# Patient Record
Sex: FEMALE | Race: WHITE | NOT HISPANIC OR LATINO
[De-identification: names, ages, dates, MRNs, and addresses within clinical notes are randomized per-mention and may not be internally consistent; named-entity substitution may affect disease eponyms.]

---

## 2017-01-12 ENCOUNTER — APPOINTMENT (OUTPATIENT)
Dept: UROGYNECOLOGY | Facility: CLINIC | Age: 82
End: 2017-01-12

## 2017-01-18 ENCOUNTER — APPOINTMENT (OUTPATIENT)
Dept: RHEUMATOLOGY | Facility: CLINIC | Age: 82
End: 2017-01-18

## 2017-01-18 ENCOUNTER — APPOINTMENT (OUTPATIENT)
Dept: RADIOLOGY | Facility: CLINIC | Age: 82
End: 2017-01-18

## 2017-01-18 ENCOUNTER — APPOINTMENT (OUTPATIENT)
Dept: INTERNAL MEDICINE | Facility: CLINIC | Age: 82
End: 2017-01-18

## 2017-01-18 ENCOUNTER — OUTPATIENT (OUTPATIENT)
Dept: OUTPATIENT SERVICES | Facility: HOSPITAL | Age: 82
LOS: 1 days | End: 2017-01-18
Payer: MEDICARE

## 2017-01-18 VITALS
DIASTOLIC BLOOD PRESSURE: 90 MMHG | HEIGHT: 62 IN | BODY MASS INDEX: 17.85 KG/M2 | WEIGHT: 97 LBS | SYSTOLIC BLOOD PRESSURE: 150 MMHG

## 2017-01-18 VITALS
BODY MASS INDEX: 17.48 KG/M2 | OXYGEN SATURATION: 98 % | HEIGHT: 62 IN | TEMPERATURE: 97.6 F | WEIGHT: 95 LBS | DIASTOLIC BLOOD PRESSURE: 100 MMHG | HEART RATE: 108 BPM | SYSTOLIC BLOOD PRESSURE: 150 MMHG

## 2017-01-18 DIAGNOSIS — Z00.00 ENCOUNTER FOR GENERAL ADULT MEDICAL EXAMINATION W/OUT ABNORMAL FINDINGS: ICD-10-CM

## 2017-01-18 DIAGNOSIS — Z00.8 ENCOUNTER FOR OTHER GENERAL EXAMINATION: ICD-10-CM

## 2017-01-18 PROCEDURE — 73130 X-RAY EXAM OF HAND: CPT

## 2017-01-18 PROCEDURE — 73110 X-RAY EXAM OF WRIST: CPT

## 2017-01-19 LAB
ALBUMIN SERPL ELPH-MCNC: 4 G/DL
ALP BLD-CCNC: 108 U/L
ALT SERPL-CCNC: 31 U/L
ANION GAP SERPL CALC-SCNC: 15 MMOL/L
AST SERPL-CCNC: 24 U/L
BASOPHILS # BLD AUTO: 0.02 K/UL
BASOPHILS NFR BLD AUTO: 0.3 %
BILIRUB SERPL-MCNC: 0.4 MG/DL
BUN SERPL-MCNC: 23 MG/DL
CALCIUM SERPL-MCNC: 10.5 MG/DL
CCP AB SER IA-ACNC: <8 UNITS
CHLORIDE SERPL-SCNC: 99 MMOL/L
CO2 SERPL-SCNC: 25 MMOL/L
CREAT SERPL-MCNC: 0.77 MG/DL
CRP SERPL-MCNC: 1.08 MG/DL
EOSINOPHIL # BLD AUTO: 0.21 K/UL
EOSINOPHIL NFR BLD AUTO: 2.7 %
ERYTHROCYTE [SEDIMENTATION RATE] IN BLOOD BY WESTERGREN METHOD: 39 MM/HR
GLUCOSE SERPL-MCNC: 203 MG/DL
HCT VFR BLD CALC: 43.9 %
HGB BLD-MCNC: 13.8 G/DL
IMM GRANULOCYTES NFR BLD AUTO: 0.4 %
LYMPHOCYTES # BLD AUTO: 1.83 K/UL
LYMPHOCYTES NFR BLD AUTO: 23.5 %
MAN DIFF?: NORMAL
MCHC RBC-ENTMCNC: 27.7 PG
MCHC RBC-ENTMCNC: 31.4 GM/DL
MCV RBC AUTO: 88.2 FL
MONOCYTES # BLD AUTO: 0.86 K/UL
MONOCYTES NFR BLD AUTO: 11.1 %
NEUTROPHILS # BLD AUTO: 4.83 K/UL
NEUTROPHILS NFR BLD AUTO: 62 %
PLATELET # BLD AUTO: 255 K/UL
POTASSIUM SERPL-SCNC: 4.3 MMOL/L
PROT SERPL-MCNC: 8.3 G/DL
RBC # BLD: 4.98 M/UL
RBC # FLD: 13.2 %
RF+CCP IGG SER-IMP: NEGATIVE
RHEUMATOID FACT SER QL: 13.4 IU/ML
SODIUM SERPL-SCNC: 139 MMOL/L
WBC # FLD AUTO: 7.78 K/UL

## 2017-01-21 LAB
HBA1C MFR BLD HPLC: 8.7 %
T4 SERPL-MCNC: 9.2 UG/DL
TSH SERPL-ACNC: 2.48 UIU/ML

## 2017-01-23 ENCOUNTER — CLINICAL ADVICE (OUTPATIENT)
Age: 82
End: 2017-01-23

## 2017-01-24 ENCOUNTER — APPOINTMENT (OUTPATIENT)
Dept: UROGYNECOLOGY | Facility: CLINIC | Age: 82
End: 2017-01-24

## 2017-02-02 ENCOUNTER — APPOINTMENT (OUTPATIENT)
Dept: INTERNAL MEDICINE | Facility: CLINIC | Age: 82
End: 2017-02-02

## 2017-02-13 ENCOUNTER — APPOINTMENT (OUTPATIENT)
Dept: ENDOCRINOLOGY | Facility: CLINIC | Age: 82
End: 2017-02-13

## 2017-02-13 VITALS
SYSTOLIC BLOOD PRESSURE: 150 MMHG | BODY MASS INDEX: 17.66 KG/M2 | WEIGHT: 96 LBS | OXYGEN SATURATION: 99 % | DIASTOLIC BLOOD PRESSURE: 90 MMHG | HEART RATE: 109 BPM | HEIGHT: 62 IN

## 2017-02-13 RX ORDER — DENOSUMAB 60 MG/ML
60 INJECTION SUBCUTANEOUS
Qty: 1 | Refills: 0 | Status: COMPLETED | OUTPATIENT
Start: 2017-02-13

## 2017-02-13 RX ADMIN — DENOSUMAB 0 MG/ML: 60 INJECTION SUBCUTANEOUS at 00:00

## 2017-03-07 ENCOUNTER — APPOINTMENT (OUTPATIENT)
Dept: UROGYNECOLOGY | Facility: CLINIC | Age: 82
End: 2017-03-07

## 2017-03-17 ENCOUNTER — MEDICATION RENEWAL (OUTPATIENT)
Age: 82
End: 2017-03-17

## 2017-03-30 ENCOUNTER — APPOINTMENT (OUTPATIENT)
Dept: RHEUMATOLOGY | Facility: CLINIC | Age: 82
End: 2017-03-30

## 2017-03-30 ENCOUNTER — APPOINTMENT (OUTPATIENT)
Dept: INTERNAL MEDICINE | Facility: CLINIC | Age: 82
End: 2017-03-30

## 2017-03-30 VITALS
OXYGEN SATURATION: 98 % | TEMPERATURE: 97.9 F | BODY MASS INDEX: 17.66 KG/M2 | WEIGHT: 96 LBS | HEIGHT: 62 IN | DIASTOLIC BLOOD PRESSURE: 90 MMHG | HEART RATE: 108 BPM | SYSTOLIC BLOOD PRESSURE: 160 MMHG

## 2017-04-20 ENCOUNTER — MESSAGE (OUTPATIENT)
Age: 82
End: 2017-04-20

## 2017-04-21 ENCOUNTER — APPOINTMENT (OUTPATIENT)
Dept: ENDOCRINOLOGY | Facility: CLINIC | Age: 82
End: 2017-04-21

## 2017-04-21 ENCOUNTER — EMERGENCY (EMERGENCY)
Facility: HOSPITAL | Age: 82
LOS: 1 days | Discharge: HOME | End: 2017-04-21
Attending: EMERGENCY MEDICINE | Admitting: EMERGENCY MEDICINE
Payer: MEDICARE

## 2017-04-21 VITALS
HEIGHT: 62 IN | OXYGEN SATURATION: 98 % | SYSTOLIC BLOOD PRESSURE: 210 MMHG | HEART RATE: 113 BPM | BODY MASS INDEX: 18.03 KG/M2 | WEIGHT: 98 LBS | DIASTOLIC BLOOD PRESSURE: 100 MMHG

## 2017-04-21 VITALS
RESPIRATION RATE: 18 BRPM | SYSTOLIC BLOOD PRESSURE: 185 MMHG | DIASTOLIC BLOOD PRESSURE: 91 MMHG | OXYGEN SATURATION: 98 % | HEART RATE: 98 BPM

## 2017-04-21 DIAGNOSIS — R29.810 FACIAL WEAKNESS: ICD-10-CM

## 2017-04-21 LAB
ALBUMIN SERPL ELPH-MCNC: 3.6 G/DL — SIGNIFICANT CHANGE UP (ref 3.3–5)
ALP SERPL-CCNC: 73 U/L — SIGNIFICANT CHANGE UP (ref 40–120)
ALT FLD-CCNC: 23 U/L RC — SIGNIFICANT CHANGE UP (ref 10–45)
ANION GAP SERPL CALC-SCNC: 13 MMOL/L — SIGNIFICANT CHANGE UP (ref 5–17)
APTT BLD: 28.4 SEC — SIGNIFICANT CHANGE UP (ref 27.5–37.4)
AST SERPL-CCNC: 19 U/L — SIGNIFICANT CHANGE UP (ref 10–40)
BASOPHILS # BLD AUTO: 0 K/UL — SIGNIFICANT CHANGE UP (ref 0–0.2)
BASOPHILS NFR BLD AUTO: 0.1 % — SIGNIFICANT CHANGE UP (ref 0–2)
BILIRUB SERPL-MCNC: 0.4 MG/DL — SIGNIFICANT CHANGE UP (ref 0.2–1.2)
BUN SERPL-MCNC: 19 MG/DL — SIGNIFICANT CHANGE UP (ref 7–23)
CALCIUM SERPL-MCNC: 9.7 MG/DL — SIGNIFICANT CHANGE UP (ref 8.4–10.5)
CHLORIDE SERPL-SCNC: 101 MMOL/L — SIGNIFICANT CHANGE UP (ref 96–108)
CO2 SERPL-SCNC: 24 MMOL/L — SIGNIFICANT CHANGE UP (ref 22–31)
CREAT SERPL-MCNC: 0.73 MG/DL — SIGNIFICANT CHANGE UP (ref 0.5–1.3)
EOSINOPHIL # BLD AUTO: 0.1 K/UL — SIGNIFICANT CHANGE UP (ref 0–0.5)
EOSINOPHIL NFR BLD AUTO: 1.7 % — SIGNIFICANT CHANGE UP (ref 0–6)
GAS PNL BLDV: SIGNIFICANT CHANGE UP
GLUCOSE BLDC GLUCOMTR-MCNC: 251
GLUCOSE SERPL-MCNC: 268 MG/DL — HIGH (ref 70–99)
HBA1C MFR BLD HPLC: 7.9
HCT VFR BLD CALC: 39.7 % — SIGNIFICANT CHANGE UP (ref 34.5–45)
HGB BLD-MCNC: 13 G/DL — SIGNIFICANT CHANGE UP (ref 11.5–15.5)
INR BLD: 1.16 RATIO — SIGNIFICANT CHANGE UP (ref 0.88–1.16)
LYMPHOCYTES # BLD AUTO: 1.6 K/UL — SIGNIFICANT CHANGE UP (ref 1–3.3)
LYMPHOCYTES # BLD AUTO: 24.6 % — SIGNIFICANT CHANGE UP (ref 13–44)
MCHC RBC-ENTMCNC: 28.2 PG — SIGNIFICANT CHANGE UP (ref 27–34)
MCHC RBC-ENTMCNC: 32.8 GM/DL — SIGNIFICANT CHANGE UP (ref 32–36)
MCV RBC AUTO: 85.9 FL — SIGNIFICANT CHANGE UP (ref 80–100)
MONOCYTES # BLD AUTO: 0.7 K/UL — SIGNIFICANT CHANGE UP (ref 0–0.9)
MONOCYTES NFR BLD AUTO: 9.8 % — SIGNIFICANT CHANGE UP (ref 2–14)
NEUTROPHILS # BLD AUTO: 4.3 K/UL — SIGNIFICANT CHANGE UP (ref 1.8–7.4)
NEUTROPHILS NFR BLD AUTO: 63.8 % — SIGNIFICANT CHANGE UP (ref 43–77)
PLATELET # BLD AUTO: 233 K/UL — SIGNIFICANT CHANGE UP (ref 150–400)
POTASSIUM SERPL-MCNC: 4.2 MMOL/L — SIGNIFICANT CHANGE UP (ref 3.5–5.3)
POTASSIUM SERPL-SCNC: 4.2 MMOL/L — SIGNIFICANT CHANGE UP (ref 3.5–5.3)
PROT SERPL-MCNC: 8.1 G/DL — SIGNIFICANT CHANGE UP (ref 6–8.3)
PROTHROM AB SERPL-ACNC: 12.6 SEC — SIGNIFICANT CHANGE UP (ref 9.8–12.7)
RBC # BLD: 4.63 M/UL — SIGNIFICANT CHANGE UP (ref 3.8–5.2)
RBC # FLD: 11.9 % — SIGNIFICANT CHANGE UP (ref 10.3–14.5)
SODIUM SERPL-SCNC: 138 MMOL/L — SIGNIFICANT CHANGE UP (ref 135–145)
URATE SERPL-MCNC: 4 MG/DL — SIGNIFICANT CHANGE UP (ref 2.5–7)
WBC # BLD: 6.7 K/UL — SIGNIFICANT CHANGE UP (ref 3.8–10.5)
WBC # FLD AUTO: 6.7 K/UL — SIGNIFICANT CHANGE UP (ref 3.8–10.5)

## 2017-04-21 PROCEDURE — 99284 EMERGENCY DEPT VISIT MOD MDM: CPT

## 2017-04-21 PROCEDURE — 99220: CPT

## 2017-04-21 PROCEDURE — 70450 CT HEAD/BRAIN W/O DYE: CPT | Mod: 26

## 2017-04-21 RX ORDER — LABETALOL HCL 100 MG
10 TABLET ORAL ONCE
Qty: 0 | Refills: 0 | Status: COMPLETED | OUTPATIENT
Start: 2017-04-21 | End: 2017-04-21

## 2017-04-21 RX ORDER — CEFAZOLIN SODIUM 1 G
1000 VIAL (EA) INJECTION ONCE
Qty: 0 | Refills: 0 | Status: COMPLETED | OUTPATIENT
Start: 2017-04-21 | End: 2017-04-21

## 2017-04-21 RX ORDER — ACETAMINOPHEN 500 MG
1000 TABLET ORAL ONCE
Qty: 0 | Refills: 0 | Status: DISCONTINUED | OUTPATIENT
Start: 2017-04-21 | End: 2017-04-25

## 2017-04-21 RX ORDER — SODIUM CHLORIDE 9 MG/ML
1000 INJECTION INTRAMUSCULAR; INTRAVENOUS; SUBCUTANEOUS ONCE
Qty: 0 | Refills: 0 | Status: COMPLETED | OUTPATIENT
Start: 2017-04-21 | End: 2017-04-21

## 2017-04-21 RX ORDER — KETOROLAC TROMETHAMINE 30 MG/ML
30 SYRINGE (ML) INJECTION ONCE
Qty: 0 | Refills: 0 | Status: DISCONTINUED | OUTPATIENT
Start: 2017-04-21 | End: 2017-04-21

## 2017-04-21 RX ORDER — INSULIN LISPRO 100/ML
10 VIAL (ML) SUBCUTANEOUS ONCE
Qty: 0 | Refills: 0 | Status: COMPLETED | OUTPATIENT
Start: 2017-04-21 | End: 2017-04-21

## 2017-04-21 RX ADMIN — SODIUM CHLORIDE 1000 MILLILITER(S): 9 INJECTION INTRAMUSCULAR; INTRAVENOUS; SUBCUTANEOUS at 22:40

## 2017-04-21 RX ADMIN — Medication 30 MILLIGRAM(S): at 22:52

## 2017-04-21 RX ADMIN — Medication 10 MILLIGRAM(S): at 19:12

## 2017-04-21 RX ADMIN — Medication 100 MILLIGRAM(S): at 22:40

## 2017-04-21 RX ADMIN — Medication 10 UNIT(S): at 23:37

## 2017-04-21 NOTE — ED CDU PROVIDER NOTE - PROGRESS NOTE DETAILS
Pt sleeping. NAD. No complaints. VSS. Continue to monitor. -SHWETA Barker Pt resting comfortably. NAD. No complaints. VSS. -PA Nieves Barker CDU NOTE SHWETA BRUNSON: Pt resting comfortably, feeling well without complaint. pt states hand swelling is improved, and warmth of hand resolved. NAD, VSS. R hand: + swelling, erythema of dorsal hand, no ttp, no warmth compared to L hand. pt to get MRI hand today. **CDU ATTENDING ADDENDUM/ATTESTATION (Dr. Don Matias): I have personally performed a face-to-face diagnostic evaluation on this patient. I attest that I have reviewed and formulated the diagnostic and therapeutic management plan in collaboration with the advanced practitioner (PA, NP). I agree with the history, physical exam, and plan of care as documented in my note and in that of the advanced practitioner (PA, NP), except where indicated. Of note, and in addition to the above, patient was evaluated by plastics/hand surgery attending Sintia earlier this AM prior to my arrival. Believes patient more likely has cellulitis, possibly other inflammatory condition (e.g. gout or osteoarthritis) and does not demonstrate evidence of acute osteomyelitis at this time. My examination reveals NO pain with range of motion of the joint, NO lymphangitic spread, and NO evidence of acute vascular, compartment, or other worrisome infectious etiology (e.g. necrotizing fasciitis). Considered MRSA as possible cause for cellulitis, but as patient's cellulitis appears to be improving. and as patient is scheduled to have close outpatient followup with Sintia, agree with continuation of previous antibiotics as prescribed at this time (cephazolin IV conversion to cephalexin PO). XR and other ED diagnostics reviewed at this time. XR without evidence of osteomyelitis. Based on the previous diagnostics, Dr. Patricio's recommendations (questioned need for advanced imaging), and my physical examination, agree with deferring MRI at this time for close outpatient followup. Anticipatory guidance provided. Patient appears STABLE for discharge at this time. CDU NOTE SHWETA BRUNSON: case and plan discussed with Dr. Matias, pt stable for d/c with antibiotics, outpt f/u with hand specialist and outpt MRI. **ATTENDING ADDENDUM (Dr. Don Matias): patient evaluated by neurology team (including attending Chano) at this time. NO adjuncts (medications) required at this time for Bell's palsy, given cellulitis. **CDU ATTENDING ADDENDUM/ATTESTATION (Dr. Don Matias): I have personally performed a face-to-face diagnostic evaluation on this patient. I attest that I have reviewed and formulated the diagnostic and therapeutic management plan in collaboration with the advanced practitioner (PA, NP). I agree with the history, physical exam, and plan of care as documented in my note and in that of the advanced practitioner (PA, NP), except where indicated. Of note, and in addition to the above, patient was evaluated by plastics/hand surgery attending Sintia earlier this AM prior to my arrival. Believes patient more likely has cellulitis, possibly other inflammatory condition (e.g. gout or osteoarthritis) and does not demonstrate evidence of acute osteomyelitis at this time. My examination reveals NO pain with range of motion of the joint, NO lymphangitic spread, and NO evidence of acute vascular, compartment, or other worrisome infectious etiology (e.g. necrotizing fasciitis). POSITIVE erythema of the dorsum of the hand. POSITIVE slight restriction of right wrist range of motion, but patient reports old history of prior wrist fracture that was treated with closed reduction and casting in the past, and current range of motion is at baseline with previous range of motion as noted. NO history of internal hardware. Considered MRSA as possible cause for cellulitis, but as patient's cellulitis appears to be improving. and as patient is scheduled to have close outpatient followup with Sintia, agree with continuation of previous antibiotics as prescribed at this time (cephazolin IV conversion to cephalexin PO). XR and other ED diagnostics reviewed at this time. XR without evidence of osteomyelitis. Based on the previous diagnostics, Dr. Patricio's recommendations (questioned need for advanced imaging), and my physical examination, agree with deferring MRI at this time for close outpatient followup. Anticipatory guidance provided. Patient appears STABLE for discharge at this time. CDU NOTE SHWETA BRUNSON: neuro saw pt re: bells palsy, nothing to do, ok to d/c, will f/u outpt. case and plan discussed with Dr. Matias; pt stable for d/c with antibiotics for cellulitis/UTI and outpt f/u.

## 2017-04-21 NOTE — ED CDU PROVIDER NOTE - OBJECTIVE STATEMENT
83yof pmhx of HTN HLD DM, currently in w/u for RA with Rheumatologist, sent from Endocrine office for further management of BP and R wrist redness and swelling. As per daughter, pt has had left lower facial droop for several days- possibly a week and noticed that bp has been uncontrolled. Pt denies any shea, visual changes, paresthesias or focal weakness, ab pain, n/v/d, cp,sob. As per daughter, pt is complaint with medications.no aggravating/ alleviating factors. No recent trauma/falls  .Rest of ros is otherwise neg. Recent change in insulin (increased humalog to 24units with breakfast and 12units with dinner). Pt seen and eval by neuro- dx with [place] palsy with outpt workup. In Cdu for pain control, abx and blood glucose monitoring.

## 2017-04-21 NOTE — ED ADULT NURSE REASSESSMENT NOTE - NS ED NURSE REASSESS COMMENT FT1
Received pt from REID Carpenter , received pt alert and responsive, oriented x4, denies any respiratory distress, SOB, or difficulty breathing. Pt transferred to CDU for observation for R hand redness/ swelling, bell palsy. Received pt with IV antibiotic and fluids infusing as ordered, pt tolerating well.  IV in place, patent and free of signs of infiltration, pt denies chest pain or palpitations, V/S stable, PA aware of BP. pt afebrile, pt denies pain at this time. Pt educated on unit and unit rules, instructed patient to notify RN of any needed assistance, Pt verbalizes understanding, Call bell placed within reach. Safety maintained. Will continue to monitor. Family member at bedside. Will assist with ambulation as needed.

## 2017-04-21 NOTE — ED ADULT NURSE NOTE - OBJECTIVE STATEMENT
Pt presents to the ER A+Ox3 complaining of left eye droop, right facial droop (chronic; of unknown time of onset), hyperglycemia, bilateral hand swelling and redness, hypertension and bilateral leg pain. As per family, within last few weeks patient has been complaining of severe bilateral leg pain making it difficult to ambulate. Was seen by Endocrine outpatient MD today; sent in for further evaluation. Pt denies headache, dizziness, blurred vision. Pt presents to the ER A+Ox3 complaining of left eye droop, right facial droop (chronic; of unknown time of onset), hyperglycemia, bilateral hand swelling and redness, hypertension and bilateral leg pain. As per family, within last few weeks patient has been complaining of severe bilateral leg pain making it difficult to ambulate. Was seen by Endocrine outpatient MD today; sent in for further evaluation. Pt denies headache, dizziness, blurred vision.  2000 pt pending ct scan results/vss elev bp noted carissa/resident/family demands insulin on the pt and food/informed needed ct results and final dispo fr attending and neuro/gcruz  2212 pt continues demands insulin for the pt and to take care of her elev bp/pt was given food by family able to tolerate at this time/pending final dispo/gcruz

## 2017-04-21 NOTE — ED ADULT NURSE NOTE - PMH
Bladder disorder    Bladder Prolapse, Acquired    CVA (cerebrovascular accident)  1/2013  Diabetes mellitus type 2 in nonobese    Dry eyes    Dyslipidemia    History of pancreatitis  ' 2008  HTN (hypertension)

## 2017-04-21 NOTE — ED CDU PROVIDER NOTE - PHYSICAL EXAMINATION
baseline imbalance with gait  nuero: + left lower facial droop; R wrist dorsum aspect with redness, swelling and warmth. limited ROM due to pain. 2+ pulses; less than 2 sec cap refill.

## 2017-04-21 NOTE — ED PROVIDER NOTE - CARE PLAN
Principal Discharge DX:	CVA (cerebral vascular accident) Principal Discharge DX:	Bell palsy Principal Discharge DX:	Bell palsy  Secondary Diagnosis:	Cellulitis of right upper extremity

## 2017-04-21 NOTE — ED PROVIDER NOTE - OBJECTIVE STATEMENT
82 y/o F hx of dm 2 , htn,  sent from pcp office for further management. As per daughter, pt has had left lower facial droop for several days  and noticed that bp has been uncontrolled. Pt denies any shea, visual changes, paresthesias or focal weakness, ab pain, n/v/d, cp,sob. As per daughter, pt is complaint with medications.no aggravating/ alleviating factors. No recent trauma/falls  .Rest of ros is otherwise neg.

## 2017-04-21 NOTE — ED CDU PROVIDER NOTE - PSH
Bladder Prolapse, Acquired  ' 2013;  Insertion Pessary  S/P laparotomy  initially to remove pancreatic mass but did not visualize mass and closed: ' 2008

## 2017-04-21 NOTE — ED PROVIDER NOTE - MEDICAL DECISION MAKING DETAILS
84 y/o F hx of dm 2 , htn, p/w  left lower facial droop for several days associated with persistnet high blood pressure and elevated sugars. PE + left lower facial droop, rest of neuro exam otherwise unremarkable. High suspicion for stroke will admit neuro. Plan to obtain labs, cth, ua and admit 82 y/o F hx of dm 2 , htn, p/w  left lower facial droop for several days associated with persistnet high blood pressure and elevated sugars. PE + left lower facial droop, rest of neuro exam otherwise unremarkable. High suspicion for stroke will call t neuro. Plan to obtain labs, cth, ua and admit ZR 84 y/o F hx of dm 2 , htn, p/w  left lower facial droop for several days associated with persistent high blood pressure and elevated sugars. she also co swelling and redness of her tr hand and wrist ,for couple of days  ,apparently has been seen by  rheumatologist dr Lawrence who is working her  up for RA and was told what she will need an MRI of her hand ,no fever ,no chills    PE + left lower facial droop, rest of neuro exam otherwise unremarkable. rt hand red swollen and hot to touch with  osteoarthritic changes  Will obtain blood work sed rate ,CRP xray of the hand   neuro cons to ro Lewis and reeval    ZR

## 2017-04-21 NOTE — ED CDU PROVIDER NOTE - PLAN OF CARE
1. Follow up with your PMD within 48-72hours.   2. Rest and elevate affected area. Take Motrin 600mg every 8 hours with food for pain. Complete the course of antibiotics. Follow up with your rheumatologist and endocrine doctors.   3. Any worsening redness, swelling, streaking (red lines), fever, chills retrun to ER 1. Follow up with your PMD and neurologist or our neurology clinic ([phone]) within 48-72hours. Follow up with Dr. Patricio, hand specialist, in   2. Rest and elevate affected area. Take Motrin 600mg every 8 hours with food as needed for pain. Take Keflex 500mg every 6 hours x 10 days, this will treat your skin infection and urine infection. Follow up with your rheumatologist, and endocrine doctors.   3. Any worsening redness, swelling, streaking (red lines), fever, chills return to ER 1. Follow up with your PMD and neurologist or our neurology clinic ([phone]) within 48-72hours. Follow up with Dr. Patricio, hand specialist, in 1 week, [phone].  2. Rest and elevate affected area. Take Motrin 600mg every 8 hours with food as needed for pain. Take Keflex 500mg every 6 hours x 10 days, this will treat your skin infection and urine infection. Follow up with your rheumatologist, and endocrine doctors.   3. Any worsening redness, swelling, streaking (red lines), fever, chills return to ER

## 2017-04-21 NOTE — ED PROVIDER NOTE - PROGRESS NOTE DETAILS
seen by neuro pt probably have [place] palsy ,follow up as out patient will call sima for hand dr terrazas will see in am in cdu dr Sasson called case discussed will see pt tomorrow in CDU

## 2017-04-21 NOTE — ED CDU PROVIDER NOTE - MEDICAL DECISION MAKING DETAILS
[place] palsy ,rt hand swelling ,redness and warmth ,? hx RA vs osteoarthritis ro gout will obtain sed rate ,crp MRI tomorrow am ,consultation tomorrow am with dr Patricio hand specialist ,antibiotics and reeval tomorrow ,Seen by neuro in ED for [place] palsy no treatment at present ZR

## 2017-04-22 VITALS
RESPIRATION RATE: 17 BRPM | OXYGEN SATURATION: 99 % | HEART RATE: 98 BPM | TEMPERATURE: 98 F | SYSTOLIC BLOOD PRESSURE: 152 MMHG | DIASTOLIC BLOOD PRESSURE: 66 MMHG

## 2017-04-22 LAB
APPEARANCE UR: CLEAR — SIGNIFICANT CHANGE UP
BILIRUB UR-MCNC: NEGATIVE — SIGNIFICANT CHANGE UP
COLOR SPEC: YELLOW — SIGNIFICANT CHANGE UP
CRP SERPL-MCNC: 1.5 MG/DL — HIGH (ref 0–0.4)
DIFF PNL FLD: ABNORMAL
ERYTHROCYTE [SEDIMENTATION RATE] IN BLOOD: 32 MM/HR — HIGH (ref 0–20)
GLUCOSE UR QL: >1000
KETONES UR-MCNC: NEGATIVE — SIGNIFICANT CHANGE UP
LEUKOCYTE ESTERASE UR-ACNC: ABNORMAL
NITRITE UR-MCNC: NEGATIVE — SIGNIFICANT CHANGE UP
PH UR: 6 — SIGNIFICANT CHANGE UP (ref 5–8)
PROT UR-MCNC: SIGNIFICANT CHANGE UP
SP GR SPEC: 1.03 — HIGH (ref 1.01–1.02)
UROBILINOGEN FLD QL: NEGATIVE — SIGNIFICANT CHANGE UP

## 2017-04-22 PROCEDURE — 84132 ASSAY OF SERUM POTASSIUM: CPT

## 2017-04-22 PROCEDURE — 85014 HEMATOCRIT: CPT

## 2017-04-22 PROCEDURE — 70450 CT HEAD/BRAIN W/O DYE: CPT

## 2017-04-22 PROCEDURE — 82803 BLOOD GASES ANY COMBINATION: CPT

## 2017-04-22 PROCEDURE — 85027 COMPLETE CBC AUTOMATED: CPT

## 2017-04-22 PROCEDURE — 83605 ASSAY OF LACTIC ACID: CPT

## 2017-04-22 PROCEDURE — 96374 THER/PROPH/DIAG INJ IV PUSH: CPT

## 2017-04-22 PROCEDURE — 86140 C-REACTIVE PROTEIN: CPT

## 2017-04-22 PROCEDURE — 80053 COMPREHEN METABOLIC PANEL: CPT

## 2017-04-22 PROCEDURE — 85610 PROTHROMBIN TIME: CPT

## 2017-04-22 PROCEDURE — 82435 ASSAY OF BLOOD CHLORIDE: CPT

## 2017-04-22 PROCEDURE — 84295 ASSAY OF SERUM SODIUM: CPT

## 2017-04-22 PROCEDURE — 99284 EMERGENCY DEPT VISIT MOD MDM: CPT | Mod: 25

## 2017-04-22 PROCEDURE — 96372 THER/PROPH/DIAG INJ SC/IM: CPT | Mod: XU

## 2017-04-22 PROCEDURE — 85730 THROMBOPLASTIN TIME PARTIAL: CPT

## 2017-04-22 PROCEDURE — 99217: CPT

## 2017-04-22 PROCEDURE — 81001 URINALYSIS AUTO W/SCOPE: CPT

## 2017-04-22 PROCEDURE — 84550 ASSAY OF BLOOD/URIC ACID: CPT

## 2017-04-22 PROCEDURE — 73110 X-RAY EXAM OF WRIST: CPT

## 2017-04-22 PROCEDURE — 82330 ASSAY OF CALCIUM: CPT

## 2017-04-22 PROCEDURE — 73110 X-RAY EXAM OF WRIST: CPT | Mod: 26,RT

## 2017-04-22 PROCEDURE — 96376 TX/PRO/DX INJ SAME DRUG ADON: CPT

## 2017-04-22 PROCEDURE — 96375 TX/PRO/DX INJ NEW DRUG ADDON: CPT

## 2017-04-22 PROCEDURE — 85652 RBC SED RATE AUTOMATED: CPT

## 2017-04-22 PROCEDURE — 82947 ASSAY GLUCOSE BLOOD QUANT: CPT

## 2017-04-22 PROCEDURE — G0378: CPT

## 2017-04-22 RX ORDER — CEFAZOLIN SODIUM 1 G
1000 VIAL (EA) INJECTION EVERY 8 HOURS
Qty: 0 | Refills: 0 | Status: DISCONTINUED | OUTPATIENT
Start: 2017-04-22 | End: 2017-04-25

## 2017-04-22 RX ORDER — AMLODIPINE BESYLATE 2.5 MG/1
10 TABLET ORAL DAILY
Qty: 0 | Refills: 0 | Status: DISCONTINUED | OUTPATIENT
Start: 2017-04-22 | End: 2017-04-25

## 2017-04-22 RX ORDER — INSULIN LISPRO 100/ML
22 VIAL (ML) SUBCUTANEOUS
Qty: 0 | Refills: 0 | Status: DISCONTINUED | OUTPATIENT
Start: 2017-04-22 | End: 2017-04-25

## 2017-04-22 RX ORDER — ATORVASTATIN CALCIUM 80 MG/1
20 TABLET, FILM COATED ORAL DAILY
Qty: 0 | Refills: 0 | Status: DISCONTINUED | OUTPATIENT
Start: 2017-04-22 | End: 2017-04-25

## 2017-04-22 RX ORDER — CEPHALEXIN 500 MG
1 CAPSULE ORAL
Qty: 40 | Refills: 0 | OUTPATIENT
Start: 2017-04-22 | End: 2017-05-02

## 2017-04-22 RX ORDER — ASPIRIN/CALCIUM CARB/MAGNESIUM 324 MG
81 TABLET ORAL DAILY
Qty: 0 | Refills: 0 | Status: DISCONTINUED | OUTPATIENT
Start: 2017-04-22 | End: 2017-04-25

## 2017-04-22 RX ADMIN — Medication 100 MILLIGRAM(S): at 05:58

## 2017-04-22 RX ADMIN — AMLODIPINE BESYLATE 10 MILLIGRAM(S): 2.5 TABLET ORAL at 05:58

## 2017-04-22 RX ADMIN — Medication 20 MILLIGRAM(S): at 05:58

## 2017-04-24 ENCOUNTER — APPOINTMENT (OUTPATIENT)
Dept: INTERNAL MEDICINE | Facility: CLINIC | Age: 82
End: 2017-04-24

## 2017-04-24 VITALS
SYSTOLIC BLOOD PRESSURE: 160 MMHG | DIASTOLIC BLOOD PRESSURE: 90 MMHG | HEIGHT: 62 IN | HEART RATE: 101 BPM | BODY MASS INDEX: 18.03 KG/M2 | WEIGHT: 98 LBS

## 2017-04-24 VITALS — SYSTOLIC BLOOD PRESSURE: 154 MMHG | DIASTOLIC BLOOD PRESSURE: 80 MMHG

## 2017-04-24 DIAGNOSIS — M25.531 PAIN IN RIGHT WRIST: ICD-10-CM

## 2017-04-24 DIAGNOSIS — L03.113 CELLULITIS OF RIGHT UPPER LIMB: ICD-10-CM

## 2017-04-24 DIAGNOSIS — G51.0 BELL'S PALSY: ICD-10-CM

## 2017-04-24 DIAGNOSIS — G89.29 PAIN IN RIGHT WRIST: ICD-10-CM

## 2017-05-15 ENCOUNTER — APPOINTMENT (OUTPATIENT)
Dept: ENDOCRINOLOGY | Facility: CLINIC | Age: 82
End: 2017-05-15

## 2017-05-15 VITALS — WEIGHT: 98.4 LBS | BODY MASS INDEX: 18 KG/M2

## 2017-05-24 ENCOUNTER — MEDICATION RENEWAL (OUTPATIENT)
Age: 82
End: 2017-05-24

## 2017-06-06 ENCOUNTER — RX RENEWAL (OUTPATIENT)
Age: 82
End: 2017-06-06

## 2017-06-06 ENCOUNTER — APPOINTMENT (OUTPATIENT)
Dept: UROGYNECOLOGY | Facility: CLINIC | Age: 82
End: 2017-06-06

## 2017-06-15 ENCOUNTER — APPOINTMENT (OUTPATIENT)
Dept: UROGYNECOLOGY | Facility: CLINIC | Age: 82
End: 2017-06-15

## 2017-07-10 ENCOUNTER — MEDICATION RENEWAL (OUTPATIENT)
Age: 82
End: 2017-07-10

## 2017-08-14 ENCOUNTER — APPOINTMENT (OUTPATIENT)
Dept: ENDOCRINOLOGY | Facility: CLINIC | Age: 82
End: 2017-08-14

## 2017-08-14 ENCOUNTER — APPOINTMENT (OUTPATIENT)
Dept: UROGYNECOLOGY | Facility: CLINIC | Age: 82
End: 2017-08-14

## 2017-08-18 ENCOUNTER — APPOINTMENT (OUTPATIENT)
Dept: UROGYNECOLOGY | Facility: CLINIC | Age: 82
End: 2017-08-18
Payer: MEDICARE

## 2017-08-18 PROCEDURE — 99213 OFFICE O/P EST LOW 20 MIN: CPT

## 2017-08-28 ENCOUNTER — APPOINTMENT (OUTPATIENT)
Dept: UROGYNECOLOGY | Facility: CLINIC | Age: 82
End: 2017-08-28
Payer: MEDICARE

## 2017-08-28 PROCEDURE — A4562: CPT

## 2017-08-28 PROCEDURE — 99214 OFFICE O/P EST MOD 30 MIN: CPT

## 2017-09-12 ENCOUNTER — APPOINTMENT (OUTPATIENT)
Dept: UROGYNECOLOGY | Facility: CLINIC | Age: 82
End: 2017-09-12
Payer: MEDICARE

## 2017-09-12 DIAGNOSIS — N86 EROSION AND ECTROPION OF CERVIX UTERI: ICD-10-CM

## 2017-09-12 DIAGNOSIS — N89.9 NONINFLAMMATORY DISORDER OF VAGINA, UNSPECIFIED: ICD-10-CM

## 2017-09-12 DIAGNOSIS — N89.8 OTHER SPECIFIED NONINFLAMMATORY DISORDERS OF VAGINA: ICD-10-CM

## 2017-09-12 PROCEDURE — 99213 OFFICE O/P EST LOW 20 MIN: CPT

## 2017-09-21 ENCOUNTER — APPOINTMENT (OUTPATIENT)
Dept: UROGYNECOLOGY | Facility: CLINIC | Age: 82
End: 2017-09-21
Payer: MEDICARE

## 2017-09-21 PROCEDURE — 99213 OFFICE O/P EST LOW 20 MIN: CPT

## 2017-10-23 ENCOUNTER — APPOINTMENT (OUTPATIENT)
Dept: UROGYNECOLOGY | Facility: CLINIC | Age: 82
End: 2017-10-23
Payer: MEDICARE

## 2017-10-23 PROCEDURE — 99213 OFFICE O/P EST LOW 20 MIN: CPT

## 2017-11-03 ENCOUNTER — APPOINTMENT (OUTPATIENT)
Dept: ENDOCRINOLOGY | Facility: CLINIC | Age: 82
End: 2017-11-03
Payer: MEDICARE

## 2017-11-03 VITALS
HEART RATE: 130 BPM | BODY MASS INDEX: 17.48 KG/M2 | HEIGHT: 62 IN | DIASTOLIC BLOOD PRESSURE: 90 MMHG | OXYGEN SATURATION: 98 % | SYSTOLIC BLOOD PRESSURE: 140 MMHG | WEIGHT: 95 LBS

## 2017-11-03 LAB
GLUCOSE BLDC GLUCOMTR-MCNC: 189
HBA1C MFR BLD HPLC: 7.2

## 2017-11-03 PROCEDURE — 99214 OFFICE O/P EST MOD 30 MIN: CPT | Mod: 25

## 2017-11-03 PROCEDURE — 96372 THER/PROPH/DIAG INJ SC/IM: CPT

## 2017-11-03 PROCEDURE — 82962 GLUCOSE BLOOD TEST: CPT

## 2017-11-03 PROCEDURE — 83036 HEMOGLOBIN GLYCOSYLATED A1C: CPT | Mod: QW

## 2017-11-03 RX ORDER — DENOSUMAB 60 MG/ML
60 INJECTION SUBCUTANEOUS
Qty: 0 | Refills: 0 | Status: COMPLETED | OUTPATIENT
Start: 2017-11-03

## 2017-11-03 RX ADMIN — DENOSUMAB 0 MG/ML: 60 INJECTION SUBCUTANEOUS at 00:00

## 2017-11-04 ENCOUNTER — MOBILE ON CALL (OUTPATIENT)
Age: 82
End: 2017-11-04

## 2017-11-06 ENCOUNTER — MOBILE ON CALL (OUTPATIENT)
Age: 82
End: 2017-11-06

## 2017-11-06 LAB
25(OH)D3 SERPL-MCNC: 64.9 NG/ML
ALBUMIN SERPL ELPH-MCNC: 3.3 G/DL
ALP BLD-CCNC: 81 U/L
ALT SERPL-CCNC: 17 U/L
ANION GAP SERPL CALC-SCNC: 14 MMOL/L
AST SERPL-CCNC: 18 U/L
BASOPHILS # BLD AUTO: 0.01 K/UL
BASOPHILS NFR BLD AUTO: 0.2 %
BILIRUB SERPL-MCNC: 0.3 MG/DL
BUN SERPL-MCNC: 32 MG/DL
CALCIUM SERPL-MCNC: 10.5 MG/DL
CALCIUM SERPL-MCNC: 10.5 MG/DL
CHLORIDE SERPL-SCNC: 103 MMOL/L
CHOLEST SERPL-MCNC: 99 MG/DL
CHOLEST/HDLC SERPL: 2.2 RATIO
CO2 SERPL-SCNC: 24 MMOL/L
CREAT SERPL-MCNC: 0.87 MG/DL
EOSINOPHIL # BLD AUTO: 0.14 K/UL
EOSINOPHIL NFR BLD AUTO: 2.5 %
GLUCOSE SERPL-MCNC: 124 MG/DL
HCT VFR BLD CALC: 33.9 %
HDLC SERPL-MCNC: 45 MG/DL
HGB BLD-MCNC: 10.5 G/DL
IMM GRANULOCYTES NFR BLD AUTO: 0.7 %
LDLC SERPL CALC-MCNC: 43 MG/DL
LYMPHOCYTES # BLD AUTO: 1.62 K/UL
LYMPHOCYTES NFR BLD AUTO: 29.1 %
MAN DIFF?: NORMAL
MCHC RBC-ENTMCNC: 25.5 PG
MCHC RBC-ENTMCNC: 31 GM/DL
MCV RBC AUTO: 82.3 FL
MONOCYTES # BLD AUTO: 0.42 K/UL
MONOCYTES NFR BLD AUTO: 7.5 %
NEUTROPHILS # BLD AUTO: 3.34 K/UL
NEUTROPHILS NFR BLD AUTO: 60 %
PARATHYROID HORMONE INTACT: 36 PG/ML
PHOSPHATE SERPL-MCNC: 2.9 MG/DL
PLATELET # BLD AUTO: 400 K/UL
POTASSIUM SERPL-SCNC: 4.5 MMOL/L
PROT SERPL-MCNC: 8 G/DL
RBC # BLD: 4.12 M/UL
RBC # FLD: 15.2 %
SODIUM SERPL-SCNC: 141 MMOL/L
T4 FREE SERPL-MCNC: 1.4 NG/DL
TRIGL SERPL-MCNC: 57 MG/DL
TSH SERPL-ACNC: 2.96 UIU/ML
WBC # FLD AUTO: 5.57 K/UL

## 2017-11-08 ENCOUNTER — FORM ENCOUNTER (OUTPATIENT)
Age: 82
End: 2017-11-08

## 2017-11-09 ENCOUNTER — INPATIENT (INPATIENT)
Facility: HOSPITAL | Age: 82
LOS: 2 days | Discharge: HOME | DRG: 303 | End: 2017-11-12
Attending: INTERNAL MEDICINE | Admitting: STUDENT IN AN ORGANIZED HEALTH CARE EDUCATION/TRAINING PROGRAM
Payer: MEDICARE

## 2017-11-09 ENCOUNTER — MOBILE ON CALL (OUTPATIENT)
Age: 82
End: 2017-11-09

## 2017-11-09 ENCOUNTER — OUTPATIENT (OUTPATIENT)
Dept: OUTPATIENT SERVICES | Facility: HOSPITAL | Age: 82
LOS: 1 days | End: 2017-11-09
Payer: MEDICARE

## 2017-11-09 ENCOUNTER — APPOINTMENT (OUTPATIENT)
Dept: INTERNAL MEDICINE | Facility: CLINIC | Age: 82
End: 2017-11-09

## 2017-11-09 ENCOUNTER — APPOINTMENT (OUTPATIENT)
Dept: ULTRASOUND IMAGING | Facility: HOSPITAL | Age: 82
End: 2017-11-09

## 2017-11-09 ENCOUNTER — APPOINTMENT (OUTPATIENT)
Dept: RHEUMATOLOGY | Facility: CLINIC | Age: 82
End: 2017-11-09
Payer: MEDICARE

## 2017-11-09 VITALS
SYSTOLIC BLOOD PRESSURE: 204 MMHG | OXYGEN SATURATION: 99 % | DIASTOLIC BLOOD PRESSURE: 99 MMHG | HEART RATE: 128 BPM | RESPIRATION RATE: 19 BRPM | TEMPERATURE: 99 F

## 2017-11-09 VITALS — DIASTOLIC BLOOD PRESSURE: 96 MMHG | SYSTOLIC BLOOD PRESSURE: 200 MMHG

## 2017-11-09 VITALS
HEIGHT: 62 IN | HEART RATE: 130 BPM | SYSTOLIC BLOOD PRESSURE: 190 MMHG | BODY MASS INDEX: 17.3 KG/M2 | TEMPERATURE: 97.9 F | OXYGEN SATURATION: 98 % | DIASTOLIC BLOOD PRESSURE: 95 MMHG | WEIGHT: 94 LBS | RESPIRATION RATE: 16 BRPM

## 2017-11-09 DIAGNOSIS — I10 ESSENTIAL (PRIMARY) HYPERTENSION: ICD-10-CM

## 2017-11-09 DIAGNOSIS — R60.9 EDEMA, UNSPECIFIED: ICD-10-CM

## 2017-11-09 DIAGNOSIS — N39.0 URINARY TRACT INFECTION, SITE NOT SPECIFIED: ICD-10-CM

## 2017-11-09 LAB
ALBUMIN SERPL ELPH-MCNC: 3.5 G/DL — SIGNIFICANT CHANGE UP (ref 3.3–5)
ALP SERPL-CCNC: 83 U/L — SIGNIFICANT CHANGE UP (ref 40–120)
ALT FLD-CCNC: 21 U/L RC — SIGNIFICANT CHANGE UP (ref 10–45)
ANION GAP SERPL CALC-SCNC: 12 MMOL/L — SIGNIFICANT CHANGE UP (ref 5–17)
APPEARANCE UR: CLEAR — SIGNIFICANT CHANGE UP
APTT BLD: 28.9 SEC — SIGNIFICANT CHANGE UP (ref 27.5–37.4)
AST SERPL-CCNC: 29 U/L — SIGNIFICANT CHANGE UP (ref 10–40)
BASOPHILS # BLD AUTO: 0 K/UL — SIGNIFICANT CHANGE UP (ref 0–0.2)
BASOPHILS NFR BLD AUTO: 0.1 % — SIGNIFICANT CHANGE UP (ref 0–2)
BILIRUB SERPL-MCNC: 0.3 MG/DL — SIGNIFICANT CHANGE UP (ref 0.2–1.2)
BILIRUB UR-MCNC: NEGATIVE — SIGNIFICANT CHANGE UP
BUN SERPL-MCNC: 22 MG/DL — SIGNIFICANT CHANGE UP (ref 7–23)
CALCIUM SERPL-MCNC: 9.5 MG/DL — SIGNIFICANT CHANGE UP (ref 8.4–10.5)
CHLORIDE SERPL-SCNC: 103 MMOL/L — SIGNIFICANT CHANGE UP (ref 96–108)
CO2 SERPL-SCNC: 23 MMOL/L — SIGNIFICANT CHANGE UP (ref 22–31)
COLOR SPEC: SIGNIFICANT CHANGE UP
CREAT SERPL-MCNC: 0.77 MG/DL — SIGNIFICANT CHANGE UP (ref 0.5–1.3)
DIFF PNL FLD: ABNORMAL
EOSINOPHIL # BLD AUTO: 0.1 K/UL — SIGNIFICANT CHANGE UP (ref 0–0.5)
EOSINOPHIL NFR BLD AUTO: 1 % — SIGNIFICANT CHANGE UP (ref 0–6)
GAS PNL BLDV: SIGNIFICANT CHANGE UP
GLUCOSE SERPL-MCNC: 183 MG/DL — HIGH (ref 70–99)
GLUCOSE UR QL: 500
HCT VFR BLD CALC: 34.8 % — SIGNIFICANT CHANGE UP (ref 34.5–45)
HGB BLD-MCNC: 11.3 G/DL — LOW (ref 11.5–15.5)
INR BLD: 1.15 RATIO — SIGNIFICANT CHANGE UP (ref 0.88–1.16)
KETONES UR-MCNC: NEGATIVE — SIGNIFICANT CHANGE UP
LEUKOCYTE ESTERASE UR-ACNC: ABNORMAL
LYMPHOCYTES # BLD AUTO: 1.7 K/UL — SIGNIFICANT CHANGE UP (ref 1–3.3)
LYMPHOCYTES # BLD AUTO: 26.5 % — SIGNIFICANT CHANGE UP (ref 13–44)
MCHC RBC-ENTMCNC: 27.2 PG — SIGNIFICANT CHANGE UP (ref 27–34)
MCHC RBC-ENTMCNC: 32.6 GM/DL — SIGNIFICANT CHANGE UP (ref 32–36)
MCV RBC AUTO: 83.5 FL — SIGNIFICANT CHANGE UP (ref 80–100)
MONOCYTES # BLD AUTO: 0.4 K/UL — SIGNIFICANT CHANGE UP (ref 0–0.9)
MONOCYTES NFR BLD AUTO: 5.4 % — SIGNIFICANT CHANGE UP (ref 2–14)
NEUTROPHILS # BLD AUTO: 4.4 K/UL — SIGNIFICANT CHANGE UP (ref 1.8–7.4)
NEUTROPHILS NFR BLD AUTO: 67 % — SIGNIFICANT CHANGE UP (ref 43–77)
NITRITE UR-MCNC: NEGATIVE — SIGNIFICANT CHANGE UP
NT-PROBNP SERPL-SCNC: 545 PG/ML — HIGH (ref 0–300)
PH UR: 7 — SIGNIFICANT CHANGE UP (ref 5–8)
PLATELET # BLD AUTO: 323 K/UL — SIGNIFICANT CHANGE UP (ref 150–400)
POTASSIUM SERPL-MCNC: 4.4 MMOL/L — SIGNIFICANT CHANGE UP (ref 3.5–5.3)
POTASSIUM SERPL-SCNC: 4.4 MMOL/L — SIGNIFICANT CHANGE UP (ref 3.5–5.3)
PROT SERPL-MCNC: 8.4 G/DL — HIGH (ref 6–8.3)
PROT UR-MCNC: SIGNIFICANT CHANGE UP
PROTHROM AB SERPL-ACNC: 12.5 SEC — SIGNIFICANT CHANGE UP (ref 9.8–12.7)
RBC # BLD: 4.16 M/UL — SIGNIFICANT CHANGE UP (ref 3.8–5.2)
RBC # FLD: 14.1 % — SIGNIFICANT CHANGE UP (ref 10.3–14.5)
SODIUM SERPL-SCNC: 138 MMOL/L — SIGNIFICANT CHANGE UP (ref 135–145)
SP GR SPEC: 1.01 — SIGNIFICANT CHANGE UP (ref 1.01–1.02)
UROBILINOGEN FLD QL: NEGATIVE — SIGNIFICANT CHANGE UP
WBC # BLD: 6.5 K/UL — SIGNIFICANT CHANGE UP (ref 3.8–10.5)
WBC # FLD AUTO: 6.5 K/UL — SIGNIFICANT CHANGE UP (ref 3.8–10.5)

## 2017-11-09 PROCEDURE — 71010: CPT | Mod: 26

## 2017-11-09 PROCEDURE — G0463: CPT

## 2017-11-09 PROCEDURE — 99214 OFFICE O/P EST MOD 30 MIN: CPT

## 2017-11-09 PROCEDURE — 93970 EXTREMITY STUDY: CPT

## 2017-11-09 PROCEDURE — 93970 EXTREMITY STUDY: CPT | Mod: 26

## 2017-11-09 PROCEDURE — 99285 EMERGENCY DEPT VISIT HI MDM: CPT

## 2017-11-09 RX ORDER — SODIUM CHLORIDE 9 MG/ML
500 INJECTION INTRAMUSCULAR; INTRAVENOUS; SUBCUTANEOUS ONCE
Qty: 0 | Refills: 0 | Status: COMPLETED | OUTPATIENT
Start: 2017-11-09 | End: 2017-11-09

## 2017-11-09 RX ORDER — INSULIN LISPRO 100/ML
10 VIAL (ML) SUBCUTANEOUS ONCE
Qty: 0 | Refills: 0 | Status: COMPLETED | OUTPATIENT
Start: 2017-11-09 | End: 2017-11-09

## 2017-11-09 RX ORDER — NAPROXEN 500 MG/1
500 TABLET, DELAYED RELEASE ORAL
Qty: 60 | Refills: 2 | Status: DISCONTINUED | COMMUNITY
Start: 2017-04-20 | End: 2017-11-09

## 2017-11-09 RX ORDER — CEFTRIAXONE 500 MG/1
1 INJECTION, POWDER, FOR SOLUTION INTRAMUSCULAR; INTRAVENOUS ONCE
Qty: 0 | Refills: 0 | Status: COMPLETED | OUTPATIENT
Start: 2017-11-09 | End: 2017-11-09

## 2017-11-09 RX ADMIN — SODIUM CHLORIDE 500 MILLILITER(S): 9 INJECTION INTRAMUSCULAR; INTRAVENOUS; SUBCUTANEOUS at 20:54

## 2017-11-09 RX ADMIN — CEFTRIAXONE 100 GRAM(S): 500 INJECTION, POWDER, FOR SOLUTION INTRAMUSCULAR; INTRAVENOUS at 22:20

## 2017-11-09 RX ADMIN — SODIUM CHLORIDE 500 MILLILITER(S): 9 INJECTION INTRAMUSCULAR; INTRAVENOUS; SUBCUTANEOUS at 20:09

## 2017-11-09 NOTE — ED PROVIDER NOTE - PROGRESS NOTE DETAILS
pt b/l pitting edema now red, hot to touch, and increased swelling and redness and warmth to R wrist. Hx arthritis followed by rheumatologist, ? infection vs arthritis. Given 1 g ceftriaxone

## 2017-11-09 NOTE — ED PROVIDER NOTE - OBJECTIVE STATEMENT
82 yo F hx HTN, CVA (2013), HLD, DM2 on insulin, Sent in by PMD for elevated BP, lower extremity edema. Pt had LE DVT study today which was negative. Denies CP, SOB, orthopnea, CANDELARIO, HA, visual changes, focal deficits. Pt's daughters explain that her PMD also concerned for anemia (Hbg 10 today vs normal 13 or 14).     Denies hematuria, dysuria, melena, hematochezia. 82 yo F hx HTN, CVA (2013), HLD, DM2 on insulin, Sent in by PMD for elevated BP, lower extremity edema. Pt had LE DVT study today which was negative. Denies hx MI/stents/CHF. Denies CP, SOB, orthopnea, CANDELARIO, HA, visual changes, focal deficits. Pt's daughters explain that her PMD also concerned for anemia (Hbg 10 today vs normal 13 or 14).     Denies hematuria, dysuria, melena, hematochezia.

## 2017-11-09 NOTE — ED CLERICAL - CLERICAL COMMENTS
IdaJoseph - [phone] / hypertensive urgency, bp - 200/90 , swelling dvt left leg, diabetic. pt has meds with her Deepika Banks - [phone] / hypertensive urgency, bp - 200/90 , swelling dvt left leg, diabetic. pt has meds with her

## 2017-11-09 NOTE — ED ADULT NURSE NOTE - PMH
Arthritis    Bladder disorder    Bladder Prolapse, Acquired    CVA (cerebrovascular accident)  1/2013  Diabetes mellitus type 2 in nonobese    Dry eyes    Dyslipidemia    History of pancreatitis  ' 2008  HTN (hypertension)

## 2017-11-09 NOTE — ED PROVIDER NOTE - PROGRESS NOTE
Problem: Pain  Goal: #Acceptable pain/comfort level is achieved/maintained at rest (based on self report using Numeric Rating Scales/Faces  Outcome: Outcome Met, Continue evaluating goal progress toward completion  Pain controlled by PRN pain medication       Stable.

## 2017-11-09 NOTE — ED ADULT NURSE NOTE - CHIEF COMPLAINT QUOTE
HTN while at PMD, leg swelling, no CP, SOB, diff breathing HTN while at PMD, leg swelling negative doppler PTA, no CP, SOB, diff breathing

## 2017-11-09 NOTE — ED PROVIDER NOTE - MEDICAL DECISION MAKING DETAILS
82 yo F hx HLD, HTN, DM2, hx CVA presenting with new onset LE edema with negative DVT study, tachycardia and elevated blood pressure. No HA, CP, visual changes or new focal neurological deficits. 82 yo F hx HLD, HTN, DM2, hx CVA presenting with new onset LE edema with negative DVT study, tachycardia and elevated blood pressure. No HA, CP, visual changes or new focal neurological deficits.    Attending MD Montano: 82 yo female with PMH for CVA, DM type 2, HLD, arthritis, HTN presents with daughters of evaluation of bilateral LE edema, and elevated BP.  Had negative outpatient LE duplex today.  On arrival in ED patient denies any complaints.  Patient denies chest pain, palpitations, SOB, or diaphoresis. Patient denies fever, chills, nausea, vomiting, or diarrhea.  Attending MD Montano: A & O x 3, NAD, HEENT WNL and no facial asymmetry; lungs CTAB, heart tachycardiar; abdomen soft NTND; extremities with bilaterally ankle edema; skin with no rashes, neuro exam non focal with no motor or sensory deficits. Patient with marked sinus tachycardia with lateral ST depression, guaiac negative on exam.  Will give fluid challenge, send labs and cardiac enzymes, chest xray, IVF and admission for further workup.

## 2017-11-09 NOTE — ED PROVIDER NOTE - LOCATION
pitting edema to knee. Area of swelling is not hot to touch or tender to touch. pitting edema to ankle

## 2017-11-09 NOTE — ED PROVIDER NOTE - MUSCULOSKELETAL NEGATIVE STATEMENT, MLM
no back pain, no gout, no musculoskeletal pain, no neck pain, and no weakness. + joint pain 2/2 osteoarthritis

## 2017-11-09 NOTE — ED PROVIDER NOTE - NEUROLOGICAL, MLM
Alert and oriented, no focal deficits, no motor or sensory deficits. slight facial dyssymmetry on R when pt smiles, but as per daughter this is not new (attributes it to her dentures)

## 2017-11-09 NOTE — ED ADULT NURSE NOTE - OBJECTIVE STATEMENT
83 year old female presents to ED sent by MD for htn whilst doing a doppler for r/o dvt in bilateral lower extremities. Patient denies sob, chest pain, dizziness. Lung sounds clear. Abd nondistended nontender. Skin warm dry intact. Family at bedside.

## 2017-11-09 NOTE — ED PROVIDER NOTE - ATTENDING CONTRIBUTION TO CARE
Attending MD Montano:  I personally have seen and examined this patient.  Resident note reviewed and agree on plan of care and except where noted.  See MDM for details.

## 2017-11-10 DIAGNOSIS — R82.90 UNSPECIFIED ABNORMAL FINDINGS IN URINE: ICD-10-CM

## 2017-11-10 DIAGNOSIS — R00.0 TACHYCARDIA, UNSPECIFIED: ICD-10-CM

## 2017-11-10 DIAGNOSIS — D64.9 ANEMIA, UNSPECIFIED: ICD-10-CM

## 2017-11-10 DIAGNOSIS — Z29.9 ENCOUNTER FOR PROPHYLACTIC MEASURES, UNSPECIFIED: ICD-10-CM

## 2017-11-10 DIAGNOSIS — E78.5 HYPERLIPIDEMIA, UNSPECIFIED: ICD-10-CM

## 2017-11-10 DIAGNOSIS — R60.0 LOCALIZED EDEMA: ICD-10-CM

## 2017-11-10 DIAGNOSIS — I63.9 CEREBRAL INFARCTION, UNSPECIFIED: ICD-10-CM

## 2017-11-10 DIAGNOSIS — E11.9 TYPE 2 DIABETES MELLITUS WITHOUT COMPLICATIONS: ICD-10-CM

## 2017-11-10 DIAGNOSIS — N39.0 URINARY TRACT INFECTION, SITE NOT SPECIFIED: ICD-10-CM

## 2017-11-10 DIAGNOSIS — I10 ESSENTIAL (PRIMARY) HYPERTENSION: ICD-10-CM

## 2017-11-10 LAB
ALBUMIN SERPL ELPH-MCNC: 3 G/DL — LOW (ref 3.3–5)
ALP SERPL-CCNC: 71 U/L — SIGNIFICANT CHANGE UP (ref 40–120)
ALT FLD-CCNC: 18 U/L RC — SIGNIFICANT CHANGE UP (ref 10–45)
ANION GAP SERPL CALC-SCNC: 11 MMOL/L — SIGNIFICANT CHANGE UP (ref 5–17)
AST SERPL-CCNC: 28 U/L — SIGNIFICANT CHANGE UP (ref 10–40)
BASOPHILS # BLD AUTO: 0.01 K/UL — SIGNIFICANT CHANGE UP (ref 0–0.2)
BASOPHILS NFR BLD AUTO: 0.2 % — SIGNIFICANT CHANGE UP (ref 0–2)
BILIRUB SERPL-MCNC: 0.3 MG/DL — SIGNIFICANT CHANGE UP (ref 0.2–1.2)
BUN SERPL-MCNC: 18 MG/DL — SIGNIFICANT CHANGE UP (ref 7–23)
CALCIUM SERPL-MCNC: 8.8 MG/DL — SIGNIFICANT CHANGE UP (ref 8.4–10.5)
CHLORIDE SERPL-SCNC: 105 MMOL/L — SIGNIFICANT CHANGE UP (ref 96–108)
CK MB BLD-MCNC: 3.9 % — HIGH (ref 0–3.5)
CK MB CFR SERPL CALC: 4.8 NG/ML — HIGH (ref 0–3.8)
CK SERPL-CCNC: 122 U/L — SIGNIFICANT CHANGE UP (ref 25–170)
CO2 SERPL-SCNC: 22 MMOL/L — SIGNIFICANT CHANGE UP (ref 22–31)
CREAT SERPL-MCNC: 0.76 MG/DL — SIGNIFICANT CHANGE UP (ref 0.5–1.3)
D DIMER BLD IA.RAPID-MCNC: 1059 NG/ML DDU — HIGH
EOSINOPHIL # BLD AUTO: 0.02 K/UL — SIGNIFICANT CHANGE UP (ref 0–0.5)
EOSINOPHIL NFR BLD AUTO: 0.3 % — SIGNIFICANT CHANGE UP (ref 0–6)
GLUCOSE BLDC GLUCOMTR-MCNC: 162 MG/DL — HIGH (ref 70–99)
GLUCOSE BLDC GLUCOMTR-MCNC: 196 MG/DL — HIGH (ref 70–99)
GLUCOSE BLDC GLUCOMTR-MCNC: 201 MG/DL — HIGH (ref 70–99)
GLUCOSE SERPL-MCNC: 82 MG/DL — SIGNIFICANT CHANGE UP (ref 70–99)
HCT VFR BLD CALC: 30.9 % — LOW (ref 34.5–45)
HGB BLD-MCNC: 9.8 G/DL — LOW (ref 11.5–15.5)
IMM GRANULOCYTES NFR BLD AUTO: 0.5 % — SIGNIFICANT CHANGE UP (ref 0–1.5)
LYMPHOCYTES # BLD AUTO: 0.85 K/UL — LOW (ref 1–3.3)
LYMPHOCYTES # BLD AUTO: 13.3 % — SIGNIFICANT CHANGE UP (ref 13–44)
MAGNESIUM SERPL-MCNC: 1.9 MG/DL — SIGNIFICANT CHANGE UP (ref 1.6–2.6)
MCHC RBC-ENTMCNC: 25.6 PG — LOW (ref 27–34)
MCHC RBC-ENTMCNC: 31.7 GM/DL — LOW (ref 32–36)
MCV RBC AUTO: 80.7 FL — SIGNIFICANT CHANGE UP (ref 80–100)
MONOCYTES # BLD AUTO: 0.44 K/UL — SIGNIFICANT CHANGE UP (ref 0–0.9)
MONOCYTES NFR BLD AUTO: 6.9 % — SIGNIFICANT CHANGE UP (ref 2–14)
NEUTROPHILS # BLD AUTO: 5.03 K/UL — SIGNIFICANT CHANGE UP (ref 1.8–7.4)
NEUTROPHILS NFR BLD AUTO: 78.8 % — HIGH (ref 43–77)
PHOSPHATE SERPL-MCNC: 1.7 MG/DL — LOW (ref 2.5–4.5)
PLATELET # BLD AUTO: 295 K/UL — SIGNIFICANT CHANGE UP (ref 150–400)
POTASSIUM SERPL-MCNC: 4 MMOL/L — SIGNIFICANT CHANGE UP (ref 3.5–5.3)
POTASSIUM SERPL-SCNC: 4 MMOL/L — SIGNIFICANT CHANGE UP (ref 3.5–5.3)
PROT SERPL-MCNC: 7.2 G/DL — SIGNIFICANT CHANGE UP (ref 6–8.3)
RBC # BLD: 3.83 M/UL — SIGNIFICANT CHANGE UP (ref 3.8–5.2)
RBC # FLD: 15.2 % — HIGH (ref 10.3–14.5)
SODIUM SERPL-SCNC: 138 MMOL/L — SIGNIFICANT CHANGE UP (ref 135–145)
TROPONIN T SERPL-MCNC: 0.02 NG/ML — SIGNIFICANT CHANGE UP (ref 0–0.06)
TSH SERPL-MCNC: 2.55 UIU/ML — SIGNIFICANT CHANGE UP (ref 0.27–4.2)
WBC # BLD: 6.38 K/UL — SIGNIFICANT CHANGE UP (ref 3.8–10.5)
WBC # FLD AUTO: 6.38 K/UL — SIGNIFICANT CHANGE UP (ref 3.8–10.5)

## 2017-11-10 PROCEDURE — 71275 CT ANGIOGRAPHY CHEST: CPT | Mod: 26

## 2017-11-10 PROCEDURE — 12345: CPT | Mod: NC

## 2017-11-10 PROCEDURE — 99223 1ST HOSP IP/OBS HIGH 75: CPT | Mod: GC

## 2017-11-10 PROCEDURE — 99223 1ST HOSP IP/OBS HIGH 75: CPT

## 2017-11-10 RX ORDER — DEXTROSE 50 % IN WATER 50 %
25 SYRINGE (ML) INTRAVENOUS ONCE
Qty: 0 | Refills: 0 | Status: DISCONTINUED | OUTPATIENT
Start: 2017-11-10 | End: 2017-11-12

## 2017-11-10 RX ORDER — SODIUM CHLORIDE 9 MG/ML
1000 INJECTION, SOLUTION INTRAVENOUS
Qty: 0 | Refills: 0 | Status: DISCONTINUED | OUTPATIENT
Start: 2017-11-10 | End: 2017-11-12

## 2017-11-10 RX ORDER — DEXTROSE 50 % IN WATER 50 %
1 SYRINGE (ML) INTRAVENOUS ONCE
Qty: 0 | Refills: 0 | Status: DISCONTINUED | OUTPATIENT
Start: 2017-11-10 | End: 2017-11-12

## 2017-11-10 RX ORDER — METOPROLOL TARTRATE 50 MG
25 TABLET ORAL DAILY
Qty: 0 | Refills: 0 | Status: DISCONTINUED | OUTPATIENT
Start: 2017-11-10 | End: 2017-11-10

## 2017-11-10 RX ORDER — ENOXAPARIN SODIUM 100 MG/ML
40 INJECTION SUBCUTANEOUS EVERY 24 HOURS
Qty: 0 | Refills: 0 | Status: DISCONTINUED | OUTPATIENT
Start: 2017-11-10 | End: 2017-11-12

## 2017-11-10 RX ORDER — DOCUSATE SODIUM 100 MG
100 CAPSULE ORAL
Qty: 0 | Refills: 0 | Status: DISCONTINUED | OUTPATIENT
Start: 2017-11-10 | End: 2017-11-12

## 2017-11-10 RX ORDER — AMLODIPINE BESYLATE 2.5 MG/1
10 TABLET ORAL DAILY
Qty: 0 | Refills: 0 | Status: DISCONTINUED | OUTPATIENT
Start: 2017-11-10 | End: 2017-11-10

## 2017-11-10 RX ORDER — DEXTROSE 50 % IN WATER 50 %
12.5 SYRINGE (ML) INTRAVENOUS ONCE
Qty: 0 | Refills: 0 | Status: DISCONTINUED | OUTPATIENT
Start: 2017-11-10 | End: 2017-11-12

## 2017-11-10 RX ORDER — INSULIN LISPRO 100/ML
VIAL (ML) SUBCUTANEOUS
Qty: 0 | Refills: 0 | Status: DISCONTINUED | OUTPATIENT
Start: 2017-11-10 | End: 2017-11-12

## 2017-11-10 RX ORDER — INSULIN LISPRO 100/ML
VIAL (ML) SUBCUTANEOUS AT BEDTIME
Qty: 0 | Refills: 0 | Status: DISCONTINUED | OUTPATIENT
Start: 2017-11-10 | End: 2017-11-12

## 2017-11-10 RX ORDER — ASPIRIN/CALCIUM CARB/MAGNESIUM 324 MG
1 TABLET ORAL
Qty: 0 | Refills: 0 | COMMUNITY

## 2017-11-10 RX ORDER — ATORVASTATIN CALCIUM 80 MG/1
20 TABLET, FILM COATED ORAL AT BEDTIME
Qty: 0 | Refills: 0 | Status: DISCONTINUED | OUTPATIENT
Start: 2017-11-10 | End: 2017-11-12

## 2017-11-10 RX ORDER — ASPIRIN/CALCIUM CARB/MAGNESIUM 324 MG
81 TABLET ORAL DAILY
Qty: 0 | Refills: 0 | Status: DISCONTINUED | OUTPATIENT
Start: 2017-11-10 | End: 2017-11-12

## 2017-11-10 RX ORDER — CEFTRIAXONE 500 MG/1
1 INJECTION, POWDER, FOR SOLUTION INTRAMUSCULAR; INTRAVENOUS EVERY 24 HOURS
Qty: 0 | Refills: 0 | Status: DISCONTINUED | OUTPATIENT
Start: 2017-11-10 | End: 2017-11-10

## 2017-11-10 RX ORDER — GLUCAGON INJECTION, SOLUTION 0.5 MG/.1ML
1 INJECTION, SOLUTION SUBCUTANEOUS ONCE
Qty: 0 | Refills: 0 | Status: DISCONTINUED | OUTPATIENT
Start: 2017-11-10 | End: 2017-11-12

## 2017-11-10 RX ORDER — AMLODIPINE BESYLATE 2.5 MG/1
1 TABLET ORAL
Qty: 0 | Refills: 0 | COMMUNITY

## 2017-11-10 RX ADMIN — Medication 81 MILLIGRAM(S): at 17:19

## 2017-11-10 RX ADMIN — Medication 100 MILLIGRAM(S): at 22:14

## 2017-11-10 RX ADMIN — ATORVASTATIN CALCIUM 20 MILLIGRAM(S): 80 TABLET, FILM COATED ORAL at 22:14

## 2017-11-10 RX ADMIN — Medication: at 13:00

## 2017-11-10 RX ADMIN — ENOXAPARIN SODIUM 40 MILLIGRAM(S): 100 INJECTION SUBCUTANEOUS at 05:17

## 2017-11-10 RX ADMIN — Medication: at 18:11

## 2017-11-10 RX ADMIN — Medication 20 MILLIGRAM(S): at 05:17

## 2017-11-10 RX ADMIN — Medication 10 UNIT(S): at 00:29

## 2017-11-10 NOTE — CHART NOTE - NSCHARTNOTEFT_GEN_A_CORE
Patient seen and examined in the emergency department. Sent to the ED with hypertension and lower extremity edema. Duplex was negative. TTE ordered. Underwent a CTA that was negative for PE (patient was tachycardic). Positive UA given CTX however has no urinary symptoms, awaiting urine culture. Seen by cardiology and an ischemic workup was recommended due to EKG changes- pharmacologic stress test pending. Type 2 DM - will change to consistent carbohydrate diet. PT eval ordered.

## 2017-11-10 NOTE — H&P ADULT - PROBLEM SELECTOR PLAN 6
- Diabetic diet.  - Lovenox for DVT ppx. - Check FSG, SSI ordered with meal. - Check FSG, SSI ordered with meal.  - confirm home insulin regimen in AM; missed last night's dose

## 2017-11-10 NOTE — H&P ADULT - NSHPPHYSICALEXAM_GEN_ALL_CORE
T(C): 37.1 (11-10-17 @ 00:33), Max: 37.1 (11-09-17 @ 18:36)  HR: 109 (11-10-17 @ 00:33) (109 - 128)  BP: 151/72 (11-10-17 @ 00:33) (151/72 - 204/99)  RR: 18 (11-10-17 @ 00:33) (16 - 19)  SpO2: 99% (11-10-17 @ 00:33) (99% - 100%)  Wt(kg): --  GENERAL: NAD, well-developed  HEAD:  Atraumatic, Normocephalic  EYES: EOMI, PERRLA, conjunctiva and sclera clear  NECK: Supple, No JVD  CHEST/LUNG: Clear to auscultation bilaterally; No wheeze  HEART: regular but tachycardic; No murmurs, rubs, or gallops  ABDOMEN: Soft, Nontender, Nondistended; Bowel sounds present  EXTREMITIES:  2+ Peripheral Pulses, 1+ LE swelling at ankle level bilaterally.  PSYCH: AAOx3  NEUROLOGY: non-focal  SKIN: No rashes or lesions

## 2017-11-10 NOTE — H&P ADULT - NSHPSOCIALHISTORY_GEN_ALL_CORE
No smoking, no drug use, no alcohol. No smoking, no drug use, no alcohol.  Lives with daughter and .

## 2017-11-10 NOTE — H&P ADULT - HISTORY OF PRESENT ILLNESS
84yo F w/ PMHx DM on insulin, HTN, CVA w/o residual deficit 2013, arthritis sent in from clinic today for elevated bp and HTN. Patient reported she was at baseline health status prior to today. She had office visit yesterday am and her bp in clinic was 200/96 (per chart review in allscript). Patient was also noticed to bilateral ankle swelling without clear etiology. Patient currently denied sob, chest pain, lightheadedness, palpitation, abd pain, n/v, diarrhea/constipation, fever, chills from home. Of note, patient denied any urinary sx at this time: no urgency, polyuria, or burning.     In the ER, patient was noted to be tachycardic up to 130s on admission. BP stable, afebrile and sating well on RA. Cardiology service was consulted. UA was found to be positive; so patient received 2L bolus and ceftriaxone 1gram after BCx, UCx sent. 82yo F w/ PMHx DM on insulin, HTN, CVA w/o residual deficit 2013, arthritis sent in from clinic today for elevated bp and HTN. Patient reported she was at baseline health status prior to today. She had office visit yesterday am and her bp in clinic was 200/96 (per chart review in allscript). Patient was also noticed to bilateral ankle swelling without clear etiology. Patient currently denied sob, chest pain, lightheadedness, palpitation, abd pain, n/v, diarrhea/constipation, fever, chills from home. Of note, patient denied any urinary sx at this time: no urgency, polyuria, or burning.     In the ER, patient was noted to be tachycardic up to 130s on admission. BP stable, afebrile and sating well on RA. Cardiology service was consulted. UA was found to be positive; so patient received 0.5L x 2 bolus and ceftriaxone 1gram after BCx, UCx sent. 84yo F w/ PMHx DM on insulin, HTN, CVA w/o residual deficit 2013, arthritis sent in from clinic today for elevated bp and HTN. Patient reported she was at baseline health status prior to today. She had office visit yesterday am and her bp in clinic was 200/96 (per chart review in allscript). Patient was also noticed to bilateral ankle swelling without clear etiology. Patient currently denied sob, chest pain, lightheadedness, palpitation, abd pain, n/v, diarrhea/constipation, fever, chills from home. She also denies CANDELARIO, orthopnea or PND.  Patient denied any urinary sx at this time: no urgency, polyuria, or burning.     In the ER, patient was noted to be tachycardic up to 130s on admission. BP stable, afebrile and sating well on RA. Cardiology service was consulted. UA was found to be positive; so patient received 0.5L x 2 bolus and ceftriaxone 1gram after BCx, UCx sent.

## 2017-11-10 NOTE — H&P ADULT - PROBLEM SELECTOR PLAN 3
- SBP normalized to 150s; will continue to monitor.  - Continue amlodipine for BP control. Lisinopril restarted as renal function at baseline and no hyperK. - SBP normalized to 150s; will continue to monitor.  - Continue amlodipine for BP control, holding parameters. - SBP normalized to 150s; will continue to monitor.  - Continue amlodipine for BP control, holding parameters.  - c/w enalapril - SBP normalized to 150s; will continue to monitor.  - c/w enalapril for BP control, holding parameters.

## 2017-11-10 NOTE — H&P ADULT - PROBLEM SELECTOR PLAN 1
- Patient was found to have positive UA in the ER; however, patient has no urinary sx reported. Remains afebrile without leukocytosis.  - Continue to monitor for fever, leukocytosis or sx development.  - C/w abx ceftriaxone 1gram daily. Finish 3 day course abx, downgrade as needed pending culture results. - Patient was found to have positive UA in the ER; however, patient has no urinary sx reported. Remains afebrile without leukocytosis.  - Continue to monitor for fever, leukocytosis or sx development.  - s/p 1gram ceftriaxone in the ER. Will hold off on further abx for now. pending culture results.

## 2017-11-10 NOTE — H&P ADULT - PROBLEM SELECTOR PLAN 2
- Patient HR 130s on admission. Cardiology consulted with notes indicating STD on inferolateral leads of admission EKG. EKG ST changes subsequently normalized after HR returns to 100s.  - Patient remains asymptomatic and CE x 1 negative. Will f/u enzymes at 8h interval.  - Will start toprol 25mg daily, lipitor and aspirin per cards recs.  - Check EKG and TTE in the am.  - Consider pharm stress test as per cardiology recs. - Patient HR 130s on admission. Cardiology consulted with notes indicating STD on inferolateral leads of admission EKG. EKG ST changes subsequently normalized after HR returns to 100s.  - Patient remains asymptomatic and CE x 1 negative. Will f/u enzymes at 8h interval.  - Will start lipitor and aspirin per cards recs. Hold off on toprol for now in case patient needs stress test in the am.  - Check EKG and TTE in the am.  - Consider pharm stress test as per cardiology recs. - Patient HR 130s on admission. Cardiology consulted with notes indicating STD on inferolateral leads of admission EKG. EKG ST changes subsequently normalized after HR returns to 100s.  no sign of infection and no pain that explains sinus tachycardia.  Wells criteria: 1.5; unlikely PE; will check d-dimer  - Patient remains asymptomatic and CE x 1 negative. Will f/u enzymes at 8h interval.  - Will start lipitor and aspirin per cards recs. Hold off on toprol for now in case patient needs stress test in the am.  - Check EKG and TTE in the am.  - Consider pharm stress test as per cardiology recs. - Patient HR 130s on admission. Cardiology consulted with notes indicating STD on inferolateral leads of admission EKG. EKG ST changes subsequently normalized after HR returns to 100s.  no sign of infection and no pain that explains sinus tachycardia.  Wells criteria: 1.5; unlikely PE; will check d-dimer  - Patient remains asymptomatic and CE x 1 negative. Will f/u enzymes at 8h interval.  - Will start lipitor and aspirin per cards recs. Hold off on toprol for now in case patient needs stress test in the am.  - Check EKG and TTE in the am.  - Consider pharm stress test as per cardiology recs.  monitor in telemetry  monitor electrolytes

## 2017-11-10 NOTE — H&P ADULT - NSHPREVIEWOFSYSTEMS_GEN_ALL_CORE
REVIEW OF SYSTEMS:    CONSTITUTIONAL: No weakness, fevers or chills  EYES/ENT: No visual changes;  No vertigo or throat pain   NECK: No pain or stiffness  RESPIRATORY: No cough, wheezing, hemoptysis; No shortness of breath  CARDIOVASCULAR: No chest pain or palpitations  GASTROINTESTINAL: No abdominal or epigastric pain. No nausea, vomiting, or hematemesis; No diarrhea or constipation. No melena or hematochezia.  GENITOURINARY: No dysuria, frequency or hematuria  NEUROLOGICAL: No numbness or weakness  SKIN: No itching, burning, rashes, or lesions   PSYCH: No mood change.  ENDO: No weight loss or gain. REVIEW OF SYSTEMS:    CONSTITUTIONAL: No weakness, fevers or chills  EYES/ENT: No visual changes;  No vertigo or throat pain   NECK: No pain or stiffness  RESPIRATORY: No cough, wheezing, hemoptysis; No shortness of breath  CARDIOVASCULAR: No chest pain or palpitations; no orthopnea or PND  GASTROINTESTINAL: No abdominal or epigastric pain. No nausea, vomiting, or hematemesis; No diarrhea or constipation. No melena or hematochezia.  GENITOURINARY: No dysuria, frequency or hematuria  NEUROLOGICAL: No numbness or weakness  SKIN: No itching, burning, rashes, or lesions   PSYCH: No mood change.  ENDO: No weight loss or gain.

## 2017-11-10 NOTE — H&P ADULT - PROBLEM SELECTOR PLAN 5
- Check FSG, SSI ordered with meal. - Unclear etiology. Likely dependent edema. Last echo 2012 with EF 70%.  - Repeat TTE in the am.   - Resp status at baseline and appears euvolemic on exam; no need for diuresis at this time. - Unclear etiology. Likely dependent edema and/or secondary to arthritis. Last echo 2012 with EF 70%.  - Repeat TTE in the am.   - Resp status at baseline and appears euvolemic on exam; no need for diuresis at this time.

## 2017-11-10 NOTE — H&P ADULT - ASSESSMENT
84yo F w/ PMHx DM on insulin, HTN, CVA w/o residual deficit 2013, arthritis sent in from clinic today for elevated bp and HTN, found to have UTI without sx in the ER complicated by tachycardia.

## 2017-11-10 NOTE — H&P ADULT - NSHPLABSRESULTS_GEN_ALL_CORE
Imaging, EKG, labs reviewed:  EKG with sinus tach (initially inferolateral leads ST depression per cardiology attending note, EKG missing currently)  CXR negative    ( @ 20:02)                      11.3  6.5 )-----------( 323                 34.8    Neutrophils = 4.4 (67.0%)  Lymphocytes = 1.7 (26.5%)  Eosinophils = 0.1 (1.0%)  Basophils = 0.0 (0.1%)  Monocytes = 0.4 (5.4%)  Bands = --%        138  |  103  |  22  ----------------------------<  183<H>  4.4   |  23  |  0.77    Ca    9.5      2017 20:02    TPro  8.4<H>  /  Alb  3.5  /  TBili  0.3  /  DBili  x   /  AST  29  /  ALT  21  /  AlkPhos  83      ( 2017 20:02 )   PT: 12.5 sec;   INR: 1.15 ratio;     PTT:28.9 sec  CARDIAC MARKERS ( 2017 20:02 )  Trop <0.01 ng/mL / CK 51 U/L / CKMB x         Venous Blood Gas:   @ 20:17  7.39/46/44/27/78  VBG Lactate: 1.8      Urinalysis Basic - ( 2017 20:56 )    Color: PL Yellow / Appearance: Clear / S.011 / pH: x  Gluc: x / Ketone: Negative  / Bili: Negative / Urobili: Negative   Blood: x / Protein: Trace / Nitrite: Negative   Leuk Esterase: Large / RBC: 5-10 /HPF / WBC >50 /HPF   Sq Epi: x / Non Sq Epi: OCC /HPF / Bacteria: Moderate /HPF Imaging, EKG, labs reviewed:  EKG with sinus tach (initially inferolateral leads ST depression per cardiology attending note, EKG missing currently)  CXR negative  Venous duplex reviewed: negative for DVT    ( @ 20:02)                      11.3  6.5 )-----------( 323                 34.8    Neutrophils = 4.4 (67.0%)  Lymphocytes = 1.7 (26.5%)  Eosinophils = 0.1 (1.0%)  Basophils = 0.0 (0.1%)  Monocytes = 0.4 (5.4%)  Bands = --%        138  |  103  |  22  ----------------------------<  183<H>  4.4   |  23  |  0.77    Ca    9.5      2017 20:02    TPro  8.4<H>  /  Alb  3.5  /  TBili  0.3  /  DBili  x   /  AST  29  /  ALT  21  /  AlkPhos  83      ( 2017 20:02 )   PT: 12.5 sec;   INR: 1.15 ratio;     PTT:28.9 sec  CARDIAC MARKERS ( 2017 20:02 )  Trop <0.01 ng/mL / CK 51 U/L / CKMB x         Venous Blood Gas:   @ 20:17  7.39/46/44/27/78  VBG Lactate: 1.8      Urinalysis Basic - ( 2017 20:56 )    Color: PL Yellow / Appearance: Clear / S.011 / pH: x  Gluc: x / Ketone: Negative  / Bili: Negative / Urobili: Negative   Blood: x / Protein: Trace / Nitrite: Negative   Leuk Esterase: Large / RBC: 5-10 /HPF / WBC >50 /HPF   Sq Epi: x / Non Sq Epi: OCC /HPF / Bacteria: Moderate /HPF

## 2017-11-10 NOTE — CONSULT NOTE ADULT - PROBLEM SELECTOR RECOMMENDATION 9
Ischemic changes appreciated on 12 lead EKG on during tachycardic episode in inferolateral leads.  Patient is asymptomatic, EKG changes resolved as tachycardia improved and heart rate came down to 100-105BPM.   Tachycardia maybe secondary to UTI, pain, flare of the arthritis producing demand ischemia. First set of cardiac enzyme negative, BNP>500.  Order 2nd, 3rd set Q8hrs.   TTE in AM for evaluation of LV function.  12 lead EKG in AM.  Continue telemetry monitoring.  Start ASA 81mg po daily, metoprolol xl 25mg po daily, continue lipitor 10mg po daily.   Ischemia workup indicated, pharmacological nuclear stress test to r/o CAD. Ischemic changes appreciated on 12 lead EKG during tachycardic episode in inferolateral leads.  Patient was asymptomatic throughout the episode, EKG changes resolved as tachycardia improved and heart rate came down to 100-105BPM.   Tachycardia maybe secondary to UTI, pain, flare of the arthritis causing ischemia. First set of cardiac enzyme negative, BNP>500.  Order 2nd, 3rd set Q8hrs.   TTE in AM for evaluation of LV function.  12 lead EKG in AM.  Continue telemetry monitoring.  Start ASA 81mg po daily, metoprolol xl 25mg po daily, continue lipitor 10mg po daily.   Ischemia workup indicated, pharmacological nuclear stress test to r/o CAD.

## 2017-11-10 NOTE — CONSULT NOTE ADULT - SUBJECTIVE AND OBJECTIVE BOX
CARDIOLOGY CONSULTATION NOTE                                                                                             Consult requested by: Emergency department    Reason for Consultation: tachycardia    History obtained by: Patient and daughters     obtained: No    Chief complaint:    Patient is a 83y old  Female who was sent from PMD office for lower extremity oedema.     HPI:  83yr old female pmhx DM, CVA  no neurological deficit, htn, arthritis sent by her PMD for lower extremity oedema. As per daughter, the lower extremity swelling started a few days ago, more prominent in left ankle. Lower extremity ultrasound done to r/o DVT, negative. Patient is also c/o right wrist swelling and pain. No c/o chest pain, chest tightness, dizziness, LOC, orthopnea, PND, nausea, vomiting, fever. EKG done in ER, sinus tachycardia 130BPM, st depression 2mm inferolateral leads. Repeat EKG sinus tachycardia 115 BPM, st depressions normalized. Patient is asymptomatic and resting comfortably in bed. Urine analysis done and found to have UTI.     REVIEW OF SYMPTOMS: Cardiovascular:  See HPI. No chest pain,  No dyspnea,  No syncope,  No palpitations, No dizziness, No Orthopnea, No Paroxsymal nocturnal dyspnea  Respiratory:  No Dyspnea, No cough,     Genitourinary:  No dysuria, no hematuria  Gastrointestinal:  No nausea, no vomiting. No diarrhea.  No abdominal pain. No dark color stool, no melena   Neurological: No headache, no dizziness, no slurred speech  Psychiatric: No agitation, no anxiety.    ALL OTHER REVIEW OF SYSTEMS ARE NEGATIVE.    ALLERGIES:   No Known Allergies    CURRENT MEDICATIONS:  ceftriaxone  normal saline    HOME MEDICATIONS:  Naproxen 500mg po bid  enalapril 20mg po daily  lipitor 10mg po daily  Humalog    PAST MEDICAL HISTORY  Arthritis  Bladder disorder  Bladder Prolapse, Acquired  CVA (cerebrovascular accident)  Transient ischemic attack (TIA)  Dyslipidemia  Diabetes mellitus type 2 in nonobese  HTN (hypertension)    PAST SURGICAL HISTORY  Bladder Prolapse, Acquired  S/P laparotomy    FAMILY HISTORY:  No pertinent family history in first degree relatives    SOCIAL HISTORY:  Denies smoking/alcohol/drugs    Vital Signs Last 24 Hrs  T(C): 37.1 (10 Nov 2017 00:33), Max: 37.1 (2017 18:36)  T(F): 98.7 (10 Nov 2017 00:33), Max: 98.7 (2017 18:36)  HR: 109 (10 Nov 2017 00:33) (109 - 128)  BP: 151/72 (10 Nov 2017 00:33) (151/72 - 204/99)  BP(mean): --  RR: 18 (10 Nov 2017 00:33) (16 - 19)  SpO2: 99% (10 Nov 2017 00:33) (99% - 100%)    PHYSICAL EXAM:  Constitutional: Comfortable . No acute distress.   HEENT: Atraumatic and normcephalic , neck is supple . no JVD. No carotid bruit. PEERL   CNS: A&Ox3. No focal deficits. EOMI.   Lymph Nodes: Cervical : Not palpable.  Respiratory: CTAB  Cardiovascular: S1S2 RRR. No murmur/rubs or gallop.  Gastrointestinal: Soft non-tender and non distended . +Bowel sounds.   Extremities: right wrist and left ankle oedema, erythema, tenderness, 2+pulses  Psychiatric: Calm . no agitation.  Skin: No skin rash/ulcers visualized to face, hands or feet.    Intake and output:     LABS:                        11.3   6.5   )-----------( 323      ( 2017 20:02 )             34.8         138  |  103  |  22  ----------------------------<  183<H>  4.4   |  23  |  0.77    Ca    9.5      2017 20:02    TPro  8.4<H>  /  Alb  3.5  /  TBili  0.3  /  DBili  x   /  AST  29  /  ALT  21  /  AlkPhos  83      CARDIAC MARKERS ( 2017 20:02 )  x     / <0.01 ng/mL / 51 U/L / x     / 3.2 ng/mL    ;p-BNP=Serum Pro-Brain Natriuretic Peptide: 545 pg/mL ( @ 20:02)    PT/INR - ( 2017 20:02 )   PT: 12.5 sec;   INR: 1.15 ratio         PTT - ( 2017 20:02 )  PTT:28.9 sec  Urinalysis Basic - ( 2017 20:56 )    Color: PL Yellow / Appearance: Clear / S.011 / pH: x  Gluc: x / Ketone: Negative  / Bili: Negative / Urobili: Negative   Blood: x / Protein: Trace / Nitrite: Negative   Leuk Esterase: Large / RBC: 5-10 /HPF / WBC >50 /HPF   Sq Epi: x / Non Sq Epi: OCC /HPF / Bacteria: Moderate /HPF        INTERPRETATION OF TELEMETRY: Reviewed by me.   ECG: Reviewed by me.   sinus tachycardia 130BPM, st depression 2mm inferolateral leads.   Repeat EKG sinus tachycardia 115 BPM, st depressions normalized.    RADIOLOGY & ADDITIONAL STUDIES:    X-ray:  reviewed by me. chronic interstitial changes, mild pulmonary vascular congestion, no infiltrate

## 2017-11-10 NOTE — H&P ADULT - PROBLEM SELECTOR PLAN 4
- Unclear etiology. Likely dependent edema. Last echo 2012 with EF 70%.  - Repeat TTE in the am.   - Resp status at baseline and appears euvolemic on exam; no need for diuresis at this time. - Per allscript clinic note, patient Hb 13.8 in Jan. 2017. Today 11.3 w/ MCV 83 on admission. No reported hx of bleeding and no recorded hx of C-scope.  - Will continue to trend CBC.  - Will send LDH, haptoglobin, VitB12, Folate, Iron studies for further w/u. - Per [facility] clinic note, patient Hb 13.8 in Jan. 2017. Today 11.3 w/ MCV 83 on admission. No reported hx of bleeding and no recorded hx of C-scope.  - Will continue to trend CBC.  - Will send LDH, haptoglobin, VitB12, Folate, Iron studies for further w/u.  - check stool occult

## 2017-11-10 NOTE — CONSULT NOTE ADULT - ATTENDING COMMENTS
Thank you for the consultation and allowing me to participate in the care of Ms. Lassiter. My team will follow.    Eric Garcia M.D, F.A.C.C  [facility]   [phone]

## 2017-11-10 NOTE — CONSULT NOTE ADULT - ASSESSMENT
83yr old female pmhx DM, CVA 2013 no neurological deficit, htn, arthritis sent by her PMD for lower extremity oedema, found to be tachycardic in ED with EKG changes suggestive of ischemia.

## 2017-11-11 DIAGNOSIS — M19.90 UNSPECIFIED OSTEOARTHRITIS, UNSPECIFIED SITE: ICD-10-CM

## 2017-11-11 DIAGNOSIS — E83.39 OTHER DISORDERS OF PHOSPHORUS METABOLISM: ICD-10-CM

## 2017-11-11 LAB
ANION GAP SERPL CALC-SCNC: 11 MMOL/L — SIGNIFICANT CHANGE UP (ref 5–17)
BUN SERPL-MCNC: 22 MG/DL — SIGNIFICANT CHANGE UP (ref 7–23)
CALCIUM SERPL-MCNC: 8.4 MG/DL — SIGNIFICANT CHANGE UP (ref 8.4–10.5)
CHLORIDE SERPL-SCNC: 102 MMOL/L — SIGNIFICANT CHANGE UP (ref 96–108)
CK SERPL-CCNC: 62 U/L — SIGNIFICANT CHANGE UP (ref 25–170)
CO2 SERPL-SCNC: 21 MMOL/L — LOW (ref 22–31)
CREAT SERPL-MCNC: 0.75 MG/DL — SIGNIFICANT CHANGE UP (ref 0.5–1.3)
FERRITIN SERPL-MCNC: 311 NG/ML — HIGH (ref 15–150)
FOLATE SERPL-MCNC: 7.5 NG/ML — SIGNIFICANT CHANGE UP (ref 4.8–24.2)
GLUCOSE BLDC GLUCOMTR-MCNC: 169 MG/DL — HIGH (ref 70–99)
GLUCOSE BLDC GLUCOMTR-MCNC: 199 MG/DL — HIGH (ref 70–99)
GLUCOSE BLDC GLUCOMTR-MCNC: 213 MG/DL — HIGH (ref 70–99)
GLUCOSE BLDC GLUCOMTR-MCNC: 284 MG/DL — HIGH (ref 70–99)
GLUCOSE SERPL-MCNC: 270 MG/DL — HIGH (ref 70–99)
HAPTOGLOB SERPL-MCNC: 213 MG/DL — HIGH (ref 34–200)
HBA1C BLD-MCNC: 7.2 % — HIGH (ref 4–5.6)
HCT VFR BLD CALC: 30.6 % — LOW (ref 34.5–45)
HGB BLD-MCNC: 9.7 G/DL — LOW (ref 11.5–15.5)
IRON SATN MFR SERPL: 16 UG/DL — LOW (ref 30–160)
IRON SATN MFR SERPL: 9 % — LOW (ref 14–50)
LDH SERPL L TO P-CCNC: 267 U/L — HIGH (ref 50–242)
MAGNESIUM SERPL-MCNC: 2 MG/DL — SIGNIFICANT CHANGE UP (ref 1.6–2.6)
MCHC RBC-ENTMCNC: 25.1 PG — LOW (ref 27–34)
MCHC RBC-ENTMCNC: 31.7 GM/DL — LOW (ref 32–36)
MCV RBC AUTO: 79.3 FL — LOW (ref 80–100)
OB PNL STL: NEGATIVE — SIGNIFICANT CHANGE UP
PHOSPHATE SERPL-MCNC: 1.6 MG/DL — LOW (ref 2.5–4.5)
PLATELET # BLD AUTO: 322 K/UL — SIGNIFICANT CHANGE UP (ref 150–400)
POTASSIUM SERPL-MCNC: 4.7 MMOL/L — SIGNIFICANT CHANGE UP (ref 3.5–5.3)
POTASSIUM SERPL-SCNC: 4.7 MMOL/L — SIGNIFICANT CHANGE UP (ref 3.5–5.3)
RBC # BLD: 3.86 M/UL — SIGNIFICANT CHANGE UP (ref 3.8–5.2)
RBC # BLD: 3.86 M/UL — SIGNIFICANT CHANGE UP (ref 3.8–5.2)
RBC # FLD: 15.7 % — HIGH (ref 10.3–14.5)
RETICS #: 38.2 K/UL — SIGNIFICANT CHANGE UP (ref 25–125)
RETICS/RBC NFR: 1 % — SIGNIFICANT CHANGE UP (ref 0.5–2.5)
SODIUM SERPL-SCNC: 134 MMOL/L — LOW (ref 135–145)
TIBC SERPL-MCNC: 173 UG/DL — LOW (ref 220–430)
UIBC SERPL-MCNC: 157 UG/DL — SIGNIFICANT CHANGE UP (ref 110–370)
VIT B12 SERPL-MCNC: 346 PG/ML — SIGNIFICANT CHANGE UP (ref 243–894)
WBC # BLD: 7.62 K/UL — SIGNIFICANT CHANGE UP (ref 3.8–10.5)
WBC # FLD AUTO: 7.62 K/UL — SIGNIFICANT CHANGE UP (ref 3.8–10.5)

## 2017-11-11 PROCEDURE — 99233 SBSQ HOSP IP/OBS HIGH 50: CPT | Mod: GC

## 2017-11-11 PROCEDURE — 99233 SBSQ HOSP IP/OBS HIGH 50: CPT

## 2017-11-11 RX ORDER — SODIUM,POTASSIUM PHOSPHATES 278-250MG
1 POWDER IN PACKET (EA) ORAL THREE TIMES A DAY
Qty: 0 | Refills: 0 | Status: DISCONTINUED | OUTPATIENT
Start: 2017-11-11 | End: 2017-11-11

## 2017-11-11 RX ORDER — METOPROLOL TARTRATE 50 MG
12.5 TABLET ORAL
Qty: 0 | Refills: 0 | Status: DISCONTINUED | OUTPATIENT
Start: 2017-11-11 | End: 2017-11-12

## 2017-11-11 RX ORDER — SODIUM,POTASSIUM PHOSPHATES 278-250MG
1 POWDER IN PACKET (EA) ORAL
Qty: 0 | Refills: 0 | Status: COMPLETED | OUTPATIENT
Start: 2017-11-11 | End: 2017-11-12

## 2017-11-11 RX ORDER — POLYETHYLENE GLYCOL 3350 17 G/17G
17 POWDER, FOR SOLUTION ORAL ONCE
Qty: 0 | Refills: 0 | Status: DISCONTINUED | OUTPATIENT
Start: 2017-11-11 | End: 2017-11-11

## 2017-11-11 RX ORDER — INSULIN GLARGINE 100 [IU]/ML
10 INJECTION, SOLUTION SUBCUTANEOUS AT BEDTIME
Qty: 0 | Refills: 0 | Status: DISCONTINUED | OUTPATIENT
Start: 2017-11-11 | End: 2017-11-12

## 2017-11-11 RX ADMIN — Medication 10 MILLIGRAM(S): at 12:33

## 2017-11-11 RX ADMIN — ENOXAPARIN SODIUM 40 MILLIGRAM(S): 100 INJECTION SUBCUTANEOUS at 06:25

## 2017-11-11 RX ADMIN — Medication 3: at 12:32

## 2017-11-11 RX ADMIN — ATORVASTATIN CALCIUM 20 MILLIGRAM(S): 80 TABLET, FILM COATED ORAL at 21:38

## 2017-11-11 RX ADMIN — Medication 100 MILLIGRAM(S): at 17:00

## 2017-11-11 RX ADMIN — Medication 2: at 08:27

## 2017-11-11 RX ADMIN — Medication 100 MILLIGRAM(S): at 07:25

## 2017-11-11 RX ADMIN — Medication 1 TABLET(S): at 21:38

## 2017-11-11 RX ADMIN — Medication 81 MILLIGRAM(S): at 12:33

## 2017-11-11 RX ADMIN — INSULIN GLARGINE 10 UNIT(S): 100 INJECTION, SOLUTION SUBCUTANEOUS at 21:40

## 2017-11-11 RX ADMIN — Medication 12.5 MILLIGRAM(S): at 17:00

## 2017-11-11 RX ADMIN — Medication 1: at 16:52

## 2017-11-11 RX ADMIN — Medication 1 TABLET(S): at 16:53

## 2017-11-11 NOTE — PROGRESS NOTE ADULT - PROBLEM SELECTOR PLAN 3
UA w/ WBCs and some bacteria, but patient is completely asymptomatic, with no dysuria/hematuria/polyuria and absence of fever and leukocytosis.  No clear evidence of UTI and patient is at her baseline, as per surrounding family.  - c/w holding ABx at this time, will monitor for change in clinical status

## 2017-11-11 NOTE — PROGRESS NOTE ADULT - PROBLEM SELECTOR PLAN 6
F/S elevated; standing basal insulin has been held since admission.  Home dose 75/25 32 units total daily.  - Start lantus 10 U qhs  - c/w F/S and ISSq

## 2017-11-11 NOTE — PROGRESS NOTE ADULT - PROBLEM SELECTOR PLAN 1
Cardiac enzymes negative and currently asymptomatic.  However, patient with ischemic changes on presenting EKG.  In the setting of comorbidities, there is some concern for underlying CAD.  Cardiology f/u appreciated.  - Pending TTE and nuclear stress test  - c/w telemetry monitoring  - Start metoprolol 12.5 mg BID as per cardiology  - c/w lipitor and ASA

## 2017-11-11 NOTE — PROGRESS NOTE ADULT - ASSESSMENT
82 y/o woman w/ PMHx DM2 on insulin, HTN, CVA w/o residual deficit 2013, and osteoarthritis sent in from clinic due to elevated BP and HTN in the setting of joint swelling, found to have dynamic EKG changes.  Now symptomatically improving.

## 2017-11-11 NOTE — PROGRESS NOTE ADULT - ASSESSMENT
IMPRESSION:     No pulmonary embolism.    Two 4 mm pulmonary nodules in the right lower lobe, stable in size since   2012.    IMPRESSION AND PLAN:  83yr old female pmhx DM, CVA 2013 no neurological deficit, htn, arthritis sent by her PMD for lower extremity edema, found to be tachycardic in ED with EKG changes suggestive of ischemia. h/o recurrent cellulitis and newly dx anemia w/ weight loss and constipation.    1) Abnormal ECG   Ischemic changes appreciated on 12 lead EKG during tachycardic hypertensive episode that improved with good control.   Tachycardia maybe secondary to UTI, pain, flare of the arthritis, ischemia secondary to severe hypertension (204/99).   Cardiac enzyme negative, BNP>500.    ·	TTE for evaluation of LV function.  ·	Start ASA 81mg po daily,   ·	Given ischemic ST abn at high HR, start and titrate BB  ·	Continue atorvastatin 10mg po daily.   ·	Pharmacological nuclear stress test  ·	    2) HTN  Poorly controlled heretofore    3)Murmur of mild AS    4) Anemia        Leland Cueva MD, FACC  Office: [phone]  Pager: [phone]

## 2017-11-11 NOTE — PROGRESS NOTE ADULT - PROBLEM SELECTOR PLAN 2
Still mild tachycardia, now improving since admission.  CTPA negative for PE, no evidence of underlying infection.  - c/w monitoring telemetry  - c/w monitoring electrolytes

## 2017-11-11 NOTE — PROGRESS NOTE ADULT - PROBLEM SELECTOR PLAN 7
Patient reported erythema, swelling, and pain of her b/l ankles and wrists two days ago, preceding this hospitalization.  All of these symptoms have resolved, unclear etiology.  She has no reported h/o inflammatory arthritis.  - Will c/w monitoring for now

## 2017-11-11 NOTE — PROGRESS NOTE ADULT - PROBLEM SELECTOR PLAN 5
Per [facility]script clinic note, patient Hb 13.8 in Jan. 2017. Hgb downtrended to 11 and then 9.8 since hospitalization, now stabilized over past two days.  Iron studies are c/w mixed picture, with Fe deficiency and some element of AOCD.  Borderline microcytosis.  No evidence of hemolysis.   - Consider starting FeSO4 daily supplementation  - c/w monitoring CBC for now   - Will continue to trend CBC.

## 2017-11-12 ENCOUNTER — TRANSCRIPTION ENCOUNTER (OUTPATIENT)
Age: 82
End: 2017-11-12

## 2017-11-12 VITALS — SYSTOLIC BLOOD PRESSURE: 147 MMHG | DIASTOLIC BLOOD PRESSURE: 78 MMHG | HEART RATE: 68 BPM

## 2017-11-12 LAB
ANION GAP SERPL CALC-SCNC: 11 MMOL/L — SIGNIFICANT CHANGE UP (ref 5–17)
ANION GAP SERPL CALC-SCNC: 12 MMOL/L — SIGNIFICANT CHANGE UP (ref 5–17)
BUN SERPL-MCNC: 21 MG/DL — SIGNIFICANT CHANGE UP (ref 7–23)
BUN SERPL-MCNC: 23 MG/DL — SIGNIFICANT CHANGE UP (ref 7–23)
CALCIUM SERPL-MCNC: 9.1 MG/DL — SIGNIFICANT CHANGE UP (ref 8.4–10.5)
CALCIUM SERPL-MCNC: 9.2 MG/DL — SIGNIFICANT CHANGE UP (ref 8.4–10.5)
CHLORIDE SERPL-SCNC: 102 MMOL/L — SIGNIFICANT CHANGE UP (ref 96–108)
CHLORIDE SERPL-SCNC: 105 MMOL/L — SIGNIFICANT CHANGE UP (ref 96–108)
CK MB BLD-MCNC: 2.1 % — SIGNIFICANT CHANGE UP (ref 0–3.5)
CK MB CFR SERPL CALC: 1.3 NG/ML — SIGNIFICANT CHANGE UP (ref 0–10)
CO2 SERPL-SCNC: 21 MMOL/L — LOW (ref 22–31)
CO2 SERPL-SCNC: 23 MMOL/L — SIGNIFICANT CHANGE UP (ref 22–31)
CREAT SERPL-MCNC: 0.78 MG/DL — SIGNIFICANT CHANGE UP (ref 0.5–1.3)
CREAT SERPL-MCNC: 0.8 MG/DL — SIGNIFICANT CHANGE UP (ref 0.5–1.3)
GLUCOSE BLDC GLUCOMTR-MCNC: 128 MG/DL — HIGH (ref 70–99)
GLUCOSE BLDC GLUCOMTR-MCNC: 205 MG/DL — HIGH (ref 70–99)
GLUCOSE BLDC GLUCOMTR-MCNC: 81 MG/DL — SIGNIFICANT CHANGE UP (ref 70–99)
GLUCOSE SERPL-MCNC: 138 MG/DL — HIGH (ref 70–99)
GLUCOSE SERPL-MCNC: 83 MG/DL — SIGNIFICANT CHANGE UP (ref 70–99)
HCT VFR BLD CALC: 35.3 % — SIGNIFICANT CHANGE UP (ref 34.5–45)
HGB BLD-MCNC: 11.6 G/DL — SIGNIFICANT CHANGE UP (ref 11.5–15.5)
MAGNESIUM SERPL-MCNC: 2.1 MG/DL — SIGNIFICANT CHANGE UP (ref 1.6–2.6)
MAGNESIUM SERPL-MCNC: 2.2 MG/DL — SIGNIFICANT CHANGE UP (ref 1.6–2.6)
MCHC RBC-ENTMCNC: 27.5 PG — SIGNIFICANT CHANGE UP (ref 27–34)
MCHC RBC-ENTMCNC: 32.7 GM/DL — SIGNIFICANT CHANGE UP (ref 32–36)
MCV RBC AUTO: 83.9 FL — SIGNIFICANT CHANGE UP (ref 80–100)
PHOSPHATE SERPL-MCNC: 2 MG/DL — LOW (ref 2.5–4.5)
PHOSPHATE SERPL-MCNC: 3.6 MG/DL — SIGNIFICANT CHANGE UP (ref 2.5–4.5)
PLATELET # BLD AUTO: 312 K/UL — SIGNIFICANT CHANGE UP (ref 150–400)
POTASSIUM SERPL-MCNC: 4.4 MMOL/L — SIGNIFICANT CHANGE UP (ref 3.5–5.3)
POTASSIUM SERPL-MCNC: 4.5 MMOL/L — SIGNIFICANT CHANGE UP (ref 3.5–5.3)
POTASSIUM SERPL-SCNC: 4.4 MMOL/L — SIGNIFICANT CHANGE UP (ref 3.5–5.3)
POTASSIUM SERPL-SCNC: 4.5 MMOL/L — SIGNIFICANT CHANGE UP (ref 3.5–5.3)
RBC # BLD: 4.21 M/UL — SIGNIFICANT CHANGE UP (ref 3.8–5.2)
RBC # FLD: 14.5 % — SIGNIFICANT CHANGE UP (ref 10.3–14.5)
SODIUM SERPL-SCNC: 136 MMOL/L — SIGNIFICANT CHANGE UP (ref 135–145)
SODIUM SERPL-SCNC: 138 MMOL/L — SIGNIFICANT CHANGE UP (ref 135–145)
TROPONIN T SERPL-MCNC: 0.01 NG/ML — SIGNIFICANT CHANGE UP
WBC # BLD: 7.7 K/UL — SIGNIFICANT CHANGE UP (ref 3.8–10.5)
WBC # FLD AUTO: 7.7 K/UL — SIGNIFICANT CHANGE UP (ref 3.8–10.5)

## 2017-11-12 PROCEDURE — 84443 ASSAY THYROID STIM HORMONE: CPT

## 2017-11-12 PROCEDURE — 83735 ASSAY OF MAGNESIUM: CPT

## 2017-11-12 PROCEDURE — 84295 ASSAY OF SERUM SODIUM: CPT

## 2017-11-12 PROCEDURE — 82607 VITAMIN B-12: CPT

## 2017-11-12 PROCEDURE — A9500: CPT

## 2017-11-12 PROCEDURE — 71045 X-RAY EXAM CHEST 1 VIEW: CPT

## 2017-11-12 PROCEDURE — 99239 HOSP IP/OBS DSCHRG MGMT >30: CPT

## 2017-11-12 PROCEDURE — 85027 COMPLETE CBC AUTOMATED: CPT

## 2017-11-12 PROCEDURE — 85610 PROTHROMBIN TIME: CPT

## 2017-11-12 PROCEDURE — 83010 ASSAY OF HAPTOGLOBIN QUANT: CPT

## 2017-11-12 PROCEDURE — 83880 ASSAY OF NATRIURETIC PEPTIDE: CPT

## 2017-11-12 PROCEDURE — 82272 OCCULT BLD FECES 1-3 TESTS: CPT

## 2017-11-12 PROCEDURE — 80048 BASIC METABOLIC PNL TOTAL CA: CPT

## 2017-11-12 PROCEDURE — 81001 URINALYSIS AUTO W/SCOPE: CPT

## 2017-11-12 PROCEDURE — 85014 HEMATOCRIT: CPT

## 2017-11-12 PROCEDURE — 83550 IRON BINDING TEST: CPT

## 2017-11-12 PROCEDURE — 82330 ASSAY OF CALCIUM: CPT

## 2017-11-12 PROCEDURE — 82962 GLUCOSE BLOOD TEST: CPT

## 2017-11-12 PROCEDURE — 93016 CV STRESS TEST SUPVJ ONLY: CPT

## 2017-11-12 PROCEDURE — 82947 ASSAY GLUCOSE BLOOD QUANT: CPT

## 2017-11-12 PROCEDURE — 80053 COMPREHEN METABOLIC PANEL: CPT

## 2017-11-12 PROCEDURE — 97161 PT EVAL LOW COMPLEX 20 MIN: CPT

## 2017-11-12 PROCEDURE — 83036 HEMOGLOBIN GLYCOSYLATED A1C: CPT

## 2017-11-12 PROCEDURE — 82550 ASSAY OF CK (CPK): CPT

## 2017-11-12 PROCEDURE — 82553 CREATINE MB FRACTION: CPT

## 2017-11-12 PROCEDURE — 84100 ASSAY OF PHOSPHORUS: CPT

## 2017-11-12 PROCEDURE — 96374 THER/PROPH/DIAG INJ IV PUSH: CPT

## 2017-11-12 PROCEDURE — 85379 FIBRIN DEGRADATION QUANT: CPT

## 2017-11-12 PROCEDURE — 84132 ASSAY OF SERUM POTASSIUM: CPT

## 2017-11-12 PROCEDURE — 83605 ASSAY OF LACTIC ACID: CPT

## 2017-11-12 PROCEDURE — 84484 ASSAY OF TROPONIN QUANT: CPT

## 2017-11-12 PROCEDURE — 93017 CV STRESS TEST TRACING ONLY: CPT

## 2017-11-12 PROCEDURE — 93018 CV STRESS TEST I&R ONLY: CPT

## 2017-11-12 PROCEDURE — 78452 HT MUSCLE IMAGE SPECT MULT: CPT

## 2017-11-12 PROCEDURE — 71275 CT ANGIOGRAPHY CHEST: CPT

## 2017-11-12 PROCEDURE — 83615 LACTATE (LD) (LDH) ENZYME: CPT

## 2017-11-12 PROCEDURE — 82746 ASSAY OF FOLIC ACID SERUM: CPT

## 2017-11-12 PROCEDURE — 78452 HT MUSCLE IMAGE SPECT MULT: CPT | Mod: 26

## 2017-11-12 PROCEDURE — A9505: CPT

## 2017-11-12 PROCEDURE — 82803 BLOOD GASES ANY COMBINATION: CPT

## 2017-11-12 PROCEDURE — 85730 THROMBOPLASTIN TIME PARTIAL: CPT

## 2017-11-12 PROCEDURE — 85045 AUTOMATED RETICULOCYTE COUNT: CPT

## 2017-11-12 PROCEDURE — 99285 EMERGENCY DEPT VISIT HI MDM: CPT | Mod: 25

## 2017-11-12 PROCEDURE — 82728 ASSAY OF FERRITIN: CPT

## 2017-11-12 PROCEDURE — 82435 ASSAY OF BLOOD CHLORIDE: CPT

## 2017-11-12 PROCEDURE — 93005 ELECTROCARDIOGRAM TRACING: CPT

## 2017-11-12 RX ORDER — SODIUM,POTASSIUM PHOSPHATES 278-250MG
1 POWDER IN PACKET (EA) ORAL
Qty: 0 | Refills: 0 | Status: DISCONTINUED | OUTPATIENT
Start: 2017-11-12 | End: 2017-11-12

## 2017-11-12 RX ORDER — METOPROLOL TARTRATE 50 MG
0.5 TABLET ORAL
Qty: 30 | Refills: 0 | OUTPATIENT
Start: 2017-11-12 | End: 2017-12-12

## 2017-11-12 RX ORDER — DOCUSATE SODIUM 100 MG
1 CAPSULE ORAL
Qty: 60 | Refills: 0 | OUTPATIENT
Start: 2017-11-12 | End: 2017-12-12

## 2017-11-12 RX ORDER — INSULIN LISPRO 100 [IU]/ML
22 INJECTION, SUSPENSION SUBCUTANEOUS
Qty: 0 | Refills: 0 | COMMUNITY

## 2017-11-12 RX ORDER — ENOXAPARIN SODIUM 100 MG/ML
30 INJECTION SUBCUTANEOUS DAILY
Qty: 0 | Refills: 0 | Status: DISCONTINUED | OUTPATIENT
Start: 2017-11-12 | End: 2017-11-12

## 2017-11-12 RX ORDER — ASPIRIN/CALCIUM CARB/MAGNESIUM 324 MG
1 TABLET ORAL
Qty: 30 | Refills: 0 | OUTPATIENT
Start: 2017-11-12 | End: 2017-12-12

## 2017-11-12 RX ADMIN — Medication 81 MILLIGRAM(S): at 12:18

## 2017-11-12 RX ADMIN — Medication 1 TABLET(S): at 09:49

## 2017-11-12 RX ADMIN — ENOXAPARIN SODIUM 30 MILLIGRAM(S): 100 INJECTION SUBCUTANEOUS at 12:20

## 2017-11-12 RX ADMIN — Medication 100 MILLIGRAM(S): at 07:19

## 2017-11-12 RX ADMIN — Medication 1 TABLET(S): at 12:18

## 2017-11-12 RX ADMIN — Medication 2: at 12:18

## 2017-11-12 RX ADMIN — Medication 12.5 MILLIGRAM(S): at 18:30

## 2017-11-12 RX ADMIN — Medication 12.5 MILLIGRAM(S): at 07:19

## 2017-11-12 RX ADMIN — Medication 10 MILLIGRAM(S): at 07:19

## 2017-11-12 RX ADMIN — Medication 1 TABLET(S): at 18:30

## 2017-11-12 RX ADMIN — ENOXAPARIN SODIUM 40 MILLIGRAM(S): 100 INJECTION SUBCUTANEOUS at 07:18

## 2017-11-12 NOTE — PROGRESS NOTE ADULT - PROBLEM SELECTOR PLAN 6
AM F/S WNL, elevated premeal for lunch. Home dose 75/25 32 units total daily.  - c/w lantus 10 U qhs  - c/w F/S and ISS  - Consider starting premeal insulin

## 2017-11-12 NOTE — PROGRESS NOTE ADULT - PROBLEM SELECTOR PLAN 5
Per [facility]script clinic note, patient Hb 13.8 in Jan. 2017. Hgb downtrended to 11 and then 9.8 since hospitalization, now stabilized.  Iron studies are c/w mixed picture, with Fe deficiency and some element of AOCD.  Borderline microcytosis.  No evidence of hemolysis.   - f/u CBC (pending today)  - Consider starting FeSO4 daily supplementation  - c/w monitoring CBC for now

## 2017-11-12 NOTE — DISCHARGE NOTE ADULT - MEDICATION SUMMARY - MEDICATIONS TO STOP TAKING
I will STOP taking the medications listed below when I get home from the hospital:    amLODIPine 10 mg oral tablet  -- 1 tab(s) by mouth once a day    Vasotec 20 mg oral tablet  -- 1 tab(s) by mouth once a day

## 2017-11-12 NOTE — DISCHARGE NOTE ADULT - MEDICATION SUMMARY - MEDICATIONS TO TAKE
I will START or STAY ON the medications listed below when I get home from the hospital:    aspirin 81 mg oral tablet, chewable  -- 1 tab(s) by mouth once a day  -- Indication: For Prophylactic measure    enalapril 10 mg oral tablet  -- 1 tab(s) by mouth once a day  -- Indication: For HTN (hypertension)    HumaLOG Mix 75/25 KwikPen subcutaneous suspension  -- 15 unit(s) subcutaneous once a daywith breakfast and 10 unts with dinner  -- Indication: For Diabetes    atorvastatin 20 mg oral tablet  -- 1 tab(s) by mouth once a day  -- Indication: For Dyslipidemia    metoprolol tartrate 25 mg oral tablet  -- 0.5 tab(s) by mouth 2 times a day   -- It is very important that you take or use this exactly as directed.  Do not skip doses or discontinue unless directed by your doctor.  May cause drowsiness.  Alcohol may intensify this effect.  Use care when operating dangerous machinery.  Some non-prescription drugs may aggravate your condition.  Read all labels carefully.  If a warning appears, check with your doctor before taking.  Take with food or milk.  This drug may impair the ability to drive or operate machinery.  Use care until you become familiar with its effects.    -- Indication: For HTN (hypertension)    docusate sodium 100 mg oral capsule  -- 1 cap(s) by mouth 2 times a day  -- Indication: For Constipation

## 2017-11-12 NOTE — PROGRESS NOTE ADULT - PROBLEM SELECTOR PLAN 3
UA had some WBC/bacteria/LE+, but no fever, leukocytosis or urinary symptoms.  No evidence of underlying UTI.  - c/w holding ABX

## 2017-11-12 NOTE — DISCHARGE NOTE ADULT - PLAN OF CARE
Resolution of symptom HOME CARE INSTRUCTIONS  f you were prescribed antibiotics, take them exactly as your caregiver instructs you. Finish the medication even if you feel better after you have only taken some of the medication.  Drink enough water and fluids to keep your urine clear or pale yellow.  Avoid caffeine, tea, and carbonated beverages. They tend to irritate your bladder.  Empty your bladder often. Avoid holding urine for long periods of time.  Empty your bladder before and after sexual intercourse.  After a bowel movement, women should cleanse from front to back. Use each tissue only once.  SEEK MEDICAL CARE IF:  You have back pain.  You develop a fever.  Your symptoms do not begin to resolve within 3 days.  SEEK IMMEDIATE MEDICAL CARE IF:  You have severe back pain or lower abdominal pain.  You develop chills.  You have nausea or vomiting.  You have continued burning or discomfort with urination. Low salt diet  Activity as tolerated.  Take all medication as prescribed.  Follow up with your medical doctor for routine blood pressure monitoring at your next visit.  Notify your doctor if you have any of the following symptoms:   Dizziness, Lightheadedness, Blurry vision, Headache, Chest pain, Shortness of breath low fat diet  continue with current egimen HgA1C this admission.  Make sure you get your HgA1c checked every three months.  If you take oral diabetes medications, check your blood glucose two times a day.  If you take insulin, check your blood glucose before meals and at bedtime.  It's important not to skip any meals.  Keep a log of your blood glucose results and always take it with you to your doctor appointments.  Keep a list of your current medications including injectables and over the counter medications and bring this medication list with you to all your doctor appointments.  If you have not seen your ophthalmologist this year call for appointment.  Check your feet daily for redness, sores, or openings. Do not self treat. If no improvement in two days call your primary care physician for an appointment.  Low blood sugar (hypoglycemia) is a blood sugar below 70mg/dl. Check your blood sugar if you feel signs/symptoms of hypoglycemia. If your blood sugar is below 70 take 15 grams of carbohydrates (ex 4 oz of apple juice, 3-4 glucose tablets, or 4-6 oz of regular soda) wait 15 minutes and repeat blood sugar to make sure it comes up above 70.  If your blood sugar is above 70 and you are due for a meal, have a meal.  If you are not due for a meal have a snack.  This snack helps keeps your blood sugar at a safe range. continue with current regimen Your blood pressure was elevated upon your initial presentation to the hospital.  This has improved.  You have no evidence of heart damage.  A stress test was completed that was normal.  You should follow up with Dr. Cueva and Dr. Andre upon discharge. low fat diet  continue with current regimen Make sure you get your HgA1c checked every three months.  If you take oral diabetes medications, check your blood glucose two times a day.  If you take insulin, check your blood glucose before meals and at bedtime.  It's important not to skip any meals.  Keep a log of your blood glucose results and always take it with you to your doctor appointments.  Keep a list of your current medications including injectables and over the counter medications and bring this medication list with you to all your doctor appointments.  If you have not seen your ophthalmologist this year call for appointment.  Check your feet daily for redness, sores, or openings. Do not self treat. If no improvement in two days call your primary care physician for an appointment.  Low blood sugar (hypoglycemia) is a blood sugar below 70mg/dl. Check your blood sugar if you feel signs/symptoms of hypoglycemia. If your blood sugar is below 70 take 15 grams of carbohydrates (ex 4 oz of apple juice, 3-4 glucose tablets, or 4-6 oz of regular soda) wait 15 minutes and repeat blood sugar to make sure it comes up above 70.  If your blood sugar is above 70 and you are due for a meal, have a meal.  If you are not due for a meal have a snack.  This snack helps keeps your blood sugar at a safe range. Your blood counts are normal upon discharge.  You should follow up with your primary care doctor for further testing within two weeks.

## 2017-11-12 NOTE — DISCHARGE NOTE ADULT - CARE PLAN
Principal Discharge DX:	UTI (urinary tract infection)  Goal:	Resolution of symptom  Instructions for follow-up, activity and diet:	HOME CARE INSTRUCTIONS  f you were prescribed antibiotics, take them exactly as your caregiver instructs you. Finish the medication even if you feel better after you have only taken some of the medication.  Drink enough water and fluids to keep your urine clear or pale yellow.  Avoid caffeine, tea, and carbonated beverages. They tend to irritate your bladder.  Empty your bladder often. Avoid holding urine for long periods of time.  Empty your bladder before and after sexual intercourse.  After a bowel movement, women should cleanse from front to back. Use each tissue only once.  SEEK MEDICAL CARE IF:  You have back pain.  You develop a fever.  Your symptoms do not begin to resolve within 3 days.  SEEK IMMEDIATE MEDICAL CARE IF:  You have severe back pain or lower abdominal pain.  You develop chills.  You have nausea or vomiting.  You have continued burning or discomfort with urination.  Secondary Diagnosis:	Hypertension, unspecified type  Instructions for follow-up, activity and diet:	Low salt diet  Activity as tolerated.  Take all medication as prescribed.  Follow up with your medical doctor for routine blood pressure monitoring at your next visit.  Notify your doctor if you have any of the following symptoms:   Dizziness, Lightheadedness, Blurry vision, Headache, Chest pain, Shortness of breath  Secondary Diagnosis:	Dyslipidemia  Instructions for follow-up, activity and diet:	low fat diet  continue with current egimen  Secondary Diagnosis:	Diabetes  Instructions for follow-up, activity and diet:	HgA1C this admission.  Make sure you get your HgA1c checked every three months.  If you take oral diabetes medications, check your blood glucose two times a day.  If you take insulin, check your blood glucose before meals and at bedtime.  It's important not to skip any meals.  Keep a log of your blood glucose results and always take it with you to your doctor appointments.  Keep a list of your current medications including injectables and over the counter medications and bring this medication list with you to all your doctor appointments.  If you have not seen your ophthalmologist this year call for appointment.  Check your feet daily for redness, sores, or openings. Do not self treat. If no improvement in two days call your primary care physician for an appointment.  Low blood sugar (hypoglycemia) is a blood sugar below 70mg/dl. Check your blood sugar if you feel signs/symptoms of hypoglycemia. If your blood sugar is below 70 take 15 grams of carbohydrates (ex 4 oz of apple juice, 3-4 glucose tablets, or 4-6 oz of regular soda) wait 15 minutes and repeat blood sugar to make sure it comes up above 70.  If your blood sugar is above 70 and you are due for a meal, have a meal.  If you are not due for a meal have a snack.  This snack helps keeps your blood sugar at a safe range.  Secondary Diagnosis:	Anemia  Instructions for follow-up, activity and diet:	continue with current regimen Principal Discharge DX:	ACS (acute coronary syndrome)  Goal:	Resolution of symptom  Instructions for follow-up, activity and diet:	Your blood pressure was elevated upon your initial presentation to the hospital.  This has improved.  You have no evidence of heart damage.  A stress test was completed that was normal.  You should follow up with Dr. Cueva and Dr. Andre upon discharge.  Secondary Diagnosis:	Hypertension, unspecified type  Instructions for follow-up, activity and diet:	Low salt diet  Activity as tolerated.  Take all medication as prescribed.  Follow up with your medical doctor for routine blood pressure monitoring at your next visit.  Notify your doctor if you have any of the following symptoms:   Dizziness, Lightheadedness, Blurry vision, Headache, Chest pain, Shortness of breath  Secondary Diagnosis:	Dyslipidemia  Instructions for follow-up, activity and diet:	low fat diet  continue with current regimen  Secondary Diagnosis:	Diabetes  Instructions for follow-up, activity and diet:	Make sure you get your HgA1c checked every three months.  If you take oral diabetes medications, check your blood glucose two times a day.  If you take insulin, check your blood glucose before meals and at bedtime.  It's important not to skip any meals.  Keep a log of your blood glucose results and always take it with you to your doctor appointments.  Keep a list of your current medications including injectables and over the counter medications and bring this medication list with you to all your doctor appointments.  If you have not seen your ophthalmologist this year call for appointment.  Check your feet daily for redness, sores, or openings. Do not self treat. If no improvement in two days call your primary care physician for an appointment.  Low blood sugar (hypoglycemia) is a blood sugar below 70mg/dl. Check your blood sugar if you feel signs/symptoms of hypoglycemia. If your blood sugar is below 70 take 15 grams of carbohydrates (ex 4 oz of apple juice, 3-4 glucose tablets, or 4-6 oz of regular soda) wait 15 minutes and repeat blood sugar to make sure it comes up above 70.  If your blood sugar is above 70 and you are due for a meal, have a meal.  If you are not due for a meal have a snack.  This snack helps keeps your blood sugar at a safe range.  Secondary Diagnosis:	Anemia  Instructions for follow-up, activity and diet:	Your blood counts are normal upon discharge.  You should follow up with your primary care doctor for further testing within two weeks.

## 2017-11-12 NOTE — DISCHARGE NOTE ADULT - HOSPITAL COURSE
82 y/o woman w/ PMHx DM2 on insulin, HTN, CVA w/o residual deficit 2013, and osteoarthritis sent in from clinic due to elevated BP and HTN in the setting of joint swelling, found to have dynamic EKG changes.  Now symptomatically improving.     DX: ACS (acute coronary syndrome). Cardiac enzymes negative and currently asymptomatic.  However, patient with ischemic changes on presenting EKG.  In the setting of comorbidities, there is some concern for underlying CAD.  Cardiology f/u appreciated. Echo to be done as outpatient. Stress test negative.  c/w metoprolol 12.5 mg BID as per cardiology,  c/w Lipitor and ASA. Follow up with cardiology Dr Cueva within 1 week. 84 y/o woman w/ PMHx DM2 on insulin, HTN, CVA w/o residual deficit 2013, and osteoarthritis sent in from clinic due to elevated BP and HTN in the setting of joint swelling, found to have dynamic EKG changes.  Now symptomatically improving.     DX: ACS (acute coronary syndrome). Cardiac enzymes negative and currently asymptomatic.  However, patient with ischemic changes on presenting EKG.  In the setting of comorbidities, there was some concern for underlying CAD.    Patient was monitored on telemetry with no acute events.  Her tachycardia improved and her blood pressure normalized.  Cardiology was consulted.  The patient's enalapril dose was adjusted and she was started on metoprolol.    Patient completed stress test, which was normal.  No reversible ischemia and LVEF noted to be 70%.  Case was d/w cardiology, who concurred patient could be discharged home with outpatient cardiology f/u for possible TTE.      Will c/w metoprolol 12.5 mg BID as per cardiology, also c/w Lipitor and ASA. Patient to follow up with cardiology, Dr Cueva, within 1 week.  On the day of discharge, patient's labs and vitals were unremarkable.  Her initial symptoms had resolved.  She was evaluated by PT, who recommended home with PT.  Patient has rolling walker at home and family is in agreement for discharge.  Patient will f/u with PCP and cardiology after discharge.

## 2017-11-12 NOTE — DISCHARGE NOTE ADULT - PATIENT PORTAL LINK FT
“You can access the [facility] Patient Portal, offered by [facility], by registering with the following website: [email or web address]”

## 2017-11-12 NOTE — PHYSICAL THERAPY INITIAL EVALUATION ADULT - DISCHARGE DISPOSITION, PT EVAL
home w/ assist/home w/ home PT/Home with home PT for safety assessment, gait, balance, & strength training and to return pt to baseline functional mobility status. Supervision/assist with mobility skills at this time (pt states Son and  provides).

## 2017-11-12 NOTE — PHYSICAL THERAPY INITIAL EVALUATION ADULT - PERTINENT HX OF CURRENT PROBLEM, REHAB EVAL
82yo F w/ PMHx DM on insulin, HTN, CVA w/o residual deficit 2013, arthritis sent in from clinic today for elevated bp and HTN. Patient reported she was at baseline health status prior to today. She had office visit yesterday am and her bp in clinic was 200/96 (per chart review in allscript). Patient was also noticed to bilateral ankle swelling without clear etiology

## 2017-11-12 NOTE — PROGRESS NOTE ADULT - PROBLEM SELECTOR PLAN 1
Cardiac enzymes negative and currently asymptomatic.  However, patient with ischemic changes on presenting EKG.  In the setting of comorbidities, there is some concern for underlying CAD.  Cardiology f/u appreciated.  - Pending TTE and nuclear stress test today  - c/w telemetry monitoring  - c/w metoprolol 12.5 mg BID as per cardiology  - c/w lipitor and ASA

## 2017-11-12 NOTE — PROGRESS NOTE ADULT - ATTENDING COMMENTS
Paras Hills M.D.  Hospitalist  Pager: [phone] Discharge time 35 minutes.    Paras Hills M.D.  Hospitalist  Pager: [phone]

## 2017-11-12 NOTE — DISCHARGE NOTE ADULT - CARE PROVIDER_API CALL
Leland Cueva (MD), Cardiovascular Disease; Internal Medicine  [address] [address]  Phone: [phone]  Fax: [phone] Leland Cueva (MD), Cardiovascular Disease; Internal Medicine  [address] [address]  Phone: [phone]  Fax: [phone]

## 2017-11-12 NOTE — PROGRESS NOTE ADULT - SUBJECTIVE AND OBJECTIVE BOX
SUBJ:  Feeling well and asymptomatic. Denies chest pain, shortness of breath, syncope, pre-syncope, lower extremity.     MEDICATIONS  (STANDING):  aspirin  chewable 81 milliGRAM(s) Oral daily  atorvastatin 20 milliGRAM(s) Oral at bedtime  dextrose 5%. 1000 milliLiter(s) (50 mL/Hr) IV Continuous <Continuous>  dextrose 50% Injectable 12.5 Gram(s) IV Push once  dextrose 50% Injectable 25 Gram(s) IV Push once  dextrose 50% Injectable 25 Gram(s) IV Push once  enoxaparin Injectable 40 milliGRAM(s) SubCutaneous every 24 hours  insulin lispro (HumaLOG) corrective regimen sliding scale   SubCutaneous three times a day before meals  insulin lispro (HumaLOG) corrective regimen sliding scale   SubCutaneous at bedtime    MEDICATIONS  (PRN):  dextrose Gel 1 Dose(s) Oral once PRN Blood Glucose LESS THAN 70 milliGRAM(s)/deciliter  glucagon  Injectable 1 milliGRAM(s) IntraMuscular once PRN Glucose LESS THAN 70 milligrams/deciliter    Vital Signs Last 24 Hrs  T(C): 36.5 (10 Nov 2017 07:26), Max: 37.1 (09 Nov 2017 18:36)  T(F): 97.7 (10 Nov 2017 07:26), Max: 98.7 (09 Nov 2017 18:36)  HR: 103 (10 Nov 2017 07:26) (96 - 128)  BP: 154/75 (10 Nov 2017 07:26) (117/62 - 204/99)  BP(mean): --  RR: 18 (10 Nov 2017 07:26) (16 - 19)  SpO2: 100% (10 Nov 2017 07:26) (99% - 100%)    REVIEW OF SYSTEMS:  As per HPI, otherwise unremarkable.   ICU Vital Signs Last 24 Hrs  T(C): 36.5 (10 Nov 2017 07:26), Max: 37.1 (09 Nov 2017 18:36)  T(F): 97.7 (10 Nov 2017 07:26), Max: 98.7 (09 Nov 2017 18:36)  HR: 103 (10 Nov 2017 07:26) (96 - 128)  BP: 154/75 (10 Nov 2017 07:26) (117/62 - 204/99)    PHYSICAL EXAM:  · CONSTITUTIONAL: Well-developed, well nourished      · EYES: EOMI  · NECK: supple  ·RESPIRATORY:   airway patent; breath sounds equal; good air movement; respirations non-labored; clear to auscultation bilaterally; no chest wall tenderness; no intercostal retractions; no rales, rhonchi or wheeze.  · CARDIOVASCULAR: regular rate and rhythm  no rub    . GASTROINTESTINAL:  no distention; no masses palpable  · EXTREMITIES: No cyanosis, clubbing or edema  · VASCULAR:  Equal and normal pulses (radial, femoral)  ·NEUROLOGICAL:  Alert and oriented x 3  · SKIN: No lesions; no rash  . LYMPH NODES: No lymphadedenopathy  · MUSCULOSKELETAL:  No calf tenderness  no joint swelling    ECG:   EKG on admission with sinus tachycardia 130BPM, ST depression 1mm precordial leads V4-6. Repeat EKG after improved blood pressure and rate with resolution of depressions.     TTE: pending    LABS:   CBC Full  -  ( 10 Nov 2017 07:40 )  WBC Count : 6.38 K/uL  Hemoglobin : 9.8 g/dL  Hematocrit : 30.9 %  Platelet Count - Automated : 295 K/uL  Mean Cell Volume : 80.7 fl  Mean Cell Hemoglobin : 25.6 pg  Mean Cell Hemoglobin Concentration : 31.7 gm/dL  Auto Neutrophil # : 5.03 K/uL  Auto Lymphocyte # : 0.85 K/uL  Auto Monocyte # : 0.44 K/uL  Auto Eosinophil # : 0.02 K/uL  Auto Basophil # : 0.01 K/uL  Auto Neutrophil % : 78.8 %  Auto Lymphocyte % : 13.3 %  Auto Monocyte % : 6.9 %  Auto Eosinophil % : 0.3 %  Auto Basophil % : 0.2 %    11-10  138  |  105  |  18  ----------------------------<  82  4.0   |  22  |  0.76    Ca    8.8      10 Nov 2017 05:45  Phos  1.7     11-10  Mg     1.9     11-10    TPro  7.2  /  Alb  3.0<L>  /  TBili  0.3  /  DBili  x   /  AST  28  /  ALT  18  /  AlkPhos  71  11-10    CARDIAC MARKERS ( 10 Nov 2017 05:45 )  x     / 0.02 ng/mL / 122 U/L / x     / 4.8 ng/mL  CARDIAC MARKERS ( 09 Nov 2017 20:02 )  x     / <0.01 ng/mL / 51 U/L / x     / 3.2 ng/mL    RADIOLOGY & ADDITIONAL STUDIES:  CT Chest 11/10/2017  IMPRESSION:     No pulmonary embolism.    Two 4 mm pulmonary nodules in the right lower lobe, stable in size since   2012.    IMPRESSION AND PLAN:  83yr old female pmhx DM, CVA 2013 no neurological deficit, htn, arthritis sent by her PMD for lower extremity oedema, found to be tachycardic in ED with EKG changes suggestive of ischemia.     1) Abnormal ECG   Ischemic changes appreciated on 12 lead EKG during tachycardic hypertensive episode that improved with good control.   Tachycardia maybe secondary to UTI, pain, flare of the arthritis, ischemia secondary to severe hypertension (204/99).   Cardiac enzyme negative, BNP>500.    ·	TTE for evaluation of LV function.  ·	Start ASA 81mg po daily,   ·	Continue atorvastatin 10mg po daily.   ·	Pending pharmacological nuclear stress test to r/o CAD. If unremarkable, she can be discharged with aspirin 81 mg daily, amlodipine 5 mg daily and enalapril 5 mg daily. If positive, further recommendations to be given.   ·	Follow up with her PCP.     2) HTN  Poorly controlled.   Home measurements are >150 systolic per daughter and patient despite being on enalapril 10 mg BID.   Some confusion regarding enalapril dosing; medication prescribed as 10 mg daily dosing but she has been taking BID. Suggest 10 mg daily.     ·	Continue Enalapril 10 mg.   ·	Start amlodipine 5 mg daily for improved control.     Souleymane Comer MD, MPH, GOVIND  Cardiovascular Specialist Attending  [facility] (formerly [facility])  C: [phone]  E: [email or web address]  (Cardiology nocturnist available every night 7 pm - 7 am; available week days for follow-up; general cardiology consult coverage weekend days)
********Cardiology Progress Note***************    CC:  "I'm OK." constipated      INTERVAL HISTORY:  c/o constipation only.  No SOB/palps/ cp w/ limited activity  No chest, throat, jaw. arm or upper back pain.  No dyspnea, orthopnea, PND.  Edema resolved. No dizziness or syncope.                Allergies    No Known Allergies    Intolerances    	    MEDICATIONS:  aspirin  chewable 81 milliGRAM(s) Oral daily  enalapril 10 milliGRAM(s) Oral daily  enoxaparin Injectable 40 milliGRAM(s) SubCutaneous every 24 hours          docusate sodium 100 milliGRAM(s) Oral two times a day    atorvastatin 20 milliGRAM(s) Oral at bedtime  dextrose 50% Injectable 12.5 Gram(s) IV Push once  dextrose 50% Injectable 25 Gram(s) IV Push once  dextrose 50% Injectable 25 Gram(s) IV Push once  dextrose Gel 1 Dose(s) Oral once PRN  glucagon  Injectable 1 milliGRAM(s) IntraMuscular once PRN  insulin lispro (HumaLOG) corrective regimen sliding scale   SubCutaneous three times a day before meals  insulin lispro (HumaLOG) corrective regimen sliding scale   SubCutaneous at bedtime    dextrose 5%. 1000 milliLiter(s) IV Continuous <Continuous>      PAST MEDICAL & SURGICAL HISTORY:  Arthritis  Bladder disorder  Bladder Prolapse, Acquired  Dry eyes  CVA (cerebrovascular accident): 1/2013  History of pancreatitis: &#x27; 2008  Dyslipidemia  Diabetes mellitus type 2 in nonobese  HTN (hypertension)  Bladder Prolapse, Acquired: &#x27; 2013;  Insertion Pessary  S/P laparotomy: initially to remove pancreatic mass but did not visualize mass and closed: &#x27; 2008      FAMILY HISTORY:  No pertinent family history in first degree relatives      SOCIAL HISTORY:  unchanged    REVIEW OF SYSTEMS:    CONSTITUTIONAL: No fever or chills.  appetite is normal.  Lost a few #s  EYES: no visual disturbances  ENMT:  No epistaxis  RESPIRATORY: No cough, wheezing  or hemoptysis  CARDIOVASCULAR: see HPI  GASTROINTESTINAL: No abdominal pain. No nausea, vomiting, or hematemesis; No diarrhea or constipation. No melena or hematochezia.  GENITOURINARY: No dysuria, frequency, hematuria  NEUROLOGICAL: No headache.  No amaurosis.  No new focal motor or sensory disturbance  SKIN: No pruritis or rash   MUSCULOSKELETAL: severe generalized arthritic pains  PSYCHIATRIC: No depression, anxiety or difficulty sleeping  HEME/LYMPH: No easy bruising, or bleeding gums  	    [x ] All others negative	    PHYSICAL EXAM:  T(C): 37.2 (11-11-17 @ 05:14), Max: 37.3 (11-10-17 @ 18:53)  HR: 95 (11-11-17 @ 05:14) (95 - 111)  BP: 126/66 (11-11-17 @ 05:14) (126/66 - 164/70)  RR: 18 (11-11-17 @ 05:14) (16 - 18)  SpO2: 95% (11-11-17 @ 05:14) (95% - 100%)  Wt(kg): --  I&O's Summary    10 Nov 2017 07:01  -  11 Nov 2017 07:00  --------------------------------------------------------  IN: 220 mL / OUT: 300 mL / NET: -80 mL        Appearance:  Thin, frail. NAD. No respiratory distress	  HEENT:   Normal oral mucosa, PERRL, EOMI	  Lymphatic: No lymphadenopathy  Cardiovascular: No JVD.  PMI not palpable. Normal S1 S2.  I-II/VI early peaking basal ELIDIA. No G/C/ R.  No edema  Respiratory: Lungs clear to auscultation	  Psychiatry: A & O x 3, Mood & affect appropriate  Gastrointestinal:  Soft, Non-tender, + BS	  Skin: No rashes, No ecchymoses, No cyanosis	  Neurologic: Non-focal  Extremities: Normal range of motion, No clubbing, cyanosis or edema  Vascular: Peripheral pulses palpable 1+ bilaterally      LABS:	 	    CBC Full  -  ( 10 Nov 2017 07:40 )  WBC Count : 6.38 K/uL  Hemoglobin : 9.8 g/dL  Hematocrit : 30.9 %  Platelet Count - Automated : 295 K/uL  Mean Cell Volume : 80.7 fl  Mean Cell Hemoglobin : 25.6 pg  Mean Cell Hemoglobin Concentration : 31.7 gm/dL  Auto Neutrophil # : 5.03 K/uL  Auto Lymphocyte # : 0.85 K/uL  Auto Monocyte # : 0.44 K/uL  Auto Eosinophil # : 0.02 K/uL  Auto Basophil # : 0.01 K/uL  Auto Neutrophil % : 78.8 %  Auto Lymphocyte % : 13.3 %  Auto Monocyte % : 6.9 %  Auto Eosinophil % : 0.3 %  Auto Basophil % : 0.2 %    11-10    138  |  105  |  18  ----------------------------<  82  4.0   |  22  |  0.76  11-09    138  |  103  |  22  ----------------------------<  183<H>  4.4   |  23  |  0.77    Ca    8.8      10 Nov 2017 05:45  Ca    9.5      09 Nov 2017 20:02  Phos  1.7     11-10  Mg     1.9     11-10    TPro  7.2  /  Alb  3.0<L>  /  TBili  0.3  /  DBili  x   /  AST  28  /  ALT  18  /  AlkPhos  71  11-10  TPro  8.4<H>  /  Alb  3.5  /  TBili  0.3  /  DBili  x   /  AST  29  /  ALT  21  /  AlkPhos  83  11-09    TELEMETRY: 	Sinus 
Patient is a 83y old  Female who presents with a chief complaint of LE swelling & High BP (10 Nov 2017 02:14)        SUBJECTIVE / OVERNIGHT EVENTS: Patient seen and examined.  She has no complaints this morning.  She feels well.  Denies dysuria, polyuria, and fever.  No SOB or chest pain.  Good appetite.    Telemetry reviewed, sinus rhythm .      MEDICATIONS  (STANDING):  aspirin  chewable 81 milliGRAM(s) Oral daily  atorvastatin 20 milliGRAM(s) Oral at bedtime  dextrose 5%. 1000 milliLiter(s) (50 mL/Hr) IV Continuous <Continuous>  dextrose 50% Injectable 12.5 Gram(s) IV Push once  dextrose 50% Injectable 25 Gram(s) IV Push once  dextrose 50% Injectable 25 Gram(s) IV Push once  docusate sodium 100 milliGRAM(s) Oral two times a day  enalapril 10 milliGRAM(s) Oral daily  enoxaparin Injectable 40 milliGRAM(s) SubCutaneous every 24 hours  insulin lispro (HumaLOG) corrective regimen sliding scale   SubCutaneous three times a day before meals  insulin lispro (HumaLOG) corrective regimen sliding scale   SubCutaneous at bedtime  metoprolol     tartrate 12.5 milliGRAM(s) Oral two times a day  potassium phosphate / sodium phosphate powder 1 Packet(s) Oral three times a day    MEDICATIONS  (PRN):  dextrose Gel 1 Dose(s) Oral once PRN Blood Glucose LESS THAN 70 milliGRAM(s)/deciliter  glucagon  Injectable 1 milliGRAM(s) IntraMuscular once PRN Glucose LESS THAN 70 milligrams/deciliter      REVIEW OF SYSTEMS    General: No fevers, chills, weight loss, or decreased appetite    Skin/Breast: No rashes or bruising  	  Ophthalmologic: No change in vision or eye pain  	  ENMT: No sore throat or dry mouth    Respiratory and Thorax: No SOB or cough  	  Cardiovascular: No chest pain, palpitations, or LE edema    Gastrointestinal: No abdominal pain, nausea, vomiting, or diarrhea    Genitourinary: No dysuria or polyuria    Musculoskeletal: No joint swelling or erythema    Neurological: No headaches or new neurological deficits    Psychiatric: No depression or anxiety    Hematology/Lymphatics: No bruising 	    Vital Signs Last 24 Hrs  T(C): 36.7 (12 Nov 2017 12:13), Max: 37 (12 Nov 2017 04:12)  T(F): 98.1 (12 Nov 2017 12:13), Max: 98.6 (12 Nov 2017 04:12)  HR: 64 (12 Nov 2017 12:13) (64 - 89)  BP: 124/68 (12 Nov 2017 04:12) (124/68 - 133/70)  BP(mean): --  RR: 18 (12 Nov 2017 12:13) (18 - 18)  SpO2: 100% (12 Nov 2017 12:13) (98% - 100%)      CAPILLARY BLOOD GLUCOSE  205 (12 Nov 2017 11:50)  POCT Blood Glucose.: 205 mg/dL (12 Nov 2017 11:49)  POCT Blood Glucose.: 81 mg/dL (12 Nov 2017 07:47)  POCT Blood Glucose.: 199 mg/dL (11 Nov 2017 21:33)  POCT Blood Glucose.: 169 mg/dL (11 Nov 2017 16:41)      I&O's Summary    11 Nov 2017 07:01  -  12 Nov 2017 07:00  --------------------------------------------------------  IN: 720 mL / OUT: 200 mL / NET: 520 mL    12 Nov 2017 07:01  -  12 Nov 2017 14:04  --------------------------------------------------------  IN: 360 mL / OUT: 0 mL / NET: 360 mL        PHYSICAL EXAM:      Constitutional: Well-appearing, NAD, lying in bed, appears stated age    Eyes: EOMI, PERRLA, clear conjunctiva    ENMT: No pharyngeal erythema, mucus membranes moist    Neck: No JVD    Back: No spinal tenderness, +kyphosis    Respiratory: Lungs clear to auscultation     Cardiovascular: Regular rate and rhythm, S1S2+, no murmurs appreciated.  No LE edema    Gastrointestinal: Soft, non-tender, non-distended, bowel sounds positive all four quadrants    Extremities: no clubbing or cyanosis    Vascular: 2+ pulses radially and dorsalis pedis    Neurological: Alert and oriented to name, place, and date.  Cranial nerves II-XII intact.  No focal neurological deficits.  5/5 motor strength all four extremities    Skin: Warm and dry.  No rashes    Lymph Nodes: No cervical lymphadenopathy    Musculoskeletal: Significant osteoarthritic change of the MCP, PIP, and DIP joint of the b/l hands.  Joints are not warm or tender to touch, no swelling.     Psychiatric: Pleasant affect      LABS: PENDING      RADIOLOGY & ADDITIONAL TESTS:    Imaging Personally Reviewed:  Consultant(s) Notes Reviewed:  [X]  Care Discussed with Consultants/Other Providers:
Patient is a 83y old  Female who presents with a chief complaint of LE swelling & High BP (10 Nov 2017 02:14)        SUBJECTIVE / OVERNIGHT EVENTS: Patient seen and examined.  She reports she feels well this AM.  She had a large BM with improved abdominal discomfort.  Denies chest pain, SOB, fevers, chills, dysuria and polyuria.  Joint swelling from two days ago has completely resolved.    Telemetry reviewed, sinus rhythm .      MEDICATIONS  (STANDING):  aspirin  chewable 81 milliGRAM(s) Oral daily  atorvastatin 20 milliGRAM(s) Oral at bedtime  dextrose 5%. 1000 milliLiter(s) (50 mL/Hr) IV Continuous <Continuous>  dextrose 50% Injectable 12.5 Gram(s) IV Push once  dextrose 50% Injectable 25 Gram(s) IV Push once  dextrose 50% Injectable 25 Gram(s) IV Push once  docusate sodium 100 milliGRAM(s) Oral two times a day  enalapril 10 milliGRAM(s) Oral daily  enoxaparin Injectable 40 milliGRAM(s) SubCutaneous every 24 hours  insulin lispro (HumaLOG) corrective regimen sliding scale   SubCutaneous three times a day before meals  insulin lispro (HumaLOG) corrective regimen sliding scale   SubCutaneous at bedtime  metoprolol     tartrate 12.5 milliGRAM(s) Oral two times a day  potassium phosphate / sodium phosphate powder 1 Packet(s) Oral three times a day    MEDICATIONS  (PRN):  dextrose Gel 1 Dose(s) Oral once PRN Blood Glucose LESS THAN 70 milliGRAM(s)/deciliter  glucagon  Injectable 1 milliGRAM(s) IntraMuscular once PRN Glucose LESS THAN 70 milligrams/deciliter      REVIEW OF SYSTEMS      General: No fevers, chills, weight loss, or decreased appetite    Skin/Breast: No rashes or bruising  	  Ophthalmologic: No change in vision or eye pain  	  ENMT: No sore throat or dry mouth    Respiratory and Thorax: No SOB or cough  	  Cardiovascular: No chest pain, palpitations, or LE edema    Gastrointestinal: No abdominal pain, nausea, vomiting, or diarrhea    Genitourinary: No dysuria or polyuria    Musculoskeletal: No joint swelling or erythema    Neurological: No headaches or new neurological deficits    Psychiatric: No depression or anxiety    Hematology/Lymphatics: No bruising 	    Vital Signs Last 24 Hrs  T(C): 36.8 (2017 12:29), Max: 37.3 (10 Nov 2017 18:53)  T(F): 98.3 (2017 12:29), Max: 99.2 (10 Nov 2017 18:53)  HR: 92 (2017 12:29) (92 - 111)  BP: 138/63 (2017 12:29) (126/66 - 164/70)  BP(mean): --  RR: 18 (2017 12:29) (16 - 18)  SpO2: 100% (2017 12:29) (95% - 100%)  CAPILLARY BLOOD GLUCOSE      POCT Blood Glucose.: 284 mg/dL (2017 11:28)  POCT Blood Glucose.: 213 mg/dL (2017 08:26)  POCT Blood Glucose.: 162 mg/dL (10 Nov 2017 21:01)  POCT Blood Glucose.: 196 mg/dL (10 Nov 2017 17:35)    I&O's Summary    10 Nov 2017 07:01  -  2017 07:00  --------------------------------------------------------  IN: 220 mL / OUT: 300 mL / NET: -80 mL        PHYSICAL EXAM:      Constitutional: Well-appearing, NAD, lying in bed, appears stated age    Eyes: EOMI, PERRLA, clear conjunctiva    ENMT: No pharyngeal erythema, mucus membranes moist    Neck: No JVD    Back: No spinal tenderness or kyphosis    Respiratory: Lungs clear to auscultation     Cardiovascular: Regular rate and rhythm, S1S2+, no murmurs appreciated.  No LE edema    Gastrointestinal: Soft, non-tender, non-distended, bowel sounds positive all four quadrants    Extremities: no clubbing or cyanosis    Vascular: 2+ pulses radially and dorsalis pedis    Neurological: Alert and oriented to name, place, and date.  Cranial nerves II-XII intact.  No focal neurological deficits.  5/5 motor strength all four extremities    Skin: Warm and dry.  No rashes    Lymph Nodes: No cervical lymphadenopathy    Musculoskeletal: Significant osteoarthritic change of the MCP, PIP, and DIP joint of the b/l hands.  Joints are not warm or tender to touch, no swelling.     Psychiatric: Pleasant affect    LABS:                        9.7    7.62  )-----------( 322      ( 2017 08:39 )             30.6     11-11    134<L>  |  102  |  22  ----------------------------<  270<H>  4.7   |  21<L>  |  0.75    Ca    8.4      2017 08:37  Phos  1.6     11-  Mg     2.0     -    TPro  7.2  /  Alb  3.0<L>  /  TBili  0.3  /  DBili  x   /  AST  28  /  ALT  18  /  AlkPhos  71  11-10    PT/INR - ( 2017 20:02 )   PT: 12.5 sec;   INR: 1.15 ratio         PTT - ( 2017 20:02 )  PTT:28.9 sec  CARDIAC MARKERS ( 10 Nov 2017 05:45 )  x     / 0.02 ng/mL / 122 U/L / x     / 4.8 ng/mL  CARDIAC MARKERS ( 2017 20:02 )  x     / <0.01 ng/mL / 51 U/L / x     / 3.2 ng/mL      Urinalysis Basic - ( 2017 20:56 )    Color: PL Yellow / Appearance: Clear / S.011 / pH: x  Gluc: x / Ketone: Negative  / Bili: Negative / Urobili: Negative   Blood: x / Protein: Trace / Nitrite: Negative   Leuk Esterase: Large / RBC: 5-10 /HPF / WBC >50 /HPF   Sq Epi: x / Non Sq Epi: OCC /HPF / Bacteria: Moderate /HPF        RADIOLOGY & ADDITIONAL TESTS:    Imaging Personally Reviewed:  Consultant(s) Notes Reviewed:  [X]  Care Discussed with Consultants/Other Providers:

## 2017-11-12 NOTE — DISCHARGE NOTE ADULT - MEDICATION SUMMARY - MEDICATIONS TO CHANGE
I will SWITCH the dose or number of times a day I take the medications listed below when I get home from the hospital:    enalapril 20 mg oral tablet  --  by mouth 2 times a day

## 2017-11-13 LAB
AMPHET UR-MCNC: NEGATIVE
BARBITURATES UR-MCNC: NEGATIVE
BENZODIAZ UR-MCNC: NEGATIVE
COCAINE METAB.OTHER UR-MCNC: NEGATIVE
CREATININE, URINE: 65.8 MG/DL
METHADONE UR-MCNC: NEGATIVE
METHAQUALONE UR-MCNC: NEGATIVE
OPIATES UR-MCNC: NEGATIVE
PCP UR-MCNC: NEGATIVE
PROPOXYPH UR QL: NEGATIVE
THC UR QL: NEGATIVE

## 2017-12-04 ENCOUNTER — APPOINTMENT (OUTPATIENT)
Dept: UROGYNECOLOGY | Facility: CLINIC | Age: 82
End: 2017-12-04

## 2017-12-20 ENCOUNTER — RX RENEWAL (OUTPATIENT)
Age: 82
End: 2017-12-20

## 2017-12-21 ENCOUNTER — MEDICATION RENEWAL (OUTPATIENT)
Age: 82
End: 2017-12-21

## 2017-12-27 ENCOUNTER — RX RENEWAL (OUTPATIENT)
Age: 82
End: 2017-12-27

## 2018-01-03 PROBLEM — M19.90 UNSPECIFIED OSTEOARTHRITIS, UNSPECIFIED SITE: Chronic | Status: ACTIVE | Noted: 2017-11-09

## 2018-01-09 ENCOUNTER — RX RENEWAL (OUTPATIENT)
Age: 83
End: 2018-01-09

## 2018-01-11 ENCOUNTER — RX RENEWAL (OUTPATIENT)
Age: 83
End: 2018-01-11

## 2018-01-22 ENCOUNTER — APPOINTMENT (OUTPATIENT)
Dept: ENDOCRINOLOGY | Facility: CLINIC | Age: 83
End: 2018-01-22

## 2018-01-22 ENCOUNTER — APPOINTMENT (OUTPATIENT)
Dept: UROGYNECOLOGY | Facility: CLINIC | Age: 83
End: 2018-01-22
Payer: MEDICARE

## 2018-01-22 DIAGNOSIS — N81.9 FEMALE GENITAL PROLAPSE, UNSPECIFIED: ICD-10-CM

## 2018-01-22 PROCEDURE — 99213 OFFICE O/P EST LOW 20 MIN: CPT

## 2018-01-23 ENCOUNTER — APPOINTMENT (OUTPATIENT)
Dept: RHEUMATOLOGY | Facility: CLINIC | Age: 83
End: 2018-01-23

## 2018-01-24 ENCOUNTER — APPOINTMENT (OUTPATIENT)
Dept: RHEUMATOLOGY | Facility: CLINIC | Age: 83
End: 2018-01-24
Payer: MEDICARE

## 2018-01-24 ENCOUNTER — APPOINTMENT (OUTPATIENT)
Dept: ENDOCRINOLOGY | Facility: CLINIC | Age: 83
End: 2018-01-24
Payer: MEDICARE

## 2018-01-24 VITALS
HEIGHT: 62 IN | SYSTOLIC BLOOD PRESSURE: 117 MMHG | DIASTOLIC BLOOD PRESSURE: 72 MMHG | TEMPERATURE: 97.8 F | HEART RATE: 118 BPM | WEIGHT: 91 LBS | BODY MASS INDEX: 16.75 KG/M2

## 2018-01-24 VITALS
WEIGHT: 96 LBS | HEART RATE: 118 BPM | TEMPERATURE: 97.8 F | OXYGEN SATURATION: 98 % | RESPIRATION RATE: 16 BRPM | HEIGHT: 62 IN | SYSTOLIC BLOOD PRESSURE: 117 MMHG | BODY MASS INDEX: 17.66 KG/M2 | DIASTOLIC BLOOD PRESSURE: 72 MMHG

## 2018-01-24 VITALS — BODY MASS INDEX: 16.75 KG/M2 | WEIGHT: 91 LBS | HEIGHT: 62 IN

## 2018-01-24 LAB
GLUCOSE BLDC GLUCOMTR-MCNC: 98
HBA1C MFR BLD HPLC: 7.3

## 2018-01-24 PROCEDURE — 82962 GLUCOSE BLOOD TEST: CPT

## 2018-01-24 PROCEDURE — 83036 HEMOGLOBIN GLYCOSYLATED A1C: CPT | Mod: QW

## 2018-01-24 PROCEDURE — 99214 OFFICE O/P EST MOD 30 MIN: CPT

## 2018-01-24 PROCEDURE — 99214 OFFICE O/P EST MOD 30 MIN: CPT | Mod: 25

## 2018-01-25 ENCOUNTER — RX RENEWAL (OUTPATIENT)
Age: 83
End: 2018-01-25

## 2018-01-30 LAB
ALBUMIN MFR SERPL ELPH: 36.8 %
ALBUMIN SERPL ELPH-MCNC: 3.4 G/DL
ALBUMIN SERPL-MCNC: 3.1 G/DL
ALBUMIN/GLOB SERPL: 0.6 RATIO
ALP BLD-CCNC: 64 U/L
ALPHA1 GLOB MFR SERPL ELPH: 6.4 %
ALPHA1 GLOB SERPL ELPH-MCNC: 0.5 G/DL
ALPHA2 GLOB MFR SERPL ELPH: 11.4 %
ALPHA2 GLOB SERPL ELPH-MCNC: 1 G/DL
ALT SERPL-CCNC: 13 U/L
ANION GAP SERPL CALC-SCNC: 14 MMOL/L
AST SERPL-CCNC: 22 U/L
B-GLOBULIN MFR SERPL ELPH: 11.7 %
B-GLOBULIN SERPL ELPH-MCNC: 1 G/DL
BASOPHILS # BLD AUTO: 0.01 K/UL
BASOPHILS NFR BLD AUTO: 0.2 %
BILIRUB SERPL-MCNC: 0.4 MG/DL
BUN SERPL-MCNC: 31 MG/DL
CALCIUM SERPL-MCNC: 10.7 MG/DL
CALCIUM SERPL-MCNC: 10.7 MG/DL
CHLORIDE SERPL-SCNC: 97 MMOL/L
CK SERPL-CCNC: 30 U/L
CO2 SERPL-SCNC: 25 MMOL/L
CREAT SERPL-MCNC: 0.93 MG/DL
CRP SERPL-MCNC: 8.6 MG/DL
EOSINOPHIL # BLD AUTO: 0.09 K/UL
EOSINOPHIL NFR BLD AUTO: 1.4 %
ERYTHROCYTE [SEDIMENTATION RATE] IN BLOOD BY WESTERGREN METHOD: 75 MM/HR
FERRITIN SERPL-MCNC: 427 NG/ML
GAMMA GLOB FLD ELPH-MCNC: 2.8 G/DL
GAMMA GLOB MFR SERPL ELPH: 33.7 %
GLUCOSE SERPL-MCNC: 84 MG/DL
HBA1C MFR BLD HPLC: 7.2 %
HCT VFR BLD CALC: 30.8 %
HGB BLD-MCNC: 9.6 G/DL
IMM GRANULOCYTES NFR BLD AUTO: 0.2 %
INTERPRETATION SERPL IEP-IMP: NORMAL
IRON SATN MFR SERPL: 6 %
IRON SERPL-MCNC: 11 UG/DL
LYMPHOCYTES # BLD AUTO: 1.92 K/UL
LYMPHOCYTES NFR BLD AUTO: 29.7 %
M PROTEIN SPEC IFE-MCNC: NORMAL
MAGNESIUM SERPL-MCNC: 2.1 MG/DL
MAN DIFF?: NORMAL
MCHC RBC-ENTMCNC: 24.8 PG
MCHC RBC-ENTMCNC: 31.2 GM/DL
MCV RBC AUTO: 79.6 FL
MONOCYTES # BLD AUTO: 0.56 K/UL
MONOCYTES NFR BLD AUTO: 8.7 %
NEUTROPHILS # BLD AUTO: 3.87 K/UL
NEUTROPHILS NFR BLD AUTO: 59.8 %
PARATHYROID HORMONE INTACT: 48 PG/ML
PLATELET # BLD AUTO: 354 K/UL
POTASSIUM SERPL-SCNC: 4.7 MMOL/L
PROT SERPL-MCNC: 8.4 G/DL
RBC # BLD: 3.87 M/UL
RBC # FLD: 16.1 %
SODIUM SERPL-SCNC: 136 MMOL/L
TIBC SERPL-MCNC: 191 UG/DL
TSH SERPL-ACNC: 2.87 UIU/ML
UIBC SERPL-MCNC: 180 UG/DL
VIT B12 SERPL-MCNC: 329 PG/ML
WBC # FLD AUTO: 6.46 K/UL

## 2018-02-02 ENCOUNTER — APPOINTMENT (OUTPATIENT)
Dept: UROGYNECOLOGY | Facility: CLINIC | Age: 83
End: 2018-02-02
Payer: MEDICARE

## 2018-02-02 ENCOUNTER — APPOINTMENT (OUTPATIENT)
Dept: INTERNAL MEDICINE | Facility: CLINIC | Age: 83
End: 2018-02-02

## 2018-02-02 ENCOUNTER — APPOINTMENT (OUTPATIENT)
Dept: GERIATRICS | Facility: CLINIC | Age: 83
End: 2018-02-02
Payer: MEDICARE

## 2018-02-02 ENCOUNTER — NON-APPOINTMENT (OUTPATIENT)
Age: 83
End: 2018-02-02

## 2018-02-02 VITALS
DIASTOLIC BLOOD PRESSURE: 70 MMHG | HEART RATE: 108 BPM | OXYGEN SATURATION: 99 % | HEIGHT: 62.4 IN | SYSTOLIC BLOOD PRESSURE: 150 MMHG | RESPIRATION RATE: 18 BRPM | BODY MASS INDEX: 16.46 KG/M2 | WEIGHT: 90.6 LBS

## 2018-02-02 DIAGNOSIS — Z86.69 PERSONAL HISTORY OF OTHER DISEASES OF THE NERVOUS SYSTEM AND SENSE ORGANS: ICD-10-CM

## 2018-02-02 DIAGNOSIS — Z86.73 PERSONAL HISTORY OF TRANSIENT ISCHEMIC ATTACK (TIA), AND CEREBRAL INFARCTION W/OUT RESIDUAL DEFICITS: ICD-10-CM

## 2018-02-02 DIAGNOSIS — R63.4 ABNORMAL WEIGHT LOSS: ICD-10-CM

## 2018-02-02 DIAGNOSIS — N95.2 POSTMENOPAUSAL ATROPHIC VAGINITIS: ICD-10-CM

## 2018-02-02 DIAGNOSIS — N89.8 OTHER SPECIFIED NONINFLAMMATORY DISORDERS OF VAGINA: ICD-10-CM

## 2018-02-02 DIAGNOSIS — M15.4 EROSIVE (OSTEO)ARTHRITIS: ICD-10-CM

## 2018-02-02 DIAGNOSIS — Z71.89 OTHER SPECIFIED COUNSELING: ICD-10-CM

## 2018-02-02 DIAGNOSIS — H91.90 UNSPECIFIED HEARING LOSS, UNSPECIFIED EAR: ICD-10-CM

## 2018-02-02 PROCEDURE — 93000 ELECTROCARDIOGRAM COMPLETE: CPT

## 2018-02-02 PROCEDURE — 99205 OFFICE O/P NEW HI 60 MIN: CPT | Mod: 25

## 2018-02-02 PROCEDURE — 99213 OFFICE O/P EST LOW 20 MIN: CPT

## 2018-02-02 RX ORDER — CEPHALEXIN 500 MG/1
500 CAPSULE ORAL
Qty: 40 | Refills: 0 | Status: DISCONTINUED | COMMUNITY
Start: 2017-04-22 | End: 2018-02-02

## 2018-02-02 RX ORDER — METOPROLOL TARTRATE 25 MG/1
25 TABLET, FILM COATED ORAL
Qty: 30 | Refills: 0 | Status: DISCONTINUED | COMMUNITY
Start: 2017-11-12 | End: 2018-02-02

## 2018-03-01 ENCOUNTER — APPOINTMENT (OUTPATIENT)
Dept: UROGYNECOLOGY | Facility: CLINIC | Age: 83
End: 2018-03-01
Payer: MEDICARE

## 2018-03-01 DIAGNOSIS — N81.11 CYSTOCELE, MIDLINE: ICD-10-CM

## 2018-03-01 DIAGNOSIS — N81.2 INCOMPLETE UTEROVAGINAL PROLAPSE: ICD-10-CM

## 2018-03-01 PROCEDURE — 99213 OFFICE O/P EST LOW 20 MIN: CPT

## 2018-03-02 ENCOUNTER — APPOINTMENT (OUTPATIENT)
Dept: GERIATRICS | Facility: CLINIC | Age: 83
End: 2018-03-02

## 2018-03-02 ENCOUNTER — MESSAGE (OUTPATIENT)
Age: 83
End: 2018-03-02

## 2018-03-15 ENCOUNTER — APPOINTMENT (OUTPATIENT)
Dept: UROGYNECOLOGY | Facility: CLINIC | Age: 83
End: 2018-03-15

## 2018-03-19 ENCOUNTER — MEDICATION RENEWAL (OUTPATIENT)
Age: 83
End: 2018-03-19

## 2018-03-20 ENCOUNTER — MEDICATION RENEWAL (OUTPATIENT)
Age: 83
End: 2018-03-20

## 2018-03-22 ENCOUNTER — MEDICATION RENEWAL (OUTPATIENT)
Age: 83
End: 2018-03-22

## 2018-04-11 ENCOUNTER — APPOINTMENT (OUTPATIENT)
Dept: RHEUMATOLOGY | Facility: CLINIC | Age: 83
End: 2018-04-11

## 2018-04-13 ENCOUNTER — APPOINTMENT (OUTPATIENT)
Dept: RHEUMATOLOGY | Facility: CLINIC | Age: 83
End: 2018-04-13

## 2018-04-18 ENCOUNTER — APPOINTMENT (OUTPATIENT)
Dept: GERIATRICS | Facility: CLINIC | Age: 83
End: 2018-04-18
Payer: MEDICARE

## 2018-04-18 VITALS
WEIGHT: 89.25 LBS | TEMPERATURE: 97.6 F | OXYGEN SATURATION: 96 % | BODY MASS INDEX: 16.22 KG/M2 | RESPIRATION RATE: 18 BRPM | HEIGHT: 62.4 IN | DIASTOLIC BLOOD PRESSURE: 60 MMHG | HEART RATE: 109 BPM | SYSTOLIC BLOOD PRESSURE: 140 MMHG

## 2018-04-18 DIAGNOSIS — R00.0 TACHYCARDIA, UNSPECIFIED: ICD-10-CM

## 2018-04-18 DIAGNOSIS — Z79.899 OTHER LONG TERM (CURRENT) DRUG THERAPY: ICD-10-CM

## 2018-04-18 DIAGNOSIS — M19.90 UNSPECIFIED OSTEOARTHRITIS, UNSPECIFIED SITE: ICD-10-CM

## 2018-04-18 DIAGNOSIS — Z86.73 PERSONAL HISTORY OF TRANSIENT ISCHEMIC ATTACK (TIA), AND CEREBRAL INFARCTION W/OUT RESIDUAL DEFICITS: ICD-10-CM

## 2018-04-18 DIAGNOSIS — D64.9 ANEMIA, UNSPECIFIED: ICD-10-CM

## 2018-04-18 DIAGNOSIS — R26.81 UNSTEADINESS ON FEET: ICD-10-CM

## 2018-04-18 PROCEDURE — 99214 OFFICE O/P EST MOD 30 MIN: CPT

## 2018-04-18 RX ORDER — TRAMADOL HYDROCHLORIDE AND ACETAMINOPHEN 37.5; 325 MG/1; MG/1
37.5-325 TABLET, FILM COATED ORAL DAILY
Qty: 30 | Refills: 3 | Status: DISCONTINUED | COMMUNITY
Start: 2017-11-09 | End: 2018-04-18

## 2018-04-18 RX ORDER — PREDNISONE 5 MG/1
5 TABLET ORAL
Qty: 15 | Refills: 0 | Status: DISCONTINUED | COMMUNITY
Start: 2018-01-24 | End: 2018-04-18

## 2018-04-18 RX ORDER — LIDOCAINE HCL 4 %
4 CREAM (GRAM) TOPICAL
Qty: 1 | Refills: 0 | Status: ACTIVE | COMMUNITY
Start: 2018-04-18 | End: 1900-01-01

## 2018-04-24 RX ORDER — ACETAMINOPHEN 500 MG/1
500 TABLET, COATED ORAL EVERY 8 HOURS
Qty: 180 | Refills: 3 | Status: ACTIVE | COMMUNITY
Start: 2018-04-18

## 2018-04-24 RX ORDER — CAPSAICIN 0.1 G/100G
0.1 CREAM TOPICAL
Qty: 1 | Refills: 3 | Status: ACTIVE | COMMUNITY
Start: 2018-04-18

## 2018-05-01 ENCOUNTER — INPATIENT (INPATIENT)
Facility: HOSPITAL | Age: 83
LOS: 12 days | Discharge: HOME HEALTH | End: 2018-05-14
Attending: HOSPITALIST | Admitting: HOSPITALIST
Payer: MEDICARE

## 2018-05-01 VITALS
RESPIRATION RATE: 16 BRPM | OXYGEN SATURATION: 100 % | DIASTOLIC BLOOD PRESSURE: 87 MMHG | SYSTOLIC BLOOD PRESSURE: 170 MMHG | TEMPERATURE: 98 F | HEART RATE: 114 BPM

## 2018-05-01 DIAGNOSIS — I24.9 ACUTE ISCHEMIC HEART DISEASE, UNSPECIFIED: ICD-10-CM

## 2018-05-01 DIAGNOSIS — E16.2 HYPOGLYCEMIA, UNSPECIFIED: ICD-10-CM

## 2018-05-01 DIAGNOSIS — I10 ESSENTIAL (PRIMARY) HYPERTENSION: ICD-10-CM

## 2018-05-01 DIAGNOSIS — E78.5 HYPERLIPIDEMIA, UNSPECIFIED: ICD-10-CM

## 2018-05-01 DIAGNOSIS — E21.0 PRIMARY HYPERPARATHYROIDISM: ICD-10-CM

## 2018-05-01 DIAGNOSIS — Z29.9 ENCOUNTER FOR PROPHYLACTIC MEASURES, UNSPECIFIED: ICD-10-CM

## 2018-05-01 DIAGNOSIS — M81.0 AGE-RELATED OSTEOPOROSIS WITHOUT CURRENT PATHOLOGICAL FRACTURE: ICD-10-CM

## 2018-05-01 DIAGNOSIS — E11.319 TYPE 2 DIABETES MELLITUS WITH UNSPECIFIED DIABETIC RETINOPATHY WITHOUT MACULAR EDEMA: ICD-10-CM

## 2018-05-01 LAB
ALBUMIN SERPL ELPH-MCNC: 3.2 G/DL — LOW (ref 3.3–5)
ALP SERPL-CCNC: 87 U/L — SIGNIFICANT CHANGE UP (ref 40–120)
ALT FLD-CCNC: 12 U/L — SIGNIFICANT CHANGE UP (ref 4–33)
APPEARANCE UR: SIGNIFICANT CHANGE UP
APTT BLD: 30.3 SEC — SIGNIFICANT CHANGE UP (ref 27.5–37.4)
APTT BLD: 31.1 SEC — SIGNIFICANT CHANGE UP (ref 27.5–37.4)
AST SERPL-CCNC: 18 U/L — SIGNIFICANT CHANGE UP (ref 4–32)
B-OH-BUTYR SERPL-SCNC: 0.2 MMOL/L — SIGNIFICANT CHANGE UP (ref 0–0.4)
BACTERIA # UR AUTO: HIGH
BASE EXCESS BLDV CALC-SCNC: 5.9 MMOL/L — SIGNIFICANT CHANGE UP
BASOPHILS # BLD AUTO: 0.02 K/UL — SIGNIFICANT CHANGE UP (ref 0–0.2)
BASOPHILS NFR BLD AUTO: 0.3 % — SIGNIFICANT CHANGE UP (ref 0–2)
BILIRUB SERPL-MCNC: 0.3 MG/DL — SIGNIFICANT CHANGE UP (ref 0.2–1.2)
BILIRUB UR-MCNC: NEGATIVE — SIGNIFICANT CHANGE UP
BLD GP AB SCN SERPL QL: NEGATIVE — SIGNIFICANT CHANGE UP
BLOOD GAS VENOUS - CREATININE: 0.55 MG/DL — SIGNIFICANT CHANGE UP (ref 0.5–1.3)
BLOOD UR QL VISUAL: NEGATIVE — SIGNIFICANT CHANGE UP
BUN SERPL-MCNC: 12 MG/DL — SIGNIFICANT CHANGE UP (ref 7–23)
BUN SERPL-MCNC: 15 MG/DL — SIGNIFICANT CHANGE UP (ref 7–23)
CALCIUM SERPL-MCNC: 9.2 MG/DL — SIGNIFICANT CHANGE UP (ref 8.4–10.5)
CALCIUM SERPL-MCNC: 9.5 MG/DL — SIGNIFICANT CHANGE UP (ref 8.4–10.5)
CHLORIDE BLDV-SCNC: 104 MMOL/L — SIGNIFICANT CHANGE UP (ref 96–108)
CHLORIDE SERPL-SCNC: 102 MMOL/L — SIGNIFICANT CHANGE UP (ref 98–107)
CHLORIDE SERPL-SCNC: 99 MMOL/L — SIGNIFICANT CHANGE UP (ref 98–107)
CK MB BLD-MCNC: 16.7 — HIGH (ref 0–2.5)
CK MB BLD-MCNC: 27.76 NG/ML — HIGH (ref 1–4.7)
CK SERPL-CCNC: 166 U/L — SIGNIFICANT CHANGE UP (ref 25–170)
CO2 SERPL-SCNC: 29 MMOL/L — SIGNIFICANT CHANGE UP (ref 22–31)
CO2 SERPL-SCNC: 29 MMOL/L — SIGNIFICANT CHANGE UP (ref 22–31)
COLOR SPEC: SIGNIFICANT CHANGE UP
CREAT SERPL-MCNC: 0.56 MG/DL — SIGNIFICANT CHANGE UP (ref 0.5–1.3)
CREAT SERPL-MCNC: 0.57 MG/DL — SIGNIFICANT CHANGE UP (ref 0.5–1.3)
EOSINOPHIL # BLD AUTO: 0 K/UL — SIGNIFICANT CHANGE UP (ref 0–0.5)
EOSINOPHIL NFR BLD AUTO: 0 % — SIGNIFICANT CHANGE UP (ref 0–6)
FERRITIN SERPL-MCNC: 360.6 NG/ML — HIGH (ref 15–150)
GAS PNL BLDV: 140 MMOL/L — SIGNIFICANT CHANGE UP (ref 136–146)
GLUCOSE BLDC GLUCOMTR-MCNC: 158 MG/DL — HIGH (ref 70–99)
GLUCOSE BLDC GLUCOMTR-MCNC: 195 MG/DL — HIGH (ref 70–99)
GLUCOSE BLDC GLUCOMTR-MCNC: 202 MG/DL — HIGH (ref 70–99)
GLUCOSE BLDV-MCNC: 229 — HIGH (ref 70–99)
GLUCOSE SERPL-MCNC: 216 MG/DL — HIGH (ref 70–99)
GLUCOSE SERPL-MCNC: 225 MG/DL — HIGH (ref 70–99)
GLUCOSE UR-MCNC: 300 — SIGNIFICANT CHANGE UP
HCO3 BLDV-SCNC: 29 MMOL/L — HIGH (ref 20–27)
HCT VFR BLD CALC: 30.8 % — LOW (ref 34.5–45)
HCT VFR BLD CALC: 34.4 % — LOW (ref 34.5–45)
HCT VFR BLDV CALC: 31.7 % — LOW (ref 34.5–45)
HGB BLD-MCNC: 10.2 G/DL — LOW (ref 11.5–15.5)
HGB BLD-MCNC: 9.5 G/DL — LOW (ref 11.5–15.5)
HGB BLDV-MCNC: 10.2 G/DL — LOW (ref 11.5–15.5)
HYALINE CASTS # UR AUTO: SIGNIFICANT CHANGE UP (ref 0–?)
IMM GRANULOCYTES # BLD AUTO: 0.05 # — SIGNIFICANT CHANGE UP
IMM GRANULOCYTES NFR BLD AUTO: 0.7 % — SIGNIFICANT CHANGE UP (ref 0–1.5)
INR BLD: 1 — SIGNIFICANT CHANGE UP (ref 0.88–1.17)
IRON SATN MFR SERPL: 157 UG/DL — SIGNIFICANT CHANGE UP (ref 140–530)
IRON SATN MFR SERPL: 23 UG/DL — LOW (ref 30–160)
KETONES UR-MCNC: NEGATIVE — SIGNIFICANT CHANGE UP
LACTATE BLDV-MCNC: 1.9 MMOL/L — SIGNIFICANT CHANGE UP (ref 0.5–2)
LEUKOCYTE ESTERASE UR-ACNC: NEGATIVE — SIGNIFICANT CHANGE UP
LYMPHOCYTES # BLD AUTO: 0.75 K/UL — LOW (ref 1–3.3)
LYMPHOCYTES # BLD AUTO: 10 % — LOW (ref 13–44)
MAGNESIUM SERPL-MCNC: 2 MG/DL — SIGNIFICANT CHANGE UP (ref 1.6–2.6)
MCHC RBC-ENTMCNC: 24.5 PG — LOW (ref 27–34)
MCHC RBC-ENTMCNC: 25.3 PG — LOW (ref 27–34)
MCHC RBC-ENTMCNC: 29.7 % — LOW (ref 32–36)
MCHC RBC-ENTMCNC: 30.8 % — LOW (ref 32–36)
MCV RBC AUTO: 82.1 FL — SIGNIFICANT CHANGE UP (ref 80–100)
MCV RBC AUTO: 82.5 FL — SIGNIFICANT CHANGE UP (ref 80–100)
MONOCYTES # BLD AUTO: 0.38 K/UL — SIGNIFICANT CHANGE UP (ref 0–0.9)
MONOCYTES NFR BLD AUTO: 5.1 % — SIGNIFICANT CHANGE UP (ref 2–14)
MUCOUS THREADS # UR AUTO: SIGNIFICANT CHANGE UP
NEUTROPHILS # BLD AUTO: 6.3 K/UL — SIGNIFICANT CHANGE UP (ref 1.8–7.4)
NEUTROPHILS NFR BLD AUTO: 83.9 % — HIGH (ref 43–77)
NITRITE UR-MCNC: POSITIVE — HIGH
NON-SQ EPI CELLS # UR AUTO: <1 — SIGNIFICANT CHANGE UP
NRBC # FLD: 0 — SIGNIFICANT CHANGE UP
NRBC # FLD: 0 — SIGNIFICANT CHANGE UP
PCO2 BLDV: 53 MMHG — HIGH (ref 41–51)
PH BLDV: 7.39 PH — SIGNIFICANT CHANGE UP (ref 7.32–7.43)
PH UR: 6 — SIGNIFICANT CHANGE UP (ref 4.6–8)
PLATELET # BLD AUTO: 526 K/UL — HIGH (ref 150–400)
PLATELET # BLD AUTO: 531 K/UL — HIGH (ref 150–400)
PMV BLD: 9.4 FL — SIGNIFICANT CHANGE UP (ref 7–13)
PMV BLD: 9.5 FL — SIGNIFICANT CHANGE UP (ref 7–13)
PO2 BLDV: 36 MMHG — SIGNIFICANT CHANGE UP (ref 35–40)
POTASSIUM BLDV-SCNC: 3.2 MMOL/L — LOW (ref 3.4–4.5)
POTASSIUM SERPL-MCNC: 3.3 MMOL/L — LOW (ref 3.5–5.3)
POTASSIUM SERPL-MCNC: 3.6 MMOL/L — SIGNIFICANT CHANGE UP (ref 3.5–5.3)
POTASSIUM SERPL-SCNC: 3.3 MMOL/L — LOW (ref 3.5–5.3)
POTASSIUM SERPL-SCNC: 3.6 MMOL/L — SIGNIFICANT CHANGE UP (ref 3.5–5.3)
PROT SERPL-MCNC: 8.1 G/DL — SIGNIFICANT CHANGE UP (ref 6–8.3)
PROT UR-MCNC: NEGATIVE MG/DL — SIGNIFICANT CHANGE UP
PROTHROM AB SERPL-ACNC: 11.5 SEC — SIGNIFICANT CHANGE UP (ref 9.8–13.1)
RBC # BLD: 3.75 M/UL — LOW (ref 3.8–5.2)
RBC # BLD: 4.17 M/UL — SIGNIFICANT CHANGE UP (ref 3.8–5.2)
RBC # FLD: 14.6 % — HIGH (ref 10.3–14.5)
RBC # FLD: 14.6 % — HIGH (ref 10.3–14.5)
RBC CASTS # UR COMP ASSIST: SIGNIFICANT CHANGE UP (ref 0–?)
RH IG SCN BLD-IMP: POSITIVE — SIGNIFICANT CHANGE UP
SAO2 % BLDV: 65.5 % — SIGNIFICANT CHANGE UP (ref 60–85)
SODIUM SERPL-SCNC: 139 MMOL/L — SIGNIFICANT CHANGE UP (ref 135–145)
SODIUM SERPL-SCNC: 140 MMOL/L — SIGNIFICANT CHANGE UP (ref 135–145)
SP GR SPEC: 1.01 — SIGNIFICANT CHANGE UP (ref 1–1.04)
SQUAMOUS # UR AUTO: SIGNIFICANT CHANGE UP
TROPONIN T SERPL-MCNC: 0.26 NG/ML — HIGH (ref 0–0.06)
TROPONIN T SERPL-MCNC: 0.52 NG/ML — HIGH (ref 0–0.06)
TROPONIN T SERPL-MCNC: < 0.06 NG/ML — SIGNIFICANT CHANGE UP (ref 0–0.06)
UIBC SERPL-MCNC: 134.2 UG/DL — SIGNIFICANT CHANGE UP (ref 110–370)
UROBILINOGEN FLD QL: NORMAL MG/DL — SIGNIFICANT CHANGE UP
WBC # BLD: 5.79 K/UL — SIGNIFICANT CHANGE UP (ref 3.8–10.5)
WBC # BLD: 7.5 K/UL — SIGNIFICANT CHANGE UP (ref 3.8–10.5)
WBC # FLD AUTO: 5.79 K/UL — SIGNIFICANT CHANGE UP (ref 3.8–10.5)
WBC # FLD AUTO: 7.5 K/UL — SIGNIFICANT CHANGE UP (ref 3.8–10.5)
WBC UR QL: SIGNIFICANT CHANGE UP (ref 0–?)

## 2018-05-01 PROCEDURE — 93458 L HRT ARTERY/VENTRICLE ANGIO: CPT | Mod: 26

## 2018-05-01 PROCEDURE — 99223 1ST HOSP IP/OBS HIGH 75: CPT | Mod: GC

## 2018-05-01 PROCEDURE — 93306 TTE W/DOPPLER COMPLETE: CPT | Mod: 26

## 2018-05-01 PROCEDURE — 71046 X-RAY EXAM CHEST 2 VIEWS: CPT | Mod: 26

## 2018-05-01 PROCEDURE — 93010 ELECTROCARDIOGRAM REPORT: CPT

## 2018-05-01 RX ORDER — ASPIRIN/CALCIUM CARB/MAGNESIUM 324 MG
324 TABLET ORAL ONCE
Qty: 0 | Refills: 0 | Status: COMPLETED | OUTPATIENT
Start: 2018-05-01 | End: 2018-05-01

## 2018-05-01 RX ORDER — CEFTRIAXONE 500 MG/1
1 INJECTION, POWDER, FOR SOLUTION INTRAMUSCULAR; INTRAVENOUS EVERY 24 HOURS
Qty: 0 | Refills: 0 | Status: DISCONTINUED | OUTPATIENT
Start: 2018-05-02 | End: 2018-05-02

## 2018-05-01 RX ORDER — INSULIN LISPRO 100/ML
3 VIAL (ML) SUBCUTANEOUS
Qty: 0 | Refills: 0 | Status: DISCONTINUED | OUTPATIENT
Start: 2018-05-01 | End: 2018-05-05

## 2018-05-01 RX ORDER — ACETAMINOPHEN 500 MG
650 TABLET ORAL ONCE
Qty: 0 | Refills: 0 | Status: COMPLETED | OUTPATIENT
Start: 2018-05-01 | End: 2018-05-01

## 2018-05-01 RX ORDER — POTASSIUM CHLORIDE 20 MEQ
40 PACKET (EA) ORAL ONCE
Qty: 0 | Refills: 0 | Status: COMPLETED | OUTPATIENT
Start: 2018-05-01 | End: 2018-05-01

## 2018-05-01 RX ORDER — INFLUENZA VIRUS VACCINE 15; 15; 15; 15 UG/.5ML; UG/.5ML; UG/.5ML; UG/.5ML
0.5 SUSPENSION INTRAMUSCULAR ONCE
Qty: 0 | Refills: 0 | Status: DISCONTINUED | OUTPATIENT
Start: 2018-05-01 | End: 2018-05-14

## 2018-05-01 RX ORDER — CEFTRIAXONE 500 MG/1
1 INJECTION, POWDER, FOR SOLUTION INTRAMUSCULAR; INTRAVENOUS ONCE
Qty: 0 | Refills: 0 | Status: COMPLETED | OUTPATIENT
Start: 2018-05-01 | End: 2018-05-01

## 2018-05-01 RX ORDER — AMLODIPINE BESYLATE 2.5 MG/1
10 TABLET ORAL DAILY
Qty: 0 | Refills: 0 | Status: DISCONTINUED | OUTPATIENT
Start: 2018-05-01 | End: 2018-05-02

## 2018-05-01 RX ORDER — DEXTROSE 50 % IN WATER 50 %
25 SYRINGE (ML) INTRAVENOUS ONCE
Qty: 0 | Refills: 0 | Status: DISCONTINUED | OUTPATIENT
Start: 2018-05-01 | End: 2018-05-14

## 2018-05-01 RX ORDER — INSULIN GLARGINE 100 [IU]/ML
10 INJECTION, SOLUTION SUBCUTANEOUS AT BEDTIME
Qty: 0 | Refills: 0 | Status: DISCONTINUED | OUTPATIENT
Start: 2018-05-01 | End: 2018-05-05

## 2018-05-01 RX ORDER — DEXTROSE 50 % IN WATER 50 %
1 SYRINGE (ML) INTRAVENOUS ONCE
Qty: 0 | Refills: 0 | Status: DISCONTINUED | OUTPATIENT
Start: 2018-05-01 | End: 2018-05-14

## 2018-05-01 RX ORDER — GLUCAGON INJECTION, SOLUTION 0.5 MG/.1ML
1 INJECTION, SOLUTION SUBCUTANEOUS ONCE
Qty: 0 | Refills: 0 | Status: DISCONTINUED | OUTPATIENT
Start: 2018-05-01 | End: 2018-05-14

## 2018-05-01 RX ORDER — SODIUM CHLORIDE 9 MG/ML
1000 INJECTION INTRAMUSCULAR; INTRAVENOUS; SUBCUTANEOUS ONCE
Qty: 0 | Refills: 0 | Status: COMPLETED | OUTPATIENT
Start: 2018-05-01 | End: 2018-05-01

## 2018-05-01 RX ORDER — ATORVASTATIN CALCIUM 80 MG/1
1 TABLET, FILM COATED ORAL
Qty: 0 | Refills: 0 | COMMUNITY

## 2018-05-01 RX ORDER — CLOPIDOGREL BISULFATE 75 MG/1
300 TABLET, FILM COATED ORAL ONCE
Qty: 0 | Refills: 0 | Status: COMPLETED | OUTPATIENT
Start: 2018-05-01 | End: 2018-05-01

## 2018-05-01 RX ORDER — HEPARIN SODIUM 5000 [USP'U]/ML
INJECTION INTRAVENOUS; SUBCUTANEOUS
Qty: 25000 | Refills: 0 | Status: DISCONTINUED | OUTPATIENT
Start: 2018-05-01 | End: 2018-05-01

## 2018-05-01 RX ORDER — INSULIN LISPRO 100/ML
VIAL (ML) SUBCUTANEOUS
Qty: 0 | Refills: 0 | Status: DISCONTINUED | OUTPATIENT
Start: 2018-05-01 | End: 2018-05-14

## 2018-05-01 RX ORDER — ATORVASTATIN CALCIUM 80 MG/1
40 TABLET, FILM COATED ORAL AT BEDTIME
Qty: 0 | Refills: 0 | Status: DISCONTINUED | OUTPATIENT
Start: 2018-05-01 | End: 2018-05-14

## 2018-05-01 RX ORDER — SODIUM CHLORIDE 9 MG/ML
1000 INJECTION, SOLUTION INTRAVENOUS
Qty: 0 | Refills: 0 | Status: DISCONTINUED | OUTPATIENT
Start: 2018-05-01 | End: 2018-05-14

## 2018-05-01 RX ORDER — INSULIN LISPRO 100/ML
VIAL (ML) SUBCUTANEOUS AT BEDTIME
Qty: 0 | Refills: 0 | Status: DISCONTINUED | OUTPATIENT
Start: 2018-05-01 | End: 2018-05-14

## 2018-05-01 RX ORDER — DEXTROSE 50 % IN WATER 50 %
12.5 SYRINGE (ML) INTRAVENOUS ONCE
Qty: 0 | Refills: 0 | Status: DISCONTINUED | OUTPATIENT
Start: 2018-05-01 | End: 2018-05-14

## 2018-05-01 RX ORDER — HEPARIN SODIUM 5000 [USP'U]/ML
2700 INJECTION INTRAVENOUS; SUBCUTANEOUS EVERY 6 HOURS
Qty: 0 | Refills: 0 | Status: DISCONTINUED | OUTPATIENT
Start: 2018-05-01 | End: 2018-05-01

## 2018-05-01 RX ORDER — ASPIRIN/CALCIUM CARB/MAGNESIUM 324 MG
81 TABLET ORAL DAILY
Qty: 0 | Refills: 0 | Status: DISCONTINUED | OUTPATIENT
Start: 2018-05-01 | End: 2018-05-14

## 2018-05-01 RX ORDER — ACETAMINOPHEN 500 MG
2 TABLET ORAL
Qty: 0 | Refills: 0 | COMMUNITY

## 2018-05-01 RX ORDER — HEPARIN SODIUM 5000 [USP'U]/ML
INJECTION INTRAVENOUS; SUBCUTANEOUS
Qty: 25000 | Refills: 0 | Status: DISCONTINUED | OUTPATIENT
Start: 2018-05-01 | End: 2018-05-03

## 2018-05-01 RX ORDER — METOPROLOL TARTRATE 50 MG
12.5 TABLET ORAL
Qty: 0 | Refills: 0 | Status: DISCONTINUED | OUTPATIENT
Start: 2018-05-01 | End: 2018-05-02

## 2018-05-01 RX ORDER — HEPARIN SODIUM 5000 [USP'U]/ML
2700 INJECTION INTRAVENOUS; SUBCUTANEOUS ONCE
Qty: 0 | Refills: 0 | Status: COMPLETED | OUTPATIENT
Start: 2018-05-01 | End: 2018-05-01

## 2018-05-01 RX ADMIN — HEPARIN SODIUM 500 UNIT(S)/HR: 5000 INJECTION INTRAVENOUS; SUBCUTANEOUS at 07:27

## 2018-05-01 RX ADMIN — ATORVASTATIN CALCIUM 40 MILLIGRAM(S): 80 TABLET, FILM COATED ORAL at 21:02

## 2018-05-01 RX ADMIN — AMLODIPINE BESYLATE 10 MILLIGRAM(S): 2.5 TABLET ORAL at 14:10

## 2018-05-01 RX ADMIN — Medication 12.5 MILLIGRAM(S): at 17:26

## 2018-05-01 RX ADMIN — CEFTRIAXONE 100 GRAM(S): 500 INJECTION, POWDER, FOR SOLUTION INTRAMUSCULAR; INTRAVENOUS at 10:14

## 2018-05-01 RX ADMIN — HEPARIN SODIUM 2700 UNIT(S): 5000 INJECTION INTRAVENOUS; SUBCUTANEOUS at 07:27

## 2018-05-01 RX ADMIN — Medication 324 MILLIGRAM(S): at 07:27

## 2018-05-01 RX ADMIN — Medication 40 MILLIEQUIVALENT(S): at 13:22

## 2018-05-01 RX ADMIN — HEPARIN SODIUM 650 UNIT(S)/HR: 5000 INJECTION INTRAVENOUS; SUBCUTANEOUS at 10:14

## 2018-05-01 RX ADMIN — Medication 2: at 17:26

## 2018-05-01 RX ADMIN — INSULIN GLARGINE 10 UNIT(S): 100 INJECTION, SOLUTION SUBCUTANEOUS at 23:01

## 2018-05-01 RX ADMIN — SODIUM CHLORIDE 1000 MILLILITER(S): 9 INJECTION INTRAMUSCULAR; INTRAVENOUS; SUBCUTANEOUS at 06:44

## 2018-05-01 RX ADMIN — Medication 3 UNIT(S): at 17:27

## 2018-05-01 RX ADMIN — CLOPIDOGREL BISULFATE 300 MILLIGRAM(S): 75 TABLET, FILM COATED ORAL at 07:27

## 2018-05-01 NOTE — ED ADULT NURSE NOTE - OBJECTIVE STATEMENT
Pt received in spot 2, A&OX3, NAD.  c/o episode of AMS where she became lethargic and "stiff" as per family.  Upon EMS arrival, pt noted to be hypoglycemic.  Given an amp dextrose and is now back to baseline.  Denies any physical complaints.  Respirations even and unlabored on room air.  18g to L wrist placed by EMS, flushing well with good blood return.  Labs sent.  Will continue to monitor.

## 2018-05-01 NOTE — H&P ADULT - HISTORY OF PRESENT ILLNESS
84 year old female pmh of DM, HTN, HLD, TIA, arthritis presents with altered mental status this AM. Per patient at ~3 am her grandson found her half on the floor and half on her couch and confused. At the time she did not know what time or day it was. She does not remember anything before being found. Prior to this event she has been in her usual state of health. Per patients family at bedside the patient has been doing well, even seeming better than usual recently. Patient reports taking insulin regularly and monitoring her diet. She denies any chest pain, palpitations, nausea, vomiting, fever, chills, dysuria, hematuria, brbpr, melena, diarrhea, swelling, shortness of breath.

## 2018-05-01 NOTE — CHART NOTE - NSCHARTNOTEFT_GEN_A_CORE
PA note   Pt s/p cardiac cath. pt resting comfortably without any complaints.  RFA groin bandage in place no bleeding, ecchymosis or hematoma noted.  pulses 2+ ,skin pink and warm to touch.  Beatriz ROCK

## 2018-05-01 NOTE — CONSULT NOTE ADULT - SUBJECTIVE AND OBJECTIVE BOX
HPI:  84 year old female pmh of DM2, HTN, HLD, TIA, arthritis presents with altered mental status this AM. Per patient at ~3 am her grandson found her half on the floor and half on her couch and confused. At the time she did not know what time or day it was. She does not remember anything before being found. EMS was called and patient was found to be hypoglycemic to 50, D50 was given with improvement in hypoglycemia.      PAST MEDICAL & SURGICAL HISTORY:  Arthritis  Bladder disorder  Bladder Prolapse, Acquired  Dry eyes  CVA (cerebrovascular accident): 1/2013  History of pancreatitis: &#x27; 2008  Dyslipidemia  Diabetes mellitus type 2 in nonobese  HTN (hypertension)  Bladder Prolapse, Acquired: &#x27; 2013;  Insertion Pessary  S/P laparotomy: initially to remove pancreatic mass but did not visualize mass and closed: &#x27; 2008      FAMILY HISTORY:  No pertinent family history in first degree relatives      Social History:  No cigarette or alcohol use  Lives with family    Outpatient Medications:  · 	aspirin 81 mg oral tablet, chewable: 1 tab(s) orally once a day, Last Dose Taken:    · 	enalapril 10 mg oral tablet: 1 tab(s) orally once a day, Last Dose Taken:    · 	HumaLOG Mix 75/25 KwikPen subcutaneous suspension: 22 unit(s) subcutaneous once a day with breakfast and 10 unts with dinner, Last Dose Taken:    · 	pravastatin 10 mg oral tablet: 1 tab(s) orally once a day, Last Dose Taken:    · 	amLODIPine 10 mg oral tablet: 1 tab(s) orally once a day, Last Dose Taken:    · 	Tylenol 500 mg oral tablet: 2 tab(s) orally every 8 hours, As Needed, Last Dose Taken:    · 	tramadol-acetaminophen 37.5 mg-325 mg oral tablet: 1 tab(s) orally once a day, As Needed    MEDICATIONS  (STANDING):  amLODIPine   Tablet 10 milliGRAM(s) Oral daily  aspirin  chewable 81 milliGRAM(s) Oral daily  atorvastatin 40 milliGRAM(s) Oral at bedtime  dextrose 5%. 1000 milliLiter(s) (50 mL/Hr) IV Continuous <Continuous>  dextrose 50% Injectable 12.5 Gram(s) IV Push once  dextrose 50% Injectable 25 Gram(s) IV Push once  dextrose 50% Injectable 25 Gram(s) IV Push once  enalapril 10 milliGRAM(s) Oral daily  insulin lispro (HumaLOG) corrective regimen sliding scale   SubCutaneous three times a day before meals  insulin lispro (HumaLOG) corrective regimen sliding scale   SubCutaneous at bedtime  metoprolol tartrate 12.5 milliGRAM(s) Oral two times a day    MEDICATIONS  (PRN):  dextrose Gel 1 Dose(s) Oral once PRN Blood Glucose LESS THAN 70 milliGRAM(s)/deciliter  glucagon  Injectable 1 milliGRAM(s) IntraMuscular once PRN Glucose LESS THAN 70 milligrams/deciliter      Allergies  No Known Allergies      Review of Systems:  Constitutional: No fever  Eyes: No blurry vision  Neuro: No tremors  HEENT: No pain  Cardiovascular: No chest pain, palpitations  Respiratory: No SOB, no cough  GI: No nausea, vomiting, abdominal pain  : No dysuria  Skin: no rash  Psych: no depression  Endocrine: no polyuria, polydipsia  Hem/lymph: no swelling  Osteoporosis: no fractures    ALL OTHER SYSTEMS REVIEWED AND NEGATIVE      PHYSICAL EXAM:  VITALS: T(C): 36.3 (05-01-18 @ 06:25)  T(F): 97.3 (05-01-18 @ 06:25), Max: 98.1 (05-01-18 @ 05:57)  HR: 109 (05-01-18 @ 08:19) (108 - 127)  BP: 148/84 (05-01-18 @ 08:19) (148/84 - 170/87)  RR:  (16 - 19)  SpO2:  (100% - 100%)  Wt(kg): --  GENERAL: NAD, well-groomed, well-developed  EYES: No proptosis, no lid lag, anicteric  HEENT:  Atraumatic, Normocephalic, moist mucous membranes  THYROID: Normal size, no palpable nodules  RESPIRATORY: Clear to auscultation bilaterally; No rales, rhonchi, wheezing  CARDIOVASCULAR: Regular rate and rhythm; No murmurs; no peripheral edema  GI: Soft, nontender, non distended, normal bowel sounds  SKIN: Dry, intact, No rashes or lesions  MUSCULOSKELETAL: Full range of motion, normal strength  NEURO: sensation intact, extraocular movements intact, no tremor  PSYCH: Alert and oriented x 3, normal affect, normal mood  CUSHING'S SIGNS: no striae      POCT Blood Glucose.: 158 mg/dL (05-01-18 @ 11:59)  POCT Blood Glucose.: 197 mg/dL (05-01-18 @ 05:57)                          10.2   7.50  )-----------( 531      ( 01 May 2018 06:15 )             34.4       05-01    139  |  99  |  15  ----------------------------<  225<H>  3.3<L>   |  29  |  0.57    EGFR if : 99  EGFR if non : 85    Ca    9.5      05-01    TPro  8.1  /  Alb  3.2<L>  /  TBili  0.3  /  DBili  x   /  AST  18  /  ALT  12  /  AlkPhos  87  05-01 HPI:  84 year old female  PMH of DM2, HTN, HLD, TIA, primary hyperparathyroidism with osteoporosis, arthritis presents with altered mental status this AM. Per patient at ~3 am her grandson found her half on the floor and half on her couch and confused. At the time she did not know what time or day it was. She does not remember anything before being found. EMS was called and patient was found to be hypoglycemic to 50, D50 was given with improvement in hypoglycemia. She was noted to have EKG changes in the ED and is now s/p cath.    Patient was diagnosed with DM2 years ago. Currently on Humalog 75/25 - 22 units before breakfast and 10 units before dinner. Adherent to therapy. However, patient sometimes skips meals and has been eating less. She took full dose Humalog before dinner last night but did not eat as much. Was found to have a FS of 50 in the middle of the night. Has known retinopathy. No history of neuropathy or nephropathy. For breakfast will have oatmeal, fruit. Lunch is usually a sandwich or cold cuts. Dinner consists of steak, chicken. Her endocrinologist is Dr. Benavides, last visit was in January 2018, HbA1c 7.3 at that time. She checks FS twice a day and notes AM fasting FS ranging from 140's to 170's. Fs before dinner usually in the 130's. In the last week, her FS have been in the low 100's. Daughter at bedside states that patient has had several Fs in the 50's in the last week.    She also has a history of primary hyperparathyroidism but has declined surgery in the past. Has osteoporosis and is on Prolia.     PAST MEDICAL & SURGICAL HISTORY:  Arthritis  Bladder disorder  Bladder Prolapse, Acquired  Dry eyes  CVA (cerebrovascular accident): 1/2013  History of pancreatitis: &#x27; 2008  Dyslipidemia  Diabetes mellitus type 2 in nonobese  HTN (hypertension)  Bladder Prolapse, Acquired: &#x27; 2013;  Insertion Pessary  S/P laparotomy: initially to remove pancreatic mass but did not visualize mass and closed: &#x27; 2008      FAMILY HISTORY:  No pertinent family history in first degree relatives      Social History:  No cigarette or alcohol use  Lives with family    Outpatient Medications:  · 	aspirin 81 mg oral tablet, chewable: 1 tab(s) orally once a day, Last Dose Taken:    · 	enalapril 10 mg oral tablet: 1 tab(s) orally once a day, Last Dose Taken:    · 	HumaLOG Mix 75/25 KwikPen subcutaneous suspension: 22 unit(s) subcutaneous once a day with breakfast and 10 unts with dinner, Last Dose Taken:    · 	pravastatin 10 mg oral tablet: 1 tab(s) orally once a day, Last Dose Taken:    · 	amLODIPine 10 mg oral tablet: 1 tab(s) orally once a day, Last Dose Taken:    · 	Tylenol 500 mg oral tablet: 2 tab(s) orally every 8 hours, As Needed, Last Dose Taken:    · 	tramadol-acetaminophen 37.5 mg-325 mg oral tablet: 1 tab(s) orally once a day, As Needed    MEDICATIONS  (STANDING):  amLODIPine   Tablet 10 milliGRAM(s) Oral daily  aspirin  chewable 81 milliGRAM(s) Oral daily  atorvastatin 40 milliGRAM(s) Oral at bedtime  dextrose 5%. 1000 milliLiter(s) (50 mL/Hr) IV Continuous <Continuous>  dextrose 50% Injectable 12.5 Gram(s) IV Push once  dextrose 50% Injectable 25 Gram(s) IV Push once  dextrose 50% Injectable 25 Gram(s) IV Push once  enalapril 10 milliGRAM(s) Oral daily  insulin lispro (HumaLOG) corrective regimen sliding scale   SubCutaneous three times a day before meals  insulin lispro (HumaLOG) corrective regimen sliding scale   SubCutaneous at bedtime  metoprolol tartrate 12.5 milliGRAM(s) Oral two times a day    MEDICATIONS  (PRN):  dextrose Gel 1 Dose(s) Oral once PRN Blood Glucose LESS THAN 70 milliGRAM(s)/deciliter  glucagon  Injectable 1 milliGRAM(s) IntraMuscular once PRN Glucose LESS THAN 70 milligrams/deciliter      Allergies  No Known Allergies      Review of Systems:  Constitutional: No fever  Eyes: No blurry vision  Neuro: No tremors  HEENT: No pain  Cardiovascular: No chest pain, palpitations  Respiratory: No SOB, no cough  GI: No nausea, vomiting, abdominal pain  : No dysuria  Skin: no rash  Endocrine: no polyuria, polydipsia      ALL OTHER SYSTEMS REVIEWED AND NEGATIVE      PHYSICAL EXAM:  VITALS: T(C): 36.3 (05-01-18 @ 06:25)  T(F): 97.3 (05-01-18 @ 06:25), Max: 98.1 (05-01-18 @ 05:57)  HR: 109 (05-01-18 @ 08:19) (108 - 127)  BP: 148/84 (05-01-18 @ 08:19) (148/84 - 170/87)  RR:  (16 - 19)  SpO2:  (100% - 100%)  Wt(kg): --  GENERAL: NAD, well-developed  EYES: No proptosis, anicteric  HEENT:  Atraumatic, Normocephalic, moist mucous membranes  THYROID: np goiter  RESPIRATORY: Clear to auscultation bilaterally; No rales, rhonchi, wheezing  CARDIOVASCULAR: Regular rate and rhythm; No murmurs; no peripheral edema  GI: Soft, nontender, non distended, normal bowel sounds  SKIN: Dry, intact, No ulcers on feet  PSYCH: Alert and oriented x 3, reactive affect      POCT Blood Glucose.: 158 mg/dL (05-01-18 @ 11:59)  POCT Blood Glucose.: 197 mg/dL (05-01-18 @ 05:57)                          10.2   7.50  )-----------( 531      ( 01 May 2018 06:15 )             34.4       05-01    139  |  99  |  15  ----------------------------<  225<H>  3.3<L>   |  29  |  0.57    EGFR if : 99  EGFR if non : 85    Ca    9.5      05-01    TPro  8.1  /  Alb  3.2<L>  /  TBili  0.3  /  DBili  x   /  AST  18  /  ALT  12  /  AlkPhos  87  05-01

## 2018-05-01 NOTE — ED PROVIDER NOTE - EYES, MLM
Clear bilaterally, pupils equal, round and reactive to light. Stye at R eyelid margin. Clear bilaterally, pupils equal, round and reactive to light. Stye at R eyelid margin (improved from 2d ago w/ warm compresses)

## 2018-05-01 NOTE — CONSULT NOTE ADULT - ASSESSMENT
84F h/o HTN, HLD, IDDM, and TIA (no residual deficits) arrives via EMS for hypoglycemia, AMS.  Patient recently started on duloxetine for leg pain. Now with EKG changes elevated AVR STD II AVF, V2-V6    Abnormal EKG  - Trend CE   - Continue Heparin gtt, Plavix, ASA  - TTE to r/o Takotsubo   - Cath today to r/o CAD 84F h/o HTN, HLD, IDDM, and TIA (no residual deficits) arrives via EMS for hypoglycemia, AMS.  Patient recently started on duloxetine for leg pain. Now with EKG changes elevated AVR STD II AVF, V2-V6    Abnormal EKG  - Admit tele medicine   - Trend CE   - Continue Heparin gtt, Plavix, ASA  - TTE to r/o Takotsubo   - Cath today to r/o CAD

## 2018-05-01 NOTE — ED ADULT TRIAGE NOTE - CHIEF COMPLAINT QUOTE
alert oriented x 3 as per EMS was found stiff in bed with AMS and slurred speech at 0300   FS on scene was 50 D10W given by EMS rpt  all s/s resolved   FS 97 in triage hx DM HTN   left 18g heplock inserted by EMS

## 2018-05-01 NOTE — H&P ADULT - PROBLEM SELECTOR PLAN 1
Admitted to tele  serial EKG and CE  EKG in 11/2017 normal - no STD or BARBARA - and Stress test was normal without ischemia  Denies any chest pain or other cardiac compliant.   Echocardiogram performed and awaiting interpretation  In Cath lab awaiting angiogram  On hep gtt and received ASA and Plavix  Currently treating as Unstable angina but suspect will r/o as NSTEMI/STEMI

## 2018-05-01 NOTE — H&P ADULT - RS GEN PE MLT RESP DETAILS PC
no wheezes/airway patent/breath sounds equal/no rales/good air movement/clear to auscultation bilaterally/no rhonchi/respirations non-labored

## 2018-05-01 NOTE — H&P ADULT - ASSESSMENT
84 year old female pmh of HTN, hLD< TIA/CVA (no deficit), DM, p/w AMS 2/2 hypoglycemia and found to have ST depression and ST elevation admitted to telemetry for r/o ACS.

## 2018-05-01 NOTE — H&P ADULT - ATTENDING COMMENTS
Pt seen and examined, chart and labs reviewed.  Care discussed with tele PA, daughter and sons at bedside.  Pt is now s/p cardiac cath, showing multivessel occlusive disease. Pt currently asymptomatic, denies chest pain, SOB; family states that pt never complained of such symptoms prior to admission.      #CAD: s/p cath showing multivessel disease.  CT surgery called to evaluate for CABG.  Continue with ASA/Plavix/Statin/BB/ACE-I for now.  TTE done and reviewed.  Trend CE, repeat due at 10PM, cardiology aware of mildly elevated trop post cath.      #Hypoglycemia: now resolved.  Endocrine recs reviewed and appreciated, now on basal regimen and d/c regimen TBD.

## 2018-05-01 NOTE — H&P ADULT - NEGATIVE GENERAL GENITOURINARY SYMPTOMS
no dysuria/no flank pain R/no incontinence/no flank pain L/normal urinary frequency/no hematuria/no renal colic

## 2018-05-01 NOTE — ED PROVIDER NOTE - MEDICAL DECISION MAKING DETAILS
Hypoglycemia of uncertain etiology. No extra insulin (not on any other antihyperglycemic medications). Normal PO intake. Pt tachycardic in the ED, concern for possible infection. Plan: ekg, labs, UA, fluids, CXR, reassess. Hypoglycemia of uncertain etiology. No extra insulin (not on any other antihyperglycemic medications). Normal PO intake. Pt tachycardic in the ED, concern for possible infection, less likely ACS. Plan: ekg, labs, UA, fluids, CXR, reassess.

## 2018-05-01 NOTE — ED PROVIDER NOTE - OBJECTIVE STATEMENT
84F h/o HTN, HLD, DM, and CVA BIBEMS for hypoglycemia. 84F h/o HTN, HLD, DM (on mealtime insulin 3 times daily), and TIA (no residual deficits) arrives via EMS for hypoglycemia. Family found pt lying half off the couch around 3am today. Pt had slurred speech and was not making any sense, was stiff as per family. FS in the field was 50, given D10W by EMS. Pt now speaking clearly, no c/o. Pt denies fever, cough, SOB, CP, N/V/D, abd pain, dysuria, cough, or rash. No extra medication doses. Ate a normal amount for dinner last night. 84F h/o HTN, HLD, DM (on mealtime insulin 3 times daily), and TIA (no residual deficits) arrives via EMS for hypoglycemia. Family found pt lying half off the couch around 3am today. Pt had slurred speech and was not making any sense, was stiff as per family. FS in the field was 50, given D10W by EMS. Pt now speaking clearly, no c/o. Pt denies fever, cough, SOB, CP, N/V/D, abd pain, dysuria, cough, or rash. No extra medication doses. Ate a normal amount for dinner last night.  [place]: 84F PMH HTN, HLD, DM p/w AMS. PT cannot recall all events, just remembers foginess. Grandson heard pt screaming and found pt on couch (often sleeps on couch) where she was stiff and not making sense. EMS noted glucose 50, gave glucose and symptoms improved. PT now asymptomatic. Was asymptomatic yesterday. Denies recent illnesses, URI symptoms, f/c, SOB/CP, NVD, HA, abd pain, urinary complaints, rashes, black/bloody stool. FSGs have been fluctuating x1w. HAs hx of hypoglcemia in the past, most recently several mos ago at [facility], was diagnosed w/ cellulitis at that time.   Ambulates independently at baseline, lives w/ family. No recent med changes. Only on insulin TID, nothing at night. Last insulin ~1900 prior to dinner. Ate same amount as usual. 84F h/o HTN, HLD, DM (on mealtime insulin 3 times daily), and TIA (no residual deficits) arrives via EMS for hypoglycemia. Family found pt lying half off the couch around 3am today. Pt had slurred speech and was not making any sense, was stiff as per family. FS in the field was 50, given D10W by EMS. Pt now speaking clearly, no c/o. Pt denies fever, cough, SOB, CP, N/V/D, abd pain, dysuria, cough, or rash. No extra medication doses. Ate a normal amount for dinner last night.  [place]: 84F PMH HTN, HLD, CVA (no residual), DM p/w AMS. PT cannot recall all events, just remembers foginess. Grandson heard pt screaming and found pt on couch (often sleeps on couch) where she was stiff and not making sense. EMS noted glucose 50, gave glucose and symptoms improved. PT now asymptomatic. Was asymptomatic yesterday. Denies recent illnesses, URI symptoms, f/c, SOB/CP, NVD, HA, abd pain, urinary complaints, rashes, black/bloody stool. FSGs have been fluctuating x1w. HAs hx of hypoglcemia in the past, most recently several mos ago at [facility], was diagnosed w/ cellulitis at that time.   Ambulates independently at baseline, lives w/ family. No recent med changes. Only on insulin TID, nothing at night. Last insulin ~1900 prior to dinner. Ate same amount as usual.

## 2018-05-01 NOTE — CONSULT NOTE ADULT - ASSESSMENT
84 year old female  PMH of DM2, HTN, HLD, TIA, primary hyperparathyroidism with osteoporosis, here with hypoglycemia in setting of reduced po intake and mixed insulin use, also with newly diagnosed CAD

## 2018-05-01 NOTE — H&P ADULT - NSHPLABSRESULTS_GEN_ALL_CORE
10.2   7.50  )-----------( 531      ( 01 May 2018 06:15 )             34.4     05-01    139  |  99  |  15  ----------------------------<  225<H>  3.3<L>   |  29  |  0.57    Ca    9.5      01 May 2018 06:15    TPro  8.1  /  Alb  3.2<L>  /  TBili  0.3  /  DBili  x   /  AST  18  /  ALT  12  /  AlkPhos  87  05-01    CARDIAC MARKERS ( 01 May 2018 06:15 )  x     / < 0.06 ng/mL / x     / x     / x          EKG  Sinus Tach 119 with 3mm ST depression in v3-6 and reciprocal ST Elevated in aVR   CXR Clear

## 2018-05-01 NOTE — CHART NOTE - NSCHARTNOTEFT_GEN_A_CORE
EKG: NSR 99 BPM, Flat T in V4, V5, V6, AVL    T(C): 37.7 (05-01-18 @ 23:35), Max: 38.3 (05-01-18 @ 23:30)  HR: 90 (05-01-18 @ 22:45) (85 - 127)  BP: 121/85 (05-01-18 @ 22:45) (121/85 - 170/87)  RR: 18 (05-01-18 @ 22:45) (16 - 19)  SpO2: 98% (05-01-18 @ 22:45) (98% - 100%)  Wt(kg): -- EKG: NSR 99 BPM, Flat T in V4, V5, V6, AVL  Initial EKG: Sinus tachycardia 119 with ST depressions.     T(C): 37.7 (05-01-18 @ 23:35), Max: 38.3 (05-01-18 @ 23:30)  HR: 90 (05-01-18 @ 22:45) (85 - 127)  BP: 121/85 (05-01-18 @ 22:45) (121/85 - 170/87)  RR: 18 (05-01-18 @ 22:45) (16 - 19)  SpO2: 98% (05-01-18 @ 22:45) (98% - 100%)  Wt(kg): -- Patient's third cardiac enzyme showed: Trop: 0.52, CK: 166, MB: 27.76 RI: 16.7. Patient was seen at bedside. Patient has no complaints. Patient also denies any blood in stool, hematochezia, melena or bleeding history  Denies cough, cold like symptoms, chest pain, SOB, CANDELARIO or other complaints.   Physical: No distress, resting comfortably. Chest: +S1, +S2, Lungs; CTA. Abdomen: soft, non tender RFA site is stable. Dressing is clean, intact, dry. No hematoma or swelling. 2+ Pedal pulse.   CARDIAC MARKERS ( 01 May 2018 21:00 )  x     / 0.52 ng/mL / 166 u/L / 27.76 ng/mL / x      CARDIAC MARKERS ( 01 May 2018 15:00 )  x     / 0.26 ng/mL / x     / x     / x      CARDIAC MARKERS ( 01 May 2018 06:15 )  x     / < 0.06 ng/mL / x     / x     / x          EKG: NSR 99 BPM, Flat T in V4, V5, V6, AVL  Initial EKG from admission : Sinus tachycardia 119 with ST depressions.     T(C): 37.7 (18 @ 23:35), Max: 38.3 (18 @ 23:30)  HR: 90 (18 @ 22:45) (85 - 127)  BP: 121/85 (18 @ 22:45) (121/85 - 170/87)  RR: 18 (18 @ 22:45) (16 - 19)  SpO2: 98% (18 @ 22:45) (98% - 100%)  Wt(kg): --    < from: Xray Chest 2 Views PA/Lat (18 @ 07:18) >    The lungs are clear. The heart is not enlarged and there are no   effusions. The bones are intact.    < end of copied text >    UA showed from 5/ AM showed:  Color: COLORL / Appearance: HAZY / S.007 / pH: 6.0  Gluc: 300 / Ketone: NEGATIVE  / Bili: NEGATIVE / Urobili: NORMAL mg/dL  Blood: NEGATIVE / Protein: NEGATIVE mg/dL / Nitrite: POSITIVE   Leuk Esterase: NEGATIVE / RBC: 0-2 / WBC 0-2  Sq Epi: OCC / Non Sq Epi: x / Bacteria: MODERATE    Elevated Troponin:  Patient was loaded with plavix and asprin on 5/1 AM given the EKG changes. Patient then underwent cardiac cath, which showed LAD 80%, LCx 60%, pOM1 70-80%, pRPL 90-95%, EDP 22. RFA access. CTS consulted during the day. Given the elevated troponin will treat as NSTEMI, discussed with cardiology NP. Will start hep gtt without bolus given recent cath site. Patient has been off bedrest since 6:30pm. Patient also noted to have decreased H/H. Will send occult and cbc, PTT. Lovenox for DVT ppx dc'ed, plavix dc'ed per cardiology given patient is planning for possible CTS.     Fever: Patient also noted to have elevated temperature. 101 rectal temp, 99.8 oral. WIll give tylenlol. Will send blood cultures.   Urine culture pending. Will continue IV ceftriaxone.    Discussed with Dr. Anderson. Will continue to monitor closely.

## 2018-05-01 NOTE — CONSULT NOTE ADULT - PROBLEM SELECTOR RECOMMENDATION 9
- while inpatient, recommend start basal bolus insulin - start Lantus 12, Humalog 4, with low correction scale   - consistent carb diet  - will follow  - for discharge: discussed with daughter at bedtime option of switching to basal bolus insulin as this would be ideal therapy in patient with irregular eating habits. She will discuss with family. Discharge recs to be determined  - outpatient follow up with Dr. Benavides - [phone] - while inpatient, recommend start basal bolus insulin - start Lantus 10, Humalog 3, with low correction scale   - consistent carb diet  - will follow  - for discharge: discussed with daughter at bedtime option of switching to basal bolus insulin as this would be ideal therapy in patient with irregular eating habits. She will discuss with family. Discharge recs to be determined  - outpatient follow up with Dr. Benavides - [phone] - while inpatient, recommend start basal bolus insulin - start Lantus 10, Humalog 3, with low correction scale   - consistent carb diet  - repeat HbA1c in AM. Goal HbA1c is close to 8 given patient's age and co-morbidities  - will follow  - for discharge: discussed with daughter at bedtime option of switching to basal bolus insulin as this would be ideal therapy in patient with irregular eating habits. She will discuss with family. Discharge recs to be determined  - outpatient follow up with Dr. Benavides - [phone]

## 2018-05-01 NOTE — H&P ADULT - NEGATIVE GASTROINTESTINAL SYMPTOMS
no constipation/no hematochezia/no change in bowel habits/no abdominal pain/no melena/no nausea/no vomiting/no diarrhea

## 2018-05-01 NOTE — H&P ADULT - NEGATIVE CARDIOVASCULAR SYMPTOMS
no paroxysmal nocturnal dyspnea/no peripheral edema/no palpitations/no dyspnea on exertion/no orthopnea/no chest pain

## 2018-05-01 NOTE — ED PROVIDER NOTE - PROGRESS NOTE DETAILS
[place]: EKG very abnormal w/ BARBARA avr and STD 2, v3-6. though normal stress in november. pt has no cardiologist. Reassessed - pt remains completely asymptomatic. d/w cath attg at 0658. will start asa/heparin/plavix, cards to consult in ED on pt. [place]: Patient signed out to me.  84F brought in with AMS and resolved hypoglycemia, found to have lateral STDs and BARBARA in AVr on EKG.  Has been given ASA, plavix, hep.  Pending troponin, will be admitted to cardiology, either CCU or tele floor based on troponin. [place]: discussed with cardiology who stated, patient should be admitted to tele medicine for continued monitoring, to receive angio during admission. To receive echo. Tele doc of the day paged for admission

## 2018-05-01 NOTE — CONSULT NOTE ADULT - SUBJECTIVE AND OBJECTIVE BOX
Date of Admission: 5/1/18    CHIEF COMPLAINT: AMS    HISTORY OF PRESENT ILLNESS:  84F h/o HTN, HLD, IDDM, and TIA (no residual deficits) arrives via EMS for hypoglycemia and AMS. Family found pt lying half off the couch around 3am today. Pt had slurred speech and was not making any sense, was stiff as per family. FS in the field was 50, given D10W by EMS. Pt now speaking clearly, no c/o. Pt denies fever, cough, SOB, CP, N/V/D, abd pain, dysuria, cough, or rash. No extra medication doses. Ate a normal amount for dinner.  Now with EKG changes elevated AVR STD II AVF, V2-V6        Allergies    No Known Allergies    Intolerances    MEDICATIONS:  heparin  Infusion.  Unit(s)/Hr IV Continuous <Continuous>  Plavix 300mg x1  ASA 81mg  Pravastatin 10mg   Prolia 10mg  Norvasc 10mg  enalapril 10mg   Duloxetine 30mg      PAST MEDICAL & SURGICAL HISTORY:  Arthritis  CVA (cerebrovascular accident): 1/2013  History of pancreatitis 2008  Dyslipidemia  Diabetes mellitus type 2 in nonobese  HTN (hypertension)  Bladder Prolapse 2013;  Insertion Pessary  S/P laparotomy: initially to remove pancreatic mass but did not visualize mass and closed: &#x27; 2008      FAMILY HISTORY:  No pertinent family history in first degree relatives      SOCIAL HISTORY:    [x] Non-smoker  [ ] Smoker  [x] denies Alcohol      REVIEW OF SYSTEMS:  CONSTITUTIONAL: No fever, weight loss, or fatigue  NECK: No pain or stiffness  RESPIRATORY: No cough, wheezing, chills or hemoptysis; No Shortness of Breath  CARDIOVASCULAR: No chest pain, palpitations, passing out, dizziness, or leg swelling  GASTROINTESTINAL: No abdominal or epigastric pain. No nausea, vomiting, or hematemesis; No diarrhea or constipation. No melena or hematochezia.  GENITOURINARY: No dysuria, frequency, hematuria, or incontinence  NEUROLOGICAL: No headaches, memory loss, loss of strength, numbness, or tremors  SKIN: No itching, burning, rashes, or lesions   ENDOCRINE: No heat or cold intolerance; No hair loss  MUSCULOSKELETAL: + joint pain or swelling; No muscle, back, or extremity pain  PSYCHIATRIC: No depression, + anxiety   	    [ ] All others negative	  [ ] Unable to obtain    PHYSICAL EXAM:  T(C): 36.3 (05-01-18 @ 06:25), Max: 36.7 (05-01-18 @ 05:57)  HR: 108 (05-01-18 @ 08:10) (108 - 127)  BP: 150/93 (05-01-18 @ 06:25) (150/93 - 170/87)  RR: 19 (05-01-18 @ 06:25) (16 - 19)  SpO2: 100% (05-01-18 @ 06:25) (100% - 100%)  I&O's Summary      Appearance: Normal	  HEENT:   Normal oral mucosa	  Cardiovascular: Normal S1 S2, No JVD, No murmurs, No edema  Respiratory: Lungs clear to auscultation	  Psychiatry: A & O x 3, Mood & affect appropriate  Gastrointestinal:  Soft, Non-tender, + BS	  Skin: No rashes, No ecchymoses, No cyanosis	  Neurologic: Non-focal  Extremities: Normal range of motion, No clubbing, cyanosis or edema  Vascular: Peripheral pulses palpable 2+ bilaterally        LABS:	 	      05-01    139  |  99  |  15  ----------------------------<  225<H>  3.3<L>   |  29  |  0.57    Ca    9.5      01 May 2018 06:15    TPro  8.1  /  Alb  3.2<L>  /  TBili  0.3  /  DBili  x   /  AST  18  /  ALT  12  /  AlkPhos  87  05-01      CARDIAC MARKERS:  Trop neg          TELEMETRY: 	    ECG:  elevated AVR STD II AVF, V2-V6  	    PREVIOUS DIAGNOSTIC TESTING:    Transthoracic Echocardiogram w/Doppler (04.25.12 @ 07:45)   Dimensions:    Normal Values:  LA:     3.0    2.0 - 4.0 cm  Ao:     2.6    2.0 - 3.8 cm  SEPTUM: 0.7    0.6 - 1.2 cm  PWT:    0.70.6 - 1.1 cm  LVIDd:  3.4    3.0 - 5.6 cm  LVIDs:  2.1    1.8 - 4.0 cm  Derived variables:  LVMI: 40 g/m2  RWT: 0.41  Fractional short: 38 %  Ejection Fraction: 70 %  ------------------------------------------------------------------------  Observations:  Mitral Valve: Normal mitral valve. Mild mitral  regurgitation.  Aortic Valve/Aorta: Normal trileaflet aortic valve.  Normal aortic root.  Left Atrium: Normal left atrium.  LA volume index = 12  cc/m2.  Left Ventricle: Normal left ventricular systolic function.  No segmental wall motion abnormalities. Normal left  ventricular internal dimensions and wall thicknesses. Mild  diastolic dysfunction (Stage I).  Right Heart: Normal right atrium. Normal right ventricular  size and function. Normal tricuspid valve. Normal pulmonic  valve.  Pericardium/Pleura: Normal pericardium with no pericardial  effusion.  Hemodynamic: Estimated right atrial pressure is 10 mm Hg.  Estimated right ventricular systolic pressure equals 37 mm  Hg, assuming right atrial pressure equals 10 mm Hg,  consistent with borderline pulmonary hypertension.  ------------------------------------------------------------------------  Conclusions:  1. Mild mitral regurgitation.  2. Normal left ventricular systolic function. No segmental  wall motion abnormalities.  3. Mild diastolic dysfunction (Stage I).  4. Normal right ventricular size and function.  5. Estimated right ventricular systolic pressure equals 37  mm Hg, assuming right atrial pressure equals 10 mm Hg,  consistent with borderline pulmonary hypertension.  6. Normal tricuspid valve.    Nuclear Stress Test-Pharmacologic (11.12.17 @ 09:28)   IMPRESSIONS:Normal Study  * Chest Pain: No chest pain with administration of  Regadenoson.  * Symptom: No Symptom.  * HR Response: Appropriate.  * BP Response: Appropriate.  * Heart Rhythm: Normal Sinus Rhythm - 78 BPM.  * ECG Abnormalities: None.  * Arrhythmia: None.  * The left ventricle was normal in size. Normal myocardial  perfusion scan, with no evidence of infarction or  inducible ischemia.  * Post-stress gated wall motion analysis was performed  (LVEF > 70%;LVEDV = 45 ml.), revealing normal LV function.  Conclusion:  Normal myocardial perfusion scan, with no evidence of  infarction or inducible ischemia.

## 2018-05-01 NOTE — H&P ADULT - PROBLEM SELECTOR PLAN 2
Hypoglycemia in setting of diabetes with Insulin use  Unclear if any change in diet recently - found to be hypoglycemic to 50 by EMS - given D10 by EMS and mental status improved  will hold home insulin regimen for now - use ISS and monitor FS  House endocrine called for evaluation

## 2018-05-01 NOTE — ED PROVIDER NOTE - ATTENDING CONTRIBUTION TO CARE
84F PMH HTN, HLD, DM p/w AMS in setting of glcuose 50, symptoms improved w/ glucose and now asymptomatic. Was asymptomatic prior to events. Tachycardic, other vitals wnl, rectal temp wnl, exam as above.  ddx: Hypoglycemia. Possible underlying metabolic abnormality or infection.   CBC, cmp, vbg comp. UA, CXR. IVF, EKG. Reassess.

## 2018-05-02 DIAGNOSIS — I21.4 NON-ST ELEVATION (NSTEMI) MYOCARDIAL INFARCTION: ICD-10-CM

## 2018-05-02 LAB
ALBUMIN SERPL ELPH-MCNC: 2.5 G/DL — LOW (ref 3.3–5)
ALP SERPL-CCNC: 58 U/L — SIGNIFICANT CHANGE UP (ref 40–120)
ALT FLD-CCNC: 9 U/L — SIGNIFICANT CHANGE UP (ref 4–33)
APTT BLD: 30.5 SEC — SIGNIFICANT CHANGE UP (ref 27.5–37.4)
APTT BLD: 30.8 SEC — SIGNIFICANT CHANGE UP (ref 27.5–37.4)
APTT BLD: 31.4 SEC — SIGNIFICANT CHANGE UP (ref 27.5–37.4)
APTT BLD: 35.6 SEC — SIGNIFICANT CHANGE UP (ref 27.5–37.4)
AST SERPL-CCNC: 22 U/L — SIGNIFICANT CHANGE UP (ref 4–32)
BASOPHILS # BLD AUTO: 0.01 K/UL — SIGNIFICANT CHANGE UP (ref 0–0.2)
BASOPHILS NFR BLD AUTO: 0.2 % — SIGNIFICANT CHANGE UP (ref 0–2)
BILIRUB SERPL-MCNC: 0.4 MG/DL — SIGNIFICANT CHANGE UP (ref 0.2–1.2)
BUN SERPL-MCNC: 16 MG/DL — SIGNIFICANT CHANGE UP (ref 7–23)
BUN SERPL-MCNC: 16 MG/DL — SIGNIFICANT CHANGE UP (ref 7–23)
BUN SERPL-MCNC: 24 MG/DL — HIGH (ref 7–23)
CALCIUM SERPL-MCNC: 9.2 MG/DL — SIGNIFICANT CHANGE UP (ref 8.4–10.5)
CALCIUM SERPL-MCNC: 9.5 MG/DL — SIGNIFICANT CHANGE UP (ref 8.4–10.5)
CALCIUM SERPL-MCNC: 9.5 MG/DL — SIGNIFICANT CHANGE UP (ref 8.4–10.5)
CHLORIDE SERPL-SCNC: 100 MMOL/L — SIGNIFICANT CHANGE UP (ref 98–107)
CHLORIDE SERPL-SCNC: 102 MMOL/L — SIGNIFICANT CHANGE UP (ref 98–107)
CHLORIDE SERPL-SCNC: 102 MMOL/L — SIGNIFICANT CHANGE UP (ref 98–107)
CHOLEST SERPL-MCNC: 98 MG/DL — LOW (ref 120–199)
CK MB BLD-MCNC: 14.95 NG/ML — HIGH (ref 1–4.7)
CK MB BLD-MCNC: 3.82 NG/ML — SIGNIFICANT CHANGE UP (ref 1–4.7)
CK MB BLD-MCNC: SIGNIFICANT CHANGE UP (ref 0–2.5)
CK SERPL-CCNC: 107 U/L — SIGNIFICANT CHANGE UP (ref 25–170)
CK SERPL-CCNC: 82 U/L — SIGNIFICANT CHANGE UP (ref 25–170)
CO2 SERPL-SCNC: 27 MMOL/L — SIGNIFICANT CHANGE UP (ref 22–31)
CO2 SERPL-SCNC: 29 MMOL/L — SIGNIFICANT CHANGE UP (ref 22–31)
CO2 SERPL-SCNC: 29 MMOL/L — SIGNIFICANT CHANGE UP (ref 22–31)
CREAT SERPL-MCNC: 0.68 MG/DL — SIGNIFICANT CHANGE UP (ref 0.5–1.3)
CREAT SERPL-MCNC: 0.68 MG/DL — SIGNIFICANT CHANGE UP (ref 0.5–1.3)
CREAT SERPL-MCNC: 1.04 MG/DL — SIGNIFICANT CHANGE UP (ref 0.5–1.3)
EOSINOPHIL # BLD AUTO: 0.08 K/UL — SIGNIFICANT CHANGE UP (ref 0–0.5)
EOSINOPHIL NFR BLD AUTO: 1.3 % — SIGNIFICANT CHANGE UP (ref 0–6)
GLUCOSE BLDC GLUCOMTR-MCNC: 170 MG/DL — HIGH (ref 70–99)
GLUCOSE BLDC GLUCOMTR-MCNC: 197 MG/DL — HIGH (ref 70–99)
GLUCOSE BLDC GLUCOMTR-MCNC: 237 MG/DL — HIGH (ref 70–99)
GLUCOSE BLDC GLUCOMTR-MCNC: 258 MG/DL — HIGH (ref 70–99)
GLUCOSE SERPL-MCNC: 179 MG/DL — HIGH (ref 70–99)
GLUCOSE SERPL-MCNC: 179 MG/DL — HIGH (ref 70–99)
GLUCOSE SERPL-MCNC: 202 MG/DL — HIGH (ref 70–99)
HBA1C BLD-MCNC: 6.4 % — HIGH (ref 4–5.6)
HCT VFR BLD CALC: 25.7 % — LOW (ref 34.5–45)
HCT VFR BLD CALC: 29 % — LOW (ref 34.5–45)
HCT VFR BLD CALC: 34.3 % — LOW (ref 34.5–45)
HDLC SERPL-MCNC: 41 MG/DL — LOW (ref 45–65)
HGB BLD-MCNC: 10.4 G/DL — LOW (ref 11.5–15.5)
HGB BLD-MCNC: 7.7 G/DL — LOW (ref 11.5–15.5)
HGB BLD-MCNC: 8.7 G/DL — LOW (ref 11.5–15.5)
IMM GRANULOCYTES # BLD AUTO: 0.01 # — SIGNIFICANT CHANGE UP
IMM GRANULOCYTES NFR BLD AUTO: 0.2 % — SIGNIFICANT CHANGE UP (ref 0–1.5)
INR BLD: 1.1 — SIGNIFICANT CHANGE UP (ref 0.88–1.17)
LACTATE SERPL-SCNC: 1.5 MMOL/L — SIGNIFICANT CHANGE UP (ref 0.5–2)
LIPID PNL WITH DIRECT LDL SERPL: 42 MG/DL — SIGNIFICANT CHANGE UP
LYMPHOCYTES # BLD AUTO: 1.94 K/UL — SIGNIFICANT CHANGE UP (ref 1–3.3)
LYMPHOCYTES # BLD AUTO: 32.4 % — SIGNIFICANT CHANGE UP (ref 13–44)
MAGNESIUM SERPL-MCNC: 1.9 MG/DL — SIGNIFICANT CHANGE UP (ref 1.6–2.6)
MAGNESIUM SERPL-MCNC: 2 MG/DL — SIGNIFICANT CHANGE UP (ref 1.6–2.6)
MCHC RBC-ENTMCNC: 24.9 PG — LOW (ref 27–34)
MCHC RBC-ENTMCNC: 24.9 PG — LOW (ref 27–34)
MCHC RBC-ENTMCNC: 25.1 PG — LOW (ref 27–34)
MCHC RBC-ENTMCNC: 30 % — LOW (ref 32–36)
MCHC RBC-ENTMCNC: 30 % — LOW (ref 32–36)
MCHC RBC-ENTMCNC: 30.3 % — LOW (ref 32–36)
MCV RBC AUTO: 82.9 FL — SIGNIFICANT CHANGE UP (ref 80–100)
MCV RBC AUTO: 83.1 FL — SIGNIFICANT CHANGE UP (ref 80–100)
MCV RBC AUTO: 83.2 FL — SIGNIFICANT CHANGE UP (ref 80–100)
MONOCYTES # BLD AUTO: 0.38 K/UL — SIGNIFICANT CHANGE UP (ref 0–0.9)
MONOCYTES NFR BLD AUTO: 6.3 % — SIGNIFICANT CHANGE UP (ref 2–14)
NEUTROPHILS # BLD AUTO: 3.57 K/UL — SIGNIFICANT CHANGE UP (ref 1.8–7.4)
NEUTROPHILS NFR BLD AUTO: 59.6 % — SIGNIFICANT CHANGE UP (ref 43–77)
NRBC # FLD: 0 — SIGNIFICANT CHANGE UP
OB PNL STL: NEGATIVE — SIGNIFICANT CHANGE UP
PHOSPHATE SERPL-MCNC: 2.5 MG/DL — SIGNIFICANT CHANGE UP (ref 2.5–4.5)
PLATELET # BLD AUTO: 413 K/UL — HIGH (ref 150–400)
PLATELET # BLD AUTO: 449 K/UL — HIGH (ref 150–400)
PLATELET # BLD AUTO: 521 K/UL — HIGH (ref 150–400)
PMV BLD: 10.1 FL — SIGNIFICANT CHANGE UP (ref 7–13)
PMV BLD: 10.2 FL — SIGNIFICANT CHANGE UP (ref 7–13)
PMV BLD: 9.4 FL — SIGNIFICANT CHANGE UP (ref 7–13)
POTASSIUM SERPL-MCNC: 4.5 MMOL/L — SIGNIFICANT CHANGE UP (ref 3.5–5.3)
POTASSIUM SERPL-MCNC: 4.6 MMOL/L — SIGNIFICANT CHANGE UP (ref 3.5–5.3)
POTASSIUM SERPL-MCNC: 4.6 MMOL/L — SIGNIFICANT CHANGE UP (ref 3.5–5.3)
POTASSIUM SERPL-SCNC: 4.5 MMOL/L — SIGNIFICANT CHANGE UP (ref 3.5–5.3)
POTASSIUM SERPL-SCNC: 4.6 MMOL/L — SIGNIFICANT CHANGE UP (ref 3.5–5.3)
POTASSIUM SERPL-SCNC: 4.6 MMOL/L — SIGNIFICANT CHANGE UP (ref 3.5–5.3)
PROT SERPL-MCNC: 6.5 G/DL — SIGNIFICANT CHANGE UP (ref 6–8.3)
PROTHROM AB SERPL-ACNC: 12.7 SEC — SIGNIFICANT CHANGE UP (ref 9.8–13.1)
RBC # BLD: 3.09 M/UL — LOW (ref 3.8–5.2)
RBC # BLD: 3.49 M/UL — LOW (ref 3.8–5.2)
RBC # BLD: 4.14 M/UL — SIGNIFICANT CHANGE UP (ref 3.8–5.2)
RBC # FLD: 14.9 % — HIGH (ref 10.3–14.5)
RBC # FLD: 15.1 % — HIGH (ref 10.3–14.5)
RBC # FLD: 15.3 % — HIGH (ref 10.3–14.5)
SODIUM SERPL-SCNC: 136 MMOL/L — SIGNIFICANT CHANGE UP (ref 135–145)
SODIUM SERPL-SCNC: 139 MMOL/L — SIGNIFICANT CHANGE UP (ref 135–145)
SODIUM SERPL-SCNC: 139 MMOL/L — SIGNIFICANT CHANGE UP (ref 135–145)
SPECIMEN SOURCE: SIGNIFICANT CHANGE UP
TRIGL SERPL-MCNC: 99 MG/DL — SIGNIFICANT CHANGE UP (ref 10–149)
TROPONIN T SERPL-MCNC: 0.72 NG/ML — HIGH (ref 0–0.06)
TROPONIN T SERPL-MCNC: 0.92 NG/ML — HIGH (ref 0–0.06)
WBC # BLD: 11.94 K/UL — HIGH (ref 3.8–10.5)
WBC # BLD: 5.99 K/UL — SIGNIFICANT CHANGE UP (ref 3.8–10.5)
WBC # BLD: 7.1 K/UL — SIGNIFICANT CHANGE UP (ref 3.8–10.5)
WBC # FLD AUTO: 11.94 K/UL — HIGH (ref 3.8–10.5)
WBC # FLD AUTO: 5.99 K/UL — SIGNIFICANT CHANGE UP (ref 3.8–10.5)
WBC # FLD AUTO: 7.1 K/UL — SIGNIFICANT CHANGE UP (ref 3.8–10.5)

## 2018-05-02 PROCEDURE — 99232 SBSQ HOSP IP/OBS MODERATE 35: CPT

## 2018-05-02 PROCEDURE — 99233 SBSQ HOSP IP/OBS HIGH 50: CPT

## 2018-05-02 PROCEDURE — 71045 X-RAY EXAM CHEST 1 VIEW: CPT | Mod: 26

## 2018-05-02 PROCEDURE — 93010 ELECTROCARDIOGRAM REPORT: CPT

## 2018-05-02 RX ORDER — SODIUM CHLORIDE 9 MG/ML
250 INJECTION INTRAMUSCULAR; INTRAVENOUS; SUBCUTANEOUS ONCE
Qty: 0 | Refills: 0 | Status: COMPLETED | OUTPATIENT
Start: 2018-05-02 | End: 2018-05-02

## 2018-05-02 RX ORDER — MAGNESIUM SULFATE 500 MG/ML
1 VIAL (ML) INJECTION ONCE
Qty: 0 | Refills: 0 | Status: COMPLETED | OUTPATIENT
Start: 2018-05-02 | End: 2018-05-02

## 2018-05-02 RX ORDER — NITROGLYCERIN 6.5 MG
5 CAPSULE, EXTENDED RELEASE ORAL
Qty: 50 | Refills: 0 | Status: DISCONTINUED | OUTPATIENT
Start: 2018-05-02 | End: 2018-05-02

## 2018-05-02 RX ORDER — ACETAMINOPHEN 500 MG
1000 TABLET ORAL ONCE
Qty: 0 | Refills: 0 | Status: DISCONTINUED | OUTPATIENT
Start: 2018-05-02 | End: 2018-05-02

## 2018-05-02 RX ORDER — METOPROLOL TARTRATE 50 MG
12.5 TABLET ORAL ONCE
Qty: 0 | Refills: 0 | Status: COMPLETED | OUTPATIENT
Start: 2018-05-02 | End: 2018-05-02

## 2018-05-02 RX ORDER — NITROGLYCERIN 6.5 MG
0.4 CAPSULE, EXTENDED RELEASE ORAL ONCE
Qty: 0 | Refills: 0 | Status: COMPLETED | OUTPATIENT
Start: 2018-05-02 | End: 2018-05-02

## 2018-05-02 RX ORDER — QUETIAPINE FUMARATE 200 MG/1
25 TABLET, FILM COATED ORAL AT BEDTIME
Qty: 0 | Refills: 0 | Status: DISCONTINUED | OUTPATIENT
Start: 2018-05-02 | End: 2018-05-02

## 2018-05-02 RX ORDER — MORPHINE SULFATE 50 MG/1
2 CAPSULE, EXTENDED RELEASE ORAL ONCE
Qty: 0 | Refills: 0 | Status: DISCONTINUED | OUTPATIENT
Start: 2018-05-02 | End: 2018-05-02

## 2018-05-02 RX ORDER — PIPERACILLIN AND TAZOBACTAM 4; .5 G/20ML; G/20ML
3.38 INJECTION, POWDER, LYOPHILIZED, FOR SOLUTION INTRAVENOUS ONCE
Qty: 0 | Refills: 0 | Status: COMPLETED | OUTPATIENT
Start: 2018-05-02 | End: 2018-05-02

## 2018-05-02 RX ORDER — METOPROLOL TARTRATE 50 MG
25 TABLET ORAL THREE TIMES A DAY
Qty: 0 | Refills: 0 | Status: DISCONTINUED | OUTPATIENT
Start: 2018-05-02 | End: 2018-05-02

## 2018-05-02 RX ORDER — QUETIAPINE FUMARATE 200 MG/1
12.5 TABLET, FILM COATED ORAL ONCE
Qty: 0 | Refills: 0 | Status: COMPLETED | OUTPATIENT
Start: 2018-05-02 | End: 2018-05-02

## 2018-05-02 RX ORDER — ACETAMINOPHEN 500 MG
650 TABLET ORAL ONCE
Qty: 0 | Refills: 0 | Status: COMPLETED | OUTPATIENT
Start: 2018-05-02 | End: 2018-05-02

## 2018-05-02 RX ORDER — VANCOMYCIN HCL 1 G
750 VIAL (EA) INTRAVENOUS ONCE
Qty: 0 | Refills: 0 | Status: COMPLETED | OUTPATIENT
Start: 2018-05-02 | End: 2018-05-03

## 2018-05-02 RX ORDER — CLOPIDOGREL BISULFATE 75 MG/1
75 TABLET, FILM COATED ORAL DAILY
Qty: 0 | Refills: 0 | Status: DISCONTINUED | OUTPATIENT
Start: 2018-05-02 | End: 2018-05-14

## 2018-05-02 RX ORDER — VASOPRESSIN 20 [USP'U]/ML
0.4 INJECTION INTRAVENOUS
Qty: 100 | Refills: 0 | Status: DISCONTINUED | OUTPATIENT
Start: 2018-05-02 | End: 2018-05-02

## 2018-05-02 RX ORDER — LIDOCAINE 4 G/100G
2 CREAM TOPICAL DAILY
Qty: 0 | Refills: 0 | Status: DISCONTINUED | OUTPATIENT
Start: 2018-05-02 | End: 2018-05-14

## 2018-05-02 RX ORDER — VASOPRESSIN 20 [USP'U]/ML
0.04 INJECTION INTRAVENOUS
Qty: 100 | Refills: 0 | Status: DISCONTINUED | OUTPATIENT
Start: 2018-05-02 | End: 2018-05-03

## 2018-05-02 RX ORDER — SODIUM CHLORIDE 9 MG/ML
500 INJECTION INTRAMUSCULAR; INTRAVENOUS; SUBCUTANEOUS ONCE
Qty: 0 | Refills: 0 | Status: COMPLETED | OUTPATIENT
Start: 2018-05-02 | End: 2018-05-02

## 2018-05-02 RX ORDER — ACETAMINOPHEN 500 MG
600 TABLET ORAL ONCE
Qty: 0 | Refills: 0 | Status: COMPLETED | OUTPATIENT
Start: 2018-05-02 | End: 2018-05-02

## 2018-05-02 RX ADMIN — HEPARIN SODIUM 650 UNIT(S)/HR: 5000 INJECTION INTRAVENOUS; SUBCUTANEOUS at 23:59

## 2018-05-02 RX ADMIN — AMLODIPINE BESYLATE 10 MILLIGRAM(S): 2.5 TABLET ORAL at 05:49

## 2018-05-02 RX ADMIN — CEFTRIAXONE 100 GRAM(S): 500 INJECTION, POWDER, FOR SOLUTION INTRAMUSCULAR; INTRAVENOUS at 09:53

## 2018-05-02 RX ADMIN — HEPARIN SODIUM 500 UNIT(S)/HR: 5000 INJECTION INTRAVENOUS; SUBCUTANEOUS at 00:00

## 2018-05-02 RX ADMIN — SODIUM CHLORIDE 1500 MILLILITER(S): 9 INJECTION INTRAMUSCULAR; INTRAVENOUS; SUBCUTANEOUS at 22:35

## 2018-05-02 RX ADMIN — QUETIAPINE FUMARATE 12.5 MILLIGRAM(S): 200 TABLET, FILM COATED ORAL at 15:18

## 2018-05-02 RX ADMIN — Medication 1.5 MICROGRAM(S)/MIN: at 09:17

## 2018-05-02 RX ADMIN — Medication 10 MILLIGRAM(S): at 05:49

## 2018-05-02 RX ADMIN — Medication 25 MILLIGRAM(S): at 15:33

## 2018-05-02 RX ADMIN — Medication 100 GRAM(S): at 23:59

## 2018-05-02 RX ADMIN — Medication 81 MILLIGRAM(S): at 06:53

## 2018-05-02 RX ADMIN — Medication 240 MILLIGRAM(S): at 21:24

## 2018-05-02 RX ADMIN — Medication 3 UNIT(S): at 09:08

## 2018-05-02 RX ADMIN — PIPERACILLIN AND TAZOBACTAM 200 GRAM(S): 4; .5 INJECTION, POWDER, LYOPHILIZED, FOR SOLUTION INTRAVENOUS at 23:56

## 2018-05-02 RX ADMIN — Medication 3: at 17:58

## 2018-05-02 RX ADMIN — Medication 650 MILLIGRAM(S): at 00:22

## 2018-05-02 RX ADMIN — SODIUM CHLORIDE 1000 MILLILITER(S): 9 INJECTION INTRAMUSCULAR; INTRAVENOUS; SUBCUTANEOUS at 23:20

## 2018-05-02 RX ADMIN — INSULIN GLARGINE 10 UNIT(S): 100 INJECTION, SOLUTION SUBCUTANEOUS at 23:56

## 2018-05-02 RX ADMIN — Medication 1.5 MICROGRAM(S)/MIN: at 21:17

## 2018-05-02 RX ADMIN — MORPHINE SULFATE 2 MILLIGRAM(S): 50 CAPSULE, EXTENDED RELEASE ORAL at 17:51

## 2018-05-02 RX ADMIN — SODIUM CHLORIDE 1500 MILLILITER(S): 9 INJECTION INTRAMUSCULAR; INTRAVENOUS; SUBCUTANEOUS at 22:50

## 2018-05-02 RX ADMIN — Medication 2: at 12:41

## 2018-05-02 RX ADMIN — Medication 0.4 MILLIGRAM(S): at 06:53

## 2018-05-02 RX ADMIN — Medication 3 UNIT(S): at 12:41

## 2018-05-02 RX ADMIN — Medication 12.5 MILLIGRAM(S): at 11:21

## 2018-05-02 RX ADMIN — Medication 3 UNIT(S): at 17:58

## 2018-05-02 RX ADMIN — LIDOCAINE 2 PATCH: 4 CREAM TOPICAL at 17:59

## 2018-05-02 RX ADMIN — Medication 12.5 MILLIGRAM(S): at 05:49

## 2018-05-02 RX ADMIN — ATORVASTATIN CALCIUM 40 MILLIGRAM(S): 80 TABLET, FILM COATED ORAL at 23:57

## 2018-05-02 RX ADMIN — VASOPRESSIN 2.4 UNIT(S)/MIN: 20 INJECTION INTRAVENOUS at 23:58

## 2018-05-02 RX ADMIN — Medication 1: at 09:08

## 2018-05-02 RX ADMIN — Medication 650 MILLIGRAM(S): at 09:55

## 2018-05-02 RX ADMIN — MORPHINE SULFATE 2 MILLIGRAM(S): 50 CAPSULE, EXTENDED RELEASE ORAL at 18:08

## 2018-05-02 NOTE — PROGRESS NOTE ADULT - PROBLEM SELECTOR PLAN 1
- s/p LHC from RFA access.  - LAD mid 80%, Cx mid and proximal 60%, OM 70-80%, and proximal RPL 90-95%.  - Potential high risk PCI vs. CABG, both requiring transfer to [place].  - Morphine PRN for relief, on nitro drip, c/w metoprolol as BP will allow.  - s/p aspirin and plavix load, c/w maintenance. c/w heparin gtt.

## 2018-05-02 NOTE — PROGRESS NOTE ADULT - SUBJECTIVE AND OBJECTIVE BOX
Chief Complaint:     History:    MEDICATIONS  (STANDING):  amLODIPine   Tablet 10 milliGRAM(s) Oral daily  aspirin  chewable 81 milliGRAM(s) Oral daily  atorvastatin 40 milliGRAM(s) Oral at bedtime  cefTRIAXone   IVPB 1 Gram(s) IV Intermittent every 24 hours  dextrose 5%. 1000 milliLiter(s) (50 mL/Hr) IV Continuous <Continuous>  dextrose 50% Injectable 12.5 Gram(s) IV Push once  dextrose 50% Injectable 25 Gram(s) IV Push once  dextrose 50% Injectable 25 Gram(s) IV Push once  enalapril 10 milliGRAM(s) Oral daily  heparin  Infusion.  Unit(s)/Hr (5 mL/Hr) IV Continuous <Continuous>  influenza   Vaccine 0.5 milliLiter(s) IntraMuscular once  insulin glargine Injectable (LANTUS) 10 Unit(s) SubCutaneous at bedtime  insulin lispro (HumaLOG) corrective regimen sliding scale   SubCutaneous three times a day before meals  insulin lispro (HumaLOG) corrective regimen sliding scale   SubCutaneous at bedtime  insulin lispro Injectable (HumaLOG) 3 Unit(s) SubCutaneous three times a day before meals  metoprolol tartrate 25 milliGRAM(s) Oral three times a day  nitroglycerin  Infusion 5 MICROgram(s)/Min (1.5 mL/Hr) IV Continuous <Continuous>    MEDICATIONS  (PRN):  dextrose Gel 1 Dose(s) Oral once PRN Blood Glucose LESS THAN 70 milliGRAM(s)/deciliter  glucagon  Injectable 1 milliGRAM(s) IntraMuscular once PRN Glucose LESS THAN 70 milligrams/deciliter      Allergies    No Known Allergies    Intolerances      Review of Systems:  Constitutional: No fever  Eyes: No blurry vision  Neuro: No tremors  HEENT: No pain  Cardiovascular: No chest pain, palpitations  Respiratory: No SOB, no cough  GI: No nausea, vomiting, abdominal pain  : No dysuria  Skin: no rash  Psych: no depression  Endocrine: no polyuria, polydipsia  Hem/lymph: no swelling  Osteoporosis: no fractures    ALL OTHER SYSTEMS REVIEWED AND NEGATIVE    UNABLE TO OBTAIN    PHYSICAL EXAM:  VITALS: T(C): 36 (05-02-18 @ 08:03)  T(F): 96.8 (05-02-18 @ 08:03), Max: 101 (05-01-18 @ 23:30)  HR: 91 (05-02-18 @ 14:00) (85 - 127)  BP: 95/46 (05-02-18 @ 14:00) (85/44 - 145/78)  RR:  (18 - 30)  SpO2:  (95% - 100%)  Wt(kg): --  GENERAL: NAD, well-groomed, well-developed  EYES: No proptosis, no lid lag, anicteric  HEENT:  Atraumatic, Normocephalic, moist mucous membranes  THYROID: Normal size, no palpable nodules  RESPIRATORY: Clear to auscultation bilaterally; No rales, rhonchi, wheezing  CARDIOVASCULAR: Regular rate and rhythm; No murmurs; no peripheral edema  GI: Soft, nontender, non distended  SKIN: Dry, intact, No rashes or lesions  MUSCULOSKELETAL: Full range of motion, normal strength  NEURO: extraocular movements intact, no tremor  PSYCH: Alert and oriented x 3, normal affect, normal mood      CAPILLARY BLOOD GLUCOSE      POCT Blood Glucose.: 237 mg/dL (02 May 2018 12:28)  POCT Blood Glucose.: 170 mg/dL (02 May 2018 08:28)  POCT Blood Glucose.: 195 mg/dL (01 May 2018 22:38)  POCT Blood Glucose.: 202 mg/dL (01 May 2018 16:50)      05-02    139  |  102  |  16  ----------------------------<  179<H>  4.6   |  29  |  0.68    EGFR if : 93  EGFR if non : 80    Ca    9.5      05-02  Mg     2.0     05-02    TPro  8.1  /  Alb  3.2<L>  /  TBili  0.3  /  DBili  x   /  AST  18  /  ALT  12  /  AlkPhos  87  05-01          Thyroid Function Tests:      Hemoglobin A1C, Whole Blood: 6.4 % <H> [4.0 - 5.6] (05-02-18 @ 05:54) Chief Complaint: t2 Dm    History:  No n/v, tolerates po.  As per family, patient is hallucinating    MEDICATIONS  (STANDING):  amLODIPine   Tablet 10 milliGRAM(s) Oral daily  aspirin  chewable 81 milliGRAM(s) Oral daily  atorvastatin 40 milliGRAM(s) Oral at bedtime  cefTRIAXone   IVPB 1 Gram(s) IV Intermittent every 24 hours  dextrose 5%. 1000 milliLiter(s) (50 mL/Hr) IV Continuous <Continuous>  dextrose 50% Injectable 12.5 Gram(s) IV Push once  dextrose 50% Injectable 25 Gram(s) IV Push once  dextrose 50% Injectable 25 Gram(s) IV Push once  enalapril 10 milliGRAM(s) Oral daily  heparin  Infusion.  Unit(s)/Hr (5 mL/Hr) IV Continuous <Continuous>  influenza   Vaccine 0.5 milliLiter(s) IntraMuscular once  insulin glargine Injectable (LANTUS) 10 Unit(s) SubCutaneous at bedtime  insulin lispro (HumaLOG) corrective regimen sliding scale   SubCutaneous three times a day before meals  insulin lispro (HumaLOG) corrective regimen sliding scale   SubCutaneous at bedtime  insulin lispro Injectable (HumaLOG) 3 Unit(s) SubCutaneous three times a day before meals  metoprolol tartrate 25 milliGRAM(s) Oral three times a day  nitroglycerin  Infusion 5 MICROgram(s)/Min (1.5 mL/Hr) IV Continuous <Continuous>    MEDICATIONS  (PRN):  dextrose Gel 1 Dose(s) Oral once PRN Blood Glucose LESS THAN 70 milliGRAM(s)/deciliter  glucagon  Injectable 1 milliGRAM(s) IntraMuscular once PRN Glucose LESS THAN 70 milligrams/deciliter      Allergies    No Known Allergies    Intolerances      Review of Systems:    UNABLE TO OBTAIN    PHYSICAL EXAM:  VITALS: T(C): 36 (05-02-18 @ 08:03)  T(F): 96.8 (05-02-18 @ 08:03), Max: 101 (05-01-18 @ 23:30)  HR: 91 (05-02-18 @ 14:00) (85 - 127)  BP: 95/46 (05-02-18 @ 14:00) (85/44 - 145/78)  RR:  (18 - 30)  SpO2:  (95% - 100%)  Wt(kg): --  GENERAL: NAD, well-developed  GI: Soft, nontender, non distended  SKIN: Dry, intact, No rashes or lesions  PSYCH: Alert and oriented x 2, normal affect, normal mood      CAPILLARY BLOOD GLUCOSE      POCT Blood Glucose.: 237 mg/dL (02 May 2018 12:28)  POCT Blood Glucose.: 170 mg/dL (02 May 2018 08:28)  POCT Blood Glucose.: 195 mg/dL (01 May 2018 22:38)  POCT Blood Glucose.: 202 mg/dL (01 May 2018 16:50)      05-02    139  |  102  |  16  ----------------------------<  179<H>  4.6   |  29  |  0.68    EGFR if : 93  EGFR if non : 80    Ca    9.5      05-02  Mg     2.0     05-02    TPro  8.1  /  Alb  3.2<L>  /  TBili  0.3  /  DBili  x   /  AST  18  /  ALT  12  /  AlkPhos  87  05-01            Hemoglobin A1C, Whole Blood: 6.4 % <H> [4.0 - 5.6] (05-02-18 @ 05:54)

## 2018-05-02 NOTE — PROGRESS NOTE ADULT - SUBJECTIVE AND OBJECTIVE BOX
YUE WINN    MRN-[id number]     HPI: Called by RN for patient complaining of chest pain. Chest pain started early this AM, pressure type, mid chest, non-radiating,  "not that bad" per patient. She has never experienced this pain before and it was not present on admission to the hospital.  Denies any nausea, vomiting, shortness of breath, hematuria, brbpr, melena, bleeding from  RFA site, jaw pain, numbness, or tingling.     MEDICATIONS  (STANDING):  amLODIPine   Tablet 10 milliGRAM(s) Oral daily  aspirin  chewable 81 milliGRAM(s) Oral daily  atorvastatin 40 milliGRAM(s) Oral at bedtime  cefTRIAXone   IVPB 1 Gram(s) IV Intermittent every 24 hours  dextrose 5%. 1000 milliLiter(s) (50 mL/Hr) IV Continuous <Continuous>  dextrose 50% Injectable 12.5 Gram(s) IV Push once  dextrose 50% Injectable 25 Gram(s) IV Push once  dextrose 50% Injectable 25 Gram(s) IV Push once  enalapril 10 milliGRAM(s) Oral daily  heparin  Infusion.  Unit(s)/Hr (5 mL/Hr) IV Continuous <Continuous>  influenza   Vaccine 0.5 milliLiter(s) IntraMuscular once  insulin glargine Injectable (LANTUS) 10 Unit(s) SubCutaneous at bedtime  insulin lispro (HumaLOG) corrective regimen sliding scale   SubCutaneous three times a day before meals  insulin lispro (HumaLOG) corrective regimen sliding scale   SubCutaneous at bedtime  insulin lispro Injectable (HumaLOG) 3 Unit(s) SubCutaneous three times a day before meals  metoprolol tartrate 12.5 milliGRAM(s) Oral two times a day    MEDICATIONS  (PRN):  dextrose Gel 1 Dose(s) Oral once PRN Blood Glucose LESS THAN 70 milliGRAM(s)/deciliter  glucagon  Injectable 1 milliGRAM(s) IntraMuscular once PRN Glucose LESS THAN 70 milligrams/deciliter      Vital Signs Last 24 Hrs  T(C): 37.1 (02 May 2018 06:55), Max: 38.3 (01 May 2018 23:30)  T(F): 98.7 (02 May 2018 06:55), Max: 101 (01 May 2018 23:30)  HR: 89 (02 May 2018 06:55) (85 - 109)  BP: 136/78 (02 May 2018 06:55) (115/85 - 148/84)  BP(mean): --  RR: 18 (02 May 2018 06:55) (18 - 18)  SpO2: 100% (02 May 2018 06:55) (98% - 100%)  I&O's Summary      Appearance: Normal	  HEENT:   Normal oral mucosa, PERRL, EOMI	  Lymphatic: No lymphadenopathy  Cardiovascular: Normal S1 S2, No JVD, No murmurs, No edema  Respiratory: Lungs clear to auscultation	  Psychiatry: A & O x 3, Mood & affect appropriate, appears teary eyed.   Gastrointestinal:  Soft, Non-tender, + BS	  Skin: No rashes, No ecchymoses, No cyanosis  Neurologic: Non-focal  Extremities: Normal range of motion, No clubbing, cyanosis or edema  Vascular: Peripheral pulses palpable 2+ bilaterally                          8.7    5.99  )-----------( 449      ( 02 May 2018 01:20 )             29.0     PT/INR - ( 02 May 2018 01:20 )   PT: 12.7 SEC;   INR: 1.10          PTT - ( 02 May 2018 01:20 )  PTT:31.4 SEC  05-01    140  |  102  |  12  ----------------------------<  216<H>  3.6   |  29  |  0.56    Ca    9.2      01 May 2018 15:00  Mg     2.0     05-01    TPro  8.1  /  Alb  3.2<L>  /  TBili  0.3  /  DBili  x   /  AST  18  /  ALT  12  /  AlkPhos  87  05-01    LIVER FUNCTIONS - ( 01 May 2018 06:15 )  Alb: 3.2 g/dL / Pro: 8.1 g/dL / ALK PHOS: 87 u/L / ALT: 12 u/L / AST: 18 u/L / GGT: x           CARDIAC MARKERS ( 01 May 2018 21:00 )  x     / 0.52 ng/mL / 166 u/L / 27.76 ng/mL / x      CARDIAC MARKERS ( 01 May 2018 15:00 )  x     / 0.26 ng/mL / x     / x     / x      CARDIAC MARKERS ( 01 May 2018 06:15 )  x     / < 0.06 ng/mL / x     / x     / x            TELEMETRY:  NSR 80-90	    ECG:  NSR @ 95 c TWI in v4-6, biphasic T wave in V3    DIAGNOSTIC TESTING:    < from: TTE with Doppler (w/Cont) (05.01.18 @ 10:29) >  CONCLUSIONS:  1. Endocardial visualization enhanced with intravenous  injection of echo contrast (Definity). Mild segmental left  ventricular systolic dysfunction. The apex, distal  anterior, distal anterolateral and distal septal walls are  hypokinetic.  2. Normal right ventricular size and function.    < end of copied text >    [ ]  Catheterization:    < from: Cardiac Cath Lab - Adult (05.01.18 @ 12:14) >  CORONARY VESSELS: The coronary circulation is right dominant.  LM:   --  LM: Normal.  LAD:   --  Proximal LAD: There was a discrete 50 % stenosis in the proximal  third of the vessel segment. There was ALESSANDRA grade 3 flow through the  vessel (brisk flow). In a second lesion, there was a discrete 50 %  stenosis in the distal third of the vessel segment, just before S1. There  was ALESSANDRA grade 3 flow through the vessel (brisk flow).  --  Mid LAD: The vessel was moderately calcified. There was a tubular 80 %  stenosis in the proximal third of the vessel segment. There was ALESSANDRA grade  3 flow through the vessel (brisk flow). In a second lesion, there was a  tubular 40 % stenosis in the distal third of the vessel segment.  CX:   --  Proximal circumflex: There was a tubular 50 % stenosis. There was  ALESSANDRA grade 3 flow through the vessel (brisk flow).  --  Mid circumflex: There was a discrete 70 % stenosis. There was ALESSANDRA  grade 3 flow through the vessel (brisk flow).  --  OM1: The vessel was small to medium sized. There was a tubular 80 %  stenosis in the proximal third of the vessel segment. There was ALESSANDRA grade  3 flow through the vessel (brisk flow).  RCA:   --  RCA: The vessel was moderately calcified.  --  Proximal RCA: There was a discrete 30 % stenosis.  --  Mid RCA: There was a tubular 20 % stenosis.  --  Distal RCA: There was a diffuse 50 % stenosis. There was ALESSANDRA grade 3  flow through the vessel (brisk flow).  --  RPLS: There was a discrete 90 % stenosis in the middle third of the  vessel segment. There was ALESSANDRA grade 3 flow through the vessel (brisk  flow).  --  RPL1: The vessel was very small sized.  --  RPL2: The vessel was medium sized. There was a discrete 90 % stenosis  in the proximal third of the vessel segment. There was ALESSANDRA grade 3 flow  through the vessel (brisk flow).    < end of copied text >

## 2018-05-02 NOTE — PROGRESS NOTE ADULT - PROBLEM SELECTOR PLAN 1
Patient with new chest pressure type pain, given one dose of Nitro 0.4SL with improvement in symptoms.  Repeat EKG to be performed to evaluate resolution of TWI after nitro  Repeat CE ordered for this AM and will continue to trend  Cards/CCU called and EKG/patient reviewed with CCU NP Sheila.   Will await review and recommendations from cardiology fellow/NP  Will continue with heparin gtt, supplement O2 as needed, and continue with aspirin.   Will follow very closely. Patient with new chest pressure type pain, given one dose of Nitro 0.4SL with improvement in symptoms.  Repeat EKG to be performed to evaluate resolution of TWI after nitro  Repeat CE ordered for this AM and will continue to trend  Cards/CCU called and EKG/patient reviewed with CCU NP Sheila.   Will await review and recommendations from cardiology fellow/NP  Will continue with heparin gtt, supplement O2 as needed, and continue with aspirin.   Will follow very closely.    Addendum: patient accepted to CCU, called [facility] @ [phone] to update on plan.

## 2018-05-02 NOTE — PROGRESS NOTE ADULT - PROBLEM SELECTOR PROBLEM 2
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

## 2018-05-02 NOTE — PROGRESS NOTE ADULT - ASSESSMENT
84 year old female pmh of HTN, hLD< TIA/CVA (no deficit), DM, p/w AMS 2/2 hypoglycemia and found to have ST depression and ST elevation ruled in for NTSEMI and found to have LAD 80%, LCx 60%, OM1: 70-80%, RPL: 90-95%  awaiting CTS consult for possible CABG.

## 2018-05-02 NOTE — PROGRESS NOTE ADULT - SUBJECTIVE AND OBJECTIVE BOX
Reason for CCU admission:    HISTORY OF PRESENT ILLNESS:  Patient is a 84y old  Female who presents with a chief complaint of Altered mental status (01 May 2018 11:43)    Hx of HTN, DM, TIA without residual deficits, no known CAD hx (stress test 2017 normal). CE rising in the setting of acute ACS. Responding to SLG. s/p C with evidence of multivessel disease. Patient complaining of substernal chest pain this morning, admitted to the CCU for nitro drip.  Plan to transfer to [place].     Allergies    No Known Allergies    Intolerances    PAST MEDICAL & SURGICAL HISTORY:  Arthritis  Bladder disorder  Bladder Prolapse, Acquired  Dry eyes  CVA (cerebrovascular accident): 2013  History of pancreatitis: &#x27;   Dyslipidemia  Diabetes mellitus type 2 in nonobese  HTN (hypertension)  Bladder Prolapse, Acquired: &#x27; ;  Insertion Pessary  S/P laparotomy: initially to remove pancreatic mass but did not visualize mass and closed: &#x27; 2008      MEDICATIONS  (STANDING):  amLODIPine   Tablet 10 milliGRAM(s) Oral daily  aspirin  chewable 81 milliGRAM(s) Oral daily  atorvastatin 40 milliGRAM(s) Oral at bedtime  cefTRIAXone   IVPB 1 Gram(s) IV Intermittent every 24 hours  dextrose 5%. 1000 milliLiter(s) (50 mL/Hr) IV Continuous <Continuous>  dextrose 50% Injectable 12.5 Gram(s) IV Push once  dextrose 50% Injectable 25 Gram(s) IV Push once  dextrose 50% Injectable 25 Gram(s) IV Push once  enalapril 10 milliGRAM(s) Oral daily  heparin  Infusion.  Unit(s)/Hr (5 mL/Hr) IV Continuous <Continuous>  influenza   Vaccine 0.5 milliLiter(s) IntraMuscular once  insulin glargine Injectable (LANTUS) 10 Unit(s) SubCutaneous at bedtime  insulin lispro (HumaLOG) corrective regimen sliding scale   SubCutaneous three times a day before meals  insulin lispro (HumaLOG) corrective regimen sliding scale   SubCutaneous at bedtime  insulin lispro Injectable (HumaLOG) 3 Unit(s) SubCutaneous three times a day before meals  metoprolol tartrate 12.5 milliGRAM(s) Oral two times a day    MEDICATIONS  (PRN):  dextrose Gel 1 Dose(s) Oral once PRN Blood Glucose LESS THAN 70 milliGRAM(s)/deciliter  glucagon  Injectable 1 milliGRAM(s) IntraMuscular once PRN Glucose LESS THAN 70 milligrams/deciliter      Drug Dosing Weight  Height (cm): 157.5 (01 May 2018 11:31)  Weight (kg): 40.37 (01 May 2018 11:31)  BMI (kg/m2): 16.3 (01 May 2018 11:31)  BSA (m2): 1.36 (01 May 2018 11:31)    FAMILY HISTORY:  No pertinent family history in first degree relatives    SOCIAL HISTORY:  Smoker[ ]  Non smoker[x ]  Alcohol [ ]    Lives with  and daughter. Retired from factory.    ADVANCE DIRECTIVES:    REVIEW OF SYSTEMS:    CONSTITUTIONAL: No fevers, No chills, No fatigue, No weight gain  EYES: No vision changes   ENT: No congestion, No ear pain, No sore throat.  NECK: No pain, No stiffness  RESPIRATORY: No shortness of breath, No cough, No wheezing, No hemoptysis  CARDIOVASCULAR: No chest pain. No palpitations, No CANDELARIO, No orthopnea, No paroxysmal nocturnal dyspnea, No pleuritic pain  GASTROINTESTINAL: No abdominal pain, No nausea, No vomiting, No hematemesis, No diarrhea No constipation. No melena  GENITOURINARY: No dysuria, No frequency, No incontinence, No hematuria  NEUROLOGICAL: No dizziness, No lightheadedness, No syncope, No LOC, No headache, No numbness or weakness  MUSCULOSKELETAL: No joint pain, No joint swelling.  PSYCHIATRIC: No anxiety, No depression  SKIN: No diaphoresis. No itching, No rashes, No pressure ulcers    All other review of systems is negative unless indicated above.    PHYSICAL EXAM:    Appearance: NAD, no distress  HEENT:   Normal oral mucosa, PERRL, EOMI  Cardiovascular: Regular rate and rhythm, Normal S1 S2, No JVD, No murmurs, No edema  Respiratory: Lungs clear to auscultation. No rales, No rhonchi, No wheezing. No tenderness to palpation  Gastrointestinal:  Soft, Non-tender, + BS  Neurologic: Non-focal, A&Ox3  Skin: Warm and dry, No rashes, No ecchymoses, No cyanosis  Extremities: No clubbing, cyanosis or edema  Vascular: Peripheral pulses palpable 2+ bilaterally  Psychiatry: Mood & affect appropriate        ICU Vital Signs Last 24 Hrs  T(C): 37.1 (02 May 2018 06:55), Max: 38.3 (01 May 2018 23:30)  T(F): 98.7 (02 May 2018 06:55), Max: 101 (01 May 2018 23:30)  HR: 89 (02 May 2018 06:55) (85 - 109)  BP: 136/78 (02 May 2018 06:55) (115/85 - 148/84)  BP(mean): --  ABP: --  ABP(mean): --  RR: 18 (02 May 2018 06:55) (18 - 18)  SpO2: 100% (02 May 2018 06:55) (98% - 100%)      LABS:  CBC Full  -  ( 02 May 2018 01:20 )  WBC Count : 5.99 K/uL  Hemoglobin : 8.7 g/dL  Hematocrit : 29.0 %  Platelet Count - Automated : 449 K/uL  Mean Cell Volume : 83.1 fL  Mean Cell Hemoglobin : 24.9 pg  Mean Cell Hemoglobin Concentration : 30.0 %  Auto Neutrophil # : 3.57 K/uL  Auto Lymphocyte # : 1.94 K/uL  Auto Monocyte # : 0.38 K/uL  Auto Eosinophil # : 0.08 K/uL  Auto Basophil # : 0.01 K/uL  Auto Neutrophil % : 59.6 %  Auto Lymphocyte % : 32.4 %  Auto Monocyte % : 6.3 %  Auto Eosinophil % : 1.3 %  Auto Basophil % : 0.2 %    05    140  |  102  |  12  ----------------------------<  216<H>  3.6   |  29  |  0.56    Ca    9.2      01 May 2018 15:00  Mg     2.0         TPro  8.1  /  Alb  3.2<L>  /  TBili  0.3  /  DBili  x   /  AST  18  /  ALT  12  /  AlkPhos  87  05-01    CARDIAC MARKERS ( 01 May 2018 21:00 )  x     / 0.52 ng/mL / 166 u/L / 27.76 ng/mL / x      CARDIAC MARKERS ( 01 May 2018 15:00 )  x     / 0.26 ng/mL / x     / x     / x      CARDIAC MARKERS ( 01 May 2018 06:15 )  x     / < 0.06 ng/mL / x     / x     / x          CAPILLARY BLOOD GLUCOSE      POCT Blood Glucose.: 195 mg/dL (01 May 2018 22:38)    PT/INR - ( 02 May 2018 01:20 )   PT: 12.7 SEC;   INR: 1.10          PTT - ( 02 May 2018 01:20 )  PTT:31.4 SEC  Urinalysis Basic - ( 01 May 2018 08:00 )    Color: COLORL / Appearance: HAZY / S.007 / pH: 6.0  Gluc: 300 / Ketone: NEGATIVE  / Bili: NEGATIVE / Urobili: NORMAL mg/dL   Blood: NEGATIVE / Protein: NEGATIVE mg/dL / Nitrite: POSITIVE   Leuk Esterase: NEGATIVE / RBC: 0-2 / WBC 0-2   Sq Epi: OCC / Non Sq Epi: x / Bacteria: MODERATE    I&O's Detail      EKG:    ECHO  < from: TTE with Doppler (w/Cont) (18 @ 10:29) >  OBSERVATIONS:  Mitral Valve: Mitral annular calcification, otherwise  normal mitral valve. Mild mitral regurgitation.  Aortic Root: Normal aortic root.  Aortic Valve: Aortic valve not well visualized; appears  calcified.  Left Atrium: Normal left atrium.  Left Ventricle: Endocardial visualization enhanced with  intravenous injection of echo contrast (Definity). Mild  segmental left ventricular systolic dysfunction. The apex,  distal anterior, distal anterolateral and distal septal  walls are hypokinetic.  Right Heart: Normal right atrium. Normal right ventricular  size and function. Normal tricuspid valve. Mild tricuspid  regurgitation. Normal pulmonic valve.  Pericardium/PleuraNormal pericardium with no pericardial  effusion.  ------------------------------------------------------------------------  CONCLUSIONS:  1. Endocardial visualization enhanced with intravenous  injection of echo contrast (Definity). Mild segmental left  ventricular systolic dysfunction. The apex, distal  anterior, distal anterolateral and distal septal walls are  hypokinetic.  2. Normal right ventricular size and function.    < end of copied text >    RADIOLOGY STUDIES    CXR:   < from: Xray Chest 2 Views PA/Lat (18 @ 07:18) >  INTERPRETATION:    The lungs are clear. The heart is not enlarged and there are no   effusions. The bones are intact.      COMPARISON:  2017 without change.      IMPRESSION:  Clear lungs.    < end of copied text >

## 2018-05-02 NOTE — PROGRESS NOTE ADULT - PROBLEM SELECTOR PLAN 1
target bg 100-200 mg/dl  Given pateint age, no need for tight control, a1c goal is 7-8%.  C/w current insulin management for now    Dc plan is basal/bolus vs basal orals, c-peptide pending.

## 2018-05-02 NOTE — PROGRESS NOTE ADULT - ASSESSMENT
85 YO F w/ hx of HTN , DM, TIA without residual deficits, no known CAD hx (stress test 11/2017 normal), presents to the ED after found at 3 AM confused by family member, found to be hypoglycemic on presentation but no other complaints. Found to have EKG changes concerning for ACS, now in the setting of worsening substernal chest pain, rising troponins and multivessel disease on LHC.

## 2018-05-03 DIAGNOSIS — A41.9 SEPSIS, UNSPECIFIED ORGANISM: ICD-10-CM

## 2018-05-03 LAB
-  AMIKACIN: SIGNIFICANT CHANGE UP
-  AMPICILLIN/SULBACTAM: SIGNIFICANT CHANGE UP
-  AMPICILLIN: SIGNIFICANT CHANGE UP
-  AZTREONAM: SIGNIFICANT CHANGE UP
-  CEFAZOLIN: SIGNIFICANT CHANGE UP
-  CEFEPIME: SIGNIFICANT CHANGE UP
-  CEFOXITIN: SIGNIFICANT CHANGE UP
-  CEFTAZIDIME: SIGNIFICANT CHANGE UP
-  CEFTRIAXONE: SIGNIFICANT CHANGE UP
-  CIPROFLOXACIN: SIGNIFICANT CHANGE UP
-  ERTAPENEM: SIGNIFICANT CHANGE UP
-  GENTAMICIN: SIGNIFICANT CHANGE UP
-  IMIPENEM: SIGNIFICANT CHANGE UP
-  LEVOFLOXACIN: SIGNIFICANT CHANGE UP
-  MEROPENEM: SIGNIFICANT CHANGE UP
-  NITROFURANTOIN: SIGNIFICANT CHANGE UP
-  PIPERACILLIN/TAZOBACTAM: SIGNIFICANT CHANGE UP
-  TIGECYCLINE: SIGNIFICANT CHANGE UP
-  TOBRAMYCIN: SIGNIFICANT CHANGE UP
-  TRIMETHOPRIM/SULFAMETHOXAZOLE: SIGNIFICANT CHANGE UP
ALBUMIN SERPL ELPH-MCNC: 2.6 G/DL — LOW (ref 3.3–5)
ALP SERPL-CCNC: 65 U/L — SIGNIFICANT CHANGE UP (ref 40–120)
ALT FLD-CCNC: 17 U/L — SIGNIFICANT CHANGE UP (ref 4–33)
AST SERPL-CCNC: 32 U/L — SIGNIFICANT CHANGE UP (ref 4–32)
B PERT DNA SPEC QL NAA+PROBE: SIGNIFICANT CHANGE UP
BACTERIA UR CULT: SIGNIFICANT CHANGE UP
BILIRUB SERPL-MCNC: 0.5 MG/DL — SIGNIFICANT CHANGE UP (ref 0.2–1.2)
BUN SERPL-MCNC: 26 MG/DL — HIGH (ref 7–23)
C PNEUM DNA SPEC QL NAA+PROBE: NOT DETECTED — SIGNIFICANT CHANGE UP
CALCIUM SERPL-MCNC: 9.3 MG/DL — SIGNIFICANT CHANGE UP (ref 8.4–10.5)
CHLORIDE SERPL-SCNC: 98 MMOL/L — SIGNIFICANT CHANGE UP (ref 98–107)
CO2 SERPL-SCNC: 22 MMOL/L — SIGNIFICANT CHANGE UP (ref 22–31)
CREAT SERPL-MCNC: 0.92 MG/DL — SIGNIFICANT CHANGE UP (ref 0.5–1.3)
FLUAV H1 2009 PAND RNA SPEC QL NAA+PROBE: NOT DETECTED — SIGNIFICANT CHANGE UP
FLUAV H1 RNA SPEC QL NAA+PROBE: NOT DETECTED — SIGNIFICANT CHANGE UP
FLUAV H3 RNA SPEC QL NAA+PROBE: NOT DETECTED — SIGNIFICANT CHANGE UP
FLUAV SUBTYP SPEC NAA+PROBE: SIGNIFICANT CHANGE UP
FLUBV RNA SPEC QL NAA+PROBE: NOT DETECTED — SIGNIFICANT CHANGE UP
GLUCOSE BLDC GLUCOMTR-MCNC: 130 MG/DL — HIGH (ref 70–99)
GLUCOSE BLDC GLUCOMTR-MCNC: 143 MG/DL — HIGH (ref 70–99)
GLUCOSE BLDC GLUCOMTR-MCNC: 153 MG/DL — HIGH (ref 70–99)
GLUCOSE BLDC GLUCOMTR-MCNC: 243 MG/DL — HIGH (ref 70–99)
GLUCOSE SERPL-MCNC: 319 MG/DL — HIGH (ref 70–99)
HADV DNA SPEC QL NAA+PROBE: NOT DETECTED — SIGNIFICANT CHANGE UP
HCOV 229E RNA SPEC QL NAA+PROBE: NOT DETECTED — SIGNIFICANT CHANGE UP
HCOV HKU1 RNA SPEC QL NAA+PROBE: NOT DETECTED — SIGNIFICANT CHANGE UP
HCOV NL63 RNA SPEC QL NAA+PROBE: NOT DETECTED — SIGNIFICANT CHANGE UP
HCOV OC43 RNA SPEC QL NAA+PROBE: NOT DETECTED — SIGNIFICANT CHANGE UP
HCT VFR BLD CALC: 27.6 % — LOW (ref 34.5–45)
HCT VFR BLD CALC: 30.3 % — LOW (ref 34.5–45)
HGB BLD-MCNC: 8.1 G/DL — LOW (ref 11.5–15.5)
HGB BLD-MCNC: 8.9 G/DL — LOW (ref 11.5–15.5)
HMPV RNA SPEC QL NAA+PROBE: NOT DETECTED — SIGNIFICANT CHANGE UP
HPIV1 RNA SPEC QL NAA+PROBE: NOT DETECTED — SIGNIFICANT CHANGE UP
HPIV2 RNA SPEC QL NAA+PROBE: NOT DETECTED — SIGNIFICANT CHANGE UP
HPIV3 RNA SPEC QL NAA+PROBE: NOT DETECTED — SIGNIFICANT CHANGE UP
HPIV4 RNA SPEC QL NAA+PROBE: NOT DETECTED — SIGNIFICANT CHANGE UP
LACTATE SERPL-SCNC: 3.1 MMOL/L — HIGH (ref 0.5–2)
M PNEUMO DNA SPEC QL NAA+PROBE: NOT DETECTED — SIGNIFICANT CHANGE UP
MAGNESIUM SERPL-MCNC: 2.3 MG/DL — SIGNIFICANT CHANGE UP (ref 1.6–2.6)
MCHC RBC-ENTMCNC: 24.8 PG — LOW (ref 27–34)
MCHC RBC-ENTMCNC: 24.8 PG — LOW (ref 27–34)
MCHC RBC-ENTMCNC: 29.3 % — LOW (ref 32–36)
MCHC RBC-ENTMCNC: 29.4 % — LOW (ref 32–36)
MCV RBC AUTO: 84.4 FL — SIGNIFICANT CHANGE UP (ref 80–100)
MCV RBC AUTO: 84.4 FL — SIGNIFICANT CHANGE UP (ref 80–100)
METHOD TYPE: SIGNIFICANT CHANGE UP
NRBC # FLD: 0 — SIGNIFICANT CHANGE UP
NRBC # FLD: 0 — SIGNIFICANT CHANGE UP
ORGANISM # SPEC MICROSCOPIC CNT: SIGNIFICANT CHANGE UP
ORGANISM # SPEC MICROSCOPIC CNT: SIGNIFICANT CHANGE UP
PHOSPHATE SERPL-MCNC: 2.5 MG/DL — SIGNIFICANT CHANGE UP (ref 2.5–4.5)
PLATELET # BLD AUTO: 428 K/UL — HIGH (ref 150–400)
PLATELET # BLD AUTO: 464 K/UL — HIGH (ref 150–400)
PMV BLD: 10.2 FL — SIGNIFICANT CHANGE UP (ref 7–13)
PMV BLD: 10.3 FL — SIGNIFICANT CHANGE UP (ref 7–13)
POTASSIUM SERPL-MCNC: 4.8 MMOL/L — SIGNIFICANT CHANGE UP (ref 3.5–5.3)
POTASSIUM SERPL-SCNC: 4.8 MMOL/L — SIGNIFICANT CHANGE UP (ref 3.5–5.3)
PROT SERPL-MCNC: 7.1 G/DL — SIGNIFICANT CHANGE UP (ref 6–8.3)
RBC # BLD: 3.27 M/UL — LOW (ref 3.8–5.2)
RBC # BLD: 3.59 M/UL — LOW (ref 3.8–5.2)
RBC # FLD: 15.6 % — HIGH (ref 10.3–14.5)
RBC # FLD: 15.6 % — HIGH (ref 10.3–14.5)
RSV RNA SPEC QL NAA+PROBE: NOT DETECTED — SIGNIFICANT CHANGE UP
RV+EV RNA SPEC QL NAA+PROBE: NOT DETECTED — SIGNIFICANT CHANGE UP
SODIUM SERPL-SCNC: 133 MMOL/L — LOW (ref 135–145)
SPECIMEN SOURCE: SIGNIFICANT CHANGE UP
WBC # BLD: 13.65 K/UL — HIGH (ref 3.8–10.5)
WBC # BLD: 15.36 K/UL — HIGH (ref 3.8–10.5)
WBC # FLD AUTO: 13.65 K/UL — HIGH (ref 3.8–10.5)
WBC # FLD AUTO: 15.36 K/UL — HIGH (ref 3.8–10.5)

## 2018-05-03 PROCEDURE — 71045 X-RAY EXAM CHEST 1 VIEW: CPT | Mod: 26

## 2018-05-03 PROCEDURE — 99232 SBSQ HOSP IP/OBS MODERATE 35: CPT

## 2018-05-03 PROCEDURE — 99233 SBSQ HOSP IP/OBS HIGH 50: CPT

## 2018-05-03 RX ORDER — ACETAMINOPHEN 500 MG
650 TABLET ORAL EVERY 6 HOURS
Qty: 0 | Refills: 0 | Status: DISCONTINUED | OUTPATIENT
Start: 2018-05-03 | End: 2018-05-14

## 2018-05-03 RX ORDER — VASOPRESSIN 20 [USP'U]/ML
0.02 INJECTION INTRAVENOUS
Qty: 100 | Refills: 0 | Status: DISCONTINUED | OUTPATIENT
Start: 2018-05-03 | End: 2018-05-05

## 2018-05-03 RX ORDER — HEPARIN SODIUM 5000 [USP'U]/ML
5000 INJECTION INTRAVENOUS; SUBCUTANEOUS EVERY 8 HOURS
Qty: 0 | Refills: 0 | Status: DISCONTINUED | OUTPATIENT
Start: 2018-05-03 | End: 2018-05-05

## 2018-05-03 RX ORDER — VANCOMYCIN HCL 1 G
500 VIAL (EA) INTRAVENOUS
Qty: 0 | Refills: 0 | Status: DISCONTINUED | OUTPATIENT
Start: 2018-05-03 | End: 2018-05-03

## 2018-05-03 RX ORDER — SODIUM CHLORIDE 9 MG/ML
250 INJECTION INTRAMUSCULAR; INTRAVENOUS; SUBCUTANEOUS ONCE
Qty: 0 | Refills: 0 | Status: COMPLETED | OUTPATIENT
Start: 2018-05-03 | End: 2018-05-03

## 2018-05-03 RX ORDER — NOREPINEPHRINE BITARTRATE/D5W 8 MG/250ML
0.05 PLASTIC BAG, INJECTION (ML) INTRAVENOUS
Qty: 8 | Refills: 0 | Status: DISCONTINUED | OUTPATIENT
Start: 2018-05-03 | End: 2018-05-03

## 2018-05-03 RX ORDER — SODIUM CHLORIDE 9 MG/ML
500 INJECTION INTRAMUSCULAR; INTRAVENOUS; SUBCUTANEOUS ONCE
Qty: 0 | Refills: 0 | Status: COMPLETED | OUTPATIENT
Start: 2018-05-03 | End: 2018-05-03

## 2018-05-03 RX ORDER — PIPERACILLIN AND TAZOBACTAM 4; .5 G/20ML; G/20ML
3.38 INJECTION, POWDER, LYOPHILIZED, FOR SOLUTION INTRAVENOUS EVERY 8 HOURS
Qty: 0 | Refills: 0 | Status: DISCONTINUED | OUTPATIENT
Start: 2018-05-03 | End: 2018-05-09

## 2018-05-03 RX ORDER — VANCOMYCIN HCL 1 G
500 VIAL (EA) INTRAVENOUS EVERY 24 HOURS
Qty: 0 | Refills: 0 | Status: DISCONTINUED | OUTPATIENT
Start: 2018-05-03 | End: 2018-05-06

## 2018-05-03 RX ADMIN — Medication 3 UNIT(S): at 08:33

## 2018-05-03 RX ADMIN — HEPARIN SODIUM 5000 UNIT(S): 5000 INJECTION INTRAVENOUS; SUBCUTANEOUS at 22:47

## 2018-05-03 RX ADMIN — PIPERACILLIN AND TAZOBACTAM 25 GRAM(S): 4; .5 INJECTION, POWDER, LYOPHILIZED, FOR SOLUTION INTRAVENOUS at 22:47

## 2018-05-03 RX ADMIN — PIPERACILLIN AND TAZOBACTAM 25 GRAM(S): 4; .5 INJECTION, POWDER, LYOPHILIZED, FOR SOLUTION INTRAVENOUS at 14:19

## 2018-05-03 RX ADMIN — SODIUM CHLORIDE 1000 MILLILITER(S): 9 INJECTION INTRAMUSCULAR; INTRAVENOUS; SUBCUTANEOUS at 18:52

## 2018-05-03 RX ADMIN — Medication 2: at 08:33

## 2018-05-03 RX ADMIN — Medication 150 MILLIGRAM(S): at 00:57

## 2018-05-03 RX ADMIN — Medication 3 UNIT(S): at 12:44

## 2018-05-03 RX ADMIN — LIDOCAINE 2 PATCH: 4 CREAM TOPICAL at 12:46

## 2018-05-03 RX ADMIN — Medication 81 MILLIGRAM(S): at 12:45

## 2018-05-03 RX ADMIN — SODIUM CHLORIDE 250 MILLILITER(S): 9 INJECTION INTRAMUSCULAR; INTRAVENOUS; SUBCUTANEOUS at 08:30

## 2018-05-03 RX ADMIN — HEPARIN SODIUM 5000 UNIT(S): 5000 INJECTION INTRAVENOUS; SUBCUTANEOUS at 14:19

## 2018-05-03 RX ADMIN — LIDOCAINE 2 PATCH: 4 CREAM TOPICAL at 06:04

## 2018-05-03 RX ADMIN — CLOPIDOGREL BISULFATE 75 MILLIGRAM(S): 75 TABLET, FILM COATED ORAL at 12:45

## 2018-05-03 RX ADMIN — Medication 1: at 18:49

## 2018-05-03 RX ADMIN — VASOPRESSIN 2.4 UNIT(S)/MIN: 20 INJECTION INTRAVENOUS at 21:42

## 2018-05-03 RX ADMIN — ATORVASTATIN CALCIUM 40 MILLIGRAM(S): 80 TABLET, FILM COATED ORAL at 22:47

## 2018-05-03 RX ADMIN — SODIUM CHLORIDE 1000 MILLILITER(S): 9 INJECTION INTRAMUSCULAR; INTRAVENOUS; SUBCUTANEOUS at 17:09

## 2018-05-03 RX ADMIN — Medication 3 UNIT(S): at 18:50

## 2018-05-03 RX ADMIN — INSULIN GLARGINE 10 UNIT(S): 100 INJECTION, SOLUTION SUBCUTANEOUS at 22:47

## 2018-05-03 NOTE — PROGRESS NOTE ADULT - PROBLEM SELECTOR PROBLEM 2
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

## 2018-05-03 NOTE — PROGRESS NOTE ADULT - PROBLEM SELECTOR PLAN 1
target bg 100-200 mg/dl  Given pateint age, no need for tight control, a1c goal is 7-8%.  C/w current insulin management for now as appetite is also reported as poor. Trend FS over next 24 hours.     Dc plan is basal/bolus vs basal orals, c-peptide pending.

## 2018-05-03 NOTE — PROGRESS NOTE ADULT - SUBJECTIVE AND OBJECTIVE BOX
Chief Complaint: DM 2    History: Patient lethargic, resting.  at bedside and said she is not eating much, only when fed.     MEDICATIONS  (STANDING):  aspirin  chewable 81 milliGRAM(s) Oral daily  atorvastatin 40 milliGRAM(s) Oral at bedtime  clopidogrel Tablet 75 milliGRAM(s) Oral daily  dextrose 5%. 1000 milliLiter(s) (50 mL/Hr) IV Continuous <Continuous>  dextrose 50% Injectable 12.5 Gram(s) IV Push once  dextrose 50% Injectable 25 Gram(s) IV Push once  dextrose 50% Injectable 25 Gram(s) IV Push once  heparin  Injectable 5000 Unit(s) SubCutaneous every 8 hours  influenza   Vaccine 0.5 milliLiter(s) IntraMuscular once  insulin glargine Injectable (LANTUS) 10 Unit(s) SubCutaneous at bedtime  insulin lispro (HumaLOG) corrective regimen sliding scale   SubCutaneous three times a day before meals  insulin lispro (HumaLOG) corrective regimen sliding scale   SubCutaneous at bedtime  insulin lispro Injectable (HumaLOG) 3 Unit(s) SubCutaneous three times a day before meals  lidocaine   Patch 2 Patch Transdermal daily  piperacillin/tazobactam IVPB. 3.375 Gram(s) IV Intermittent every 8 hours  vancomycin  IVPB 500 milliGRAM(s) IV Intermittent every 24 hours    MEDICATIONS  (PRN):  dextrose Gel 1 Dose(s) Oral once PRN Blood Glucose LESS THAN 70 milliGRAM(s)/deciliter  glucagon  Injectable 1 milliGRAM(s) IntraMuscular once PRN Glucose LESS THAN 70 milligrams/deciliter          No Known Allergies          Review of Systems:    UNABLE TO OBTAIN    PHYSICAL EXAM:  VITALS: T(C): 38.5 (05-03-18 @ 12:00)  T(F): 101.3 (05-03-18 @ 12:00), Max: 102.7 (05-02-18 @ 22:05)  HR: 102 (05-03-18 @ 12:00) (71 - 102)  BP: 111/62 (05-03-18 @ 12:00) (61/33 - 128/70)  RR:  (11 - 32)  SpO2:  (95% - 100%)  Wt(kg): --  GENERAL: NAD, well-groomed  RESPIRATORY: Non labored  CARDIOVASCULAR: Regular rate and rhythm noted  SKIN: Dry, warm      POCT Blood Glucose.: 143 mg/dL (05-03-18 @ 12:34)  POCT Blood Glucose.: 243 mg/dL (05-03-18 @ 08:12)  POCT Blood Glucose.: 197 mg/dL (05-02-18 @ 23:10)  POCT Blood Glucose.: 258 mg/dL (05-02-18 @ 17:47)  POCT Blood Glucose.: 237 mg/dL (05-02-18 @ 12:28)  POCT Blood Glucose.: 170 mg/dL (05-02-18 @ 08:28)  POCT Blood Glucose.: 195 mg/dL (05-01-18 @ 22:38)  POCT Blood Glucose.: 202 mg/dL (05-01-18 @ 16:50)  POCT Blood Glucose.: 158 mg/dL (05-01-18 @ 11:59)  POCT Blood Glucose.: 197 mg/dL (05-01-18 @ 05:57)      05-03    133<L>  |  98  |  26<H>  ----------------------------<  319<H>  4.8   |  22  |  0.92    EGFR if : 66  EGFR if non : 57    Ca    9.3      05-03  Mg     2.3     05-03  Phos  2.5     05-03    TPro  7.1  /  Alb  2.6<L>  /  TBili  0.5  /  DBili  x   /  AST  32  /  ALT  17  /  AlkPhos  65  05-03            Hemoglobin A1C, Whole Blood: 6.4 % <H> [4.0 - 5.6] (05-02-18 @ 05:54)

## 2018-05-03 NOTE — PROGRESS NOTE ADULT - PROBLEM SELECTOR PLAN 2
- endocrine following.  - lantus 10 U, 3/3/3 lispro, LDISS qhs and with meals. - s/p LHC from RFA access.  - LAD mid 80%, Cx mid and proximal 60%, OM 70-80%, and proximal RPL 90-95%.  - Potential high risk PCI vs. medical management, first stabilization of sepsis.  - Morphine PRN for relief, on nitro drip, c/w metoprolol as BP will allow.  - s/p aspirin and plavix load, c/w maintenance. c/w heparin gtt.

## 2018-05-03 NOTE — PROGRESS NOTE ADULT - ASSESSMENT
83 YO F w/ hx of HTN , DM, TIA without residual deficits, no known CAD hx (stress test 11/2017 normal), presents to the ED after found at 3 AM confused by family member, found to be hypoglycemic on presentation but no other complaints. Found to have EKG changes concerning for ACS, now in the setting of worsening substernal chest pain, rising troponins and multivessel disease on LHC.

## 2018-05-03 NOTE — PROGRESS NOTE ADULT - PROBLEM SELECTOR PLAN 1
- s/p LHC from RFA access.  - LAD mid 80%, Cx mid and proximal 60%, OM 70-80%, and proximal RPL 90-95%.  - Potential high risk PCI vs. CABG, both requiring transfer to [place].  - Morphine PRN for relief, on nitro drip, c/w metoprolol as BP will allow.  - s/p aspirin and plavix load, c/w maintenance. c/w heparin gtt. Hypotensive responding to fluids overnight, brief pressor requirement d/c.  lactate from AM 3.1, s/p 1250 cc IVF.  broaden abx to vancomycin and zosyn, dosed renally.  repeat blood cultures pending  patient with recurrent UTI and cellulitis in the past. No history of resistant orgs. Last abx 6 months ago for UTI.  repeat CXR. unlikely pulmonary or GI origin of sepsis. comprehensive skin check negative.

## 2018-05-03 NOTE — PROGRESS NOTE ADULT - PROBLEM SELECTOR PLAN 3
relative hypotension. monitor while on nitro drip and parameters on metoprolol. - endocrine following.  - lantus 10 U, 3/3/3 lispro, LDISS qhs and with meals.

## 2018-05-03 NOTE — PROGRESS NOTE ADULT - PROBLEM SELECTOR PROBLEM 3
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

## 2018-05-03 NOTE — PROGRESS NOTE ADULT - SUBJECTIVE AND OBJECTIVE BOX
HPI/INTERVAL HISTORY:  Patient seen and examined at bedside.    OBJECTIVE:  VITAL SIGNS:  ICU Vital Signs Last 24 Hrs  T(C): 37.5 (03 May 2018 08:30), Max: 39.3 (02 May 2018 22:05)  T(F): 99.5 (03 May 2018 08:30), Max: 102.7 (02 May 2018 22:05)  HR: 92 (03 May 2018 08:30) (71 - 98)  BP: 124/62 (03 May 2018 08:30) (61/33 - 128/70)  BP(mean): 76 (03 May 2018 08:30) (38 - 81)  ABP: --  ABP(mean): --  RR: 32 (03 May 2018 08:30) (11 - 32)  SpO2: 98% (03 May 2018 06:00) (95% - 100%)        05-02 @ 07:01  -  05-03 @ 07:00  --------------------------------------------------------  IN: 1298.7 mL / OUT: 400 mL / NET: 898.7 mL      CAPILLARY BLOOD GLUCOSE      POCT Blood Glucose.: 243 mg/dL (03 May 2018 08:12)      PHYSICAL EXAM:  Gen:   HEENT: NC/AT; PERRL, anicteric sclera  Neck: supple, no JVD  Resp: clear to ausculation B/L; no wheezes, rales or rhonchi  Cardiovasc: S1S2 normal; RRR; no murmurs, rubs or gallops  GI: soft, nondistended, nontender; +BS  Extr: warm, well-perfused, PT/DP pulses 2+ B/L; no LE edema  Skin: normal color and turgor  Neuro:     LABS:                        8.9    13.65 )-----------( 428      ( 03 May 2018 06:10 )             30.3     05-03    133<L>  |  98  |  26<H>  ----------------------------<  319<H>  4.8   |  22  |  0.92    Ca    9.3      03 May 2018 06:10  Phos  2.5     05-03  Mg     2.3     05-03    TPro  7.1  /  Alb  2.6<L>  /  TBili  0.5  /  DBili  x   /  AST  32  /  ALT  17  /  AlkPhos  65  05-03    LIVER FUNCTIONS - ( 03 May 2018 06:10 )  Alb: 2.6 g/dL / Pro: 7.1 g/dL / ALK PHOS: 65 u/L / ALT: 17 u/L / AST: 32 u/L / GGT: x           PT/INR - ( 02 May 2018 01:20 )   PT: 12.7 SEC;   INR: 1.10          PTT - ( 02 May 2018 22:50 )  PTT:35.6 SEC    CARDIAC MARKERS ( 02 May 2018 22:40 )  x     / 0.92 ng/mL / 82 u/L / 3.82 ng/mL / x      CARDIAC MARKERS ( 02 May 2018 05:54 )  x     / 0.72 ng/mL / 107 u/L / 14.95 ng/mL / x      CARDIAC MARKERS ( 01 May 2018 21:00 )  x     / 0.52 ng/mL / 166 u/L / 27.76 ng/mL / x      CARDIAC MARKERS ( 01 May 2018 15:00 )  x     / 0.26 ng/mL / x     / x     / x            RADIOLOGY & ADDITIONAL TESTS: Reviewed.    aspirin  chewable 81 milliGRAM(s) Oral daily  atorvastatin 40 milliGRAM(s) Oral at bedtime  clopidogrel Tablet 75 milliGRAM(s) Oral daily  dextrose 5%. 1000 milliLiter(s) IV Continuous <Continuous>  dextrose 50% Injectable 12.5 Gram(s) IV Push once  dextrose 50% Injectable 25 Gram(s) IV Push once  dextrose 50% Injectable 25 Gram(s) IV Push once  dextrose Gel 1 Dose(s) Oral once PRN  glucagon  Injectable 1 milliGRAM(s) IntraMuscular once PRN  influenza   Vaccine 0.5 milliLiter(s) IntraMuscular once  insulin glargine Injectable (LANTUS) 10 Unit(s) SubCutaneous at bedtime  insulin lispro (HumaLOG) corrective regimen sliding scale   SubCutaneous three times a day before meals  insulin lispro (HumaLOG) corrective regimen sliding scale   SubCutaneous at bedtime  insulin lispro Injectable (HumaLOG) 3 Unit(s) SubCutaneous three times a day before meals  lidocaine   Patch 2 Patch Transdermal daily  piperacillin/tazobactam IVPB. 3.375 Gram(s) IV Intermittent every 8 hours  vancomycin  IVPB 500 milliGRAM(s) IV Intermittent every 48 hours      No Known Allergies HPI/INTERVAL HISTORY:  Patient seen and examined at bedside.    Overnight patient hypotensive and febrile to 102.7. Vasopressin and phenyephrine started and patient was given IVF bolus total 1 L with good response. Pressors were weaned this AM. Blood cultures were redrawn and pending, Ceftriaxone changed to vancomycin, zosyn. At bedside this AM patient is asymptomatic. Apparent agitation last night completely resolved. Denies chest pain, SOB, cough, abdominal pain, diarrhea.    OBJECTIVE:  VITAL SIGNS:  ICU Vital Signs Last 24 Hrs  T(C): 37.5 (03 May 2018 08:30), Max: 39.3 (02 May 2018 22:05)  T(F): 99.5 (03 May 2018 08:30), Max: 102.7 (02 May 2018 22:05)  HR: 92 (03 May 2018 08:30) (71 - 98)  BP: 124/62 (03 May 2018 08:30) (61/33 - 128/70)  BP(mean): 76 (03 May 2018 08:30) (38 - 81)  ABP: --  ABP(mean): --  RR: 32 (03 May 2018 08:30) (11 - 32)  SpO2: 98% (03 May 2018 06:00) (95% - 100%)        05-02 @ 07:01  -  05-03 @ 07:00  --------------------------------------------------------  IN: 1298.7 mL / OUT: 400 mL / NET: 898.7 mL      CAPILLARY BLOOD GLUCOSE      POCT Blood Glucose.: 243 mg/dL (03 May 2018 08:12)      PHYSICAL EXAM:  Gen: NAD.  HEENT: NC/AT; PERRL, anicteric sclera  Neck: supple, no JVD  Resp: clear to ausculation B/L; no wheezes, rales or rhonchi  Cardiovasc: Distant heart sounds.  S1, S2, no m/r/g.  GI: soft, nondistended, nontender; +BS  Extr: warm, well-perfused, PT/DP pulses 2+ B/L; no LE edema  Skin: normal color and turgor  Neuro: AOx2, non-focal.    LABS:                        8.9    13.65 )-----------( 428      ( 03 May 2018 06:10 )             30.3     05-03    133<L>  |  98  |  26<H>  ----------------------------<  319<H>  4.8   |  22  |  0.92    Ca    9.3      03 May 2018 06:10  Phos  2.5     05-03  Mg     2.3     05-03    TPro  7.1  /  Alb  2.6<L>  /  TBili  0.5  /  DBili  x   /  AST  32  /  ALT  17  /  AlkPhos  65  05-03    LIVER FUNCTIONS - ( 03 May 2018 06:10 )  Alb: 2.6 g/dL / Pro: 7.1 g/dL / ALK PHOS: 65 u/L / ALT: 17 u/L / AST: 32 u/L / GGT: x           PT/INR - ( 02 May 2018 01:20 )   PT: 12.7 SEC;   INR: 1.10          PTT - ( 02 May 2018 22:50 )  PTT:35.6 SEC    CARDIAC MARKERS ( 02 May 2018 22:40 )  x     / 0.92 ng/mL / 82 u/L / 3.82 ng/mL / x      CARDIAC MARKERS ( 02 May 2018 05:54 )  x     / 0.72 ng/mL / 107 u/L / 14.95 ng/mL / x      CARDIAC MARKERS ( 01 May 2018 21:00 )  x     / 0.52 ng/mL / 166 u/L / 27.76 ng/mL / x      CARDIAC MARKERS ( 01 May 2018 15:00 )  x     / 0.26 ng/mL / x     / x     / x            RADIOLOGY & ADDITIONAL TESTS: Reviewed.    aspirin  chewable 81 milliGRAM(s) Oral daily  atorvastatin 40 milliGRAM(s) Oral at bedtime  clopidogrel Tablet 75 milliGRAM(s) Oral daily  dextrose 5%. 1000 milliLiter(s) IV Continuous <Continuous>  dextrose 50% Injectable 12.5 Gram(s) IV Push once  dextrose 50% Injectable 25 Gram(s) IV Push once  dextrose 50% Injectable 25 Gram(s) IV Push once  dextrose Gel 1 Dose(s) Oral once PRN  glucagon  Injectable 1 milliGRAM(s) IntraMuscular once PRN  influenza   Vaccine 0.5 milliLiter(s) IntraMuscular once  insulin glargine Injectable (LANTUS) 10 Unit(s) SubCutaneous at bedtime  insulin lispro (HumaLOG) corrective regimen sliding scale   SubCutaneous three times a day before meals  insulin lispro (HumaLOG) corrective regimen sliding scale   SubCutaneous at bedtime  insulin lispro Injectable (HumaLOG) 3 Unit(s) SubCutaneous three times a day before meals  lidocaine   Patch 2 Patch Transdermal daily  piperacillin/tazobactam IVPB. 3.375 Gram(s) IV Intermittent every 8 hours  vancomycin  IVPB 500 milliGRAM(s) IV Intermittent every 48 hours      No Known Allergies

## 2018-05-03 NOTE — PROGRESS NOTE ADULT - PROBLEM SELECTOR PLAN 4
DVT: heparin gtt.    Diet: DASH consistent carbs. relative hypotension. monitor while on nitro drip and parameters on metoprolol.

## 2018-05-03 NOTE — PROGRESS NOTE ADULT - PROBLEM SELECTOR PLAN 5
DVT: heparin gtt.    Diet: DASH consistent carbs.    Dispo: sepsis O/N with reculture, now on vancomycin zosyn, stabilize sepsis for potential transfer to [place] for high risk PCI if continued improvement.     Savage Angulo MD  [facility] Internal Medicine   Pager # [phone]/ [phone]

## 2018-05-04 DIAGNOSIS — D64.9 ANEMIA, UNSPECIFIED: ICD-10-CM

## 2018-05-04 LAB
ALBUMIN SERPL ELPH-MCNC: 2.1 G/DL — LOW (ref 3.3–5)
ALP SERPL-CCNC: 53 U/L — SIGNIFICANT CHANGE UP (ref 40–120)
ALT FLD-CCNC: 15 U/L — SIGNIFICANT CHANGE UP (ref 4–33)
APPEARANCE UR: SIGNIFICANT CHANGE UP
AST SERPL-CCNC: 22 U/L — SIGNIFICANT CHANGE UP (ref 4–32)
BACTERIA # UR AUTO: SIGNIFICANT CHANGE UP
BASOPHILS # BLD AUTO: 0.01 K/UL — SIGNIFICANT CHANGE UP (ref 0–0.2)
BASOPHILS NFR BLD AUTO: 0.1 % — SIGNIFICANT CHANGE UP (ref 0–2)
BILIRUB SERPL-MCNC: 0.3 MG/DL — SIGNIFICANT CHANGE UP (ref 0.2–1.2)
BILIRUB UR-MCNC: NEGATIVE — SIGNIFICANT CHANGE UP
BLOOD UR QL VISUAL: HIGH
BUN SERPL-MCNC: 28 MG/DL — HIGH (ref 7–23)
C DIFF TOX GENS STL QL NAA+PROBE: SIGNIFICANT CHANGE UP
C PEPTIDE SERPL-MCNC: 1 NG/ML — SIGNIFICANT CHANGE UP (ref 0.9–7.1)
CALCIUM SERPL-MCNC: 8.9 MG/DL — SIGNIFICANT CHANGE UP (ref 8.4–10.5)
CHLORIDE SERPL-SCNC: 104 MMOL/L — SIGNIFICANT CHANGE UP (ref 98–107)
CO2 SERPL-SCNC: 23 MMOL/L — SIGNIFICANT CHANGE UP (ref 22–31)
COLOR SPEC: YELLOW — SIGNIFICANT CHANGE UP
CREAT SERPL-MCNC: 0.97 MG/DL — SIGNIFICANT CHANGE UP (ref 0.5–1.3)
EOSINOPHIL # BLD AUTO: 0.06 K/UL — SIGNIFICANT CHANGE UP (ref 0–0.5)
EOSINOPHIL NFR BLD AUTO: 0.6 % — SIGNIFICANT CHANGE UP (ref 0–6)
GLUCOSE BLDC GLUCOMTR-MCNC: 112 MG/DL — HIGH (ref 70–99)
GLUCOSE BLDC GLUCOMTR-MCNC: 121 MG/DL — HIGH (ref 70–99)
GLUCOSE BLDC GLUCOMTR-MCNC: 82 MG/DL — SIGNIFICANT CHANGE UP (ref 70–99)
GLUCOSE BLDC GLUCOMTR-MCNC: 87 MG/DL — SIGNIFICANT CHANGE UP (ref 70–99)
GLUCOSE SERPL-MCNC: 115 MG/DL — HIGH (ref 70–99)
GLUCOSE UR-MCNC: NEGATIVE — SIGNIFICANT CHANGE UP
HCT VFR BLD CALC: 22.8 % — LOW (ref 34.5–45)
HCT VFR BLD CALC: 24 % — LOW (ref 34.5–45)
HCT VFR BLD CALC: 31.6 % — LOW (ref 34.5–45)
HGB BLD-MCNC: 6.9 G/DL — CRITICAL LOW (ref 11.5–15.5)
HGB BLD-MCNC: 7.3 G/DL — LOW (ref 11.5–15.5)
HGB BLD-MCNC: 9.8 G/DL — LOW (ref 11.5–15.5)
IMM GRANULOCYTES # BLD AUTO: 0.06 # — SIGNIFICANT CHANGE UP
IMM GRANULOCYTES NFR BLD AUTO: 0.6 % — SIGNIFICANT CHANGE UP (ref 0–1.5)
KETONES UR-MCNC: NEGATIVE — SIGNIFICANT CHANGE UP
LACTATE SERPL-SCNC: 1.3 MMOL/L — SIGNIFICANT CHANGE UP (ref 0.5–2)
LEUKOCYTE ESTERASE UR-ACNC: HIGH
LYMPHOCYTES # BLD AUTO: 1.61 K/UL — SIGNIFICANT CHANGE UP (ref 1–3.3)
LYMPHOCYTES # BLD AUTO: 17.4 % — SIGNIFICANT CHANGE UP (ref 13–44)
MAGNESIUM SERPL-MCNC: 2.1 MG/DL — SIGNIFICANT CHANGE UP (ref 1.6–2.6)
MCHC RBC-ENTMCNC: 25.3 PG — LOW (ref 27–34)
MCHC RBC-ENTMCNC: 25.3 PG — LOW (ref 27–34)
MCHC RBC-ENTMCNC: 25.9 PG — LOW (ref 27–34)
MCHC RBC-ENTMCNC: 30.3 % — LOW (ref 32–36)
MCHC RBC-ENTMCNC: 30.4 % — LOW (ref 32–36)
MCHC RBC-ENTMCNC: 31 % — LOW (ref 32–36)
MCV RBC AUTO: 83.3 FL — SIGNIFICANT CHANGE UP (ref 80–100)
MCV RBC AUTO: 83.5 FL — SIGNIFICANT CHANGE UP (ref 80–100)
MCV RBC AUTO: 83.6 FL — SIGNIFICANT CHANGE UP (ref 80–100)
MONOCYTES # BLD AUTO: 0.59 K/UL — SIGNIFICANT CHANGE UP (ref 0–0.9)
MONOCYTES NFR BLD AUTO: 6.4 % — SIGNIFICANT CHANGE UP (ref 2–14)
MUCOUS THREADS # UR AUTO: SIGNIFICANT CHANGE UP
NEUTROPHILS # BLD AUTO: 6.92 K/UL — SIGNIFICANT CHANGE UP (ref 1.8–7.4)
NEUTROPHILS NFR BLD AUTO: 74.9 % — SIGNIFICANT CHANGE UP (ref 43–77)
NITRITE UR-MCNC: NEGATIVE — SIGNIFICANT CHANGE UP
NON-SQ EPI CELLS # UR AUTO: <1 — SIGNIFICANT CHANGE UP
NRBC # FLD: 0 — SIGNIFICANT CHANGE UP
PH UR: 6 — SIGNIFICANT CHANGE UP (ref 4.6–8)
PHOSPHATE SERPL-MCNC: 2.2 MG/DL — LOW (ref 2.5–4.5)
PLATELET # BLD AUTO: 297 K/UL — SIGNIFICANT CHANGE UP (ref 150–400)
PLATELET # BLD AUTO: 309 K/UL — SIGNIFICANT CHANGE UP (ref 150–400)
PLATELET # BLD AUTO: 319 K/UL — SIGNIFICANT CHANGE UP (ref 150–400)
PMV BLD: 10.3 FL — SIGNIFICANT CHANGE UP (ref 7–13)
PMV BLD: 10.3 FL — SIGNIFICANT CHANGE UP (ref 7–13)
PMV BLD: 10.5 FL — SIGNIFICANT CHANGE UP (ref 7–13)
POTASSIUM SERPL-MCNC: 4.1 MMOL/L — SIGNIFICANT CHANGE UP (ref 3.5–5.3)
POTASSIUM SERPL-SCNC: 4.1 MMOL/L — SIGNIFICANT CHANGE UP (ref 3.5–5.3)
PROT SERPL-MCNC: 6.1 G/DL — SIGNIFICANT CHANGE UP (ref 6–8.3)
PROT UR-MCNC: 30 MG/DL — HIGH
RBC # BLD: 2.73 M/UL — LOW (ref 3.8–5.2)
RBC # BLD: 2.88 M/UL — LOW (ref 3.8–5.2)
RBC # BLD: 3.78 M/UL — LOW (ref 3.8–5.2)
RBC # FLD: 15.4 % — HIGH (ref 10.3–14.5)
RBC # FLD: 15.7 % — HIGH (ref 10.3–14.5)
RBC # FLD: 15.7 % — HIGH (ref 10.3–14.5)
RBC CASTS # UR COMP ASSIST: SIGNIFICANT CHANGE UP (ref 0–?)
SODIUM SERPL-SCNC: 136 MMOL/L — SIGNIFICANT CHANGE UP (ref 135–145)
SP GR SPEC: 1.03 — SIGNIFICANT CHANGE UP (ref 1–1.04)
SPECIMEN SOURCE: SIGNIFICANT CHANGE UP
URATE CRY FLD QL MICRO: SIGNIFICANT CHANGE UP (ref 0–0)
UROBILINOGEN FLD QL: NORMAL MG/DL — SIGNIFICANT CHANGE UP
VANCOMYCIN FLD-MCNC: 9.1 UG/ML — SIGNIFICANT CHANGE UP
WBC # BLD: 8.92 K/UL — SIGNIFICANT CHANGE UP (ref 3.8–10.5)
WBC # BLD: 9.25 K/UL — SIGNIFICANT CHANGE UP (ref 3.8–10.5)
WBC # BLD: 9.53 K/UL — SIGNIFICANT CHANGE UP (ref 3.8–10.5)
WBC # FLD AUTO: 8.92 K/UL — SIGNIFICANT CHANGE UP (ref 3.8–10.5)
WBC # FLD AUTO: 9.25 K/UL — SIGNIFICANT CHANGE UP (ref 3.8–10.5)
WBC # FLD AUTO: 9.53 K/UL — SIGNIFICANT CHANGE UP (ref 3.8–10.5)
WBC UR QL: SIGNIFICANT CHANGE UP (ref 0–?)

## 2018-05-04 PROCEDURE — 99233 SBSQ HOSP IP/OBS HIGH 50: CPT

## 2018-05-04 RX ORDER — SODIUM,POTASSIUM PHOSPHATES 278-250MG
2 POWDER IN PACKET (EA) ORAL ONCE
Qty: 0 | Refills: 0 | Status: DISCONTINUED | OUTPATIENT
Start: 2018-05-04 | End: 2018-05-04

## 2018-05-04 RX ORDER — SODIUM,POTASSIUM PHOSPHATES 278-250MG
2 POWDER IN PACKET (EA) ORAL ONCE
Qty: 0 | Refills: 0 | Status: COMPLETED | OUTPATIENT
Start: 2018-05-04 | End: 2018-05-04

## 2018-05-04 RX ORDER — CHLORHEXIDINE GLUCONATE 213 G/1000ML
1 SOLUTION TOPICAL
Qty: 0 | Refills: 0 | Status: DISCONTINUED | OUTPATIENT
Start: 2018-05-04 | End: 2018-05-08

## 2018-05-04 RX ADMIN — INSULIN GLARGINE 10 UNIT(S): 100 INJECTION, SOLUTION SUBCUTANEOUS at 22:47

## 2018-05-04 RX ADMIN — PIPERACILLIN AND TAZOBACTAM 25 GRAM(S): 4; .5 INJECTION, POWDER, LYOPHILIZED, FOR SOLUTION INTRAVENOUS at 13:44

## 2018-05-04 RX ADMIN — HEPARIN SODIUM 5000 UNIT(S): 5000 INJECTION INTRAVENOUS; SUBCUTANEOUS at 13:42

## 2018-05-04 RX ADMIN — PIPERACILLIN AND TAZOBACTAM 25 GRAM(S): 4; .5 INJECTION, POWDER, LYOPHILIZED, FOR SOLUTION INTRAVENOUS at 22:47

## 2018-05-04 RX ADMIN — CHLORHEXIDINE GLUCONATE 1 APPLICATION(S): 213 SOLUTION TOPICAL at 04:55

## 2018-05-04 RX ADMIN — CLOPIDOGREL BISULFATE 75 MILLIGRAM(S): 75 TABLET, FILM COATED ORAL at 13:41

## 2018-05-04 RX ADMIN — LIDOCAINE 2 PATCH: 4 CREAM TOPICAL at 13:42

## 2018-05-04 RX ADMIN — ATORVASTATIN CALCIUM 40 MILLIGRAM(S): 80 TABLET, FILM COATED ORAL at 22:47

## 2018-05-04 RX ADMIN — Medication 2 PACKET(S): at 07:49

## 2018-05-04 RX ADMIN — Medication 3 UNIT(S): at 18:30

## 2018-05-04 RX ADMIN — PIPERACILLIN AND TAZOBACTAM 25 GRAM(S): 4; .5 INJECTION, POWDER, LYOPHILIZED, FOR SOLUTION INTRAVENOUS at 05:01

## 2018-05-04 RX ADMIN — HEPARIN SODIUM 5000 UNIT(S): 5000 INJECTION INTRAVENOUS; SUBCUTANEOUS at 05:01

## 2018-05-04 RX ADMIN — Medication 3 UNIT(S): at 09:00

## 2018-05-04 RX ADMIN — Medication 100 MILLIGRAM(S): at 00:15

## 2018-05-04 RX ADMIN — LIDOCAINE 2 PATCH: 4 CREAM TOPICAL at 00:16

## 2018-05-04 RX ADMIN — HEPARIN SODIUM 5000 UNIT(S): 5000 INJECTION INTRAVENOUS; SUBCUTANEOUS at 22:47

## 2018-05-04 RX ADMIN — Medication 3 UNIT(S): at 13:30

## 2018-05-04 RX ADMIN — Medication 81 MILLIGRAM(S): at 13:41

## 2018-05-04 NOTE — PROGRESS NOTE ADULT - PROBLEM SELECTOR PLAN 3
- endocrine following.  - lantus 10 U, 3/3/3 lispro, LDISS qhs and with meals. - s/p LHC from RFA access.  - LAD mid 80%, Cx mid and proximal 60%, OM 70-80%, and proximal RPL 90-95%.  - Potential high risk PCI vs. medical management, first stabilization of sepsis.  - s/p aspirin and plavix load, c/w maintenance. hold heparin gtt in the setting of worsening anemia.

## 2018-05-04 NOTE — PROGRESS NOTE ADULT - PROBLEM SELECTOR PLAN 5
DVT: heparin gtt.    Diet: DASH consistent carbs.    Dispo: sepsis O/N with reculture, now on vancomycin zosyn, stabilize sepsis for potential transfer to [place] for high risk PCI if continued improvement.     Savage Angulo MD  [facility] Internal Medicine   Pager # [phone]/ [phone] relative hypotension. hold nitro drip and metoprolol while on pressors and currently rate controlled. - relative hypotension. hold nitro drip and metoprolol while on pressors and currently rate controlled.

## 2018-05-04 NOTE — PROGRESS NOTE ADULT - PROBLEM SELECTOR PLAN 6
DVT: heparin sq    Diet: DASH consistent carbs.    Dispo: recurrent hypotension despite broaden abx, no clear source of sepsis. consult ID, obtain CT c/a/p     Savage Angulo MD  [facility] Internal Medicine   Pager # [phone]/ [phone] DVT: heparin sq    Diet: DASH consistent carbs.    Dispo: recurrent hypotension despite broaden abx, no clear source of sepsis. consider consult ID, obtain CT c/a/p     Savage Angulo MD  [facility] Internal Medicine   Pager # [phone]/ [phone]

## 2018-05-04 NOTE — PROGRESS NOTE ADULT - SUBJECTIVE AND OBJECTIVE BOX
HPI/INTERVAL HISTORY:  Patient seen and examined at bedside.    OBJECTIVE:  VITAL SIGNS:  ICU Vital Signs Last 24 Hrs  T(C): 36.6 (04 May 2018 04:00), Max: 39.6 (03 May 2018 16:00)  T(F): 97.8 (04 May 2018 04:00), Max: 103.3 (03 May 2018 16:00)  HR: 86 (04 May 2018 05:00) (67 - 102)  BP: 98/49 (04 May 2018 05:00) (75/43 - 133/99)  BP(mean): 60 (04 May 2018 05:00) (49 - 106)  ABP: --  ABP(mean): --  RR: 28 (04 May 2018 05:00) (16 - 32)  SpO2: 94% (04 May 2018 05:00) (93% - 100%)        05-03 @ 07:01  -  05-04 @ 07:00  --------------------------------------------------------  IN: 857.2 mL / OUT: 545 mL / NET: 312.2 mL      CAPILLARY BLOOD GLUCOSE      POCT Blood Glucose.: 130 mg/dL (03 May 2018 22:46)      PHYSICAL EXAM:  Gen:   HEENT: NC/AT; PERRL, anicteric sclera  Neck: supple, no JVD  Resp: clear to ausculation B/L; no wheezes, rales or rhonchi  Cardiovasc: S1S2 normal; RRR; no murmurs, rubs or gallops  GI: soft, nondistended, nontender; +BS  Extr: warm, well-perfused, PT/DP pulses 2+ B/L; no LE edema  Skin: normal color and turgor  Neuro:     LABS:                        7.3    9.53  )-----------( 309      ( 04 May 2018 06:23 )             24.0     05-04    136  |  104  |  28<H>  ----------------------------<  115<H>  4.1   |  23  |  0.97    Ca    8.9      04 May 2018 05:30  Phos  2.2     05-04  Mg     2.1     05-04    TPro  6.1  /  Alb  2.1<L>  /  TBili  0.3  /  DBili  x   /  AST  22  /  ALT  15  /  AlkPhos  53  05-04    LIVER FUNCTIONS - ( 04 May 2018 05:30 )  Alb: 2.1 g/dL / Pro: 6.1 g/dL / ALK PHOS: 53 u/L / ALT: 15 u/L / AST: 22 u/L / GGT: x           PTT - ( 02 May 2018 22:50 )  PTT:35.6 SEC    CARDIAC MARKERS ( 02 May 2018 22:40 )  x     / 0.92 ng/mL / 82 u/L / 3.82 ng/mL / x            RADIOLOGY & ADDITIONAL TESTS: Reviewed.    acetaminophen   Tablet 650 milliGRAM(s) Oral every 6 hours PRN  aspirin  chewable 81 milliGRAM(s) Oral daily  atorvastatin 40 milliGRAM(s) Oral at bedtime  chlorhexidine 4% Liquid 1 Application(s) Topical <User Schedule>  clopidogrel Tablet 75 milliGRAM(s) Oral daily  dextrose 5%. 1000 milliLiter(s) IV Continuous <Continuous>  dextrose 50% Injectable 12.5 Gram(s) IV Push once  dextrose 50% Injectable 25 Gram(s) IV Push once  dextrose 50% Injectable 25 Gram(s) IV Push once  dextrose Gel 1 Dose(s) Oral once PRN  glucagon  Injectable 1 milliGRAM(s) IntraMuscular once PRN  heparin  Injectable 5000 Unit(s) SubCutaneous every 8 hours  influenza   Vaccine 0.5 milliLiter(s) IntraMuscular once  insulin glargine Injectable (LANTUS) 10 Unit(s) SubCutaneous at bedtime  insulin lispro (HumaLOG) corrective regimen sliding scale   SubCutaneous three times a day before meals  insulin lispro (HumaLOG) corrective regimen sliding scale   SubCutaneous at bedtime  insulin lispro Injectable (HumaLOG) 3 Unit(s) SubCutaneous three times a day before meals  lidocaine   Patch 2 Patch Transdermal daily  piperacillin/tazobactam IVPB. 3.375 Gram(s) IV Intermittent every 8 hours  potassium phosphate / sodium phosphate powder 2 Packet(s) Oral once  vancomycin  IVPB 500 milliGRAM(s) IV Intermittent every 24 hours  vasopressin Infusion 0.02 Unit(s)/Min IV Continuous <Continuous>      No Known Allergies HPI/INTERVAL HISTORY:  Patient seen and examined at bedside.    In the afternoon 5/3 patient became febrile again to ~103 with hypotensive to the 70s systolic. Given additional 1 L IVF with good improvement in BP, vasopressin restarted, wean this AM.    Currently without complaints or returns in previous delirium state. Patient is AOx1-2 currently. Denies chest pain, SOB, abdominal pain. Used bedpan this AM, unable to describe BM.    Additional dx testing revealed negative RVP and cdiff stool test negative.    OBJECTIVE:  VITAL SIGNS:  ICU Vital Signs Last 24 Hrs  T(C): 36.6 (04 May 2018 04:00), Max: 39.6 (03 May 2018 16:00)  T(F): 97.8 (04 May 2018 04:00), Max: 103.3 (03 May 2018 16:00)  HR: 86 (04 May 2018 05:00) (67 - 102)  BP: 98/49 (04 May 2018 05:00) (75/43 - 133/99)  BP(mean): 60 (04 May 2018 05:00) (49 - 106)  ABP: --  ABP(mean): --  RR: 28 (04 May 2018 05:00) (16 - 32)  SpO2: 94% (04 May 2018 05:00) (93% - 100%)        05-03 @ 07:01  -  05-04 @ 07:00  --------------------------------------------------------  IN: 857.2 mL / OUT: 545 mL / NET: 312.2 mL      CAPILLARY BLOOD GLUCOSE      POCT Blood Glucose.: 130 mg/dL (03 May 2018 22:46)      PHYSICAL EXAM:  Gen: NAD, Alert and at baseline MS.  HEENT: NC/AT; PERRL, anicteric sclera  Neck: supple, no JVD  Resp: pectus carinatum. clear to ausculation B/L; no wheezes, rales or rhonchi  Cardiovasc: Distant heart sounds. regular HR. no appreciable M/R/G.  GI: soft, nondistended, nontender; +BS  Extr: warm, well-perfused, PT/DP pulses 2+ B/L; no LE edema  Skin: normal color and turgor  Neuro: non-focal.    LABS:                        7.3    9.53  )-----------( 309      ( 04 May 2018 06:23 )             24.0     05-04    136  |  104  |  28<H>  ----------------------------<  115<H>  4.1   |  23  |  0.97    Ca    8.9      04 May 2018 05:30  Phos  2.2     05-04  Mg     2.1     05-04    TPro  6.1  /  Alb  2.1<L>  /  TBili  0.3  /  DBili  x   /  AST  22  /  ALT  15  /  AlkPhos  53  05-04    LIVER FUNCTIONS - ( 04 May 2018 05:30 )  Alb: 2.1 g/dL / Pro: 6.1 g/dL / ALK PHOS: 53 u/L / ALT: 15 u/L / AST: 22 u/L / GGT: x           PTT - ( 02 May 2018 22:50 )  PTT:35.6 SEC    CARDIAC MARKERS ( 02 May 2018 22:40 )  x     / 0.92 ng/mL / 82 u/L / 3.82 ng/mL / x            RADIOLOGY & ADDITIONAL TESTS: Reviewed.    acetaminophen   Tablet 650 milliGRAM(s) Oral every 6 hours PRN  aspirin  chewable 81 milliGRAM(s) Oral daily  atorvastatin 40 milliGRAM(s) Oral at bedtime  chlorhexidine 4% Liquid 1 Application(s) Topical <User Schedule>  clopidogrel Tablet 75 milliGRAM(s) Oral daily  dextrose 5%. 1000 milliLiter(s) IV Continuous <Continuous>  dextrose 50% Injectable 12.5 Gram(s) IV Push once  dextrose 50% Injectable 25 Gram(s) IV Push once  dextrose 50% Injectable 25 Gram(s) IV Push once  dextrose Gel 1 Dose(s) Oral once PRN  glucagon  Injectable 1 milliGRAM(s) IntraMuscular once PRN  heparin  Injectable 5000 Unit(s) SubCutaneous every 8 hours  influenza   Vaccine 0.5 milliLiter(s) IntraMuscular once  insulin glargine Injectable (LANTUS) 10 Unit(s) SubCutaneous at bedtime  insulin lispro (HumaLOG) corrective regimen sliding scale   SubCutaneous three times a day before meals  insulin lispro (HumaLOG) corrective regimen sliding scale   SubCutaneous at bedtime  insulin lispro Injectable (HumaLOG) 3 Unit(s) SubCutaneous three times a day before meals  lidocaine   Patch 2 Patch Transdermal daily  piperacillin/tazobactam IVPB. 3.375 Gram(s) IV Intermittent every 8 hours  potassium phosphate / sodium phosphate powder 2 Packet(s) Oral once  vancomycin  IVPB 500 milliGRAM(s) IV Intermittent every 24 hours  vasopressin Infusion 0.02 Unit(s)/Min IV Continuous <Continuous>      No Known Allergies

## 2018-05-04 NOTE — PROGRESS NOTE ADULT - PROBLEM SELECTOR PLAN 2
- s/p LHC from RFA access.  - LAD mid 80%, Cx mid and proximal 60%, OM 70-80%, and proximal RPL 90-95%.  - Potential high risk PCI vs. medical management, first stabilization of sepsis.  - Morphine PRN for relief, on nitro drip, c/w metoprolol as BP will allow.  - s/p aspirin and plavix load, c/w maintenance. c/w heparin gtt. - ferritin elevated TIBC low. Likely mixed picture (ACD vs. ALBINA). No current source of bleeding, normocytic. Worsening in the setting of acute sepsis.  - Transfusion to goal 8.0 as patient with ACS.

## 2018-05-04 NOTE — PROGRESS NOTE ADULT - PROBLEM SELECTOR PLAN 1
Hypotensive responding to fluids overnight, brief pressor requirement d/c.  lactate from AM 3.1, s/p 1250 cc IVF.  broaden abx to vancomycin and zosyn, dosed renally.  repeat blood cultures pending  patient with recurrent UTI and cellulitis in the past. No history of resistant orgs. Last abx 6 months ago for UTI.  repeat CXR. unlikely pulmonary or GI origin of sepsis. comprehensive skin check negative. Hypotensive 5/3 PM restarted on vasopressin. Responded to fluids (additional 1 L)  lactate improved from 3.1 to 1.3.  c/w vancomycin and zosyn dosed renally. f/u vancomycin trough.  f/u blood cultures 5/3. RVP negative. Cdiff negative.  patient with recurrent UTI and cellulitis in the past. No history of resistant orgs. Last abx 6 months ago for UTI.  CT chest, CT abdomen pelvis with IV contrast to r/o occult source. 500 cc isotonic bolus after CT. Potential ID consult. Hypotensive 5/3 PM restarted on vasopressin. Responded to fluids (additional 1 L)  lactate improved from 3.1 to 1.3.  c/w vancomycin and zosyn dosed renally. f/u vancomycin trough tonight.  f/u blood cultures 5/3. RVP negative. Cdiff negative.  patient with recurrent UTI and cellulitis in the past. No history of resistant orgs. Last abx 6 months ago for UTI.  Consider CT chest, CT abdomen pelvis with IV contrast to r/o occult source. 500 cc isotonic bolus after CT. Potential ID consult, if fever curve not improving, persistent hypotension.

## 2018-05-05 ENCOUNTER — TRANSCRIPTION ENCOUNTER (OUTPATIENT)
Age: 83
End: 2018-05-05

## 2018-05-05 LAB
APTT BLD: 28 SEC — SIGNIFICANT CHANGE UP (ref 27.5–37.4)
BLD GP AB SCN SERPL QL: NEGATIVE — SIGNIFICANT CHANGE UP
BUN SERPL-MCNC: 21 MG/DL — SIGNIFICANT CHANGE UP (ref 7–23)
CALCIUM SERPL-MCNC: 8.7 MG/DL — SIGNIFICANT CHANGE UP (ref 8.4–10.5)
CHLORIDE SERPL-SCNC: 104 MMOL/L — SIGNIFICANT CHANGE UP (ref 98–107)
CO2 SERPL-SCNC: 23 MMOL/L — SIGNIFICANT CHANGE UP (ref 22–31)
CREAT SERPL-MCNC: 0.92 MG/DL — SIGNIFICANT CHANGE UP (ref 0.5–1.3)
GLUCOSE BLDC GLUCOMTR-MCNC: 101 MG/DL — HIGH (ref 70–99)
GLUCOSE BLDC GLUCOMTR-MCNC: 109 MG/DL — HIGH (ref 70–99)
GLUCOSE BLDC GLUCOMTR-MCNC: 52 MG/DL — LOW (ref 70–99)
GLUCOSE BLDC GLUCOMTR-MCNC: 74 MG/DL — SIGNIFICANT CHANGE UP (ref 70–99)
GLUCOSE SERPL-MCNC: 73 MG/DL — SIGNIFICANT CHANGE UP (ref 70–99)
HCT VFR BLD CALC: 27.8 % — LOW (ref 34.5–45)
HGB BLD-MCNC: 8.8 G/DL — LOW (ref 11.5–15.5)
INR BLD: 1.1 — SIGNIFICANT CHANGE UP (ref 0.88–1.17)
MAGNESIUM SERPL-MCNC: 2.1 MG/DL — SIGNIFICANT CHANGE UP (ref 1.6–2.6)
MCHC RBC-ENTMCNC: 26 PG — LOW (ref 27–34)
MCHC RBC-ENTMCNC: 31.7 % — LOW (ref 32–36)
MCV RBC AUTO: 82 FL — SIGNIFICANT CHANGE UP (ref 80–100)
NRBC # FLD: 0 — SIGNIFICANT CHANGE UP
PHOSPHATE SERPL-MCNC: 2.5 MG/DL — SIGNIFICANT CHANGE UP (ref 2.5–4.5)
PLATELET # BLD AUTO: 324 K/UL — SIGNIFICANT CHANGE UP (ref 150–400)
PMV BLD: 10.2 FL — SIGNIFICANT CHANGE UP (ref 7–13)
POTASSIUM SERPL-MCNC: 3.6 MMOL/L — SIGNIFICANT CHANGE UP (ref 3.5–5.3)
POTASSIUM SERPL-SCNC: 3.6 MMOL/L — SIGNIFICANT CHANGE UP (ref 3.5–5.3)
PROTHROM AB SERPL-ACNC: 12.7 SEC — SIGNIFICANT CHANGE UP (ref 9.8–13.1)
RBC # BLD: 3.39 M/UL — LOW (ref 3.8–5.2)
RBC # FLD: 15.4 % — HIGH (ref 10.3–14.5)
RH IG SCN BLD-IMP: POSITIVE — SIGNIFICANT CHANGE UP
SODIUM SERPL-SCNC: 136 MMOL/L — SIGNIFICANT CHANGE UP (ref 135–145)
WBC # BLD: 8.1 K/UL — SIGNIFICANT CHANGE UP (ref 3.8–10.5)
WBC # FLD AUTO: 8.1 K/UL — SIGNIFICANT CHANGE UP (ref 3.8–10.5)

## 2018-05-05 PROCEDURE — 99233 SBSQ HOSP IP/OBS HIGH 50: CPT

## 2018-05-05 PROCEDURE — 99232 SBSQ HOSP IP/OBS MODERATE 35: CPT

## 2018-05-05 RX ORDER — HEPARIN SODIUM 5000 [USP'U]/ML
5000 INJECTION INTRAVENOUS; SUBCUTANEOUS EVERY 12 HOURS
Qty: 0 | Refills: 0 | Status: DISCONTINUED | OUTPATIENT
Start: 2018-05-05 | End: 2018-05-14

## 2018-05-05 RX ORDER — METOPROLOL TARTRATE 50 MG
12.5 TABLET ORAL EVERY 12 HOURS
Qty: 0 | Refills: 0 | Status: DISCONTINUED | OUTPATIENT
Start: 2018-05-05 | End: 2018-05-06

## 2018-05-05 RX ORDER — INSULIN GLARGINE 100 [IU]/ML
8 INJECTION, SOLUTION SUBCUTANEOUS AT BEDTIME
Qty: 0 | Refills: 0 | Status: DISCONTINUED | OUTPATIENT
Start: 2018-05-05 | End: 2018-05-05

## 2018-05-05 RX ORDER — POTASSIUM CHLORIDE 20 MEQ
40 PACKET (EA) ORAL ONCE
Qty: 0 | Refills: 0 | Status: DISCONTINUED | OUTPATIENT
Start: 2018-05-05 | End: 2018-05-05

## 2018-05-05 RX ORDER — POTASSIUM CHLORIDE 20 MEQ
40 PACKET (EA) ORAL ONCE
Qty: 0 | Refills: 0 | Status: COMPLETED | OUTPATIENT
Start: 2018-05-05 | End: 2018-05-05

## 2018-05-05 RX ORDER — INSULIN GLARGINE 100 [IU]/ML
5 INJECTION, SOLUTION SUBCUTANEOUS AT BEDTIME
Qty: 0 | Refills: 0 | Status: DISCONTINUED | OUTPATIENT
Start: 2018-05-05 | End: 2018-05-14

## 2018-05-05 RX ADMIN — Medication 81 MILLIGRAM(S): at 12:19

## 2018-05-05 RX ADMIN — PIPERACILLIN AND TAZOBACTAM 25 GRAM(S): 4; .5 INJECTION, POWDER, LYOPHILIZED, FOR SOLUTION INTRAVENOUS at 14:10

## 2018-05-05 RX ADMIN — HEPARIN SODIUM 5000 UNIT(S): 5000 INJECTION INTRAVENOUS; SUBCUTANEOUS at 06:42

## 2018-05-05 RX ADMIN — PIPERACILLIN AND TAZOBACTAM 25 GRAM(S): 4; .5 INJECTION, POWDER, LYOPHILIZED, FOR SOLUTION INTRAVENOUS at 06:43

## 2018-05-05 RX ADMIN — PIPERACILLIN AND TAZOBACTAM 25 GRAM(S): 4; .5 INJECTION, POWDER, LYOPHILIZED, FOR SOLUTION INTRAVENOUS at 22:38

## 2018-05-05 RX ADMIN — Medication 12.5 MILLIGRAM(S): at 18:59

## 2018-05-05 RX ADMIN — ATORVASTATIN CALCIUM 40 MILLIGRAM(S): 80 TABLET, FILM COATED ORAL at 22:38

## 2018-05-05 RX ADMIN — HEPARIN SODIUM 5000 UNIT(S): 5000 INJECTION INTRAVENOUS; SUBCUTANEOUS at 19:01

## 2018-05-05 RX ADMIN — LIDOCAINE 2 PATCH: 4 CREAM TOPICAL at 01:23

## 2018-05-05 RX ADMIN — Medication 100 MILLIGRAM(S): at 23:17

## 2018-05-05 RX ADMIN — CHLORHEXIDINE GLUCONATE 1 APPLICATION(S): 213 SOLUTION TOPICAL at 12:19

## 2018-05-05 RX ADMIN — Medication 100 MILLIGRAM(S): at 01:48

## 2018-05-05 RX ADMIN — Medication 40 MILLIEQUIVALENT(S): at 06:42

## 2018-05-05 RX ADMIN — CLOPIDOGREL BISULFATE 75 MILLIGRAM(S): 75 TABLET, FILM COATED ORAL at 12:19

## 2018-05-05 NOTE — PROGRESS NOTE ADULT - PROBLEM SELECTOR PLAN 3
- s/p LHC from RFA access.  - LAD mid 80%, Cx mid and proximal 60%, OM 70-80%, and proximal RPL 90-95%.  - Potential high risk PCI vs. medical management, first stabilization of sepsis.  - s/p aspirin and plavix load, c/w maintenance. hold heparin gtt in the setting of worsening anemia. - s/p LHC from RFA access.  - LAD mid 80%, Cx mid and proximal 60%, OM 70-80%, and proximal RPL 90-95%.  - Potential high risk PCI vs. medical management, first stabilization of sepsis.  - s/p aspirin and plavix load, c/w maintenance. restart heparin gtt prior to planned intervention.

## 2018-05-05 NOTE — PROGRESS NOTE ADULT - SUBJECTIVE AND OBJECTIVE BOX
Chief Complaint: DM 2    History: Hypoglycemia noted this AM. PO intake reported as low.     MEDICATIONS  (STANDING):  aspirin  chewable 81 milliGRAM(s) Oral daily  atorvastatin 40 milliGRAM(s) Oral at bedtime  chlorhexidine 4% Liquid 1 Application(s) Topical <User Schedule>  clopidogrel Tablet 75 milliGRAM(s) Oral daily  dextrose 5%. 1000 milliLiter(s) (50 mL/Hr) IV Continuous <Continuous>  dextrose 50% Injectable 12.5 Gram(s) IV Push once  dextrose 50% Injectable 25 Gram(s) IV Push once  dextrose 50% Injectable 25 Gram(s) IV Push once  heparin  Injectable 5000 Unit(s) SubCutaneous every 12 hours  influenza   Vaccine 0.5 milliLiter(s) IntraMuscular once  insulin glargine Injectable (LANTUS) 10 Unit(s) SubCutaneous at bedtime  insulin lispro (HumaLOG) corrective regimen sliding scale   SubCutaneous three times a day before meals  insulin lispro (HumaLOG) corrective regimen sliding scale   SubCutaneous at bedtime  insulin lispro Injectable (HumaLOG) 3 Unit(s) SubCutaneous three times a day before meals  lidocaine   Patch 2 Patch Transdermal daily  metoprolol tartrate 12.5 milliGRAM(s) Oral every 12 hours  piperacillin/tazobactam IVPB. 3.375 Gram(s) IV Intermittent every 8 hours  vancomycin  IVPB 500 milliGRAM(s) IV Intermittent every 24 hours    MEDICATIONS  (PRN):  acetaminophen   Tablet 650 milliGRAM(s) Oral every 6 hours PRN For Temp greater than 38 C (100.4 F)  dextrose Gel 1 Dose(s) Oral once PRN Blood Glucose LESS THAN 70 milliGRAM(s)/deciliter  glucagon  Injectable 1 milliGRAM(s) IntraMuscular once PRN Glucose LESS THAN 70 milligrams/deciliter          No Known Allergies          Review of Systems:  Constitutional: No fever  Cardiovascular: No chest pain, palpitations  Respiratory: No SOB, no cough  GI: Decreased PO intake, No nausea, vomiting, abdominal pain        PHYSICAL EXAM:  VITALS: T(C): 36.7 (05-05-18 @ 08:00)  T(F): 98.1 (05-05-18 @ 08:00), Max: 99 (05-04-18 @ 16:00)  HR: 77 (05-05-18 @ 10:00) (62 - 87)  BP: 125/80 (05-05-18 @ 10:00) (98/48 - 128/64)  RR:  (14 - 29)  SpO2:  (91% - 98%)  Wt(kg): --  GENERAL: NAD, well-groomed  RESPIRATORY: Non labored  CARDIOVASCULAR: Regular rate and rhythm noted  SKIN: Dry, warm    POCT Blood Glucose.: 52 mg/dL (05-05-18 @ 08:47)  POCT Blood Glucose.: 121 mg/dL (05-04-18 @ 22:51)  POCT Blood Glucose.: 82 mg/dL (05-04-18 @ 17:28)  POCT Blood Glucose.: 87 mg/dL (05-04-18 @ 12:25)  POCT Blood Glucose.: 112 mg/dL (05-04-18 @ 08:43)  POCT Blood Glucose.: 130 mg/dL (05-03-18 @ 22:46)  POCT Blood Glucose.: 153 mg/dL (05-03-18 @ 18:02)  POCT Blood Glucose.: 143 mg/dL (05-03-18 @ 12:34)  POCT Blood Glucose.: 243 mg/dL (05-03-18 @ 08:12)  POCT Blood Glucose.: 197 mg/dL (05-02-18 @ 23:10)  POCT Blood Glucose.: 258 mg/dL (05-02-18 @ 17:47)  POCT Blood Glucose.: 237 mg/dL (05-02-18 @ 12:28)      05-05    136  |  104  |  21  ----------------------------<  73  3.6   |  23  |  0.92    EGFR if : 66  EGFR if non : 57    Ca    8.7      05-05  Mg     2.1     05-05  Phos  2.5     05-05    TPro  6.1  /  Alb  2.1<L>  /  TBili  0.3  /  DBili  x   /  AST  22  /  ALT  15  /  AlkPhos  53  05-04              Hemoglobin A1C, Whole Blood: 6.4 % <H> [4.0 - 5.6] (05-02-18 @ 05:54)    C peptide 1.0

## 2018-05-05 NOTE — DISCHARGE NOTE ADULT - HOSPITAL COURSE
84F with HTN, HLD, TIA/CVA, DM p/w AMS in setting of hypoglycemia and found to have EKG changes. Cardiac cath revealed multivessel disease. In CCU patient with fever and UTI, and also was hypotensive requiring vasopressin. Treated with antibiotics for UTI and is now stable off pressors awaiting likely high risk PCI vs. medical management. 84F with HTN, HLD, TIA/CVA, DM p/w AMS in setting of hypoglycemia and found to have EKG changes. Cardiac cath revealed multivessel disease. In CCU patient with fever and UTI, and also was hypotensive requiring vasopressin. Treated with antibiotics for UTI and is now stable off pressors now for medical management.  Pt. was seen by Infectious disease and was cleared for DC on 5/14/18. 84F with HTN, HLD, TIA/CVA, DM p/w AMS in setting of hypoglycemia and found to have EKG changes. Cardiac cath revealed multivessel disease. In CCU patient with fever and UTI, and also was hypotensive requiring vasopressin. Treated with antibiotics for UTI and is now stable off pressors now for medical management.  Pt. was seen by Infectious disease and was cleared for DC on 5/14/18.  D/W Cardiology.

## 2018-05-05 NOTE — PROGRESS NOTE ADULT - SUBJECTIVE AND OBJECTIVE BOX
HPI/INTERVAL HISTORY:  Patient seen and examined at bedside.    OBJECTIVE:  VITAL SIGNS:  ICU Vital Signs Last 24 Hrs  T(C): 36.7 (05 May 2018 04:00), Max: 37.2 (04 May 2018 16:00)  T(F): 98 (05 May 2018 04:00), Max: 99 (04 May 2018 16:00)  HR: 62 (05 May 2018 04:00) (62 - 90)  BP: 124/69 (05 May 2018 04:00) (98/48 - 128/64)  BP(mean): 77 (05 May 2018 04:00) (61 - 83)  ABP: --  ABP(mean): --  RR: 23 (05 May 2018 04:00) (14 - 29)  SpO2: 94% (05 May 2018 04:00) (92% - 98%)         @ 07:01  -  - @ 07:00  --------------------------------------------------------  IN: 1127.2 mL / OUT: 460 mL / NET: 667.2 mL      CAPILLARY BLOOD GLUCOSE      POCT Blood Glucose.: 121 mg/dL (04 May 2018 22:51)      PHYSICAL EXAM:  Gen:   HEENT: NC/AT; PERRL, anicteric sclera  Neck: supple, no JVD  Resp: clear to ausculation B/L; no wheezes, rales or rhonchi  Cardiovasc: S1S2 normal; RRR; no murmurs, rubs or gallops  GI: soft, nondistended, nontender; +BS  Extr: warm, well-perfused, PT/DP pulses 2+ B/L; no LE edema  Skin: normal color and turgor  Neuro:     LABS:                        8.8    8.10  )-----------( 324      ( 05 May 2018 04:05 )             27.8         136  |  104  |  21  ----------------------------<  73  3.6   |  23  |  0.92    Ca    8.7      05 May 2018 04:05  Phos  2.5     05-05  Mg     2.1     05-05    TPro  6.1  /  Alb  2.1<L>  /  TBili  0.3  /  DBili  x   /  AST  22  /  ALT  15  /  AlkPhos  53  05-04    LIVER FUNCTIONS - ( 04 May 2018 05:30 )  Alb: 2.1 g/dL / Pro: 6.1 g/dL / ALK PHOS: 53 u/L / ALT: 15 u/L / AST: 22 u/L / GGT: x           PT/INR - ( 05 May 2018 04:05 )   PT: 12.7 SEC;   INR: 1.10          PTT - ( 05 May 2018 04:05 )  PTT:28.0 SEC  Urinalysis Basic - ( 04 May 2018 11:00 )    Color: YELLOW / Appearance: TURBID / S.030 / pH: 6.0  Gluc: NEGATIVE / Ketone: NEGATIVE  / Bili: NEGATIVE / Urobili: NORMAL mg/dL   Blood: TRACE / Protein: 30 mg/dL / Nitrite: NEGATIVE   Leuk Esterase: LARGE / RBC: 25-50 / WBC 10-25   Sq Epi: x / Non Sq Epi: x / Bacteria: FEW            RADIOLOGY & ADDITIONAL TESTS: Reviewed.    acetaminophen   Tablet 650 milliGRAM(s) Oral every 6 hours PRN  aspirin  chewable 81 milliGRAM(s) Oral daily  atorvastatin 40 milliGRAM(s) Oral at bedtime  chlorhexidine 4% Liquid 1 Application(s) Topical <User Schedule>  clopidogrel Tablet 75 milliGRAM(s) Oral daily  dextrose 5%. 1000 milliLiter(s) IV Continuous <Continuous>  dextrose 50% Injectable 12.5 Gram(s) IV Push once  dextrose 50% Injectable 25 Gram(s) IV Push once  dextrose 50% Injectable 25 Gram(s) IV Push once  dextrose Gel 1 Dose(s) Oral once PRN  glucagon  Injectable 1 milliGRAM(s) IntraMuscular once PRN  heparin  Injectable 5000 Unit(s) SubCutaneous every 8 hours  influenza   Vaccine 0.5 milliLiter(s) IntraMuscular once  insulin glargine Injectable (LANTUS) 10 Unit(s) SubCutaneous at bedtime  insulin lispro (HumaLOG) corrective regimen sliding scale   SubCutaneous three times a day before meals  insulin lispro (HumaLOG) corrective regimen sliding scale   SubCutaneous at bedtime  insulin lispro Injectable (HumaLOG) 3 Unit(s) SubCutaneous three times a day before meals  lidocaine   Patch 2 Patch Transdermal daily  piperacillin/tazobactam IVPB. 3.375 Gram(s) IV Intermittent every 8 hours  vancomycin  IVPB 500 milliGRAM(s) IV Intermittent every 24 hours      No Known Allergies HPI/INTERVAL HISTORY:  Patient seen and examined at bedside.    No complaints this AM. Patient AOx1-2 apparent baseline per family. No fever in the last 24 hours. No recurrence of hypotension. Currently NSR in the 70-80s.     Denies chest pain, SOB, chest pressure, abdominal pain, pain around sanchez, diarrhea.    OBJECTIVE:  VITAL SIGNS:  ICU Vital Signs Last 24 Hrs  T(C): 36.7 (05 May 2018 04:00), Max: 37.2 (04 May 2018 16:00)  T(F): 98 (05 May 2018 04:00), Max: 99 (04 May 2018 16:00)  HR: 62 (05 May 2018 04:00) (62 - 90)  BP: 124/69 (05 May 2018 04:00) (98/48 - 128/64)  BP(mean): 77 (05 May 2018 04:00) (61 - 83)  ABP: --  ABP(mean): --  RR: 23 (05 May 2018 04:00) (14 - 29)  SpO2: 94% (05 May 2018 04:00) (92% - 98%)         @ 07:01  -  05-05 @ 07:00  --------------------------------------------------------  IN: 1127.2 mL / OUT: 460 mL / NET: 667.2 mL      CAPILLARY BLOOD GLUCOSE      POCT Blood Glucose.: 121 mg/dL (04 May 2018 22:51)      PHYSICAL EXAM:  Sanchez in place.  Gen: NAD, sitting up at bedside eating.  HEENT: NC/AT; PERRL, anicteric sclera  Neck: supple, flat neck veins.  Resp: clear to ausculation B/L; no wheezes, rales or rhonchi  Cardiovasc: S1S2 normal; RRR; no murmurs, rubs or gallops  GI: soft, nondistended, nontender; +BS  Extr: warm, well-perfused, PT/DP pulses 2+ B/L; no LE edema  Skin: normal color and turgor  Neuro: AOx1-2.    LABS:                        8.8    8.10  )-----------( 324      ( 05 May 2018 04:05 )             27.8     05-05    136  |  104  |  21  ----------------------------<  73  3.6   |  23  |  0.92    Ca    8.7      05 May 2018 04:05  Phos  2.5     05-05  Mg     2.1     05-05    TPro  6.1  /  Alb  2.1<L>  /  TBili  0.3  /  DBili  x   /  AST  22  /  ALT  15  /  AlkPhos  53  05-04    LIVER FUNCTIONS - ( 04 May 2018 05:30 )  Alb: 2.1 g/dL / Pro: 6.1 g/dL / ALK PHOS: 53 u/L / ALT: 15 u/L / AST: 22 u/L / GGT: x           PT/INR - ( 05 May 2018 04:05 )   PT: 12.7 SEC;   INR: 1.10          PTT - ( 05 May 2018 04:05 )  PTT:28.0 SEC  Urinalysis Basic - ( 04 May 2018 11:00 )    Color: YELLOW / Appearance: TURBID / S.030 / pH: 6.0  Gluc: NEGATIVE / Ketone: NEGATIVE  / Bili: NEGATIVE / Urobili: NORMAL mg/dL   Blood: TRACE / Protein: 30 mg/dL / Nitrite: NEGATIVE   Leuk Esterase: LARGE / RBC: 25-50 / WBC 10-25   Sq Epi: x / Non Sq Epi: x / Bacteria: FEW            RADIOLOGY & ADDITIONAL TESTS: Reviewed.    acetaminophen   Tablet 650 milliGRAM(s) Oral every 6 hours PRN  aspirin  chewable 81 milliGRAM(s) Oral daily  atorvastatin 40 milliGRAM(s) Oral at bedtime  chlorhexidine 4% Liquid 1 Application(s) Topical <User Schedule>  clopidogrel Tablet 75 milliGRAM(s) Oral daily  dextrose 5%. 1000 milliLiter(s) IV Continuous <Continuous>  dextrose 50% Injectable 12.5 Gram(s) IV Push once  dextrose 50% Injectable 25 Gram(s) IV Push once  dextrose 50% Injectable 25 Gram(s) IV Push once  dextrose Gel 1 Dose(s) Oral once PRN  glucagon  Injectable 1 milliGRAM(s) IntraMuscular once PRN  heparin  Injectable 5000 Unit(s) SubCutaneous every 8 hours  influenza   Vaccine 0.5 milliLiter(s) IntraMuscular once  insulin glargine Injectable (LANTUS) 10 Unit(s) SubCutaneous at bedtime  insulin lispro (HumaLOG) corrective regimen sliding scale   SubCutaneous three times a day before meals  insulin lispro (HumaLOG) corrective regimen sliding scale   SubCutaneous at bedtime  insulin lispro Injectable (HumaLOG) 3 Unit(s) SubCutaneous three times a day before meals  lidocaine   Patch 2 Patch Transdermal daily  piperacillin/tazobactam IVPB. 3.375 Gram(s) IV Intermittent every 8 hours  vancomycin  IVPB 500 milliGRAM(s) IV Intermittent every 24 hours      No Known Allergies

## 2018-05-05 NOTE — DISCHARGE NOTE ADULT - CARE PROVIDER_API CALL
Angela Germain), Cardiovascular Disease  [facility]  [address] [place] [address] [address] [place] [id number]  Phone: [phone]  Fax: [phone]

## 2018-05-05 NOTE — DISCHARGE NOTE ADULT - PATIENT PORTAL LINK FT
You can access the [facility] Patient Portal, offered by [facility], by registering with the following website: [email or web address]

## 2018-05-05 NOTE — DISCHARGE NOTE ADULT - DURABLE MEDICAL EQUIPMENT AGENCY
[facility] [phone] requested hospital bed with gel overlay, they are working on auth.

## 2018-05-05 NOTE — DISCHARGE NOTE ADULT - MEDICATION SUMMARY - MEDICATIONS TO STOP TAKING
I will STOP taking the medications listed below when I get home from the hospital:    enalapril 10 mg oral tablet  -- 1 tab(s) by mouth once a day    HumaLOG Mix 75/25 KwikPen subcutaneous suspension  -- 22 unit(s) subcutaneous once a day with breakfast and 10 unts with dinner    pravastatin 10 mg oral tablet  -- 1 tab(s) by mouth once a day    amLODIPine 10 mg oral tablet  -- 1 tab(s) by mouth once a day    tramadol-acetaminophen 37.5 mg-325 mg oral tablet  -- 1 tab(s) by mouth once a day, As Needed    ISTOP reference#[id number]  Quantity: 30  Day Supply: 30  Dispensed: 3/22/2018 I will STOP taking the medications listed below when I get home from the hospital:    enalapril 10 mg oral tablet  -- 1 tab(s) by mouth once a day    HumaLOG Mix 75/25 KwikPen subcutaneous suspension  -- 22 unit(s) subcutaneous once a day with breakfast and 10 unts with dinner    pravastatin 10 mg oral tablet  -- 1 tab(s) by mouth once a day    amLODIPine 10 mg oral tablet  -- 1 tab(s) by mouth once a day    tramadol-acetaminophen 37.5 mg-325 mg oral tablet  -- 1 tab(s) by mouth once a day, As Needed    ISTOP reference#[id number]  Quantity: 30  Day Supply: 30  Dispensed: 3/22/2018

## 2018-05-05 NOTE — PROGRESS NOTE ADULT - PROBLEM SELECTOR PLAN 5
- relative hypotension. hold nitro drip and metoprolol while on pressors and currently rate controlled. - can restart metoprolol 12.5 bid for rate control in ACS. Improved BP.

## 2018-05-05 NOTE — DISCHARGE NOTE ADULT - SECONDARY DIAGNOSIS.
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

## 2018-05-05 NOTE — DISCHARGE NOTE ADULT - MEDICATION SUMMARY - MEDICATIONS TO TAKE
I will START or STAY ON the medications listed below when I get home from the hospital:    pen needles  -- Use as directed at bedtime  -- Indication: For DM    aspirin 81 mg oral tablet, chewable  -- 1 tab(s) by mouth once a day  -- Indication: For CAD    Tylenol 500 mg oral tablet  -- 2 tab(s) by mouth every 8 hours, As Needed  -- Indication: For Pain    lisinopril 10 mg oral tablet  -- 1 tab(s) by mouth once a day  -- Indication: For Essential hypertension    Lantus Solostar Pen 100 units/mL subcutaneous solution  -- 5 unit(s) subcutaneous once a day (at bedtime)   -- Do not drink alcoholic beverages when taking this medication.  It is very important that you take or use this exactly as directed.  Do not skip doses or discontinue unless directed by your doctor.  Keep in refrigerator.  Do not freeze.    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, with long-term current use of insulin, macular edema presence unspecified, unspecified laterality, unspecified retinopathy severity    atorvastatin 40 mg oral tablet  -- 1 tab(s) by mouth once a day (at bedtime)  -- Indication: For Dyslipidemia    clopidogrel 75 mg oral tablet  -- 1 tab(s) by mouth once a day  -- Indication: For CAD    metoprolol tartrate 25 mg oral tablet  -- 1 tab(s) by mouth 2 times a day  -- Indication: For NSTEMI (non-ST elevated myocardial infarction)    lidocaine 5% topical film  -- Apply on skin to affected area once a day   -- Indication: For Pain    furosemide 20 mg oral tablet  -- 1 tab(s) by mouth once a day  -- Indication: For CHF I will START or STAY ON the medications listed below when I get home from the hospital:    pen needles  -- Use as directed at bedtime  -- Indication: For DM    aspirin 81 mg oral tablet, chewable  -- 1 tab(s) by mouth once a day  -- Indication: For CAD    Tylenol 500 mg oral tablet  -- 2 tab(s) by mouth every 8 hours, As Needed  -- Indication: For Pain    lisinopril 10 mg oral tablet  -- 1 tab(s) by mouth once a day  -- Indication: For HTN    Lantus Solostar Pen 100 units/mL subcutaneous solution  -- 5 unit(s) subcutaneous once a day (at bedtime)   -- Do not drink alcoholic beverages when taking this medication.  It is very important that you take or use this exactly as directed.  Do not skip doses or discontinue unless directed by your doctor.  Keep in refrigerator.  Do not freeze.    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, with long-term current use of insulin, macular edema presence unspecified, unspecified laterality, unspecified retinopathy severity    atorvastatin 40 mg oral tablet  -- 1 tab(s) by mouth once a day (at bedtime)  -- Indication: For Dyslipidemia    clopidogrel 75 mg oral tablet  -- 1 tab(s) by mouth once a day  -- Indication: For CAD    metoprolol tartrate 25 mg oral tablet  -- 1 tab(s) by mouth 2 times a day  -- Indication: For NSTEMI (non-ST elevated myocardial infarction)    lidocaine 5% topical film  -- Apply on skin to affected area once a day   -- Indication: For Pain    furosemide 20 mg oral tablet  -- 1 tab(s) by mouth once a day  -- Indication: For CHF

## 2018-05-05 NOTE — DISCHARGE NOTE ADULT - PLAN OF CARE
To be asymptomatic, to reduce risks factors such as hypertension, diabetes and hyperlipidemia to lower the risk of blood clots formation; and to prevent complications of coronary artery disease such as worsening chest pain, heart attack and death. Continue aspirin and Plavix, do not stop unless instructed by your physician.  Continue low salt, fat, cholesterol and carbohydrate diet. Follow up with cardiologist and primary care physician's routine appointment. To maintain normal cholesterol levels to prevent stroke, coronary artery disease, peripheral vascular disease and heart attacks. Low fat diet, exercise daily and continue current medications. Follow up with primary care physician and cardiologist for management. To maintain a normal blood pressure to prevent heart attack, stroke and renal failure. Low sodium and fat diet, continue anti-hypertensive medications, and follow up with primary care physician. To maintain a normal blood glucose level and HgA1C level < 5.7 and to prevent diabetic complications such as uncontrolled diabetes, diabetic coma, blindness, renal failure, and amputations. Monitor finger sticks pre-meal and bedtime, low salt, fat and carbohydrate diet, minimize glucose intake.  Exercise daily for at least 30 minutes and weight loss.  Follow up with primary care physician and endocrinologist for routine Hemoglobin A1C checks and management.  Follow up with your ophthalmologist for routine yearly vision exams.

## 2018-05-05 NOTE — DISCHARGE NOTE ADULT - NS AS ACTIVITY OBS
Walking-Outdoors allowed/Stairs allowed/No Heavy lifting/straining/Showering allowed/Walking-Indoors allowed/Bathing allowed

## 2018-05-05 NOTE — DISCHARGE NOTE ADULT - ADDITIONAL INSTRUCTIONS
-Will follow up with Dr. Benavides as an outpatient (Will need to reschedule her appointment, Dr. Benavides is aware of her hospital admission- office phone number is [phone]

## 2018-05-05 NOTE — PROGRESS NOTE ADULT - PROBLEM SELECTOR PLAN 1
Hypotensive 5/3 PM restarted on vasopressin. Responded to fluids (additional 1 L)  lactate improved from 3.1 to 1.3.  c/w vancomycin and zosyn dosed renally. f/u vancomycin trough tonight.  f/u blood cultures 5/3. RVP negative. Cdiff negative.  patient with recurrent UTI and cellulitis in the past. No history of resistant orgs. Last abx 6 months ago for UTI.  Consider CT chest, CT abdomen pelvis with IV contrast to r/o occult source. 500 cc isotonic bolus after CT. Potential ID consult, if fever curve not improving, persistent hypotension. - Improving fever curve, no hypotension, no AMS.  Hypotensive 5/3 PM restarted on vasopressin. Responded to fluids (additional 1 L)  lactate improved from 3.1 to 1.3.  c/w vancomycin and zosyn dosed renally. f/u vancomycin trough tonight.  f/u blood cultures 5/3. RVP negative. Cdiff negative.  patient with recurrent UTI and cellulitis in the past. No history of resistant orgs. Last abx 6 months ago for UTI.  Consider CT chest, CT abdomen pelvis with IV contrast to r/o occult source. 500 cc isotonic bolus after CT. Potential ID consult, if fever curve not improving, persistent hypotension.

## 2018-05-05 NOTE — DISCHARGE NOTE ADULT - CARE PLAN
Principal Discharge DX:	NSTEMI (non-ST elevated myocardial infarction)  Goal:	To be asymptomatic, to reduce risks factors such as hypertension, diabetes and hyperlipidemia to lower the risk of blood clots formation; and to prevent complications of coronary artery disease such as worsening chest pain, heart attack and death.  Assessment and plan of treatment:	Continue aspirin and Plavix, do not stop unless instructed by your physician.  Continue low salt, fat, cholesterol and carbohydrate diet. Follow up with cardiologist and primary care physician's routine appointment.  Secondary Diagnosis:	Dyslipidemia  Goal:	To maintain normal cholesterol levels to prevent stroke, coronary artery disease, peripheral vascular disease and heart attacks.  Assessment and plan of treatment:	Low fat diet, exercise daily and continue current medications. Follow up with primary care physician and cardiologist for management.  Secondary Diagnosis:	Essential hypertension  Goal:	To maintain a normal blood pressure to prevent heart attack, stroke and renal failure.  Assessment and plan of treatment:	Low sodium and fat diet, continue anti-hypertensive medications, and follow up with primary care physician.  Secondary Diagnosis:	Triple vessel coronary artery disease  Goal:	To be asymptomatic, to reduce risks factors such as hypertension, diabetes and hyperlipidemia to lower the risk of blood clots formation; and to prevent complications of coronary artery disease such as worsening chest pain, heart attack and death.  Assessment and plan of treatment:	Continue aspirin and Plavix, do not stop unless instructed by your physician.  Continue low salt, fat, cholesterol and carbohydrate diet. Follow up with cardiologist and primary care physician's routine appointment.  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  Assessment and plan of treatment:	Monitor finger sticks pre-meal and bedtime, low salt, fat and carbohydrate diet, minimize glucose intake.  Exercise daily for at least 30 minutes and weight loss.  Follow up with primary care physician and endocrinologist for routine Hemoglobin A1C checks and management.  Follow up with your ophthalmologist for routine yearly vision exams.

## 2018-05-05 NOTE — PROGRESS NOTE ADULT - PROBLEM SELECTOR PLAN 2
- ferritin elevated TIBC low. Likely mixed picture (ACD vs. ALBINA). No current source of bleeding, normocytic. Worsening in the setting of acute sepsis.  - Transfusion to goal 8.0 as patient with ACS.

## 2018-05-05 NOTE — PROGRESS NOTE ADULT - PROBLEM SELECTOR PLAN 6
DVT: heparin sq    Diet: DASH consistent carbs.    Dispo: recurrent hypotension despite broaden abx, no clear source of sepsis. consider consult ID, obtain CT c/a/p     Savage Angulo MD  [facility] Internal Medicine   Pager # [phone]/ [phone] DVT: heparin sq    Diet: DASH consistent carbs.    Dispo: Stable on broad spectrum abx. Consider removal of sanchez tomorrow.    Savage Angulo MD  [facility] Internal Medicine   Pager # [phone]/ [phone]

## 2018-05-05 NOTE — PROGRESS NOTE ADULT - PROBLEM SELECTOR PLAN 1
target bg 100-200 mg/dl  Given pateint age, no need for tight control, a1c goal is 7-8%.  Hypoglycemia noted from this AM  Please discontinue pre-meal Humalog   Please decreased Lantus to 8 units QHS  Can c/w low correctional scales as ordered    C-peptide is 1.0, lowest end of normal. DC plan depends on glucose trend, would adust insulin as above and see trend. Patient may only need basal insulin (one injection daily) Will follow and discuss with her private endocrinologist, Dr Benavides, prior to DC. target bg 100-200 mg/dl  Given patient age, no need for tight control, a1c goal is 7-8%.  Hypoglycemia noted from this AM  Please discontinue pre-meal Humalog   Please decreased Lantus to 8 units QHS  Can c/w low correctional scales as ordered    C-peptide is 1.0, lowest end of normal. DC plan depends on glucose trend, would adust insulin as above and see trend. Patient may only need basal insulin (one injection daily) Will follow and discuss with her private endocrinologist, Dr Benavides, prior to DC. target bg 100-200 mg/dl  Given patient age, no need for tight control, a1c goal is 7-8%.  Hypoglycemia noted from this AM- please treat and manage hypoglycemia as per protocol, no repeat FS is noted. FS should be repeated every 15 minutes until >100 s/p hypoglycemic events  Please discontinue pre-meal Humalog   Please decreased Lantus to 8 units QHS  Can c/w low correctional scales as ordered    C-peptide is 1.0, lowest end of normal. DC plan depends on glucose trend, would adust insulin as above and see trend. Patient may only need basal insulin (one injection daily) Will follow and discuss with her private endocrinologist, Dr Benavides, prior to DC. target bg 100-200 mg/dl  Given patient age, no need for tight control, a1c goal is 7-8%.  Hypoglycemia noted from this AM- please treat and manage hypoglycemia as per protocol, no repeat FS is noted. FS should be repeated every 15 minutes until >100 s/p hypoglycemic events  Please discontinue pre-meal Humalog   Please decreased Lantus to 5 units QHS  Can c/w low correctional scales as ordered    C-peptide is 1.0, lowest end of normal. DC plan depends on glucose trend, would adust insulin as above and see trend. Patient may only need basal insulin (one injection daily) Will follow and discuss with her private endocrinologist, Dr Benavides, prior to DC. target bg 100-200 mg/dl  Given patient age, no need for tight control, a1c goal is 7-8%.  Hypoglycemia noted from this AM- please treat and manage hypoglycemia as per protocol, no repeat FS is noted. FS should be repeated every 15 minutes until >100 s/p hypoglycemic events  Please discontinue pre-meal Humalog   Please decreased Lantus to 5 units QHS (with hold parameters)  Can c/w low correctional scales as ordered    C-peptide is 1.0, lowest end of normal. DC plan depends on glucose trend, would adust insulin as above and see trend. Patient may only need basal insulin (one injection daily) Will follow and discuss with her private endocrinologist, Dr Benavides, prior to DC.

## 2018-05-05 NOTE — DISCHARGE NOTE ADULT - OTHER SIGNIFICANT FINDINGS
5/1/18 Cardiac cath - LAD 80%, LCx 60%, pOM1 70-80%, pRPL 90-95%, EDP 22. RFA access    5/1 Echo: 1. Endocardial visualization enhanced with intravenous injection of echo contrast (Definity). Mild segmental left ventricular systolic dysfunction. The apex, distal  anterior, distal anterolateral and distal septal walls are hypokinetic.  2. Normal right ventricular size and function.    5/8 CXR: IMPRESSION:  New bilateral focal large opacities in the midlungs raising the possibility of loculated pleural effusions in the fissures although atelectasis or multifocal pneumonia is not excluded. CT scan of the chest could be performed for further characterization if felt to be clinically indicated. New small loculated left pleural effusion along the lateral left  hemithorax.  5/8 UA NEG  5/10- Procalcitonin- 28.93    5/13: Echo: EF 60%1. Normal left ventricular systolic function. No segmental wall motion abnormalities.  Mild diastolic dysfunction (Stage I).  Normal right ventricular size and function. Thickened pericardium with trace pericardial effusion.

## 2018-05-06 LAB
ALBUMIN SERPL ELPH-MCNC: 2 G/DL — LOW (ref 3.3–5)
ALP SERPL-CCNC: 56 U/L — SIGNIFICANT CHANGE UP (ref 40–120)
ALT FLD-CCNC: 16 U/L — SIGNIFICANT CHANGE UP (ref 4–33)
AST SERPL-CCNC: 20 U/L — SIGNIFICANT CHANGE UP (ref 4–32)
BILIRUB SERPL-MCNC: 0.4 MG/DL — SIGNIFICANT CHANGE UP (ref 0.2–1.2)
BUN SERPL-MCNC: 14 MG/DL — SIGNIFICANT CHANGE UP (ref 7–23)
CALCIUM SERPL-MCNC: 8.7 MG/DL — SIGNIFICANT CHANGE UP (ref 8.4–10.5)
CHLORIDE SERPL-SCNC: 106 MMOL/L — SIGNIFICANT CHANGE UP (ref 98–107)
CO2 SERPL-SCNC: 23 MMOL/L — SIGNIFICANT CHANGE UP (ref 22–31)
CREAT SERPL-MCNC: 0.87 MG/DL — SIGNIFICANT CHANGE UP (ref 0.5–1.3)
GLUCOSE BLDC GLUCOMTR-MCNC: 127 MG/DL — HIGH (ref 70–99)
GLUCOSE BLDC GLUCOMTR-MCNC: 138 MG/DL — HIGH (ref 70–99)
GLUCOSE BLDC GLUCOMTR-MCNC: 173 MG/DL — HIGH (ref 70–99)
GLUCOSE BLDC GLUCOMTR-MCNC: 173 MG/DL — HIGH (ref 70–99)
GLUCOSE SERPL-MCNC: 109 MG/DL — HIGH (ref 70–99)
HCT VFR BLD CALC: 33 % — LOW (ref 34.5–45)
HGB BLD-MCNC: 10.3 G/DL — LOW (ref 11.5–15.5)
MAGNESIUM SERPL-MCNC: 2.2 MG/DL — SIGNIFICANT CHANGE UP (ref 1.6–2.6)
MCHC RBC-ENTMCNC: 25.9 PG — LOW (ref 27–34)
MCHC RBC-ENTMCNC: 31.2 % — LOW (ref 32–36)
MCV RBC AUTO: 83.1 FL — SIGNIFICANT CHANGE UP (ref 80–100)
NRBC # FLD: 0 — SIGNIFICANT CHANGE UP
PHOSPHATE SERPL-MCNC: 3.1 MG/DL — SIGNIFICANT CHANGE UP (ref 2.5–4.5)
PLATELET # BLD AUTO: 349 K/UL — SIGNIFICANT CHANGE UP (ref 150–400)
PMV BLD: 10.3 FL — SIGNIFICANT CHANGE UP (ref 7–13)
POTASSIUM SERPL-MCNC: 4.3 MMOL/L — SIGNIFICANT CHANGE UP (ref 3.5–5.3)
POTASSIUM SERPL-SCNC: 4.3 MMOL/L — SIGNIFICANT CHANGE UP (ref 3.5–5.3)
PROT SERPL-MCNC: 6.1 G/DL — SIGNIFICANT CHANGE UP (ref 6–8.3)
RBC # BLD: 3.97 M/UL — SIGNIFICANT CHANGE UP (ref 3.8–5.2)
RBC # FLD: 15.9 % — HIGH (ref 10.3–14.5)
SODIUM SERPL-SCNC: 138 MMOL/L — SIGNIFICANT CHANGE UP (ref 135–145)
WBC # BLD: 5.14 K/UL — SIGNIFICANT CHANGE UP (ref 3.8–10.5)
WBC # FLD AUTO: 5.14 K/UL — SIGNIFICANT CHANGE UP (ref 3.8–10.5)

## 2018-05-06 PROCEDURE — 99233 SBSQ HOSP IP/OBS HIGH 50: CPT

## 2018-05-06 RX ORDER — METOPROLOL TARTRATE 50 MG
12.5 TABLET ORAL EVERY 12 HOURS
Qty: 0 | Refills: 0 | Status: DISCONTINUED | OUTPATIENT
Start: 2018-05-06 | End: 2018-05-07

## 2018-05-06 RX ADMIN — Medication 12.5 MILLIGRAM(S): at 06:43

## 2018-05-06 RX ADMIN — PIPERACILLIN AND TAZOBACTAM 25 GRAM(S): 4; .5 INJECTION, POWDER, LYOPHILIZED, FOR SOLUTION INTRAVENOUS at 06:44

## 2018-05-06 RX ADMIN — INSULIN GLARGINE 5 UNIT(S): 100 INJECTION, SOLUTION SUBCUTANEOUS at 23:07

## 2018-05-06 RX ADMIN — ATORVASTATIN CALCIUM 40 MILLIGRAM(S): 80 TABLET, FILM COATED ORAL at 21:39

## 2018-05-06 RX ADMIN — Medication 1: at 13:00

## 2018-05-06 RX ADMIN — PIPERACILLIN AND TAZOBACTAM 25 GRAM(S): 4; .5 INJECTION, POWDER, LYOPHILIZED, FOR SOLUTION INTRAVENOUS at 14:11

## 2018-05-06 RX ADMIN — Medication 81 MILLIGRAM(S): at 12:09

## 2018-05-06 RX ADMIN — PIPERACILLIN AND TAZOBACTAM 25 GRAM(S): 4; .5 INJECTION, POWDER, LYOPHILIZED, FOR SOLUTION INTRAVENOUS at 21:38

## 2018-05-06 RX ADMIN — CLOPIDOGREL BISULFATE 75 MILLIGRAM(S): 75 TABLET, FILM COATED ORAL at 12:09

## 2018-05-06 RX ADMIN — HEPARIN SODIUM 5000 UNIT(S): 5000 INJECTION INTRAVENOUS; SUBCUTANEOUS at 18:11

## 2018-05-06 RX ADMIN — CHLORHEXIDINE GLUCONATE 1 APPLICATION(S): 213 SOLUTION TOPICAL at 12:09

## 2018-05-06 RX ADMIN — Medication 12.5 MILLIGRAM(S): at 18:11

## 2018-05-06 RX ADMIN — HEPARIN SODIUM 5000 UNIT(S): 5000 INJECTION INTRAVENOUS; SUBCUTANEOUS at 06:43

## 2018-05-06 NOTE — PROGRESS NOTE ADULT - PROBLEM SELECTOR PLAN 1
- Improving fever curve, no hypotension, no AMS.  Hypotensive 5/3 PM restarted on vasopressin. Responded to fluids (additional 1 L)  lactate improved from 3.1 to 1.3.  c/w vancomycin and zosyn dosed renally. f/u vancomycin trough   f/u blood cultures 5/3. RVP negative. Cdiff negative.  patient with recurrent UTI and cellulitis in the past. No history of resistant orgs. Last abx 6 months ago for UTI.

## 2018-05-06 NOTE — PROGRESS NOTE ADULT - SUBJECTIVE AND OBJECTIVE BOX
Tele: NSR     Overnight: No complaints     MEDICATIONS  (STANDING):  aspirin  chewable 81 milliGRAM(s) Oral daily  atorvastatin 40 milliGRAM(s) Oral at bedtime  clopidogrel Tablet 75 milliGRAM(s) Oral daily  heparin  Injectable 5000 Unit(s) SubCutaneous every 12 hours  influenza   Vaccine 0.5 milliLiter(s) IntraMuscular once  insulin glargine Injectable (LANTUS) 5 Unit(s) SubCutaneous at bedtime  insulin lispro (HumaLOG) corrective regimen sliding scale   SubCutaneous three times a day before meals  insulin lispro (HumaLOG) corrective regimen sliding scale   SubCutaneous at bedtime  lidocaine   Patch 2 Patch Transdermal daily  metoprolol tartrate 12.5 milliGRAM(s) Oral every 12 hours  piperacillin/tazobactam IVPB. 3.375 Gram(s) IV Intermittent every 8 hours  vancomycin  IVPB 500 milliGRAM(s) IV Intermittent every 24 hours    MEDICATIONS  (PRN):  acetaminophen   Tablet 650 milliGRAM(s) Oral every 6 hours PRN For Temp greater than 38 C (100.4 F)  dextrose Gel 1 Dose(s) Oral once PRN Blood Glucose LESS THAN 70 milliGRAM(s)/deciliter  glucagon  Injectable 1 milliGRAM(s) IntraMuscular once PRN Glucose LESS THAN 70 milligrams/deciliter          Vital Signs Last 24 Hrs  T(C): 36.8 (06 May 2018 04:00), Max: 36.8 (05 May 2018 16:00)  T(F): 98.2 (06 May 2018 04:00), Max: 98.2 (05 May 2018 16:00)  HR: 69 (06 May 2018 06:00) (63 - 81)  BP: 133/56 (06 May 2018 06:00) (108/54 - 138/69)  BP(mean): 76 (06 May 2018 06:00) (64 - 91)  RR: 22 (06 May 2018 06:00) (15 - 29)  SpO2: 100% (06 May 2018 06:00) (95% - 100%)  I&O's Detail    05 May 2018 07:01  -  06 May 2018 07:00  --------------------------------------------------------  IN:    IV PiggyBack: 300 mL    Oral Fluid: 470 mL  Total IN: 770 mL    OUT:    Indwelling Catheter - Urethral: 1290 mL  Total OUT: 1290 mL    Total NET: -520 mL      Physical exam:   Gen- NAD  Resp- exp rub b/l   CV- S1S2 RRR  ABD- +BSx4 ND/NT   EXT- No edema   Neuro- A&Ox3                             10.3   5.14  )-----------( 349      ( 06 May 2018 05:35 )             33.0     PT/INR - ( 05 May 2018 04:05 )   PT: 12.7 SEC;   INR: 1.10          PTT - ( 05 May 2018 04:05 )  PTT:28.0 SEC  06 May 2018 05:35    138    |  106    |  14     ----------------------------<  109<H>  4.3     |  23     |  0.87   05 May 2018 04:05    136    |  104    |  21     ----------------------------<  73     3.6     |  23     |  0.92     Ca    8.7        06 May 2018 05:35  Ca    8.7        05 May 2018 04:05  Phos  3.1       06 May 2018 05:35  Phos  2.5       05 May 2018 04:05  Mg     2.2       06 May 2018 05:35  Mg     2.1       05 May 2018 04:05    TPro  6.1    /  Alb  2.0<L>  /  TBili  0.4    /  DBili  x      /  AST  20     /  ALT  16     /  AlkPhos  56     06 May 2018 05:35

## 2018-05-06 NOTE — PROGRESS NOTE ADULT - PROBLEM SELECTOR PLAN 2
- ferritin elevated TIBC low. Likely mixed picture (ACD vs. ALBINA). No current source of bleeding, normocytic. Worsening in the setting of acute sepsis.  - Transfusion to goal 8.0 as patient with ACS.  - s/p 1UPRB 5/4

## 2018-05-06 NOTE — PROGRESS NOTE ADULT - PROBLEM SELECTOR PLAN 3
- s/p LHC from RFA access.  - LAD mid 80%, Cx mid and proximal 60%, OM 70-80%, and proximal RPL 90-95%.  - Potential high risk PCI vs. medical management, first stabilization of sepsis.  - s/p aspirin, plavix, statin, BB

## 2018-05-07 LAB
BACTERIA BLD CULT: SIGNIFICANT CHANGE UP
BUN SERPL-MCNC: 12 MG/DL — SIGNIFICANT CHANGE UP (ref 7–23)
CALCIUM SERPL-MCNC: 8.5 MG/DL — SIGNIFICANT CHANGE UP (ref 8.4–10.5)
CHLORIDE SERPL-SCNC: 104 MMOL/L — SIGNIFICANT CHANGE UP (ref 98–107)
CO2 SERPL-SCNC: 21 MMOL/L — LOW (ref 22–31)
CREAT SERPL-MCNC: 0.78 MG/DL — SIGNIFICANT CHANGE UP (ref 0.5–1.3)
GLUCOSE BLDC GLUCOMTR-MCNC: 125 MG/DL — HIGH (ref 70–99)
GLUCOSE BLDC GLUCOMTR-MCNC: 130 MG/DL — HIGH (ref 70–99)
GLUCOSE BLDC GLUCOMTR-MCNC: 157 MG/DL — HIGH (ref 70–99)
GLUCOSE BLDC GLUCOMTR-MCNC: 276 MG/DL — HIGH (ref 70–99)
GLUCOSE SERPL-MCNC: 136 MG/DL — HIGH (ref 70–99)
HCT VFR BLD CALC: 31.5 % — LOW (ref 34.5–45)
HGB BLD-MCNC: 9.7 G/DL — LOW (ref 11.5–15.5)
MAGNESIUM SERPL-MCNC: 2.1 MG/DL — SIGNIFICANT CHANGE UP (ref 1.6–2.6)
MCHC RBC-ENTMCNC: 25.9 PG — LOW (ref 27–34)
MCHC RBC-ENTMCNC: 30.8 % — LOW (ref 32–36)
MCV RBC AUTO: 84.2 FL — SIGNIFICANT CHANGE UP (ref 80–100)
NRBC # FLD: 0 — SIGNIFICANT CHANGE UP
PHOSPHATE SERPL-MCNC: 2 MG/DL — LOW (ref 2.5–4.5)
PLATELET # BLD AUTO: 349 K/UL — SIGNIFICANT CHANGE UP (ref 150–400)
PMV BLD: 11 FL — SIGNIFICANT CHANGE UP (ref 7–13)
POTASSIUM SERPL-MCNC: 3.9 MMOL/L — SIGNIFICANT CHANGE UP (ref 3.5–5.3)
POTASSIUM SERPL-SCNC: 3.9 MMOL/L — SIGNIFICANT CHANGE UP (ref 3.5–5.3)
RBC # BLD: 3.74 M/UL — LOW (ref 3.8–5.2)
RBC # FLD: 15.8 % — HIGH (ref 10.3–14.5)
SODIUM SERPL-SCNC: 138 MMOL/L — SIGNIFICANT CHANGE UP (ref 135–145)
WBC # BLD: 5.64 K/UL — SIGNIFICANT CHANGE UP (ref 3.8–10.5)
WBC # FLD AUTO: 5.64 K/UL — SIGNIFICANT CHANGE UP (ref 3.8–10.5)

## 2018-05-07 PROCEDURE — 99221 1ST HOSP IP/OBS SF/LOW 40: CPT

## 2018-05-07 PROCEDURE — 99233 SBSQ HOSP IP/OBS HIGH 50: CPT

## 2018-05-07 PROCEDURE — 99232 SBSQ HOSP IP/OBS MODERATE 35: CPT

## 2018-05-07 RX ORDER — SODIUM,POTASSIUM PHOSPHATES 278-250MG
1 POWDER IN PACKET (EA) ORAL
Qty: 0 | Refills: 0 | Status: COMPLETED | OUTPATIENT
Start: 2018-05-07 | End: 2018-05-08

## 2018-05-07 RX ORDER — LISINOPRIL 2.5 MG/1
5 TABLET ORAL DAILY
Qty: 0 | Refills: 0 | Status: DISCONTINUED | OUTPATIENT
Start: 2018-05-07 | End: 2018-05-08

## 2018-05-07 RX ORDER — METOPROLOL TARTRATE 50 MG
25 TABLET ORAL
Qty: 0 | Refills: 0 | Status: DISCONTINUED | OUTPATIENT
Start: 2018-05-07 | End: 2018-05-07

## 2018-05-07 RX ORDER — METOPROLOL TARTRATE 50 MG
12.5 TABLET ORAL
Qty: 0 | Refills: 0 | Status: DISCONTINUED | OUTPATIENT
Start: 2018-05-07 | End: 2018-05-13

## 2018-05-07 RX ORDER — SODIUM,POTASSIUM PHOSPHATES 278-250MG
1 POWDER IN PACKET (EA) ORAL
Qty: 0 | Refills: 0 | Status: DISCONTINUED | OUTPATIENT
Start: 2018-05-07 | End: 2018-05-07

## 2018-05-07 RX ADMIN — Medication 3: at 13:28

## 2018-05-07 RX ADMIN — INSULIN GLARGINE 5 UNIT(S): 100 INJECTION, SOLUTION SUBCUTANEOUS at 22:47

## 2018-05-07 RX ADMIN — PIPERACILLIN AND TAZOBACTAM 25 GRAM(S): 4; .5 INJECTION, POWDER, LYOPHILIZED, FOR SOLUTION INTRAVENOUS at 21:53

## 2018-05-07 RX ADMIN — Medication 12.5 MILLIGRAM(S): at 05:32

## 2018-05-07 RX ADMIN — Medication 1 TABLET(S): at 18:04

## 2018-05-07 RX ADMIN — CLOPIDOGREL BISULFATE 75 MILLIGRAM(S): 75 TABLET, FILM COATED ORAL at 11:08

## 2018-05-07 RX ADMIN — LISINOPRIL 5 MILLIGRAM(S): 2.5 TABLET ORAL at 21:53

## 2018-05-07 RX ADMIN — Medication 81 MILLIGRAM(S): at 11:08

## 2018-05-07 RX ADMIN — Medication 12.5 MILLIGRAM(S): at 17:20

## 2018-05-07 RX ADMIN — PIPERACILLIN AND TAZOBACTAM 25 GRAM(S): 4; .5 INJECTION, POWDER, LYOPHILIZED, FOR SOLUTION INTRAVENOUS at 13:28

## 2018-05-07 RX ADMIN — HEPARIN SODIUM 5000 UNIT(S): 5000 INJECTION INTRAVENOUS; SUBCUTANEOUS at 05:32

## 2018-05-07 RX ADMIN — ATORVASTATIN CALCIUM 40 MILLIGRAM(S): 80 TABLET, FILM COATED ORAL at 21:53

## 2018-05-07 RX ADMIN — PIPERACILLIN AND TAZOBACTAM 25 GRAM(S): 4; .5 INJECTION, POWDER, LYOPHILIZED, FOR SOLUTION INTRAVENOUS at 05:32

## 2018-05-07 RX ADMIN — HEPARIN SODIUM 5000 UNIT(S): 5000 INJECTION INTRAVENOUS; SUBCUTANEOUS at 17:22

## 2018-05-07 NOTE — PROGRESS NOTE ADULT - SUBJECTIVE AND OBJECTIVE BOX
Diabetes  Follow up note    Interval History: Pt reports good appetite, eating meals, no nausea or vomiting    MEDICATIONS  (STANDING):  aspirin  chewable 81 milliGRAM(s) Oral daily  atorvastatin 40 milliGRAM(s) Oral at bedtime  chlorhexidine 4% Liquid 1 Application(s) Topical <User Schedule>  clopidogrel Tablet 75 milliGRAM(s) Oral daily  dextrose 5%. 1000 milliLiter(s) (50 mL/Hr) IV Continuous <Continuous>  dextrose 50% Injectable 12.5 Gram(s) IV Push once  dextrose 50% Injectable 25 Gram(s) IV Push once  dextrose 50% Injectable 25 Gram(s) IV Push once  heparin  Injectable 5000 Unit(s) SubCutaneous every 12 hours  influenza   Vaccine 0.5 milliLiter(s) IntraMuscular once  insulin glargine Injectable (LANTUS) 5 Unit(s) SubCutaneous at bedtime  insulin lispro (HumaLOG) corrective regimen sliding scale   SubCutaneous three times a day before meals  insulin lispro (HumaLOG) corrective regimen sliding scale   SubCutaneous at bedtime  lidocaine   Patch 2 Patch Transdermal daily  lisinopril 5 milliGRAM(s) Oral daily  metoprolol tartrate 12.5 milliGRAM(s) Oral two times a day  piperacillin/tazobactam IVPB. 3.375 Gram(s) IV Intermittent every 8 hours  potassium acid phosphate/sodium acid phosphate tablet (K-PHOS No. 2) 1 Tablet(s) Oral two times a day with meals    MEDICATIONS  (PRN):  acetaminophen   Tablet 650 milliGRAM(s) Oral every 6 hours PRN For Temp greater than 38 C (100.4 F)  dextrose Gel 1 Dose(s) Oral once PRN Blood Glucose LESS THAN 70 milliGRAM(s)/deciliter  glucagon  Injectable 1 milliGRAM(s) IntraMuscular once PRN Glucose LESS THAN 70 milligrams/deciliter      Allergies    No Known Allergies    PHYSICAL EXAM:  VITALS: T(C): 36.6 (05-07-18 @ 05:40)  T(F): 97.9 (05-07-18 @ 05:40), Max: 98.6 (05-06-18 @ 20:30)  HR: 73 (05-07-18 @ 05:40) (64 - 76)  BP: 141/70 (05-07-18 @ 05:40) (117/49 - 149/59)  RR:  (15 - 25)  SpO2:  (98% - 100%)    GENERAL: Lying in bed in NAD,   EYES: No proptosis,  HEENT:  Atraumatic, Normocephalic,   RESPIRATORY: Clear to auscultation bilaterally  CARDIOVASCULAR: Regular rhythm; No murmurs; no peripheral edema  GI:  normal bowel sounds  SKIN: Dry, intact, ecchymosis on right arm      POCT Blood Glucose.: 276 mg/dL (05-07-18 @ 13:21)  POCT Blood Glucose.: 125 mg/dL (05-07-18 @ 08:58)  POCT Blood Glucose.: 173 mg/dL (05-06-18 @ 22:41)  POCT Blood Glucose.: 138 mg/dL (05-06-18 @ 18:07)  POCT Blood Glucose.: 173 mg/dL (05-06-18 @ 12:44)  POCT Blood Glucose.: 127 mg/dL (05-06-18 @ 08:35)  POCT Blood Glucose.: 109 mg/dL (05-05-18 @ 22:34)  POCT Blood Glucose.: 101 mg/dL (05-05-18 @ 18:53)  POCT Blood Glucose.: 74 mg/dL (05-05-18 @ 12:06)  POCT Blood Glucose.: 52 mg/dL (05-05-18 @ 08:47)  POCT Blood Glucose.: 121 mg/dL (05-04-18 @ 22:51)  POCT Blood Glucose.: 82 mg/dL (05-04-18 @ 17:28)        05-07    138  |  104  |  12  ----------------------------<  136<H>  3.9   |  21<L>  |  0.78    EGFR if : 81  EGFR if non : 70    Ca    8.5      05-07  Mg     2.1     05-07  Phos  2.0     05-07    TPro  6.1  /  Alb  2.0<L>  /  TBili  0.4  /  DBili  x   /  AST  20  /  ALT  16  /  AlkPhos  56  05-06        Hemoglobin A1C, Whole Blood: 6.4 % <H> [4.0 - 5.6] (05-02-18 @ 05:54)

## 2018-05-07 NOTE — CONSULT NOTE ADULT - PROBLEM SELECTOR RECOMMENDATION 9
Patient with recent cath showing significant vessel involvement.  Currently on medical management.   High risk PCI vs continued medical management, once sepsis is resolved.

## 2018-05-07 NOTE — PROGRESS NOTE ADULT - SUBJECTIVE AND OBJECTIVE BOX
Date of Admission:    24 hour events:    Vital Signs Last 24 Hrs  T(C): 36.6 (07 May 2018 05:40), Max: 37 (06 May 2018 20:30)  T(F): 97.9 (07 May 2018 05:40), Max: 98.6 (06 May 2018 20:30)  HR: 73 (07 May 2018 05:40) (61 - 76)  BP: 141/70 (07 May 2018 05:40) (117/49 - 149/59)  BP(mean): 82 (06 May 2018 20:00) (65 - 84)  RR: 18 (07 May 2018 05:40) (15 - 26)  SpO2: 99% (07 May 2018 05:40) (95% - 100%)  I&O's Summary    06 May 2018 07:01  -  07 May 2018 07:00  --------------------------------------------------------  IN: 1300 mL / OUT: 1575 mL / NET: -275 mL        MEDICATIONS:  aspirin  chewable 81 milliGRAM(s) Oral daily  clopidogrel Tablet 75 milliGRAM(s) Oral daily  heparin  Injectable 5000 Unit(s) SubCutaneous every 12 hours  metoprolol tartrate 12.5 milliGRAM(s) Oral every 12 hours    piperacillin/tazobactam IVPB. 3.375 Gram(s) IV Intermittent every 8 hours      acetaminophen   Tablet 650 milliGRAM(s) Oral every 6 hours PRN      atorvastatin 40 milliGRAM(s) Oral at bedtime  dextrose 50% Injectable 12.5 Gram(s) IV Push once  dextrose 50% Injectable 25 Gram(s) IV Push once  dextrose 50% Injectable 25 Gram(s) IV Push once  dextrose Gel 1 Dose(s) Oral once PRN  glucagon  Injectable 1 milliGRAM(s) IntraMuscular once PRN  insulin glargine Injectable (LANTUS) 5 Unit(s) SubCutaneous at bedtime  insulin lispro (HumaLOG) corrective regimen sliding scale   SubCutaneous three times a day before meals  insulin lispro (HumaLOG) corrective regimen sliding scale   SubCutaneous at bedtime    chlorhexidine 4% Liquid 1 Application(s) Topical <User Schedule>  dextrose 5%. 1000 milliLiter(s) IV Continuous <Continuous>  influenza   Vaccine 0.5 milliLiter(s) IntraMuscular once  lidocaine   Patch 2 Patch Transdermal daily      REVIEW OF SYSTEMS:  Complete 10point ROS negative.    PHYSICAL EXAM:  General: NAD  Cardiovascular: Normal S1 S2, No JVD, No murmurs, No edema  Respiratory: Lungs clear to auscultation	  Gastrointestinal:  Soft, Non-tender, + BS	  Skin: warm and dry, No rashes, No ecchymoses, No cyanosis	  Extremities: Normal range of motion, No clubbing, cyanosis or edema  Vascular: Peripheral pulses palpable 2+ bilaterally    LABS:	 	    CBC Full  -  ( 07 May 2018 05:40 )  WBC Count : 5.64 K/uL  Hemoglobin : 9.7 g/dL  Hematocrit : 31.5 %  Platelet Count - Automated : 349 K/uL  Mean Cell Volume : 84.2 fL  Mean Cell Hemoglobin : 25.9 pg  Mean Cell Hemoglobin Concentration : 30.8 %  Auto Neutrophil # : x  Auto Lymphocyte # : x  Auto Monocyte # : x  Auto Eosinophil # : x  Auto Basophil # : x  Auto Neutrophil % : x  Auto Lymphocyte % : x  Auto Monocyte % : x  Auto Eosinophil % : x  Auto Basophil % : x    05-06    138  |  106  |  14  ----------------------------<  109<H>  4.3   |  23  |  0.87    Ca    8.7      06 May 2018 05:35  Phos  3.1     05-06  Mg     2.2     05-06    TPro  6.1  /  Alb  2.0<L>  /  TBili  0.4  /  DBili  x   /  AST  20  /  ALT  16  /  AlkPhos  56  05-06 Date of Admission:  5/1/18  24 hour events:  Patient was bradycardic 42 overnight  Vital Signs Last 24 Hrs  T(C): 36.6 (07 May 2018 05:40), Max: 37 (06 May 2018 20:30)  T(F): 97.9 (07 May 2018 05:40), Max: 98.6 (06 May 2018 20:30)  HR: 73 (07 May 2018 05:40) (61 - 76)  BP: 141/70 (07 May 2018 05:40) (117/49 - 149/59)  BP(mean): 82 (06 May 2018 20:00) (65 - 84)  RR: 18 (07 May 2018 05:40) (15 - 26)  SpO2: 99% (07 May 2018 05:40) (95% - 100%)  I&O's Summary    06 May 2018 07:01  -  07 May 2018 07:00  --------------------------------------------------------  IN: 1300 mL / OUT: 1575 mL / NET: -275 mL        MEDICATIONS:  aspirin  chewable 81 milliGRAM(s) Oral daily  clopidogrel Tablet 75 milliGRAM(s) Oral daily  heparin  Injectable 5000 Unit(s) SubCutaneous every 12 hours  metoprolol tartrate 12.5 milliGRAM(s) Oral every 12 hours    piperacillin/tazobactam IVPB. 3.375 Gram(s) IV Intermittent every 8 hours      acetaminophen   Tablet 650 milliGRAM(s) Oral every 6 hours PRN      atorvastatin 40 milliGRAM(s) Oral at bedtime  dextrose 50% Injectable 12.5 Gram(s) IV Push once  dextrose 50% Injectable 25 Gram(s) IV Push once  dextrose 50% Injectable 25 Gram(s) IV Push once  dextrose Gel 1 Dose(s) Oral once PRN  glucagon  Injectable 1 milliGRAM(s) IntraMuscular once PRN  insulin glargine Injectable (LANTUS) 5 Unit(s) SubCutaneous at bedtime  insulin lispro (HumaLOG) corrective regimen sliding scale   SubCutaneous three times a day before meals  insulin lispro (HumaLOG) corrective regimen sliding scale   SubCutaneous at bedtime    chlorhexidine 4% Liquid 1 Application(s) Topical <User Schedule>  dextrose 5%. 1000 milliLiter(s) IV Continuous <Continuous>  influenza   Vaccine 0.5 milliLiter(s) IntraMuscular once  lidocaine   Patch 2 Patch Transdermal daily      REVIEW OF SYSTEMS:  Complete 10point ROS negative.    PHYSICAL EXAM:  General: NAD  Cardiovascular: Normal S1 S2, No JVD, No murmurs, No edema  Respiratory: Lungs clear to auscultation	  Gastrointestinal:  Soft, Non-tender, + BS	  Skin: warm and dry, No rashes, No ecchymoses, No cyanosis	  Extremities: Normal range of motion, No clubbing, cyanosis or edema  Vascular: Peripheral pulses palpable 2+ bilaterally    LABS:	 	    CBC Full  -  ( 07 May 2018 05:40 )  WBC Count : 5.64 K/uL  Hemoglobin : 9.7 g/dL  Hematocrit : 31.5 %  Platelet Count - Automated : 349 K/uL  Mean Cell Volume : 84.2 fL  Mean Cell Hemoglobin : 25.9 pg  Mean Cell Hemoglobin Concentration : 30.8 %  Auto Neutrophil # : x  Auto Lymphocyte # : x  Auto Monocyte # : x  Auto Eosinophil # : x  Auto Basophil # : x  Auto Neutrophil % : x  Auto Lymphocyte % : x  Auto Monocyte % : x  Auto Eosinophil % : x  Auto Basophil % : x    05-06    138  |  106  |  14  ----------------------------<  109<H>  4.3   |  23  |  0.87    Ca    8.7      06 May 2018 05:35  Phos  3.1     05-06  Mg     2.2     05-06    TPro  6.1  /  Alb  2.0<L>  /  TBili  0.4  /  DBili  x   /  AST  20  /  ALT  16  /  AlkPhos  56  05-06

## 2018-05-07 NOTE — PROGRESS NOTE ADULT - PROBLEM SELECTOR PLAN 1
target bg 100-200 mg/dl  Given patient age, no need for tight control, a1c goal is 7-8%.  Glucose values have been reasonable, continue to monitor on current dose of lantus with correctional insulin        C-peptide is 1.0, lowest end of normal. Consider DC on basal insulin only.  Will follow and discuss with her private endocrinologist, Dr Benavides, prior to DC.

## 2018-05-07 NOTE — PROGRESS NOTE ADULT - ASSESSMENT
84 year old woman with T2DM, primary hyperparathyroidism with osteoporosis, admitted with hypoglycemia in setting of reduced po intake and mixed insulin use, also with newly diagnosed CAD

## 2018-05-07 NOTE — CONSULT NOTE ADULT - ASSESSMENT
84 F with numerous comorbidities, including most recently triple vessel disease, sepsis of unclear etiology, osteoarthritis, encounter for palliative care.

## 2018-05-07 NOTE — PROGRESS NOTE ADULT - PROBLEM SELECTOR PLAN 1
-No more fevers, WBC normal.  -blood cultures negative  -urine culture +for E.Coli  -Continue zosyn for 2 more days

## 2018-05-07 NOTE — PROGRESS NOTE ADULT - PROBLEM SELECTOR PLAN 4
-endocrine input appreciated. Goal HGBA1c is 7-8. FSticks better controlled, continue current regimen, encourage eating and drinking

## 2018-05-07 NOTE — PROGRESS NOTE ADULT - PROBLEM SELECTOR PLAN 5
-BP remains slightly elevated, increase lopressor to 25mg po bid.  -start lisinopril 5mg po qd -BP remains slightly elevated, keep lopressor at 12.5 mg po qd, patient is bradycardic while resting.  -start lisinopril 5mg po qd

## 2018-05-07 NOTE — PROGRESS NOTE ADULT - ASSESSMENT
83 YO F w/ hx of HTN , DM, TIA without residual deficits, no known CAD hx (stress test 11/2017 normal), presents to the ED with NSTEMI, s/p cath,course complicated by sepsis and anemia. Patient and family are deciding on medical managment vs. high-risk PCI, vs. OHS.

## 2018-05-07 NOTE — CONSULT NOTE ADULT - SUBJECTIVE AND OBJECTIVE BOX
HPI:  84 year old female pmh of DM, HTN, HLD, TIA, arthritis presents with altered mental status this AM. Per patient at ~3 am her grandson found her half on the floor and half on her couch and confused. At the time she did not know what time or day it was. She does not remember anything before being found. Prior to this event she has been in her usual state of health. Per patients family at bedside the patient has been doing well, even seeming better than usual recently. Patient reports taking insulin regularly and monitoring her diet. She denies any chest pain, palpitations, nausea, vomiting, fever, chills, dysuria, hematuria, brbpr, melena, diarrhea, swelling, shortness of breath. (01 May 2018 11:43).  Patient found to have STEMI, requiring cardiac cath, with triple vessel disease.  Also with persistent fever spikes of unclear etiology, on broad spectrum antibiotics      PERTINENT PMH REVIEWED:  [x ] YES [ ] NO           SOCIAL HISTORY:  Significant other/partner:  [x ] YES  [ ] NO            Children:  [x ] YES  [ ] NO                   Christianity/Spirituality: Pentecostalism  Subtance hx:  [ ] YES   [ x] NO           Tobacco hx:  [ ] YES  [ x] NO             Alcohol hx: [ ] YES  [x ] NO        Home Opiod hx:  [ ] YES  [x ] NO   Living Situation: [ x] Home  [ ] Long term care  [ ] Rehab    REFERRALS:   [x ] Chaplaincy  [ ] Hospice  [ ] Child Life  [ ] Social Work  [ ] Case management [ ] Holistic Therapy     FAMILY HISTORY:  No pertinent family history in first degree relatives    [x ] Family history non contributory     BASELINE ADLs (prior to admission):  Independent [ ] moderately [ ] fully   Dependent   [ x] moderately [ ] fully    ADVANCE DIRECTIVES:  [ ] YES [x ] NO   DNR [ ] YES [x ] NO                      MOLST  [ ] YES x[ ] NO    Living Will  [ ] YES [ xNO    Health Care Proxy [ ] YES  [ x] NO      [ x] Surrogate  [ ] HCP  [ ] Guardian:      Mick Lassiter ()                                                            Phone#: [phone]    Allergies    No Known Allergies    Intolerances        MEDICATIONS  (STANDING):  aspirin  chewable 81 milliGRAM(s) Oral daily  atorvastatin 40 milliGRAM(s) Oral at bedtime  azithromycin  IVPB      azithromycin  IVPB 500 milliGRAM(s) IV Intermittent every 24 hours  clopidogrel Tablet 75 milliGRAM(s) Oral daily  dextrose 5%. 1000 milliLiter(s) (50 mL/Hr) IV Continuous <Continuous>  dextrose 50% Injectable 12.5 Gram(s) IV Push once  dextrose 50% Injectable 25 Gram(s) IV Push once  dextrose 50% Injectable 25 Gram(s) IV Push once  furosemide    Tablet 20 milliGRAM(s) Oral daily  heparin  Injectable 5000 Unit(s) SubCutaneous every 12 hours  influenza   Vaccine 0.5 milliLiter(s) IntraMuscular once  insulin glargine Injectable (LANTUS) 5 Unit(s) SubCutaneous at bedtime  insulin lispro (HumaLOG) corrective regimen sliding scale   SubCutaneous three times a day before meals  insulin lispro (HumaLOG) corrective regimen sliding scale   SubCutaneous at bedtime  lidocaine   Patch 2 Patch Transdermal daily  lisinopril 10 milliGRAM(s) Oral daily  metoprolol tartrate 12.5 milliGRAM(s) Oral two times a day  piperacillin/tazobactam IVPB. 3.375 Gram(s) IV Intermittent every 8 hours    MEDICATIONS  (PRN):  acetaminophen   Tablet 650 milliGRAM(s) Oral every 6 hours PRN For Temp greater than 38 C (100.4 F)  dextrose Gel 1 Dose(s) Oral once PRN Blood Glucose LESS THAN 70 milliGRAM(s)/deciliter  glucagon  Injectable 1 milliGRAM(s) IntraMuscular once PRN Glucose LESS THAN 70 milligrams/deciliter      PRESENT SYMPTOMS:  Source: [x ] Patient   [ ] Family   [ ] Team     Pain: [ ] YES [ ] NO  OLDCARTS:     Dyspnea: [ ] YES [x ] NO   Anxiety: [x ] YES [ ] NO  Fatigue: [x ] YES [ ] NO   Nausea: [ ] YES [x ] NO  Loss of appetite: [x ] YES [ ] NO   Constipation: [ ] YES [x ] NO     Other Symptoms:  [x ] All other review of systems negative   [ ] Unable to obtain due to poor mentation     Karnofsky Performance Score/Palliative Performance Status Version 2:   40      %  Protein Calorie Malutrition:  [ ] Mild   [ ] Moderate   [ ] Severe     Vital Signs Last 24 Hrs  T(C): 37.2 (09 May 2018 06:00), Max: 40.9 (08 May 2018 19:53)  T(F): 99 (09 May 2018 06:00), Max: 105.6 (08 May 2018 19:53)  HR: 77 (09 May 2018 06:00) (60 - 102)  BP: 133/66 (09 May 2018 06:00) (83/54 - 133/66)  BP(mean): --  RR: 17 (09 May 2018 06:00) (14 - 20)  SpO2: 96% (09 May 2018 06:00) (96% - 100%)    Physical Exam:    General: [x ] Alert,  A&O x 2-3    [ ] lethargic   [ ] Agitated   [ ] Cachexia   HEENT: [ ] Normal   [x ] Dry mouth   [ ] ET Tube    [ ] Trach   Lungs: [ ] Clear [ x] Rhonchi  [ ] Crackles [ ] Wheezing [ ] Tachypnea  [ ] Audible excessive secretions   Cardiovascular:  [ x] Regular rate and rhythm  [ ] Irregular [ ] Tachycardia   [ ] Bradycardia   Abdomen: [x ] Soft  [ ] Distended  [ ]  [ x] +BS  [x ] Non tender [ ] Tender  [ ]PEG   [ ] NGT   Last BM:     Genitourinary: [ ] Normal [x ] Incontinent   [ ] Oliguria/Anuria   [ ] Mooeny  Musculoskeletal:  [ ] Normal   [ x] Generalized weakness  [ ] Bedbound   Neurological: [x ] No focal deficits  [ ] Cognitive impairment     Skin: [ x] Normal   [ ] Pressure ulcers     LABS:                        10.3   17.05 )-----------( 395      ( 09 May 2018 06:54 )             33.7     05-    135  |  98  |  16  ----------------------------<  139<H>  3.6   |  26  |  1.07    Ca    9.1      09 May 2018 06:54  Phos  3.4     05  Mg     2.1     05-09        Urinalysis Basic - ( 08 May 2018 11:03 )    Color: COLORL / Appearance: CLEAR / S.009 / pH: 6.5  Gluc: NEGATIVE / Ketone: NEGATIVE  / Bili: NEGATIVE / Urobili: NORMAL mg/dL   Blood: NEGATIVE / Protein: 10 mg/dL / Nitrite: NEGATIVE   Leuk Esterase: NEGATIVE / RBC: 0-2 / WBC 0-2   Sq Epi: x / Non Sq Epi: x / Bacteria: x      I&O's Summary    08 May 2018 07:01  -  09 May 2018 07:00  --------------------------------------------------------  IN: 360 mL / OUT: 1 mL / NET: 359 mL        RADIOLOGY & ADDITIONAL STUDIES:

## 2018-05-08 ENCOUNTER — APPOINTMENT (OUTPATIENT)
Dept: RHEUMATOLOGY | Facility: CLINIC | Age: 83
End: 2018-05-08

## 2018-05-08 ENCOUNTER — APPOINTMENT (OUTPATIENT)
Dept: ENDOCRINOLOGY | Facility: CLINIC | Age: 83
End: 2018-05-08

## 2018-05-08 DIAGNOSIS — Z02.9 ENCOUNTER FOR ADMINISTRATIVE EXAMINATIONS, UNSPECIFIED: ICD-10-CM

## 2018-05-08 DIAGNOSIS — R50.9 FEVER, UNSPECIFIED: ICD-10-CM

## 2018-05-08 LAB
APPEARANCE UR: CLEAR — SIGNIFICANT CHANGE UP
BACTERIA BLD CULT: SIGNIFICANT CHANGE UP
BILIRUB UR-MCNC: NEGATIVE — SIGNIFICANT CHANGE UP
BLOOD UR QL VISUAL: NEGATIVE — SIGNIFICANT CHANGE UP
BUN SERPL-MCNC: 11 MG/DL — SIGNIFICANT CHANGE UP (ref 7–23)
CALCIUM SERPL-MCNC: 8.7 MG/DL — SIGNIFICANT CHANGE UP (ref 8.4–10.5)
CHLORIDE SERPL-SCNC: 103 MMOL/L — SIGNIFICANT CHANGE UP (ref 98–107)
CO2 SERPL-SCNC: 23 MMOL/L — SIGNIFICANT CHANGE UP (ref 22–31)
COLOR SPEC: SIGNIFICANT CHANGE UP
CREAT SERPL-MCNC: 0.83 MG/DL — SIGNIFICANT CHANGE UP (ref 0.5–1.3)
GLUCOSE BLDC GLUCOMTR-MCNC: 115 MG/DL — HIGH (ref 70–99)
GLUCOSE BLDC GLUCOMTR-MCNC: 215 MG/DL — HIGH (ref 70–99)
GLUCOSE BLDC GLUCOMTR-MCNC: 266 MG/DL — HIGH (ref 70–99)
GLUCOSE SERPL-MCNC: 119 MG/DL — HIGH (ref 70–99)
GLUCOSE UR-MCNC: NEGATIVE — SIGNIFICANT CHANGE UP
HCT VFR BLD CALC: 31 % — LOW (ref 34.5–45)
HGB BLD-MCNC: 9.7 G/DL — LOW (ref 11.5–15.5)
HYALINE CASTS # UR AUTO: SIGNIFICANT CHANGE UP (ref 0–?)
KETONES UR-MCNC: NEGATIVE — SIGNIFICANT CHANGE UP
LEUKOCYTE ESTERASE UR-ACNC: NEGATIVE — SIGNIFICANT CHANGE UP
MAGNESIUM SERPL-MCNC: 1.9 MG/DL — SIGNIFICANT CHANGE UP (ref 1.6–2.6)
MCHC RBC-ENTMCNC: 25.9 PG — LOW (ref 27–34)
MCHC RBC-ENTMCNC: 31.3 % — LOW (ref 32–36)
MCV RBC AUTO: 82.9 FL — SIGNIFICANT CHANGE UP (ref 80–100)
MUCOUS THREADS # UR AUTO: SIGNIFICANT CHANGE UP
NITRITE UR-MCNC: NEGATIVE — SIGNIFICANT CHANGE UP
NRBC # FLD: 0 — SIGNIFICANT CHANGE UP
PH UR: 6.5 — SIGNIFICANT CHANGE UP (ref 4.6–8)
PHOSPHATE SERPL-MCNC: 2.6 MG/DL — SIGNIFICANT CHANGE UP (ref 2.5–4.5)
PLATELET # BLD AUTO: 361 K/UL — SIGNIFICANT CHANGE UP (ref 150–400)
PMV BLD: 10.5 FL — SIGNIFICANT CHANGE UP (ref 7–13)
POTASSIUM SERPL-MCNC: 3.7 MMOL/L — SIGNIFICANT CHANGE UP (ref 3.5–5.3)
POTASSIUM SERPL-SCNC: 3.7 MMOL/L — SIGNIFICANT CHANGE UP (ref 3.5–5.3)
PROT UR-MCNC: 10 MG/DL — SIGNIFICANT CHANGE UP
RBC # BLD: 3.74 M/UL — LOW (ref 3.8–5.2)
RBC # FLD: 16 % — HIGH (ref 10.3–14.5)
RBC CASTS # UR COMP ASSIST: SIGNIFICANT CHANGE UP (ref 0–?)
SODIUM SERPL-SCNC: 136 MMOL/L — SIGNIFICANT CHANGE UP (ref 135–145)
SP GR SPEC: 1.01 — SIGNIFICANT CHANGE UP (ref 1–1.04)
UROBILINOGEN FLD QL: NORMAL MG/DL — SIGNIFICANT CHANGE UP
WBC # BLD: 6.84 K/UL — SIGNIFICANT CHANGE UP (ref 3.8–10.5)
WBC # FLD AUTO: 6.84 K/UL — SIGNIFICANT CHANGE UP (ref 3.8–10.5)
WBC UR QL: SIGNIFICANT CHANGE UP (ref 0–?)

## 2018-05-08 PROCEDURE — 71045 X-RAY EXAM CHEST 1 VIEW: CPT | Mod: 26

## 2018-05-08 PROCEDURE — 99232 SBSQ HOSP IP/OBS MODERATE 35: CPT

## 2018-05-08 PROCEDURE — 99233 SBSQ HOSP IP/OBS HIGH 50: CPT

## 2018-05-08 RX ORDER — AZITHROMYCIN 500 MG/1
TABLET, FILM COATED ORAL
Qty: 0 | Refills: 0 | Status: DISCONTINUED | OUTPATIENT
Start: 2018-05-08 | End: 2018-05-09

## 2018-05-08 RX ORDER — FUROSEMIDE 40 MG
20 TABLET ORAL DAILY
Qty: 0 | Refills: 0 | Status: DISCONTINUED | OUTPATIENT
Start: 2018-05-08 | End: 2018-05-14

## 2018-05-08 RX ORDER — FUROSEMIDE 40 MG
20 TABLET ORAL ONCE
Qty: 0 | Refills: 0 | Status: COMPLETED | OUTPATIENT
Start: 2018-05-08 | End: 2018-05-08

## 2018-05-08 RX ORDER — AZITHROMYCIN 500 MG/1
500 TABLET, FILM COATED ORAL ONCE
Qty: 0 | Refills: 0 | Status: COMPLETED | OUTPATIENT
Start: 2018-05-08 | End: 2018-05-08

## 2018-05-08 RX ORDER — AZITHROMYCIN 500 MG/1
500 TABLET, FILM COATED ORAL EVERY 24 HOURS
Qty: 0 | Refills: 0 | Status: DISCONTINUED | OUTPATIENT
Start: 2018-05-09 | End: 2018-05-09

## 2018-05-08 RX ORDER — LISINOPRIL 2.5 MG/1
10 TABLET ORAL DAILY
Qty: 0 | Refills: 0 | Status: DISCONTINUED | OUTPATIENT
Start: 2018-05-08 | End: 2018-05-14

## 2018-05-08 RX ADMIN — HEPARIN SODIUM 5000 UNIT(S): 5000 INJECTION INTRAVENOUS; SUBCUTANEOUS at 18:09

## 2018-05-08 RX ADMIN — PIPERACILLIN AND TAZOBACTAM 25 GRAM(S): 4; .5 INJECTION, POWDER, LYOPHILIZED, FOR SOLUTION INTRAVENOUS at 05:14

## 2018-05-08 RX ADMIN — Medication 1 TABLET(S): at 06:55

## 2018-05-08 RX ADMIN — Medication 650 MILLIGRAM(S): at 19:45

## 2018-05-08 RX ADMIN — Medication 1: at 22:59

## 2018-05-08 RX ADMIN — CLOPIDOGREL BISULFATE 75 MILLIGRAM(S): 75 TABLET, FILM COATED ORAL at 09:32

## 2018-05-08 RX ADMIN — Medication 2: at 18:11

## 2018-05-08 RX ADMIN — Medication 81 MILLIGRAM(S): at 09:32

## 2018-05-08 RX ADMIN — LISINOPRIL 5 MILLIGRAM(S): 2.5 TABLET ORAL at 05:15

## 2018-05-08 RX ADMIN — AZITHROMYCIN 250 MILLIGRAM(S): 500 TABLET, FILM COATED ORAL at 18:08

## 2018-05-08 RX ADMIN — INSULIN GLARGINE 5 UNIT(S): 100 INJECTION, SOLUTION SUBCUTANEOUS at 23:00

## 2018-05-08 RX ADMIN — HEPARIN SODIUM 5000 UNIT(S): 5000 INJECTION INTRAVENOUS; SUBCUTANEOUS at 05:14

## 2018-05-08 RX ADMIN — PIPERACILLIN AND TAZOBACTAM 25 GRAM(S): 4; .5 INJECTION, POWDER, LYOPHILIZED, FOR SOLUTION INTRAVENOUS at 22:24

## 2018-05-08 RX ADMIN — CHLORHEXIDINE GLUCONATE 1 APPLICATION(S): 213 SOLUTION TOPICAL at 09:32

## 2018-05-08 RX ADMIN — Medication 12.5 MILLIGRAM(S): at 18:09

## 2018-05-08 RX ADMIN — PIPERACILLIN AND TAZOBACTAM 25 GRAM(S): 4; .5 INJECTION, POWDER, LYOPHILIZED, FOR SOLUTION INTRAVENOUS at 14:51

## 2018-05-08 RX ADMIN — Medication 650 MILLIGRAM(S): at 05:14

## 2018-05-08 RX ADMIN — Medication 20 MILLIGRAM(S): at 09:32

## 2018-05-08 RX ADMIN — Medication 12.5 MILLIGRAM(S): at 05:15

## 2018-05-08 RX ADMIN — ATORVASTATIN CALCIUM 40 MILLIGRAM(S): 80 TABLET, FILM COATED ORAL at 22:23

## 2018-05-08 NOTE — PROGRESS NOTE ADULT - SUBJECTIVE AND OBJECTIVE BOX
Diabetes Follow up note    Interval History: patient reports feeling well, eating meals, no nausea or vomiting    MEDICATIONS  (STANDING):  aspirin  chewable 81 milliGRAM(s) Oral daily  atorvastatin 40 milliGRAM(s) Oral at bedtime  chlorhexidine 4% Liquid 1 Application(s) Topical <User Schedule>  clopidogrel Tablet 75 milliGRAM(s) Oral daily  dextrose 5%. 1000 milliLiter(s) (50 mL/Hr) IV Continuous <Continuous>  dextrose 50% Injectable 12.5 Gram(s) IV Push once  dextrose 50% Injectable 25 Gram(s) IV Push once  dextrose 50% Injectable 25 Gram(s) IV Push once  furosemide    Tablet 20 milliGRAM(s) Oral daily  heparin  Injectable 5000 Unit(s) SubCutaneous every 12 hours  influenza   Vaccine 0.5 milliLiter(s) IntraMuscular once  insulin glargine Injectable (LANTUS) 5 Unit(s) SubCutaneous at bedtime  insulin lispro (HumaLOG) corrective regimen sliding scale   SubCutaneous three times a day before meals  insulin lispro (HumaLOG) corrective regimen sliding scale   SubCutaneous at bedtime  lidocaine   Patch 2 Patch Transdermal daily  lisinopril 10 milliGRAM(s) Oral daily  metoprolol tartrate 12.5 milliGRAM(s) Oral two times a day  piperacillin/tazobactam IVPB. 3.375 Gram(s) IV Intermittent every 8 hours    MEDICATIONS  (PRN):  acetaminophen   Tablet 650 milliGRAM(s) Oral every 6 hours PRN For Temp greater than 38 C (100.4 F)  dextrose Gel 1 Dose(s) Oral once PRN Blood Glucose LESS THAN 70 milliGRAM(s)/deciliter  glucagon  Injectable 1 milliGRAM(s) IntraMuscular once PRN Glucose LESS THAN 70 milligrams/deciliter      Allergies    No Known Allergies    PHYSICAL EXAM:  VITALS: T(C): 36.8 (05-08-18 @ 09:29)  T(F): 98.2 (05-08-18 @ 09:29), Max: 100.9 (05-08-18 @ 05:10)  HR: 62 (05-08-18 @ 09:29) (62 - 81)  BP: 109/57 (05-08-18 @ 09:29) (109/57 - 152/73)  RR:  (16 - 17)  SpO2:  (98% - 100%)  GENERAL: Lying in bed inNAD,   EYES: No proptosis,  HEENT:  Atraumatic, Normocephalic,   RESPIRATORY: Clear to auscultation bilaterally  CARDIOVASCULAR: Regular rhythm; No murmurs; no peripheral edema  GI: Soft, nontender, non distended, normal bowel sounds  SKIN: ecchymosis on right arm      POCT Blood Glucose.: 115 mg/dL (05-08-18 @ 08:37)  POCT Blood Glucose.: 157 mg/dL (05-07-18 @ 22:25)  POCT Blood Glucose.: 130 mg/dL (05-07-18 @ 17:39)  POCT Blood Glucose.: 276 mg/dL (05-07-18 @ 13:21)  POCT Blood Glucose.: 125 mg/dL (05-07-18 @ 08:58)  POCT Blood Glucose.: 173 mg/dL (05-06-18 @ 22:41)  POCT Blood Glucose.: 138 mg/dL (05-06-18 @ 18:07)  POCT Blood Glucose.: 173 mg/dL (05-06-18 @ 12:44)  POCT Blood Glucose.: 127 mg/dL (05-06-18 @ 08:35)  POCT Blood Glucose.: 109 mg/dL (05-05-18 @ 22:34)  POCT Blood Glucose.: 101 mg/dL (05-05-18 @ 18:53)        05-08    136  |  103  |  11  ----------------------------<  119<H>  3.7   |  23  |  0.83    EGFR if : 75  EGFR if non : 65    Ca    8.7      05-08  Mg     1.9     05-08  Phos  2.6     05-08    TPro  6.1  /  Alb  2.0<L>  /  TBili  0.4  /  DBili  x   /  AST  20  /  ALT  16  /  AlkPhos  56  05-06        Hemoglobin A1C, Whole Blood: 6.4 % <H> [4.0 - 5.6] (05-02-18 @ 05:54)

## 2018-05-08 NOTE — PROGRESS NOTE ADULT - PROBLEM SELECTOR PLAN 4
-endocrine input appreciated. Goal HGBA1c is 7-8. FSticks better controlled, continue current regimen, encourage eating and drinking -initiate lasix 20mg po qd, and stat dose of lasix 20mg IVP x 1  - s/p LHC from RFA access.  - LAD mid 80%, Cx mid and proximal 60%, OM 70-80%, and proximal RPL 90-95%.  -Increase lisinopril to 10mg po qd, continue lopressor 12.5 po bid. Patient remains alexey at night, would keep BB at this dose for now. Continue lipitor 40 -initiate lasix 20mg po qd, and stat dose of lasix 20mg IVP x 1. CXR ordered, will follow up with results  - s/p LHC from RFA access.  - LAD mid 80%, Cx mid and proximal 60%, OM 70-80%, and proximal RPL 90-95%.  -Increase lisinopril to 10mg po qd, continue lopressor 12.5 po bid. Patient remains alexey at night, would keep BB at this dose for now. Continue lipitor 40

## 2018-05-08 NOTE — PROGRESS NOTE ADULT - ASSESSMENT
85 YO F w/ hx of HTN , DM, TIA without residual deficits, no known CAD hx (stress test 11/2017 normal), presents to the ED with NSTEMI, s/p cath,course complicated by sepsis and anemia. Patient and family are deciding on medical managment vs. high-risk PCI, vs. OHS.

## 2018-05-08 NOTE — PROGRESS NOTE ADULT - PROBLEM SELECTOR PLAN 6
DVT: heparin sq    Diet: DASH consistent carbs.    Dispo: Stable on broad spectrum abx. -BP remains slightly elevated, keep lopressor at 12.5 mg po qd, patient is bradycardic while resting.  -start lisinopril 5mg po qd

## 2018-05-08 NOTE — PROGRESS NOTE ADULT - PROBLEM SELECTOR PLAN 5
-BP remains slightly elevated, keep lopressor at 12.5 mg po qd, patient is bradycardic while resting.  -start lisinopril 5mg po qd -endocrine input appreciated. Goal HGBA1c is 7-8. FSticks better controlled, continue current regimen, encourage eating and drinking

## 2018-05-08 NOTE — PROGRESS NOTE ADULT - PROBLEM SELECTOR PLAN 2
- H&H stable, will continue to monitor -No fever, WBC normal.  -blood cultures negative  -urine culture +for E.Coli  -Continue zosyn for 1 more day

## 2018-05-08 NOTE — PROGRESS NOTE ADULT - PROBLEM SELECTOR PLAN 1
Glucose values have been in target range, other than one postprandial value yesterday. continue to monitor on current dose of lantus with correctional insulin  target bg 100-200 mg/dl  Given patient's age, no need for tight control, a1c goal is 7-8%.          C-peptide is 1.0, low end of normal. DC plan: basal insulin only vs basal plus Januvia ( if she develops further postprandial hyperglycemia).  Will follow and discuss with her private endocrinologist, Dr Benavides, prior to DC.

## 2018-05-08 NOTE — PROGRESS NOTE ADULT - SUBJECTIVE AND OBJECTIVE BOX
Date of Admission:  5/1/18  24 hour events:  Patient and family spoke with Dr. Asaf Davidson yesterday.  Will opt for medical management post MI. Will not pursue high-risk PCI or open heart surgery at this time. No further reports of chest pain or SOB  Vital Signs Last 24 Hrs  T(C): 38.3 (08 May 2018 05:10), Max: 38.3 (08 May 2018 05:10)  T(F): 100.9 (08 May 2018 05:10), Max: 100.9 (08 May 2018 05:10)  HR: 81 (08 May 2018 05:00) (77 - 81)  BP: 152/73 (08 May 2018 05:00) (126/72 - 152/73)  BP(mean): --  RR: 17 (08 May 2018 05:00) (17 - 17)  SpO2: 100% (08 May 2018 05:00) (98% - 100%)  I&O's Summary    07 May 2018 07:01  -  08 May 2018 07:00  --------------------------------------------------------  IN: 600 mL / OUT: 550 mL / NET: 50 mL        MEDICATIONS:  aspirin  chewable 81 milliGRAM(s) Oral daily  clopidogrel Tablet 75 milliGRAM(s) Oral daily  heparin  Injectable 5000 Unit(s) SubCutaneous every 12 hours  lisinopril 10 milliGRAM(s) Oral daily  metoprolol tartrate 12.5 milliGRAM(s) Oral two times a day  piperacillin/tazobactam IVPB. 3.375 Gram(s) IV Intermittent every 8 hours  acetaminophen   Tablet 650 milliGRAM(s) Oral every 6 hours PRN  atorvastatin 40 milliGRAM(s) Oral at bedtime  dextrose 50% Injectable 12.5 Gram(s) IV Push once  dextrose 50% Injectable 25 Gram(s) IV Push once  dextrose 50% Injectable 25 Gram(s) IV Push once  dextrose Gel 1 Dose(s) Oral once PRN  glucagon  Injectable 1 milliGRAM(s) IntraMuscular once PRN  insulin glargine Injectable (LANTUS) 5 Unit(s) SubCutaneous at bedtime  insulin lispro (HumaLOG) corrective regimen sliding scale   SubCutaneous three times a day before meals  insulin lispro (HumaLOG) corrective regimen sliding scale   SubCutaneous at bedtime  chlorhexidine 4% Liquid 1 Application(s) Topical <User Schedule>  dextrose 5%. 1000 milliLiter(s) IV Continuous <Continuous>  influenza   Vaccine 0.5 milliLiter(s) IntraMuscular once  lidocaine   Patch 2 Patch Transdermal daily      REVIEW OF SYSTEMS:  Complete 10point ROS negative.    PHYSICAL EXAM:  General: NAD  Cardiovascular: Normal S1 S2, No JVD, No murmurs, No edema  Respiratory: Lungs clear to auscultation	  Gastrointestinal:  Soft, Non-tender, + BS	  Skin: warm and dry, No rashes, No ecchymoses, No cyanosis	  Extremities: Normal range of motion, No clubbing, cyanosis or edema  Vascular: Peripheral pulses palpable 2+ bilaterally    LABS:	 	    CBC Full  -  ( 08 May 2018 06:00 )  WBC Count : 6.84 K/uL  Hemoglobin : 9.7 g/dL  Hematocrit : 31.0 %  Platelet Count - Automated : 361 K/uL  Mean Cell Volume : 82.9 fL  Mean Cell Hemoglobin : 25.9 pg  Mean Cell Hemoglobin Concentration : 31.3 %  Auto Neutrophil # : x  Auto Lymphocyte # : x  Auto Monocyte # : x  Auto Eosinophil # : x  Auto Basophil # : x  Auto Neutrophil % : x  Auto Lymphocyte % : x  Auto Monocyte % : x  Auto Eosinophil % : x  Auto Basophil % : x    05-08    136  |  103  |  11  ----------------------------<  119<H>  3.7   |  23  |  0.83  05-07    138  |  104  |  12  ----------------------------<  136<H>  3.9   |  21<L>  |  0.78    Ca    8.7      08 May 2018 06:00  Ca    8.5      07 May 2018 05:40  Phos  2.6     05-08  Phos  2.0     05-07  Mg     1.9     05-08  Mg     2.1     05-07 Date of Admission:  5/1/18  24 hour events: Patient with dry cough.  Patient and family spoke with Dr. Asaf Davidson yesterday.  Will opt for medical management post MI. Will not pursue high-risk PCI or open heart surgery at this time. No further reports of chest pain or SOB  Vital Signs Last 24 Hrs  T(C): 38.3 (08 May 2018 05:10), Max: 38.3 (08 May 2018 05:10)  T(F): 100.9 (08 May 2018 05:10), Max: 100.9 (08 May 2018 05:10)  HR: 81 (08 May 2018 05:00) (77 - 81)  BP: 152/73 (08 May 2018 05:00) (126/72 - 152/73)  BP(mean): --  RR: 17 (08 May 2018 05:00) (17 - 17)  SpO2: 100% (08 May 2018 05:00) (98% - 100%)  I&O's Summary    07 May 2018 07:01  -  08 May 2018 07:00  --------------------------------------------------------  IN: 600 mL / OUT: 550 mL / NET: 50 mL    MEDICATIONS:  aspirin  chewable 81 milliGRAM(s) Oral daily  clopidogrel Tablet 75 milliGRAM(s) Oral daily  heparin  Injectable 5000 Unit(s) SubCutaneous every 12 hours  lisinopril 10 milliGRAM(s) Oral daily  metoprolol tartrate 12.5 milliGRAM(s) Oral two times a day  piperacillin/tazobactam IVPB. 3.375 Gram(s) IV Intermittent every 8 hours  acetaminophen   Tablet 650 milliGRAM(s) Oral every 6 hours PRN  atorvastatin 40 milliGRAM(s) Oral at bedtime  dextrose 50% Injectable 12.5 Gram(s) IV Push once  dextrose 50% Injectable 25 Gram(s) IV Push once  dextrose 50% Injectable 25 Gram(s) IV Push once  dextrose Gel 1 Dose(s) Oral once PRN  glucagon  Injectable 1 milliGRAM(s) IntraMuscular once PRN  insulin glargine Injectable (LANTUS) 5 Unit(s) SubCutaneous at bedtime  insulin lispro (HumaLOG) corrective regimen sliding scale   SubCutaneous three times a day before meals  insulin lispro (HumaLOG) corrective regimen sliding scale   SubCutaneous at bedtime  chlorhexidine 4% Liquid 1 Application(s) Topical <User Schedule>  dextrose 5%. 1000 milliLiter(s) IV Continuous <Continuous>  influenza   Vaccine 0.5 milliLiter(s) IntraMuscular once  lidocaine   Patch 2 Patch Transdermal daily      REVIEW OF SYSTEMS:  Complete 10point ROS negative.    PHYSICAL EXAM:  General: NAD  Cardiovascular: Normal S1 S2, No JVD, No murmurs, No edema  Respiratory: crackles bilaterally  Gastrointestinal:  Soft, Non-tender, + BS	  Skin: warm and dry, No rashes, No ecchymoses, No cyanosis	  Extremities: Normal range of motion, No clubbing, cyanosis or edema  Vascular: Peripheral pulses palpable 2+ bilaterally    LABS:	 	    CBC Full  -  ( 08 May 2018 06:00 )  WBC Count : 6.84 K/uL  Hemoglobin : 9.7 g/dL  Hematocrit : 31.0 %  Platelet Count - Automated : 361 K/uL  Mean Cell Volume : 82.9 fL  Mean Cell Hemoglobin : 25.9 pg  Mean Cell Hemoglobin Concentration : 31.3 %  Auto Neutrophil # : x  Auto Lymphocyte # : x  Auto Monocyte # : x  Auto Eosinophil # : x  Auto Basophil # : x  Auto Neutrophil % : x  Auto Lymphocyte % : x  Auto Monocyte % : x  Auto Eosinophil % : x  Auto Basophil % : x    05-08    136  |  103  |  11  ----------------------------<  119<H>  3.7   |  23  |  0.83  05-07    138  |  104  |  12  ----------------------------<  136<H>  3.9   |  21<L>  |  0.78    Ca    8.7      08 May 2018 06:00  Ca    8.5      07 May 2018 05:40  Phos  2.6     05-08  Phos  2.0     05-07  Mg     1.9     05-08  Mg     2.1     05-07 Date of Admission:  5/1/18  24 hour events: Patient with dry cough. Low grade fever overnight.  Patient and family spoke with Dr. Asaf Davidson yesterday.  Will opt for medical management post MI. Will not pursue high-risk PCI or open heart surgery at this time. No further reports of chest pain or SOB  Vital Signs Last 24 Hrs  T(C): 38.3 (08 May 2018 05:10), Max: 38.3 (08 May 2018 05:10)  T(F): 100.9 (08 May 2018 05:10), Max: 100.9 (08 May 2018 05:10)  HR: 81 (08 May 2018 05:00) (77 - 81)  BP: 152/73 (08 May 2018 05:00) (126/72 - 152/73)  BP(mean): --  RR: 17 (08 May 2018 05:00) (17 - 17)  SpO2: 100% (08 May 2018 05:00) (98% - 100%)  I&O's Summary    07 May 2018 07:01  -  08 May 2018 07:00  --------------------------------------------------------  IN: 600 mL / OUT: 550 mL / NET: 50 mL    MEDICATIONS:  aspirin  chewable 81 milliGRAM(s) Oral daily  clopidogrel Tablet 75 milliGRAM(s) Oral daily  heparin  Injectable 5000 Unit(s) SubCutaneous every 12 hours  lisinopril 10 milliGRAM(s) Oral daily  metoprolol tartrate 12.5 milliGRAM(s) Oral two times a day  piperacillin/tazobactam IVPB. 3.375 Gram(s) IV Intermittent every 8 hours  acetaminophen   Tablet 650 milliGRAM(s) Oral every 6 hours PRN  atorvastatin 40 milliGRAM(s) Oral at bedtime  dextrose 50% Injectable 12.5 Gram(s) IV Push once  dextrose 50% Injectable 25 Gram(s) IV Push once  dextrose 50% Injectable 25 Gram(s) IV Push once  dextrose Gel 1 Dose(s) Oral once PRN  glucagon  Injectable 1 milliGRAM(s) IntraMuscular once PRN  insulin glargine Injectable (LANTUS) 5 Unit(s) SubCutaneous at bedtime  insulin lispro (HumaLOG) corrective regimen sliding scale   SubCutaneous three times a day before meals  insulin lispro (HumaLOG) corrective regimen sliding scale   SubCutaneous at bedtime  chlorhexidine 4% Liquid 1 Application(s) Topical <User Schedule>  dextrose 5%. 1000 milliLiter(s) IV Continuous <Continuous>  influenza   Vaccine 0.5 milliLiter(s) IntraMuscular once  lidocaine   Patch 2 Patch Transdermal daily      REVIEW OF SYSTEMS:  Complete 10point ROS negative.    PHYSICAL EXAM:  General: NAD  Cardiovascular: Normal S1 S2, No JVD, No murmurs, No edema  Respiratory: crackles bilaterally  Gastrointestinal:  Soft, Non-tender, + BS	  Skin: warm and dry, No rashes, No ecchymoses, No cyanosis	  Extremities: Normal range of motion, No clubbing, cyanosis or edema  Vascular: Peripheral pulses palpable 2+ bilaterally    LABS:	 	    CBC Full  -  ( 08 May 2018 06:00 )  WBC Count : 6.84 K/uL  Hemoglobin : 9.7 g/dL  Hematocrit : 31.0 %  Platelet Count - Automated : 361 K/uL  Mean Cell Volume : 82.9 fL  Mean Cell Hemoglobin : 25.9 pg  Mean Cell Hemoglobin Concentration : 31.3 %  Auto Neutrophil # : x  Auto Lymphocyte # : x  Auto Monocyte # : x  Auto Eosinophil # : x  Auto Basophil # : x  Auto Neutrophil % : x  Auto Lymphocyte % : x  Auto Monocyte % : x  Auto Eosinophil % : x  Auto Basophil % : x    05-08    136  |  103  |  11  ----------------------------<  119<H>  3.7   |  23  |  0.83  05-07    138  |  104  |  12  ----------------------------<  136<H>  3.9   |  21<L>  |  0.78    Ca    8.7      08 May 2018 06:00  Ca    8.5      07 May 2018 05:40  Phos  2.6     05-08  Phos  2.0     05-07  Mg     1.9     05-08  Mg     2.1     05-07

## 2018-05-08 NOTE — PROGRESS NOTE ADULT - PROBLEM SELECTOR PLAN 3
- s/p LHC from RFA access.  - LAD mid 80%, Cx mid and proximal 60%, OM 70-80%, and proximal RPL 90-95%.  -Increase lisinopril to 10mg po qd, continue lopressor 12.5 po bid. Patient remains alexey at night, would keep BB at this dose for now. Continue lipitor 40 -initiate lasix 20mg po qd, and stat dose of lasix 20mg IVP x 1  - s/p LHC from RFA access.  - LAD mid 80%, Cx mid and proximal 60%, OM 70-80%, and proximal RPL 90-95%.  -Increase lisinopril to 10mg po qd, continue lopressor 12.5 po bid. Patient remains alexey at night, would keep BB at this dose for now. Continue lipitor 40 - H&H stable, will continue to monitor

## 2018-05-08 NOTE — PROGRESS NOTE ADULT - PROBLEM SELECTOR PLAN 7
PT evaluation suggests home with home PT. Please obtain  evaluation. Potential discharge plan for tomorrow, once IV antibiotic is completed. DVT: heparin sq    Diet: DASH consistent carbs.    Dispo: Stable on broad spectrum abx.

## 2018-05-08 NOTE — PROGRESS NOTE ADULT - PROBLEM SELECTOR PLAN 1
-No fever, WBC normal.  -blood cultures negative  -urine culture +for E.Coli  -Continue zosyn for 1 more day T max of 100.9 overnight. Blood cultures x 2 ordered, UA, Urine culture ordered. Monitor temps q4. Treat with tylenol. Keep on zosyn for now.

## 2018-05-09 DIAGNOSIS — Z51.5 ENCOUNTER FOR PALLIATIVE CARE: ICD-10-CM

## 2018-05-09 DIAGNOSIS — A41.9 SEPSIS, UNSPECIFIED ORGANISM: ICD-10-CM

## 2018-05-09 DIAGNOSIS — M19.90 UNSPECIFIED OSTEOARTHRITIS, UNSPECIFIED SITE: ICD-10-CM

## 2018-05-09 DIAGNOSIS — I25.10 ATHEROSCLEROTIC HEART DISEASE OF NATIVE CORONARY ARTERY WITHOUT ANGINA PECTORIS: ICD-10-CM

## 2018-05-09 LAB
B PERT DNA SPEC QL NAA+PROBE: SIGNIFICANT CHANGE UP
BASOPHILS # BLD AUTO: 0.04 K/UL — SIGNIFICANT CHANGE UP (ref 0–0.2)
BASOPHILS NFR BLD AUTO: 0.2 % — SIGNIFICANT CHANGE UP (ref 0–2)
BUN SERPL-MCNC: 16 MG/DL — SIGNIFICANT CHANGE UP (ref 7–23)
C PNEUM DNA SPEC QL NAA+PROBE: NOT DETECTED — SIGNIFICANT CHANGE UP
CALCIUM SERPL-MCNC: 9.1 MG/DL — SIGNIFICANT CHANGE UP (ref 8.4–10.5)
CHLORIDE SERPL-SCNC: 98 MMOL/L — SIGNIFICANT CHANGE UP (ref 98–107)
CO2 SERPL-SCNC: 26 MMOL/L — SIGNIFICANT CHANGE UP (ref 22–31)
CREAT SERPL-MCNC: 1.07 MG/DL — SIGNIFICANT CHANGE UP (ref 0.5–1.3)
EOSINOPHIL # BLD AUTO: 0.01 K/UL — SIGNIFICANT CHANGE UP (ref 0–0.5)
EOSINOPHIL NFR BLD AUTO: 0.1 % — SIGNIFICANT CHANGE UP (ref 0–6)
FLUAV H1 2009 PAND RNA SPEC QL NAA+PROBE: NOT DETECTED — SIGNIFICANT CHANGE UP
FLUAV H1 RNA SPEC QL NAA+PROBE: NOT DETECTED — SIGNIFICANT CHANGE UP
FLUAV H3 RNA SPEC QL NAA+PROBE: NOT DETECTED — SIGNIFICANT CHANGE UP
FLUAV SUBTYP SPEC NAA+PROBE: SIGNIFICANT CHANGE UP
FLUBV RNA SPEC QL NAA+PROBE: NOT DETECTED — SIGNIFICANT CHANGE UP
GLUCOSE BLDC GLUCOMTR-MCNC: 124 MG/DL — HIGH (ref 70–99)
GLUCOSE BLDC GLUCOMTR-MCNC: 132 MG/DL — HIGH (ref 70–99)
GLUCOSE BLDC GLUCOMTR-MCNC: 155 MG/DL — HIGH (ref 70–99)
GLUCOSE BLDC GLUCOMTR-MCNC: 194 MG/DL — HIGH (ref 70–99)
GLUCOSE SERPL-MCNC: 139 MG/DL — HIGH (ref 70–99)
HADV DNA SPEC QL NAA+PROBE: NOT DETECTED — SIGNIFICANT CHANGE UP
HCOV 229E RNA SPEC QL NAA+PROBE: NOT DETECTED — SIGNIFICANT CHANGE UP
HCOV HKU1 RNA SPEC QL NAA+PROBE: NOT DETECTED — SIGNIFICANT CHANGE UP
HCOV NL63 RNA SPEC QL NAA+PROBE: NOT DETECTED — SIGNIFICANT CHANGE UP
HCOV OC43 RNA SPEC QL NAA+PROBE: NOT DETECTED — SIGNIFICANT CHANGE UP
HCT VFR BLD CALC: 33.7 % — LOW (ref 34.5–45)
HCT VFR BLD CALC: 33.7 % — LOW (ref 34.5–45)
HGB BLD-MCNC: 10.3 G/DL — LOW (ref 11.5–15.5)
HGB BLD-MCNC: 10.3 G/DL — LOW (ref 11.5–15.5)
HMPV RNA SPEC QL NAA+PROBE: NOT DETECTED — SIGNIFICANT CHANGE UP
HPIV1 RNA SPEC QL NAA+PROBE: NOT DETECTED — SIGNIFICANT CHANGE UP
HPIV2 RNA SPEC QL NAA+PROBE: NOT DETECTED — SIGNIFICANT CHANGE UP
HPIV3 RNA SPEC QL NAA+PROBE: NOT DETECTED — SIGNIFICANT CHANGE UP
HPIV4 RNA SPEC QL NAA+PROBE: NOT DETECTED — SIGNIFICANT CHANGE UP
IMM GRANULOCYTES # BLD AUTO: 0.26 # — SIGNIFICANT CHANGE UP
IMM GRANULOCYTES NFR BLD AUTO: 1.5 % — SIGNIFICANT CHANGE UP (ref 0–1.5)
LYMPHOCYTES # BLD AUTO: 15.5 % — SIGNIFICANT CHANGE UP (ref 13–44)
LYMPHOCYTES # BLD AUTO: 2.75 K/UL — SIGNIFICANT CHANGE UP (ref 1–3.3)
M PNEUMO DNA SPEC QL NAA+PROBE: NOT DETECTED — SIGNIFICANT CHANGE UP
MAGNESIUM SERPL-MCNC: 2.1 MG/DL — SIGNIFICANT CHANGE UP (ref 1.6–2.6)
MCHC RBC-ENTMCNC: 25.8 PG — LOW (ref 27–34)
MCHC RBC-ENTMCNC: 25.8 PG — LOW (ref 27–34)
MCHC RBC-ENTMCNC: 30.6 % — LOW (ref 32–36)
MCHC RBC-ENTMCNC: 30.6 % — LOW (ref 32–36)
MCV RBC AUTO: 84.5 FL — SIGNIFICANT CHANGE UP (ref 80–100)
MCV RBC AUTO: 84.5 FL — SIGNIFICANT CHANGE UP (ref 80–100)
MONOCYTES # BLD AUTO: 1.73 K/UL — HIGH (ref 0–0.9)
MONOCYTES NFR BLD AUTO: 9.8 % — SIGNIFICANT CHANGE UP (ref 2–14)
NEUTROPHILS # BLD AUTO: 12.91 K/UL — HIGH (ref 1.8–7.4)
NEUTROPHILS NFR BLD AUTO: 72.9 % — SIGNIFICANT CHANGE UP (ref 43–77)
NRBC # FLD: 0 — SIGNIFICANT CHANGE UP
NRBC # FLD: 0 — SIGNIFICANT CHANGE UP
PHOSPHATE SERPL-MCNC: 3.4 MG/DL — SIGNIFICANT CHANGE UP (ref 2.5–4.5)
PLATELET # BLD AUTO: 395 K/UL — SIGNIFICANT CHANGE UP (ref 150–400)
PLATELET # BLD AUTO: 395 K/UL — SIGNIFICANT CHANGE UP (ref 150–400)
PMV BLD: 11 FL — SIGNIFICANT CHANGE UP (ref 7–13)
PMV BLD: 11 FL — SIGNIFICANT CHANGE UP (ref 7–13)
POTASSIUM SERPL-MCNC: 3.6 MMOL/L — SIGNIFICANT CHANGE UP (ref 3.5–5.3)
POTASSIUM SERPL-SCNC: 3.6 MMOL/L — SIGNIFICANT CHANGE UP (ref 3.5–5.3)
RBC # BLD: 3.99 M/UL — SIGNIFICANT CHANGE UP (ref 3.8–5.2)
RBC # BLD: 3.99 M/UL — SIGNIFICANT CHANGE UP (ref 3.8–5.2)
RBC # FLD: 16.7 % — HIGH (ref 10.3–14.5)
RBC # FLD: 16.7 % — HIGH (ref 10.3–14.5)
RSV RNA SPEC QL NAA+PROBE: NOT DETECTED — SIGNIFICANT CHANGE UP
RV+EV RNA SPEC QL NAA+PROBE: NOT DETECTED — SIGNIFICANT CHANGE UP
SODIUM SERPL-SCNC: 135 MMOL/L — SIGNIFICANT CHANGE UP (ref 135–145)
SPECIMEN SOURCE: SIGNIFICANT CHANGE UP
SPECIMEN SOURCE: SIGNIFICANT CHANGE UP
WBC # BLD: 17.05 K/UL — HIGH (ref 3.8–10.5)
WBC # BLD: 17.05 K/UL — HIGH (ref 3.8–10.5)
WBC # FLD AUTO: 17.05 K/UL — HIGH (ref 3.8–10.5)
WBC # FLD AUTO: 17.05 K/UL — HIGH (ref 3.8–10.5)

## 2018-05-09 PROCEDURE — 99232 SBSQ HOSP IP/OBS MODERATE 35: CPT

## 2018-05-09 PROCEDURE — 71250 CT THORAX DX C-: CPT | Mod: 26

## 2018-05-09 PROCEDURE — 99223 1ST HOSP IP/OBS HIGH 75: CPT

## 2018-05-09 RX ORDER — MEROPENEM 1 G/30ML
500 INJECTION INTRAVENOUS ONCE
Qty: 0 | Refills: 0 | Status: COMPLETED | OUTPATIENT
Start: 2018-05-09 | End: 2018-05-09

## 2018-05-09 RX ORDER — MEROPENEM 1 G/30ML
INJECTION INTRAVENOUS
Qty: 0 | Refills: 0 | Status: DISCONTINUED | OUTPATIENT
Start: 2018-05-09 | End: 2018-05-11

## 2018-05-09 RX ORDER — MEROPENEM 1 G/30ML
500 INJECTION INTRAVENOUS EVERY 12 HOURS
Qty: 0 | Refills: 0 | Status: DISCONTINUED | OUTPATIENT
Start: 2018-05-10 | End: 2018-05-11

## 2018-05-09 RX ORDER — VANCOMYCIN HCL 1 G
750 VIAL (EA) INTRAVENOUS ONCE
Qty: 0 | Refills: 0 | Status: COMPLETED | OUTPATIENT
Start: 2018-05-09 | End: 2018-05-09

## 2018-05-09 RX ORDER — VANCOMYCIN HCL 1 G
750 VIAL (EA) INTRAVENOUS EVERY 24 HOURS
Qty: 0 | Refills: 0 | Status: DISCONTINUED | OUTPATIENT
Start: 2018-05-10 | End: 2018-05-11

## 2018-05-09 RX ORDER — SODIUM CHLORIDE 9 MG/ML
500 INJECTION INTRAMUSCULAR; INTRAVENOUS; SUBCUTANEOUS
Qty: 0 | Refills: 0 | Status: DISCONTINUED | OUTPATIENT
Start: 2018-05-09 | End: 2018-05-09

## 2018-05-09 RX ORDER — VANCOMYCIN HCL 1 G
VIAL (EA) INTRAVENOUS
Qty: 0 | Refills: 0 | Status: DISCONTINUED | OUTPATIENT
Start: 2018-05-09 | End: 2018-05-11

## 2018-05-09 RX ADMIN — Medication 20 MILLIGRAM(S): at 05:11

## 2018-05-09 RX ADMIN — Medication 12.5 MILLIGRAM(S): at 05:11

## 2018-05-09 RX ADMIN — LISINOPRIL 10 MILLIGRAM(S): 2.5 TABLET ORAL at 05:10

## 2018-05-09 RX ADMIN — ATORVASTATIN CALCIUM 40 MILLIGRAM(S): 80 TABLET, FILM COATED ORAL at 22:27

## 2018-05-09 RX ADMIN — HEPARIN SODIUM 5000 UNIT(S): 5000 INJECTION INTRAVENOUS; SUBCUTANEOUS at 06:40

## 2018-05-09 RX ADMIN — Medication 12.5 MILLIGRAM(S): at 16:51

## 2018-05-09 RX ADMIN — INSULIN GLARGINE 5 UNIT(S): 100 INJECTION, SOLUTION SUBCUTANEOUS at 22:27

## 2018-05-09 RX ADMIN — CLOPIDOGREL BISULFATE 75 MILLIGRAM(S): 75 TABLET, FILM COATED ORAL at 15:01

## 2018-05-09 RX ADMIN — Medication 1: at 17:50

## 2018-05-09 RX ADMIN — HEPARIN SODIUM 5000 UNIT(S): 5000 INJECTION INTRAVENOUS; SUBCUTANEOUS at 16:52

## 2018-05-09 RX ADMIN — Medication 81 MILLIGRAM(S): at 15:01

## 2018-05-09 RX ADMIN — PIPERACILLIN AND TAZOBACTAM 25 GRAM(S): 4; .5 INJECTION, POWDER, LYOPHILIZED, FOR SOLUTION INTRAVENOUS at 05:17

## 2018-05-09 RX ADMIN — MEROPENEM 100 MILLIGRAM(S): 1 INJECTION INTRAVENOUS at 15:01

## 2018-05-09 RX ADMIN — Medication 250 MILLIGRAM(S): at 15:01

## 2018-05-09 NOTE — CONSULT NOTE ADULT - ASSESSMENT
84F with DM, HTN, HLD, TIA and Osteoarthritis presented 5/1/18 for symptomatic hypoglycemia but also found to be in NSTEMI and admitted to CCU. Developed nonfocal fevers 5/1-5/3, then recurred 5/8, Tmax 105.6F with rise in white count to 17. Looks remarkably well. Blood culture all negative. Urine E. coli but she was asymptomatic. RVP negative. Repeat CXR 5/8 with new bilateral midlung opacities but clinically not a bacterial pneumonia, not even coughing. Right arm ecchymosis without cellulitis.   Received Ceftriaxone 5/1, switched to and still on Zosyn since 5/2, Azithromycin added 5/8. Vancomycin 5/3-5/5.     Recommend   -agree with CT chest   -repeat RVP? 84F with DM, HTN, HLD, TIA and Osteoarthritis presented 5/1/18 for symptomatic hypoglycemia but also found to be in NSTEMI and admitted to CCU. Developed nonfocal fevers 5/1-5/3, then recurred 5/8, Tmax 105.6F with rise in white count to 17. Looks remarkably well.   Not clearly an infectious etiology. Blood cultures negative. Urine E. coli but she was asymptomatic. RVP negative. Repeat CXR 5/8 new bilateral midlung opacities, CT with pericardial effusion and loculated pleural effusion in both fissures, clinically not a bacterial pneumonia, not even coughing. Right arm ecchymosis without cellulitis.   Received Ceftriaxone 5/1, switched to and still on Zosyn since 5/2, Azithromycin added 5/8. Vancomycin 5/3-5/5.   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     Recommend   -broaden Zosyn to Meropenem 500mg IV q12h for now given fevers while on broad antibiotics   -restart Vancomycin 750mg q24h   -agree with CT chest   -check serum procalcitonin level tomorrow   -Pulmonary eval     Discussed with Dr Andre and primary team

## 2018-05-09 NOTE — DIETITIAN INITIAL EVALUATION ADULT. - PERTINENT LABORATORY DATA
(5/9) WBC 17.05 H, H/H 10.3/33.7 L, Glu 139 H;            (5/6) Albumin 2.0 L;         (5/2) HbA1c 6.4% H

## 2018-05-09 NOTE — PROGRESS NOTE ADULT - PROBLEM SELECTOR PLAN 6
-BP remains slightly elevated, keep lopressor at 12.5 mg po qd, patient is bradycardic while resting.  -start lisinopril 5mg po qd

## 2018-05-09 NOTE — DIETITIAN INITIAL EVALUATION ADULT. - SOURCE
patient/family/significant other family/significant other/other (specify)/patient/Daughter @ bed side, Nurse, Tele PA, Medical Chart

## 2018-05-09 NOTE — DIETITIAN INITIAL EVALUATION ADULT. - NS AS NUTRI INTERV MEALS SNACK
Other (specify)/1. Suggest: PO diet rx: Mechanical Soft; Consistent Carbohydrate (no snacks), DASH/TLC (cholesterol and sodium restricted); PO supplement: Glucerna Shake 8oz. 2x daily (will provide additional ~440kcals, 20g protein);              2. Encourage & assist Pt with meals; Monitor PO diet tolerance; Honor food preferences;           3. Monitor labs, weights, hydration status;/Texture-modified diet

## 2018-05-09 NOTE — PROVIDER CONTACT NOTE (MEDICATION) - BACKGROUND
PT had fever 105.6 requiring antipyretics and cooling blanket. Tachycardia at 102. Diagnosed with PNA.

## 2018-05-09 NOTE — DIETITIAN INITIAL EVALUATION ADULT. - DIET TYPE
regular/consistent carbohydrate (no snacks)/DASH/TLC (sodium and cholesterol restricted diet)/low fat

## 2018-05-09 NOTE — CONSULT NOTE ADULT - ATTENDING COMMENTS
Superinfection with resistant pathogen with pneumonia of concern  On CT scan, it appears there is fluid confined to fissures as opposed to consolidation in lung parenchyma. There is possible pericarditis. 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    Consider pulmonary consult, echocardiogram  If PCT < 0.5, would consider Colchicine
84F DM2, primary hyperparathyroidism found with hypoglycemia and CAD. On mixed insulin at home. Check c-pepide. Will consider basal bolus vs. basal plus prandin for dc. Outpatient endocrine follow up with Dr. Benavides.

## 2018-05-09 NOTE — CHART NOTE - NSCHARTNOTEFT_GEN_A_CORE
Patient was febrile with T max of 105.6 - pt admitted with sepsis on multiple antibiotics, Blood cultures x 2 sets testing  Patient denies chills, n, v, abd pain, diaphoresis, chest pain / palpitation          VITAL SIGNS:  Vital Signs Last 24 Hrs  T(C): 37 (09 May 2018 03:43), Max: 40.9 (08 May 2018 19:53)  T(F): 98.6 (09 May 2018 03:43), Max: 105.6 (08 May 2018 19:53)  HR: 71 (09 May 2018 02:56) (60 - 102)  BP: 109/62 (09 May 2018 02:56) (83/54 - 152/73)    RR: 14 (09 May 2018 01:14) (14 - 20)  SpO2: 100% (09 May 2018 01:14) (97% - 100%)    O:  Well developed, well nourished elderly white  Female found laying n bed  A & O x 3 in NAD      No Known Allergies      MEDICATIONS  (STANDING):  aspirin  chewable 81 milliGRAM(s) Oral daily  atorvastatin 40 milliGRAM(s) Oral at bedtime  azithromycin  IVPB      azithromycin  IVPB 500 milliGRAM(s) IV Intermittent every 24 hours  clopidogrel Tablet 75 milliGRAM(s) Oral daily  dextrose 5%. 1000 milliLiter(s) (50 mL/Hr) IV Continuous <Continuous>  dextrose 50% Injectable 12.5 Gram(s) IV Push once  dextrose 50% Injectable 25 Gram(s) IV Push once  dextrose 50% Injectable 25 Gram(s) IV Push once  furosemide    Tablet 20 milliGRAM(s) Oral daily  heparin  Injectable 5000 Unit(s) SubCutaneous every 12 hours  influenza   Vaccine 0.5 milliLiter(s) IntraMuscular once  insulin glargine Injectable (LANTUS) 5 Unit(s) SubCutaneous at bedtime  insulin lispro (HumaLOG) corrective regimen sliding scale   SubCutaneous three times a day before meals  insulin lispro (HumaLOG) corrective regimen sliding scale   SubCutaneous at bedtime  lidocaine   Patch 2 Patch Transdermal daily  lisinopril 10 milliGRAM(s) Oral daily  metoprolol tartrate 12.5 milliGRAM(s) Oral two times a day  piperacillin/tazobactam IVPB. 3.375 Gram(s) IV Intermittent every 8 hours    MEDICATIONS  (PRN):  acetaminophen   Tablet 650 milliGRAM(s) Oral every 6 hours PRN For Temp greater than 38 C (100.4 F)  dextrose Gel 1 Dose(s) Oral once PRN Blood Glucose LESS THAN 70 milliGRAM(s)/deciliter  glucagon  Injectable 1 milliGRAM(s) IntraMuscular once PRN Glucose LESS THAN 70 milligrams/deciliter                            9.7    6.84  )-----------( 361      ( 08 May 2018 06:00 )             31.0     05-08    136  |  103  |  11  ----------------------------<  119<H>  3.7   |  23  |  0.83    Ca    8.7      08 May 2018 06:00  Phos  2.6     05-08  Mg     1.9     05-08            CAPILLARY BLOOD GLUCOSE      POCT Blood Glucose.: 266 mg/dL (08 May 2018 22:42)  POCT Blood Glucose.: 215 mg/dL (08 May 2018 18:10)  POCT Blood Glucose.: 115 mg/dL (08 May 2018 08:37)      Urinalysis Basic - ( 08 May 2018 11:03 )    Color: COLORL / Appearance: CLEAR / S.009 / pH: 6.5  Gluc: NEGATIVE / Ketone: NEGATIVE  / Bili: NEGATIVE / Urobili: NORMAL mg/dL   Blood: NEGATIVE / Protein: 10 mg/dL / Nitrite: NEGATIVE   Leuk Esterase: NEGATIVE / RBC: 0-2 / WBC 0-2   Sq Epi: x / Non Sq Epi: x / Bacteria: x        Assessment: 83y/o female admitted with ACS / NSTEMI s/p Cath medical therapy, UTI / PNA on antibiotics now with recurrent fever, pt asympt - Blood cultures & urine culture testing, will continue antibiotics, order coolng blanket, Tylenol for fever. Will call ID c/s in am in view of recurrent fevers while on antibiotics.     Addendum Note:   Repeat BP was found to be low pt pt was asympt, likely 2/2 to dehydration - started on normal saline IVF for gentle hydration      Above events and plan d/w hospitalist on call Dr Wilson     however per RN the pt's BP improved so IVF were not given

## 2018-05-09 NOTE — PROGRESS NOTE ADULT - PROBLEM SELECTOR PLAN 1
-Tmax of 104.6 at 10pm yesterday. Elevated WBC of 17.  -Infectious disease consulted  -Blood cultures repeated today.  -CXR suggestive of PNA  -continue zosyn and azithromycin

## 2018-05-09 NOTE — PROGRESS NOTE ADULT - PROBLEM SELECTOR PLAN 1
Glucose values have been variable, mostly in target range, with no consistent patterns. Continue to monitor on current dose of lantus with correctional insulin  target bg 100-200 mg/dl  Given patient's age, no need for tight control, a1c goal is 7-8%.          C-peptide is 1.0, low end of normal. DC plan: basal insulin only vs basal plus Januvia ( if she develops consistent postprandial hyperglycemia).  Will follow and discuss with her private endocrinologist, Dr Benavides, prior to DC.

## 2018-05-09 NOTE — PROGRESS NOTE ADULT - SUBJECTIVE AND OBJECTIVE BOX
Diabetes  Follow up note    Interval History: Feeling well, no nausea or vomiting,     MEDICATIONS  (STANDING):  aspirin  chewable 81 milliGRAM(s) Oral daily  atorvastatin 40 milliGRAM(s) Oral at bedtime  clopidogrel Tablet 75 milliGRAM(s) Oral daily  dextrose 5%. 1000 milliLiter(s) (50 mL/Hr) IV Continuous <Continuous>  dextrose 50% Injectable 12.5 Gram(s) IV Push once  dextrose 50% Injectable 25 Gram(s) IV Push once  dextrose 50% Injectable 25 Gram(s) IV Push once  furosemide    Tablet 20 milliGRAM(s) Oral daily  heparin  Injectable 5000 Unit(s) SubCutaneous every 12 hours  influenza   Vaccine 0.5 milliLiter(s) IntraMuscular once  insulin glargine Injectable (LANTUS) 5 Unit(s) SubCutaneous at bedtime  insulin lispro (HumaLOG) corrective regimen sliding scale   SubCutaneous three times a day before meals  insulin lispro (HumaLOG) corrective regimen sliding scale   SubCutaneous at bedtime  lidocaine   Patch 2 Patch Transdermal daily  lisinopril 10 milliGRAM(s) Oral daily  meropenem  IVPB      metoprolol tartrate 12.5 milliGRAM(s) Oral two times a day  vancomycin  IVPB        MEDICATIONS  (PRN):  acetaminophen   Tablet 650 milliGRAM(s) Oral every 6 hours PRN For Temp greater than 38 C (100.4 F)  dextrose Gel 1 Dose(s) Oral once PRN Blood Glucose LESS THAN 70 milliGRAM(s)/deciliter  glucagon  Injectable 1 milliGRAM(s) IntraMuscular once PRN Glucose LESS THAN 70 milligrams/deciliter      Allergies    No Known Allergies    PHYSICAL EXAM:  VITALS: T(C): 36.7 (05-09-18 @ 16:45)  T(F): 98 (05-09-18 @ 16:45), Max: 105.6 (05-08-18 @ 19:53)  HR: 72 (05-09-18 @ 16:45) (68 - 102)  BP: 123/64 (05-09-18 @ 16:45) (83/54 - 133/66)  RR:  (14 - 20)  SpO2:  (96% - 100%)  GENERAL: Sitting up in bed in NAD,   EYES: No proptosis,  HEENT:  Atraumatic, Normocephalic,   RESPIRATORY: Clear to auscultation bilaterally  CARDIOVASCULAR: Regular rhythm; No murmurs; no peripheral edema  GI: Soft, nontender, non distended, normal bowel sounds        POCT Blood Glucose.: 194 mg/dL (05-09-18 @ 17:41)  POCT Blood Glucose.: 132 mg/dL (05-09-18 @ 12:34)  POCT Blood Glucose.: 124 mg/dL (05-09-18 @ 08:41)  POCT Blood Glucose.: 266 mg/dL (05-08-18 @ 22:42)  POCT Blood Glucose.: 215 mg/dL (05-08-18 @ 18:10)  POCT Blood Glucose.: 115 mg/dL (05-08-18 @ 08:37)  POCT Blood Glucose.: 157 mg/dL (05-07-18 @ 22:25)  POCT Blood Glucose.: 130 mg/dL (05-07-18 @ 17:39)  POCT Blood Glucose.: 276 mg/dL (05-07-18 @ 13:21)  POCT Blood Glucose.: 125 mg/dL (05-07-18 @ 08:58)  POCT Blood Glucose.: 173 mg/dL (05-06-18 @ 22:41)        05-09    135  |  98  |  16  ----------------------------<  139<H>  3.6   |  26  |  1.07    EGFR if : 55  EGFR if non : 48    Ca    9.1      05-09  Mg     2.1     05-09  Phos  3.4     05-09          Hemoglobin A1C, Whole Blood: 6.4 % <H> [4.0 - 5.6] (05-02-18 @ 05:54)

## 2018-05-09 NOTE — PROGRESS NOTE ADULT - PROBLEM SELECTOR PLAN 8
PT evaluation suggests home with home PT. Please obtain  evaluation. Potential discharge plan for tomorrow, once IV antibiotic is completed.
PT evaluation suggests home with home PT. Please obtain  evaluation. Potential discharge plan for tomorrow, once IV antibiotic is completed.

## 2018-05-09 NOTE — DIETITIAN INITIAL EVALUATION ADULT. - SIGNS/SYMPTOMS
As evidenced by unintended weight loss of ~25# in 6 months (Per daughter) As evidenced by Pt C/O chewing difficulty (despite of her denture use)

## 2018-05-09 NOTE — CHART NOTE - NSCHARTNOTEFT_GEN_A_CORE
D/w primary team. No need for Pall Care consult at this time. Please reconsult should any palliative need arise.

## 2018-05-09 NOTE — CONSULT NOTE ADULT - SUBJECTIVE AND OBJECTIVE BOX
HPI:  84F with DM, HTN, HLD, TIA and Osteoarthritis was admitted 18 for altered mentation after she was found half on the floor and half on her couch and confused by her son in the middle of the night.   Admitted in 2017 for uncontrolled HTN, had a normal stress test.       PAST MEDICAL & SURGICAL HISTORY:  Arthritis  Bladder disorder  Bladder Prolapse, Acquired  Dry eyes  CVA (cerebrovascular accident): 2013  History of pancreatitis: &#x27;   Dyslipidemia  Diabetes mellitus type 2 in nonobese  HTN (hypertension)  Bladder Prolapse, Acquired: &#x27; ;  Insertion Pessary  S/P laparotomy: initially to remove pancreatic mass but did not visualize mass and closed: &#x27;       Allergies    No Known Allergies    Intolerances        ANTIMICROBIALS:  azithromycin  IVPB    azithromycin  IVPB 500 every 24 hours  piperacillin/tazobactam IVPB. 3.375 every 8 hours      OTHER MEDS:  acetaminophen   Tablet 650 milliGRAM(s) Oral every 6 hours PRN  aspirin  chewable 81 milliGRAM(s) Oral daily  atorvastatin 40 milliGRAM(s) Oral at bedtime  clopidogrel Tablet 75 milliGRAM(s) Oral daily  dextrose 5%. 1000 milliLiter(s) IV Continuous <Continuous>  dextrose 50% Injectable 12.5 Gram(s) IV Push once  dextrose 50% Injectable 25 Gram(s) IV Push once  dextrose 50% Injectable 25 Gram(s) IV Push once  dextrose Gel 1 Dose(s) Oral once PRN  furosemide    Tablet 20 milliGRAM(s) Oral daily  glucagon  Injectable 1 milliGRAM(s) IntraMuscular once PRN  heparin  Injectable 5000 Unit(s) SubCutaneous every 12 hours  influenza   Vaccine 0.5 milliLiter(s) IntraMuscular once  insulin glargine Injectable (LANTUS) 5 Unit(s) SubCutaneous at bedtime  insulin lispro (HumaLOG) corrective regimen sliding scale   SubCutaneous three times a day before meals  insulin lispro (HumaLOG) corrective regimen sliding scale   SubCutaneous at bedtime  lidocaine   Patch 2 Patch Transdermal daily  lisinopril 10 milliGRAM(s) Oral daily  metoprolol tartrate 12.5 milliGRAM(s) Oral two times a day      SOCIAL HISTORY:    Marital Status:  (   )    (  ) Single    (   )    (  )   Occupation:   Lives with: (  ) alone  (  ) children   (  ) spouse   (  ) parents  (  ) other    Substance Use (street drugs): (  ) never used  (  ) other:  Tobacco Usage:  (  ) never smoked   (   ) former smoker   (   ) current smoker  (     ) pack year  (        ) last cigarette date  Alcohol Usage: Social EtOH    FAMILY HISTORY:  No pertinent family history in first degree relatives      ROS:  Unobtainable because:   All other systems negative     Constitutional: no fever, no chills, no weight loss, no night sweats  HEENT:  no vision changes, no sore throat, no rhinorrhea  Cardiovascular:  no chest pain, no palpitation  Respiratory:  no SOB, no cough  GI:  no abd pain, no vomiting, no diarrhea  urinary: no dysuria, no hematuria, no flank pain  musculoskeletal:  no joint pain, no joint swelling  skin:  no rash  neurology:  no headache, no seizure, no change in mental status    Physical Exam:    General:    NAD, non toxic, A&O x3  HEENT:   no oropharyngeal lesions, no LAD, neck supple  Cardiovascular:    regular S1,S2, no murmur  Respiratory:   clear b/l, no wheezing  abd:   soft, BS +, not tender, no hepatosplenomegaly  :     no CVAT, no spurapubic tenderness, no sanchez  Musculoskeletal : no joint swelling, no edema  Skin:    no rash  Neurologic:     no focal deficits      Drug Dosing Weight  Height (cm): 157.5 (01 May 2018 11:31)  Weight (kg): 40.37 (01 May 2018 11:31)  BMI (kg/m2): 16.3 (01 May 2018 11:31)  BSA (m2): 1.36 (01 May 2018 11:31)    Vital Signs Last 24 Hrs  T(F): 99 (18 @ 06:00), Max: 105.6 (18 @ 19:53)    Vital Signs Last 24 Hrs  HR: 77 (18 @ 06:00) (60 - 102)  BP: 133/66 (18 @ 06:00) (83/54 - 133/66)  RR: 17 (18 @ 06:00)  SpO2: 96% (18 @ 06:00) (96% - 100%)  Wt(kg): --                          10.3   17.05 )-----------( 395      ( 09 May 2018 06:54 )             33.7           135  |  98  |  16  ----------------------------<  139<H>  3.6   |  26  |  1.07    Ca    9.1      09 May 2018 06:54  Phos  3.4       Mg     2.1             Urinalysis Basic - ( 08 May 2018 11:03 )    Color: COLORL / Appearance: CLEAR / S.009 / pH: 6.5  Gluc: NEGATIVE / Ketone: NEGATIVE  / Bili: NEGATIVE / Urobili: NORMAL mg/dL   Blood: NEGATIVE / Protein: 10 mg/dL / Nitrite: NEGATIVE   Leuk Esterase: NEGATIVE / RBC: 0-2 / WBC 0-2   Sq Epi: x / Non Sq Epi: x / Bacteria: x        MICROBIOLOGY:  v  BLOOD PERIPHERAL  18 --  --  --      BLOOD  18 --  --  --      BLOOD PERIPHERAL  18 --  --  --      BLOOD  18 --  --  --      BLOOD VENOUS  18 --  --  --      BLOOD-CATHETER  18 --  --  --      URINE MIDSTREAM  18 --  --  Escherichia coli                  RADIOLOGY:  Xray Chest 1 View- PORTABLE-Urgent (18 @ 09:56)   New bilateral focal large opacities in the midlungs raising   the possibility of loculated pleural effusions in the fissures although   atelectasis or multifocal pneumonia is not excluded. CT scan of the chest   could be performed for further characterization if felt to be clinically indicated.  New small loculated left pleural effusion along the lateral left hemithorax.    Xray Chest 1 View- PORTABLE-Routine (18 @ 12:50)   Cardiac size is not accurately assessed in this projection.  There is obscuration of the medial aspect of the left hemidiaphragm which   may be related to atelectasis and/or pneumonia. PA and lateral x-rays of   the chest can be performed for further evaluation..  No pleural effusions or pneumothorax.  No acute osseous abnormality.    Xray Chest 2 Views PA/Lat (18 @ 07:18)   Clear lungs. HPI:  84F with DM, HTN, HLD, TIA and Osteoarthritis presented Tuesday morning 18 after her grandson found her nursing home slid off the couch and confused. She always sleeps on the couch and is normally fully oriented but at the time was incoherent and babbling. EMS found her blood glucose level to be 50 and she improved "back to her normal self" after eating a peanut butter and jelly sandwich.   Admitted in 2017 for uncontrolled HTN, had a normal stress test.       PAST MEDICAL & SURGICAL HISTORY:  Arthritis  Bladder disorder  Bladder Prolapse, Acquired  Dry eyes  CVA (cerebrovascular accident): 2013  History of pancreatitis: &#x27;   Dyslipidemia  Diabetes mellitus type 2 in nonobese  HTN (hypertension)  Bladder Prolapse, Acquired: &#x27; ;  Insertion Pessary  S/P laparotomy: initially to remove pancreatic mass but did not visualize mass and closed: &#x27;       Allergies    No Known Allergies    Intolerances        ANTIMICROBIALS:  azithromycin  IVPB    azithromycin  IVPB 500 every 24 hours  piperacillin/tazobactam IVPB. 3.375 every 8 hours      OTHER MEDS:  acetaminophen   Tablet 650 milliGRAM(s) Oral every 6 hours PRN  aspirin  chewable 81 milliGRAM(s) Oral daily  atorvastatin 40 milliGRAM(s) Oral at bedtime  clopidogrel Tablet 75 milliGRAM(s) Oral daily  dextrose 5%. 1000 milliLiter(s) IV Continuous <Continuous>  dextrose 50% Injectable 12.5 Gram(s) IV Push once  dextrose 50% Injectable 25 Gram(s) IV Push once  dextrose 50% Injectable 25 Gram(s) IV Push once  dextrose Gel 1 Dose(s) Oral once PRN  furosemide    Tablet 20 milliGRAM(s) Oral daily  glucagon  Injectable 1 milliGRAM(s) IntraMuscular once PRN  heparin  Injectable 5000 Unit(s) SubCutaneous every 12 hours  influenza   Vaccine 0.5 milliLiter(s) IntraMuscular once  insulin glargine Injectable (LANTUS) 5 Unit(s) SubCutaneous at bedtime  insulin lispro (HumaLOG) corrective regimen sliding scale   SubCutaneous three times a day before meals  insulin lispro (HumaLOG) corrective regimen sliding scale   SubCutaneous at bedtime  lidocaine   Patch 2 Patch Transdermal daily  lisinopril 10 milliGRAM(s) Oral daily  metoprolol tartrate 12.5 milliGRAM(s) Oral two times a day      SOCIAL HISTORY:    Marital Status:  (   )    (  ) Single    (   )    (  )   Occupation:   Lives with: (  ) alone  (  ) children   (  ) spouse   (  ) parents  (  ) other    Substance Use (street drugs): (  ) never used  (  ) other:  Tobacco Usage:  (  ) never smoked   (   ) former smoker   (   ) current smoker  (     ) pack year  (        ) last cigarette date  Alcohol Usage: Social EtOH    FAMILY HISTORY:  No pertinent family history in first degree relatives      ROS:  Unobtainable because:   All other systems negative     Constitutional: no fever, no chills, no weight loss, no night sweats  HEENT:  no vision changes, no sore throat, no rhinorrhea  Cardiovascular:  no chest pain, no palpitation  Respiratory:  no SOB, no cough  GI:  no abd pain, no vomiting, no diarrhea  urinary: no dysuria, no hematuria, no flank pain  musculoskeletal:  no joint pain, no joint swelling  skin:  no rash  neurology:  no headache, no seizure, no change in mental status    Physical Exam:    General:    NAD, non toxic, A&O x3  HEENT:   no oropharyngeal lesions, no LAD, neck supple  Cardiovascular:    regular S1,S2, no murmur  Respiratory:   clear b/l, no wheezing  abd:   soft, BS +, not tender, no hepatosplenomegaly  :     no CVAT, no spurapubic tenderness, no sanchez  Musculoskeletal : no joint swelling, no edema  Skin:    no rash  Neurologic:     no focal deficits      Drug Dosing Weight  Height (cm): 157.5 (01 May 2018 11:31)  Weight (kg): 40.37 (01 May 2018 11:31)  BMI (kg/m2): 16.3 (01 May 2018 11:31)  BSA (m2): 1.36 (01 May 2018 11:31)    Vital Signs Last 24 Hrs  T(F): 99 (18 @ 06:00), Max: 105.6 (18 @ 19:53)    Vital Signs Last 24 Hrs  HR: 77 (18 @ 06:00) (60 - 102)  BP: 133/66 (18 @ 06:00) (83/54 - 133/66)  RR: 17 (18 @ 06:00)  SpO2: 96% (18 @ 06:00) (96% - 100%)  Wt(kg): --                          10.3   17.05 )-----------( 395      ( 09 May 2018 06:54 )             33.7           135  |  98  |  16  ----------------------------<  139<H>  3.6   |  26  |  1.07    Ca    9.1      09 May 2018 06:54  Phos  3.4       Mg     2.1             Urinalysis Basic - ( 08 May 2018 11:03 )    Color: COLORL / Appearance: CLEAR / S.009 / pH: 6.5  Gluc: NEGATIVE / Ketone: NEGATIVE  / Bili: NEGATIVE / Urobili: NORMAL mg/dL   Blood: NEGATIVE / Protein: 10 mg/dL / Nitrite: NEGATIVE   Leuk Esterase: NEGATIVE / RBC: 0-2 / WBC 0-2   Sq Epi: x / Non Sq Epi: x / Bacteria: x        MICROBIOLOGY:  v  BLOOD PERIPHERAL  18 --  --  --      BLOOD  18 --  --  --      BLOOD PERIPHERAL  18 --  --  --      BLOOD  18 --  --  --      BLOOD VENOUS  18 --  --  --      BLOOD-CATHETER  18 --  --  --      URINE MIDSTREAM  18 --  --  Escherichia coli                  RADIOLOGY:  Xray Chest 1 View- PORTABLE-Urgent (18 @ 09:56)   New bilateral focal large opacities in the midlungs raising   the possibility of loculated pleural effusions in the fissures although   atelectasis or multifocal pneumonia is not excluded. CT scan of the chest   could be performed for further characterization if felt to be clinically indicated.  New small loculated left pleural effusion along the lateral left hemithorax.    Xray Chest 1 View- PORTABLE-Routine (18 @ 12:50)   Cardiac size is not accurately assessed in this projection.  There is obscuration of the medial aspect of the left hemidiaphragm which   may be related to atelectasis and/or pneumonia. PA and lateral x-rays of   the chest can be performed for further evaluation..  No pleural effusions or pneumothorax.  No acute osseous abnormality.    Xray Chest 2 Views PA/Lat (18 @ 07:18)   Clear lungs. HPI:  84F with DM, HTN, HLD, TIA and Osteoarthritis presented Tuesday morning 18 after her grandson found her [facility] slid off the couch and confused. She always sleeps on the couch and is normally fully oriented but at the time was incoherent and babbling. EMS found her blood glucose level to be 50 and she improved "back to her normal self" after eating a peanut butter and jelly sandwich.   After admission and arriving to the medical wards, she had a new fever to 101F that went up to 103.3F with chills and sweating. No other focal symptoms. Her urinalysis showed nitrites and moderate bacteria, grew pansensitive E. coli but she had no dysuria and received Ceftriaxone 1GM two doses, then transitioned to Zosyn which she has been on since .       PAST MEDICAL & SURGICAL HISTORY:  Arthritis  Bladder disorder  Bladder Prolapse, Acquired  Dry eyes  CVA (cerebrovascular accident): 2013  History of pancreatitis: &#x27;   Dyslipidemia  Diabetes mellitus type 2 in nonobese  HTN (hypertension)  Bladder Prolapse, Acquired: &#x27; ;  Insertion Pessary  S/P laparotomy: initially to remove pancreatic mass but did not visualize mass and closed: &#x27;       Allergies    No Known Allergies    Intolerances        ANTIMICROBIALS:  azithromycin  IVPB    azithromycin  IVPB 500 every 24 hours  piperacillin/tazobactam IVPB. 3.375 every 8 hours      OTHER MEDS:  acetaminophen   Tablet 650 milliGRAM(s) Oral every 6 hours PRN  aspirin  chewable 81 milliGRAM(s) Oral daily  atorvastatin 40 milliGRAM(s) Oral at bedtime  clopidogrel Tablet 75 milliGRAM(s) Oral daily  dextrose 5%. 1000 milliLiter(s) IV Continuous <Continuous>  dextrose 50% Injectable 12.5 Gram(s) IV Push once  dextrose 50% Injectable 25 Gram(s) IV Push once  dextrose 50% Injectable 25 Gram(s) IV Push once  dextrose Gel 1 Dose(s) Oral once PRN  furosemide    Tablet 20 milliGRAM(s) Oral daily  glucagon  Injectable 1 milliGRAM(s) IntraMuscular once PRN  heparin  Injectable 5000 Unit(s) SubCutaneous every 12 hours  influenza   Vaccine 0.5 milliLiter(s) IntraMuscular once  insulin glargine Injectable (LANTUS) 5 Unit(s) SubCutaneous at bedtime  insulin lispro (HumaLOG) corrective regimen sliding scale   SubCutaneous three times a day before meals  insulin lispro (HumaLOG) corrective regimen sliding scale   SubCutaneous at bedtime  lidocaine   Patch 2 Patch Transdermal daily  lisinopril 10 milliGRAM(s) Oral daily  metoprolol tartrate 12.5 milliGRAM(s) Oral two times a day      SOCIAL HISTORY:    Marital Status:  (   )    (  ) Single    (   )    (  )   Occupation:   Lives with: (  ) alone  (  ) children   (  ) spouse   (  ) parents  (  ) other    Substance Use (street drugs): (  ) never used  (  ) other:  Tobacco Usage:  (  ) never smoked   (   ) former smoker   (   ) current smoker  (     ) pack year  (        ) last cigarette date  Alcohol Usage: Social EtOH    FAMILY HISTORY:  No pertinent family history in first degree relatives      ROS:  Unobtainable because:   All other systems negative     Constitutional: no fever, no chills, no weight loss, no night sweats  HEENT:  no vision changes, no sore throat, no rhinorrhea  Cardiovascular:  no chest pain, no palpitation  Respiratory:  no SOB, no cough  GI:  no abd pain, no vomiting, no diarrhea  urinary: no dysuria, no hematuria, no flank pain  musculoskeletal:  no joint pain, no joint swelling  skin:  no rash  neurology:  no headache, no seizure, no change in mental status    Physical Exam:    General:    NAD, non toxic, A&O x3  HEENT:   no oropharyngeal lesions, no LAD, neck supple  Cardiovascular:    regular S1,S2, no murmur  Respiratory:   clear b/l, no wheezing  abd:   soft, BS +, not tender, no hepatosplenomegaly  :     no CVAT, no spurapubic tenderness, no sanchez  Musculoskeletal : no joint swelling, no edema  Skin:    no rash  Neurologic:     no focal deficits      Drug Dosing Weight  Height (cm): 157.5 (01 May 2018 11:31)  Weight (kg): 40.37 (01 May 2018 11:31)  BMI (kg/m2): 16.3 (01 May 2018 11:31)  BSA (m2): 1.36 (01 May 2018 11:31)    Vital Signs Last 24 Hrs  T(F): 99 (18 @ 06:00), Max: 105.6 (18 @ 19:53)    Vital Signs Last 24 Hrs  HR: 77 (18 @ 06:00) (60 - 102)  BP: 133/66 (18 @ 06:00) (83/54 - 133/66)  RR: 17 (18 @ 06:00)  SpO2: 96% (18 @ 06:00) (96% - 100%)  Wt(kg): --                          10.3   17.05 )-----------( 395      ( 09 May 2018 06:54 )             33.7           135  |  98  |  16  ----------------------------<  139<H>  3.6   |  26  |  1.07    Ca    9.1      09 May 2018 06:54  Phos  3.4       Mg     2.1             Urinalysis Basic - ( 08 May 2018 11:03 )    Color: COLORL / Appearance: CLEAR / S.009 / pH: 6.5  Gluc: NEGATIVE / Ketone: NEGATIVE  / Bili: NEGATIVE / Urobili: NORMAL mg/dL   Blood: NEGATIVE / Protein: 10 mg/dL / Nitrite: NEGATIVE   Leuk Esterase: NEGATIVE / RBC: 0-2 / WBC 0-2   Sq Epi: x / Non Sq Epi: x / Bacteria: x        MICROBIOLOGY:  v  BLOOD PERIPHERAL  18 --  --  --      BLOOD  18 --  --  --      BLOOD PERIPHERAL  18 --  --  --      BLOOD  18 --  --  --      BLOOD VENOUS  18 --  --  --      BLOOD-CATHETER  18 --  --  --      URINE MIDSTREAM  18 --  --  Escherichia coli                  RADIOLOGY:  Xray Chest 1 View- PORTABLE-Urgent (18 @ 09:56)   New bilateral focal large opacities in the midlungs raising   the possibility of loculated pleural effusions in the fissures although   atelectasis or multifocal pneumonia is not excluded. CT scan of the chest   could be performed for further characterization if felt to be clinically indicated.  New small loculated left pleural effusion along the lateral left hemithorax.    Xray Chest 1 View- PORTABLE-Routine (18 @ 12:50)   Cardiac size is not accurately assessed in this projection.  There is obscuration of the medial aspect of the left hemidiaphragm which   may be related to atelectasis and/or pneumonia. PA and lateral x-rays of   the chest can be performed for further evaluation..  No pleural effusions or pneumothorax.  No acute osseous abnormality.    Xray Chest 2 Views PA/Lat (18 @ 07:18)   Clear lungs. HPI:  84F with DM, HTN, HLD, TIA and Osteoarthritis presented Tuesday morning 18 after her grandson found her [facility] slid off the couch and confused. She always sleeps on the couch and is normally fully oriented but at the time was incoherent and babbling. EMS found her blood glucose level to be 50 and she improved "back to her normal self" after eating a peanut butter and jelly sandwich.   On presentation was found to have EKG changes and elevated cardiac enzymes concerning for NSTEMI and was admitted to CCU. Her first night she had a fever to 101F, then 102.7F  and 103.3F on 5/3 with chills and sweating. No other focal symptoms. Her urinalysis showed nitrites and moderate bacteria, grew pansensitive E. coli but she had no dysuria and received Ceftriaxone 1GM two doses, then broadened to Zosyn which she has been on since . Also received Vancomcyin 5/3-. Her fevers stopped 5/3 evening then recurred yesterday  up to 105.6F. Repeat chest XR showed new bilateral midlung opacities. Azithromycin was added to Zosyn. ID consulted.   She feels well today, eating breakfast, has been back to normal except some confusion with febrile episodes. No cough, no chest pain or shortness of breath. She is not planned for coronary intervention after discussing with cardiology. No runny nose, sore throat, sinus congestion. No abdominal pain or diarrhea, no vomiting. Never had dysuria, hematuria, increased frequency or foul urine odor. No skin rash or joint pains.   Lives with her  in [place], her daughter visits them often. No sick contacts, no traveling. No outdoor activity or insect/tick bites. Family has a dog but she is not around it much. No young children around. Takes no psychotropic meds.     PAST MEDICAL & SURGICAL HISTORY:  Arthritis  Bladder disorder  Bladder Prolapse, Acquired  Dry eyes  CVA (cerebrovascular accident): 2013  History of pancreatitis: &#x27;   Dyslipidemia  Diabetes mellitus type 2 in nonobese  HTN (hypertension)  Bladder Prolapse, Acquired: &#x27; ;  Insertion Pessary  S/P laparotomy: initially to remove pancreatic mass but did not visualize mass and closed: &#x27;     Allergies    No Known Allergies    Intolerances    ANTIMICROBIALS:  azithromycin  IVPB    azithromycin  IVPB 500 every 24 hours  piperacillin/tazobactam IVPB. 3.375 every 8 hours      OTHER MEDS:  acetaminophen   Tablet 650 milliGRAM(s) Oral every 6 hours PRN  aspirin  chewable 81 milliGRAM(s) Oral daily  atorvastatin 40 milliGRAM(s) Oral at bedtime  clopidogrel Tablet 75 milliGRAM(s) Oral daily  dextrose 5%. 1000 milliLiter(s) IV Continuous <Continuous>  dextrose 50% Injectable 12.5 Gram(s) IV Push once  dextrose 50% Injectable 25 Gram(s) IV Push once  dextrose 50% Injectable 25 Gram(s) IV Push once  dextrose Gel 1 Dose(s) Oral once PRN  furosemide    Tablet 20 milliGRAM(s) Oral daily  glucagon  Injectable 1 milliGRAM(s) IntraMuscular once PRN  heparin  Injectable 5000 Unit(s) SubCutaneous every 12 hours  influenza   Vaccine 0.5 milliLiter(s) IntraMuscular once  insulin glargine Injectable (LANTUS) 5 Unit(s) SubCutaneous at bedtime  insulin lispro (HumaLOG) corrective regimen sliding scale   SubCutaneous three times a day before meals  insulin lispro (HumaLOG) corrective regimen sliding scale   SubCutaneous at bedtime  lidocaine   Patch 2 Patch Transdermal daily  lisinopril 10 milliGRAM(s) Oral daily  metoprolol tartrate 12.5 milliGRAM(s) Oral two times a day      SOCIAL HISTORY: Never smoker. No alcohol. Never worked. Lives with her  in [place].     FAMILY HISTORY:  No pertinent family history in first degree relatives      ROS:  All other systems negative   Constitutional: fevers and chills   HEENT:  no sore throat, no rhinorrhea  Cardiovascular:  no chest pain, no palpitation  Respiratory:  no SOB, no cough  GI:  no abd pain, no vomiting, no diarrhea  urinary: no dysuria, no hematuria, no flank pain  musculoskeletal:  no joint pain, no joint swelling  skin:  no rash. right forearm bruising from prior IV.   neurology:  no headache, no seizure, no change in mental status    Physical Exam:  General:  elderly thin lady, NAD, non toxic, A&O x3  HEENT:   no oropharyngeal lesions, no teeth, gums look ok, no LAD, neck supple  Cardiovascular:  regular rate and rhythm, no murmur  Respiratory:   nonlabored on room air, fine bibasilar crackles but otherwise clear b/l, no wheezing  abd:   soft, BS +, not tender, no hepatosplenomegaly  :     no CVAT, no spurapubic tenderness, no sanchez  Musculoskeletal : no joint swelling, no edema  Skin:    no rash. left arm IV without phlebitis. right forearm ecchymotic lesion, no cellulitis   Neurologic:     no focal deficits  Psych: cooperative     Drug Dosing Weight  Height (cm): 157.5 (01 May 2018 11:31)  Weight (kg): 40.37 (01 May 2018 11:31)  BMI (kg/m2): 16.3 (01 May 2018 11:31)  BSA (m2): 1.36 (01 May 2018 11:31)    Vital Signs Last 24 Hrs  T(F): 99 (18 @ 06:00), Max: 105.6 (18 @ 19:53)    Vital Signs Last 24 Hrs  HR: 77 (18 @ 06:00) (60 - 102)  BP: 133/66 (18 @ 06:00) (83/54 - 133/66)  RR: 17 (18 @ 06:00)  SpO2: 96% (18 @ 06:00) (96% - 100%)  Wt(kg): --                          10.3   17.05 )-----------( 395      ( 09 May 2018 06:54 )             33.7           135  |  98  |  16  ----------------------------<  139<H>  3.6   |  26  |  1.07    Ca    9.1      09 May 2018 06:54  Phos  3.4       Mg     2.1             Urinalysis Basic - ( 08 May 2018 11:03 )    Color: COLORL / Appearance: CLEAR / S.009 / pH: 6.5  Gluc: NEGATIVE / Ketone: NEGATIVE  / Bili: NEGATIVE / Urobili: NORMAL mg/dL   Blood: NEGATIVE / Protein: 10 mg/dL / Nitrite: NEGATIVE   Leuk Esterase: NEGATIVE / RBC: 0-2 / WBC 0-2   Sq Epi: x / Non Sq Epi: x / Bacteria: x        MICROBIOLOGY:  Culture - Blood (18 @ 09:35)    Culture - Blood:   NO ORGANISMS ISOLATED  NO ORGANISMS ISOLATED AT 24 HOURS    Specimen Source: BLOOD PERIPHERAL    Culture - Blood (18 @ 18:06)    Culture - Blood:   NO ORGANISMS ISOLATED    Specimen Source: BLOOD    Culture - Blood (18 @ 16:42)    Culture - Blood:   NO ORGANISMS ISOLATED    Specimen Source: BLOOD PERIPHERAL    Culture - Blood in AM (18 @ 09:19)    Culture - Blood:   NO ORGANISMS ISOLATED    Specimen Source: BLOOD    Culture - Blood (18 @ 22:36)    Culture - Blood:   NO ORGANISMS ISOLATED    Specimen Source: BLOOD VENOUS    Culture - Blood (18 @ 00:25)    Culture - Blood:   NO ORGANISMS ISOLATED    Specimen Source: BLOOD PERIPHERAL    Culture - Blood (18 @ 00:25)    Culture - Blood:   NO ORGANISMS ISOLATED    Specimen Source: BLOOD-CATHETER    Culture - Urine (18 @ 09:52)    -  Trimethoprim/Sulfamethoxazole: S <=2/38 GRACIELA    -  Nitrofurantoin: S <=32 GRACIELA    -  Meropenem: S <=1 GRACIELA    -  Levofloxacin: S <=2 GRACIELA    -  Imipenem: S <=1 GRACIELA    -  Tobramycin: S <=4 GRACIELA    -  Piperacillin/Tazobactam: S <=16 GRACIELA    -  Tigecycline: S <=2 GRACIELA    Culture - Urine:   COLONY COUNT: > = 100,000 CFU/ML    -  Aztreonam: S <=4 GRACIELA    -  Ampicillin: S <=8 GRACIELA    -  Ampicillin/Sulbactam: S <=8/4 GRACIELA    -  Amikacin: S <=16 GRACIELA    -  Gentamicin: S <=4 GRACIELA    -  Ertapenem: S <=1 GRACIELA    -  Ciprofloxacin: S <=1 GRACIELA    -  Ceftriaxone: S <=1 GRACIELA    -  Ceftazidime: S <=1 GRACIELA    -  Cefoxitin: S <=8 GRACIELA    -  Cefepime: S <=4 GRACIELA    -  Cefazolin: S <=8 GRACIELA    Specimen Source: URINE MIDSTREAM    Organism Identification: Escherichia coli    Organism: Escherichia coli    Method Type: MICROSCAN NEG URINE COMBO 61    RADIOLOGY:  Xray Chest 1 View- PORTABLE-Urgent (18 @ 09:56)   New bilateral focal large opacities in the midlungs raising   the possibility of loculated pleural effusions in the fissures although   atelectasis or multifocal pneumonia is not excluded. CT scan of the chest   could be performed for further characterization if felt to be clinically indicated.  New small loculated left pleural effusion along the lateral left hemithorax.    Xray Chest 1 View- PORTABLE-Routine (18 @ 12:50)   Cardiac size is not accurately assessed in this projection.  There is obscuration of the medial aspect of the left hemidiaphragm which   may be related to atelectasis and/or pneumonia. PA and lateral x-rays of   the chest can be performed for further evaluation..  No pleural effusions or pneumothorax.  No acute osseous abnormality.    Xray Chest 2 Views PA/Lat (18 @ 07:18)   Clear lungs. HPI:  84F with DM, HTN, HLD, TIA and Osteoarthritis presented Tuesday morning 18 after her grandson found her assisted slid off the couch and confused. She always sleeps on the couch and is normally fully oriented but at the time was incoherent and babbling. EMS found her blood glucose level to be 50 and she improved "back to her normal self" after eating a peanut butter and jelly sandwich.   On presentation was found to have EKG changes and elevated cardiac enzymes concerning for NSTEMI and was admitted to CCU. Her first night she had a fever to 101F, then 102.7F  and 103.3F on 5/3 with chills and sweating. No other focal symptoms. Her urinalysis showed nitrites and moderate bacteria, grew pansensitive E. coli but she had no dysuria and received Ceftriaxone 1GM two doses, then broadened to Zosyn which she has been on since . Also received Vancomcyin 5/3-. Her fevers stopped 5/3 evening then recurred yesterday  up to 105.6F. Repeat chest XR showed new bilateral midlung opacities. Azithromycin was added to Zosyn. ID consulted.   She feels well today, eating breakfast, has been back to normal except some confusion with febrile episodes. No cough, no chest pain or shortness of breath. She is not planned for coronary intervention after discussing with cardiology. No runny nose, sore throat, sinus congestion. No abdominal pain or diarrhea, no vomiting. Never had dysuria, hematuria, increased frequency or foul urine odor. No skin rash or joint pains.   Lives with her  in [place], her daughter visits them often. No sick contacts, no traveling. No outdoor activity or insect/tick bites. Family has a dog but she is not around it much. No young children around. Takes no psychotropic meds.     PAST MEDICAL & SURGICAL HISTORY:  Arthritis  Bladder disorder  Bladder Prolapse, Acquired  Dry eyes  CVA (cerebrovascular accident): 2013  History of pancreatitis: &#x27;   Dyslipidemia  Diabetes mellitus type 2 in nonobese  HTN (hypertension)  Bladder Prolapse, Acquired: &#x27; ;  Insertion Pessary  S/P laparotomy: initially to remove pancreatic mass but did not visualize mass and closed: &#x27;     Allergies    No Known Allergies    Intolerances    ANTIMICROBIALS:  azithromycin  IVPB    azithromycin  IVPB 500 every 24 hours  piperacillin/tazobactam IVPB. 3.375 every 8 hours      OTHER MEDS:  acetaminophen   Tablet 650 milliGRAM(s) Oral every 6 hours PRN  aspirin  chewable 81 milliGRAM(s) Oral daily  atorvastatin 40 milliGRAM(s) Oral at bedtime  clopidogrel Tablet 75 milliGRAM(s) Oral daily  dextrose 5%. 1000 milliLiter(s) IV Continuous <Continuous>  dextrose 50% Injectable 12.5 Gram(s) IV Push once  dextrose 50% Injectable 25 Gram(s) IV Push once  dextrose 50% Injectable 25 Gram(s) IV Push once  dextrose Gel 1 Dose(s) Oral once PRN  furosemide    Tablet 20 milliGRAM(s) Oral daily  glucagon  Injectable 1 milliGRAM(s) IntraMuscular once PRN  heparin  Injectable 5000 Unit(s) SubCutaneous every 12 hours  influenza   Vaccine 0.5 milliLiter(s) IntraMuscular once  insulin glargine Injectable (LANTUS) 5 Unit(s) SubCutaneous at bedtime  insulin lispro (HumaLOG) corrective regimen sliding scale   SubCutaneous three times a day before meals  insulin lispro (HumaLOG) corrective regimen sliding scale   SubCutaneous at bedtime  lidocaine   Patch 2 Patch Transdermal daily  lisinopril 10 milliGRAM(s) Oral daily  metoprolol tartrate 12.5 milliGRAM(s) Oral two times a day      SOCIAL HISTORY: Never smoker. No alcohol. Never worked. Lives with her  in [place].     FAMILY HISTORY:  No pertinent family history in first degree relatives      ROS:  All other systems negative   Constitutional: fevers and chills   HEENT:  no sore throat, no rhinorrhea  Cardiovascular:  no chest pain, no palpitation  Respiratory:  no SOB, no cough  GI:  no abd pain, no vomiting, no diarrhea  urinary: no dysuria, no hematuria, no flank pain  musculoskeletal:  no joint pain, no joint swelling  skin:  no rash. right forearm bruising from prior IV.   neurology:  no headache, no seizure, no change in mental status    Physical Exam:  General:  elderly thin lady, NAD, non toxic, A&O x3  HEENT:   no oropharyngeal lesions, no teeth, gums look ok, no LAD, neck supple  Cardiovascular:  regular rate and rhythm, no murmur  Respiratory:   nonlabored on room air, fine bibasilar crackles but otherwise clear b/l, no wheezing  abd:   soft, BS +, not tender, no hepatosplenomegaly  :     no CVAT, no spurapubic tenderness, no sanchez  Musculoskeletal : no joint swelling, no edema  Skin:    no rash. left arm IV without phlebitis. right forearm ecchymotic lesion, no cellulitis   Neurologic:     no focal deficits  Psych: cooperative     Drug Dosing Weight  Height (cm): 157.5 (01 May 2018 11:31)  Weight (kg): 40.37 (01 May 2018 11:31)  BMI (kg/m2): 16.3 (01 May 2018 11:31)  BSA (m2): 1.36 (01 May 2018 11:31)    Vital Signs Last 24 Hrs  T(F): 99 (18 @ 06:00), Max: 105.6 (18 @ 19:53)    Vital Signs Last 24 Hrs  HR: 77 (18 @ 06:00) (60 - 102)  BP: 133/66 (18 @ 06:00) (83/54 - 133/66)  RR: 17 (18 @ 06:00)  SpO2: 96% (18 @ 06:00) (96% - 100%)  Wt(kg): --                          10.3   17.05 )-----------( 395      ( 09 May 2018 06:54 )             33.7           135  |  98  |  16  ----------------------------<  139<H>  3.6   |  26  |  1.07    Ca    9.1      09 May 2018 06:54  Phos  3.4       Mg     2.1             Urinalysis Basic - ( 08 May 2018 11:03 )    Color: COLORL / Appearance: CLEAR / S.009 / pH: 6.5  Gluc: NEGATIVE / Ketone: NEGATIVE  / Bili: NEGATIVE / Urobili: NORMAL mg/dL   Blood: NEGATIVE / Protein: 10 mg/dL / Nitrite: NEGATIVE   Leuk Esterase: NEGATIVE / RBC: 0-2 / WBC 0-2   Sq Epi: x / Non Sq Epi: x / Bacteria: x        MICROBIOLOGY:  Culture - Blood (18 @ 09:35)    Culture - Blood:   NO ORGANISMS ISOLATED  NO ORGANISMS ISOLATED AT 24 HOURS    Specimen Source: BLOOD PERIPHERAL    Culture - Blood (18 @ 18:06)    Culture - Blood:   NO ORGANISMS ISOLATED    Specimen Source: BLOOD    Culture - Blood (18 @ 16:42)    Culture - Blood:   NO ORGANISMS ISOLATED    Specimen Source: BLOOD PERIPHERAL    Culture - Blood in AM (18 @ 09:19)    Culture - Blood:   NO ORGANISMS ISOLATED    Specimen Source: BLOOD    Culture - Blood (18 @ 22:36)    Culture - Blood:   NO ORGANISMS ISOLATED    Specimen Source: BLOOD VENOUS    Culture - Blood (18 @ 00:25)    Culture - Blood:   NO ORGANISMS ISOLATED    Specimen Source: BLOOD PERIPHERAL    Culture - Blood (18 @ 00:25)    Culture - Blood:   NO ORGANISMS ISOLATED    Specimen Source: BLOOD-CATHETER    Culture - Urine (18 @ 09:52)    -  Trimethoprim/Sulfamethoxazole: S <=2/38 GRACIELA    -  Nitrofurantoin: S <=32 GRACIELA    -  Meropenem: S <=1 GRACIELA    -  Levofloxacin: S <=2 GRACIELA    -  Imipenem: S <=1 GRACIELA    -  Tobramycin: S <=4 GRACIELA    -  Piperacillin/Tazobactam: S <=16 GRACIELA    -  Tigecycline: S <=2 GRACIELA    Culture - Urine:   COLONY COUNT: > = 100,000 CFU/ML    -  Aztreonam: S <=4 GRACIELA    -  Ampicillin: S <=8 GRACIELA    -  Ampicillin/Sulbactam: S <=8/4 GRACIELA    -  Amikacin: S <=16 GRACIELA    -  Gentamicin: S <=4 GRACIELA    -  Ertapenem: S <=1 GRACIELA    -  Ciprofloxacin: S <=1 GRACIELA    -  Ceftriaxone: S <=1 GRACIELA    -  Ceftazidime: S <=1 GRACIELA    -  Cefoxitin: S <=8 GRACIELA    -  Cefepime: S <=4 GRACIELA    -  Cefazolin: S <=8 GRACIELA    Specimen Source: URINE MIDSTREAM    Organism Identification: Escherichia coli    Organism: Escherichia coli    Method Type: MICROSCAN NEG URINE COMBO 61    RADIOLOGY:  CT Chest No Cont (18 @ 11:53)   New small pericardial effusion with mild haziness surrounding the   pericardium of uncertain significance. Given history of fever, consider   pericarditis. Reactive anterior mediastinal lymphadenopathy up to 1 cm  Bilateral pleural effusions with areas of loculation within the fissures bilaterally  New right lower lobe 4 mm nodule when compared with prior study.    Xray Chest 1 View- PORTABLE-Urgent (18 @ 09:56)   New bilateral focal large opacities in the midlungs raising   the possibility of loculated pleural effusions in the fissures although   atelectasis or multifocal pneumonia is not excluded. CT scan of the chest   could be performed for further characterization if felt to be clinically indicated.  New small loculated left pleural effusion along the lateral left hemithorax.    Xray Chest 1 View- PORTABLE-Routine (18 @ 12:50)   Cardiac size is not accurately assessed in this projection.  There is obscuration of the medial aspect of the left hemidiaphragm which   may be related to atelectasis and/or pneumonia. PA and lateral x-rays of   the chest can be performed for further evaluation..  No pleural effusions or pneumothorax.  No acute osseous abnormality.    Xray Chest 2 Views PA/Lat (18 @ 07:18)   Clear lungs. HPI:  84F with DM, HTN, HLD, TIA and Osteoarthritis presented Tuesday morning 18 after her grandson found her prison slid off the couch and confused. She always sleeps on the couch and is normally fully oriented but at the time was incoherent and babbling. EMS found her blood glucose level to be 50 and she improved "back to her normal self" after eating a peanut butter and jelly sandwich.   On presentation was found to have EKG changes and elevated cardiac enzymes concerning for NSTEMI and was admitted to CCU. Her first night she had a fever to 101F, then 102.7F  and 103.3F on 5/3 with chills and sweating. No other focal symptoms. Her urinalysis showed nitrites and moderate bacteria, grew pansensitive E. coli but she had no dysuria and received Ceftriaxone 1GM two doses, then broadened to Zosyn which she has been on since . Also received Vancomcyin 5/3-. Her fevers stopped 5/3 evening then recurred yesterday  up to 105.6F. Repeat chest XR showed new bilateral midlung opacities. Azithromycin was added to Zosyn. ID consulted.   She feels well today, eating breakfast, has been back to normal except some confusion with febrile episodes. No cough, no chest pain or shortness of breath. She is not planned for coronary intervention after discussing with cardiology. No runny nose, sore throat, sinus congestion. No abdominal pain or diarrhea, no vomiting. Never had dysuria, hematuria, increased frequency or foul urine odor. No skin rash or joint pains.   Lives with her  in [place], her daughter visits them often. No sick contacts, no traveling. No outdoor activity or insect/tick bites. Family has a dog but she is not around it much. No young children around. Takes no psychotropic meds.     PAST MEDICAL & SURGICAL HISTORY:  Arthritis  Bladder disorder  Bladder Prolapse, Acquired  Dry eyes  CVA (cerebrovascular accident): 2013  History of pancreatitis: &#x27;   Dyslipidemia  Diabetes mellitus type 2 in nonobese  HTN (hypertension)  Bladder Prolapse, Acquired: &#x27; ;  Insertion Pessary  S/P laparotomy: initially to remove pancreatic mass but did not visualize mass and closed: &#x27;     Allergies  No Known Allergies    ANTIMICROBIALS:  azithromycin  IVPB    azithromycin  IVPB 500 every 24 hours  piperacillin/tazobactam IVPB. 3.375 every 8 hours    OTHER MEDS:  acetaminophen   Tablet 650 milliGRAM(s) Oral every 6 hours PRN  aspirin  chewable 81 milliGRAM(s) Oral daily  atorvastatin 40 milliGRAM(s) Oral at bedtime  clopidogrel Tablet 75 milliGRAM(s) Oral daily  dextrose 5%. 1000 milliLiter(s) IV Continuous <Continuous>  dextrose 50% Injectable 12.5 Gram(s) IV Push once  dextrose 50% Injectable 25 Gram(s) IV Push once  dextrose 50% Injectable 25 Gram(s) IV Push once  dextrose Gel 1 Dose(s) Oral once PRN  furosemide    Tablet 20 milliGRAM(s) Oral daily  glucagon  Injectable 1 milliGRAM(s) IntraMuscular once PRN  heparin  Injectable 5000 Unit(s) SubCutaneous every 12 hours  influenza   Vaccine 0.5 milliLiter(s) IntraMuscular once  insulin glargine Injectable (LANTUS) 5 Unit(s) SubCutaneous at bedtime  insulin lispro (HumaLOG) corrective regimen sliding scale   SubCutaneous three times a day before meals  insulin lispro (HumaLOG) corrective regimen sliding scale   SubCutaneous at bedtime  lidocaine   Patch 2 Patch Transdermal daily  lisinopril 10 milliGRAM(s) Oral daily  metoprolol tartrate 12.5 milliGRAM(s) Oral two times a day      SOCIAL HISTORY: Never smoker. No alcohol. Never worked. Lives with her  in [place].     FAMILY HISTORY:  No pertinent family history in first degree relatives      ROS:  All other systems negative   Constitutional: fevers and chills   HEENT:  no sore throat, no rhinorrhea  Cardiovascular:  no chest pain, no palpitation  Respiratory:  no SOB, no cough  GI:  no abd pain, no vomiting, no diarrhea  urinary: no dysuria, no hematuria, no flank pain  musculoskeletal:  no joint pain, no joint swelling  skin:  no rash. right forearm bruising from prior IV.   neurology:  no headache, no seizure, no change in mental status    Physical Exam:  General:  elderly thin lady, NAD, non toxic, A&O x3  HEENT:   no oropharyngeal lesions, no teeth, gums look ok, no LAD, neck supple  Cardiovascular:  regular rate and rhythm, no murmur  Respiratory:   nonlabored on room air, fine bibasilar crackles but otherwise clear b/l, no wheezing  abd:   soft, BS +, not tender, no hepatosplenomegaly  :     no CVAT, no spurapubic tenderness, no sanchez  Musculoskeletal : no joint swelling, no edema  Skin:    no rash. left arm IV without phlebitis. right forearm ecchymotic lesion, no cellulitis   Neurologic:     no focal deficits  Psych: cooperative     Drug Dosing Weight  Height (cm): 157.5 (01 May 2018 11:31)  Weight (kg): 40.37 (01 May 2018 11:31)  BMI (kg/m2): 16.3 (01 May 2018 11:31)  BSA (m2): 1.36 (01 May 2018 11:31)    Vital Signs Last 24 Hrs  T(F): 99 (18 @ 06:00), Max: 105.6 (18 @ 19:53)    Vital Signs Last 24 Hrs  HR: 77 (18 @ 06:00) (60 - 102)  BP: 133/66 (18 @ 06:00) (83/54 - 133/66)  RR: 17 (18 @ 06:00)  SpO2: 96% (18 @ 06:00) (96% - 100%)  Wt(kg): --                          10.3   17.05 )-----------( 395      ( 09 May 2018 06:54 )             33.7           135  |  98  |  16  ----------------------------<  139<H>  3.6   |  26  |  1.07    Ca    9.1      09 May 2018 06:54  Phos  3.4       Mg     2.1             Urinalysis Basic - ( 08 May 2018 11:03 )    Color: COLORL / Appearance: CLEAR / S.009 / pH: 6.5  Gluc: NEGATIVE / Ketone: NEGATIVE  / Bili: NEGATIVE / Urobili: NORMAL mg/dL   Blood: NEGATIVE / Protein: 10 mg/dL / Nitrite: NEGATIVE   Leuk Esterase: NEGATIVE / RBC: 0-2 / WBC 0-2   Sq Epi: x / Non Sq Epi: x / Bacteria: x        MICROBIOLOGY:  Culture - Blood (18 @ 09:35)    Culture - Blood:   NO ORGANISMS ISOLATED  NO ORGANISMS ISOLATED AT 24 HOURS    Specimen Source: BLOOD PERIPHERAL    Culture - Blood (18 @ 18:06)    Culture - Blood:   NO ORGANISMS ISOLATED    Specimen Source: BLOOD    Culture - Blood (18 @ 16:42)    Culture - Blood:   NO ORGANISMS ISOLATED    Specimen Source: BLOOD PERIPHERAL    Culture - Blood in AM (18 @ 09:19)    Culture - Blood:   NO ORGANISMS ISOLATED    Specimen Source: BLOOD    Culture - Blood (18 @ 22:36)    Culture - Blood:   NO ORGANISMS ISOLATED    Specimen Source: BLOOD VENOUS    Culture - Blood (18 @ 00:25)    Culture - Blood:   NO ORGANISMS ISOLATED    Specimen Source: BLOOD PERIPHERAL    Culture - Blood (18 @ 00:25)    Culture - Blood:   NO ORGANISMS ISOLATED    Specimen Source: BLOOD-CATHETER    Culture - Urine (18 @ 09:52)    -  Trimethoprim/Sulfamethoxazole: S <=2/38 GRACIELA    -  Nitrofurantoin: S <=32 GRACIELA    -  Meropenem: S <=1 GRACIELA    -  Levofloxacin: S <=2 GRACIELA    -  Imipenem: S <=1 GRACIELA    -  Tobramycin: S <=4 GRACIELA    -  Piperacillin/Tazobactam: S <=16 GRACIELA    -  Tigecycline: S <=2 GRACIELA    Culture - Urine:   COLONY COUNT: > = 100,000 CFU/ML    -  Aztreonam: S <=4 GRACIELA    -  Ampicillin: S <=8 GRACIELA    -  Ampicillin/Sulbactam: S <=8/4 GRACIELA    -  Amikacin: S <=16 GRACIELA    -  Gentamicin: S <=4 GRACIELA    -  Ertapenem: S <=1 GRACIELA    -  Ciprofloxacin: S <=1 GRACIELA    -  Ceftriaxone: S <=1 GRACIELA    -  Ceftazidime: S <=1 GRACIELA    -  Cefoxitin: S <=8 GRACIELA    -  Cefepime: S <=4 GRACIELA    -  Cefazolin: S <=8 GRACIELA    Specimen Source: URINE MIDSTREAM    Organism Identification: Escherichia coli    Organism: Escherichia coli    Method Type: MICROSCAN NEG URINE COMBO 61    RADIOLOGY:  CT Chest No Cont (18 @ 11:53)   New small pericardial effusion with mild haziness surrounding the   pericardium of uncertain significance. Given history of fever, consider   pericarditis. Reactive anterior mediastinal lymphadenopathy up to 1 cm  Bilateral pleural effusions with areas of loculation within the fissures bilaterally  New right lower lobe 4 mm nodule when compared with prior study.    Xray Chest 1 View- PORTABLE-Urgent (18 @ 09:56)   New bilateral focal large opacities in the midlungs raising   the possibility of loculated pleural effusions in the fissures although   atelectasis or multifocal pneumonia is not excluded. CT scan of the chest   could be performed for further characterization if felt to be clinically indicated.  New small loculated left pleural effusion along the lateral left hemithorax.    Xray Chest 1 View- PORTABLE-Routine (18 @ 12:50)   Cardiac size is not accurately assessed in this projection.  There is obscuration of the medial aspect of the left hemidiaphragm which   may be related to atelectasis and/or pneumonia. PA and lateral x-rays of   the chest can be performed for further evaluation..  No pleural effusions or pneumothorax.  No acute osseous abnormality.    Xray Chest 2 Views PA/Lat (18 @ 07:18)   Clear lungs.

## 2018-05-09 NOTE — DIETITIAN INITIAL EVALUATION ADULT. - OTHER INFO
Pt seen for Length of stay. Pt 83 yo female with admit diagnosis: Hypoglycemia. Pt appears alert, oriented; Pt's daughter @ bed side answered questions during interview. Per daughter Pt's appetite not that well. Pt edentulous, uses dentures reported. But Pt C/O chewing difficulty (despite of denture use). RDN offered Mechanical Soft consistency, Pt agreed to try. Per daughter Pt's current body weight: ~85#; Pt lost ~25# in past 6 months. Food preferences discussed; Pt agreed to try PO supplement: Glucerna Shake. At home Pt tries to avoid salt & sugar reported. No other food related concerns voiced. Spoke to Tele PA. RDN remains available, Pt and her family made aware.

## 2018-05-09 NOTE — PROGRESS NOTE ADULT - PROBLEM SELECTOR PLAN 2
Day 6 of zosyn, patient spiked a fever yesterday and generated a WBC count, IV antibiotics to continue, awaiting recommendations from ID

## 2018-05-09 NOTE — PROGRESS NOTE ADULT - SUBJECTIVE AND OBJECTIVE BOX
Date of Admission:  5/1/18  24 hour events:  Patient with 104.6  fever overnight, continues with a cough. CXR suspicous for PNA. IV azithromycin started yesterday.   Vital Signs Last 24 Hrs  T(C): 37.2 (09 May 2018 06:00), Max: 40.9 (08 May 2018 19:53)  T(F): 99 (09 May 2018 06:00), Max: 105.6 (08 May 2018 19:53)  HR: 77 (09 May 2018 06:00) (60 - 102)  BP: 133/66 (09 May 2018 06:00) (83/54 - 133/66)  BP(mean): --  RR: 17 (09 May 2018 06:00) (14 - 20)  SpO2: 96% (09 May 2018 06:00) (96% - 100%)  I&O's Summary    08 May 2018 07:01  -  09 May 2018 07:00  --------------------------------------------------------  IN: 360 mL / OUT: 1 mL / NET: 359 mL        MEDICATIONS:  aspirin  chewable 81 milliGRAM(s) Oral daily  clopidogrel Tablet 75 milliGRAM(s) Oral daily  furosemide    Tablet 20 milliGRAM(s) Oral daily  heparin  Injectable 5000 Unit(s) SubCutaneous every 12 hours  lisinopril 10 milliGRAM(s) Oral daily  metoprolol tartrate 12.5 milliGRAM(s) Oral two times a day  azithromycin  IVPB      azithromycin  IVPB 500 milliGRAM(s) IV Intermittent every 24 hours  piperacillin/tazobactam IVPB. 3.375 Gram(s) IV Intermittent every 8 hours  acetaminophen   Tablet 650 milliGRAM(s) Oral every 6 hours PRN  atorvastatin 40 milliGRAM(s) Oral at bedtime  dextrose 50% Injectable 12.5 Gram(s) IV Push once  dextrose 50% Injectable 25 Gram(s) IV Push once  dextrose 50% Injectable 25 Gram(s) IV Push once  dextrose Gel 1 Dose(s) Oral once PRN  glucagon  Injectable 1 milliGRAM(s) IntraMuscular once PRN  insulin glargine Injectable (LANTUS) 5 Unit(s) SubCutaneous at bedtime  insulin lispro (HumaLOG) corrective regimen sliding scale   SubCutaneous three times a day before meals  insulin lispro (HumaLOG) corrective regimen sliding scale   SubCutaneous at bedtime    dextrose 5%. 1000 milliLiter(s) IV Continuous <Continuous>  influenza   Vaccine 0.5 milliLiter(s) IntraMuscular once  lidocaine   Patch 2 Patch Transdermal daily      REVIEW OF SYSTEMS:  +cough     PHYSICAL EXAM:  General: NAD  Cardiovascular: Normal S1 S2, No JVD, No murmurs, No edema  Respiratory: crackles at bases  Gastrointestinal:  Soft, Non-tender, + BS	  Skin: warm and dry, No rashes, No ecchymoses, No cyanosis	  Extremities: Normal range of motion, No clubbing, cyanosis or edema  Vascular: Peripheral pulses palpable 2+ bilaterally    LABS:	 	    CBC Full  -  ( 09 May 2018 06:54 )  WBC Count : 17.05 K/uL  Hemoglobin : 10.3 g/dL  Hematocrit : 33.7 %  Platelet Count - Automated : 395 K/uL  Mean Cell Volume : 84.5 fL  Mean Cell Hemoglobin : 25.8 pg  Mean Cell Hemoglobin Concentration : 30.6 %  Auto Neutrophil # : x  Auto Lymphocyte # : x  Auto Monocyte # : x  Auto Eosinophil # : x  Auto Basophil # : x  Auto Neutrophil % : x  Auto Lymphocyte % : x  Auto Monocyte % : x  Auto Eosinophil % : x  Auto Basophil % : x    05-08    136  |  103  |  11  ----------------------------<  119<H>  3.7   |  23  |  0.83    Ca    8.7      08 May 2018 06:00  Phos  2.6     05-08  Mg     1.9     05-08

## 2018-05-09 NOTE — DIETITIAN INITIAL EVALUATION ADULT. - ENERGY NEEDS
Pt's height: 61" (per daughter)                IBW: ~105#+/-10%   Pt's recorded body weight: 97# (5/9) not consistent with stated current weight (~85# per daughter). ??Question about accuracy of recorded or stated weights??

## 2018-05-09 NOTE — PROGRESS NOTE ADULT - PROBLEM SELECTOR PLAN 4
-initiate lasix 20mg po qd, and stat dose of lasix 20mg IVP x 1. CXR ordered, will follow up with results  - s/p LHC from RFA access.  - LAD mid 80%, Cx mid and proximal 60%, OM 70-80%, and proximal RPL 90-95%.  -Increase lisinopril to 10mg po qd, continue lopressor 12.5 po bid. Patient remains alexey at night, would keep BB at this dose for now. Continue lipitor 40

## 2018-05-10 DIAGNOSIS — R93.8 ABNORMAL FINDINGS ON DIAGNOSTIC IMAGING OF OTHER SPECIFIED BODY STRUCTURES: ICD-10-CM

## 2018-05-10 LAB
BASOPHILS # BLD AUTO: 0.02 K/UL — SIGNIFICANT CHANGE UP (ref 0–0.2)
BASOPHILS NFR BLD AUTO: 0.2 % — SIGNIFICANT CHANGE UP (ref 0–2)
BUN SERPL-MCNC: 17 MG/DL — SIGNIFICANT CHANGE UP (ref 7–23)
CALCIUM SERPL-MCNC: 9 MG/DL — SIGNIFICANT CHANGE UP (ref 8.4–10.5)
CHLORIDE SERPL-SCNC: 98 MMOL/L — SIGNIFICANT CHANGE UP (ref 98–107)
CO2 SERPL-SCNC: 25 MMOL/L — SIGNIFICANT CHANGE UP (ref 22–31)
CREAT SERPL-MCNC: 0.89 MG/DL — SIGNIFICANT CHANGE UP (ref 0.5–1.3)
EOSINOPHIL # BLD AUTO: 0.14 K/UL — SIGNIFICANT CHANGE UP (ref 0–0.5)
EOSINOPHIL NFR BLD AUTO: 1.4 % — SIGNIFICANT CHANGE UP (ref 0–6)
GLUCOSE BLDC GLUCOMTR-MCNC: 109 MG/DL — HIGH (ref 70–99)
GLUCOSE BLDC GLUCOMTR-MCNC: 144 MG/DL — HIGH (ref 70–99)
GLUCOSE BLDC GLUCOMTR-MCNC: 186 MG/DL — HIGH (ref 70–99)
GLUCOSE BLDC GLUCOMTR-MCNC: 84 MG/DL — SIGNIFICANT CHANGE UP (ref 70–99)
GLUCOSE SERPL-MCNC: 95 MG/DL — SIGNIFICANT CHANGE UP (ref 70–99)
HCT VFR BLD CALC: 27.8 % — LOW (ref 34.5–45)
HGB BLD-MCNC: 8.8 G/DL — LOW (ref 11.5–15.5)
IMM GRANULOCYTES # BLD AUTO: 0.12 # — SIGNIFICANT CHANGE UP
IMM GRANULOCYTES NFR BLD AUTO: 1.2 % — SIGNIFICANT CHANGE UP (ref 0–1.5)
LYMPHOCYTES # BLD AUTO: 1.6 K/UL — SIGNIFICANT CHANGE UP (ref 1–3.3)
LYMPHOCYTES # BLD AUTO: 16.4 % — SIGNIFICANT CHANGE UP (ref 13–44)
MAGNESIUM SERPL-MCNC: 2 MG/DL — SIGNIFICANT CHANGE UP (ref 1.6–2.6)
MCHC RBC-ENTMCNC: 25.9 PG — LOW (ref 27–34)
MCHC RBC-ENTMCNC: 31.7 % — LOW (ref 32–36)
MCV RBC AUTO: 81.8 FL — SIGNIFICANT CHANGE UP (ref 80–100)
MONOCYTES # BLD AUTO: 0.85 K/UL — SIGNIFICANT CHANGE UP (ref 0–0.9)
MONOCYTES NFR BLD AUTO: 8.7 % — SIGNIFICANT CHANGE UP (ref 2–14)
NEUTROPHILS # BLD AUTO: 7.05 K/UL — SIGNIFICANT CHANGE UP (ref 1.8–7.4)
NEUTROPHILS NFR BLD AUTO: 72.1 % — SIGNIFICANT CHANGE UP (ref 43–77)
NRBC # FLD: 0 — SIGNIFICANT CHANGE UP
PHOSPHATE SERPL-MCNC: 2.6 MG/DL — SIGNIFICANT CHANGE UP (ref 2.5–4.5)
PLATELET # BLD AUTO: 336 K/UL — SIGNIFICANT CHANGE UP (ref 150–400)
PMV BLD: 10.9 FL — SIGNIFICANT CHANGE UP (ref 7–13)
POTASSIUM SERPL-MCNC: 3.3 MMOL/L — LOW (ref 3.5–5.3)
POTASSIUM SERPL-SCNC: 3.3 MMOL/L — LOW (ref 3.5–5.3)
PROCALCITONIN SERPL-MCNC: 28.93 NG/ML — HIGH (ref 0–0.04)
RBC # BLD: 3.4 M/UL — LOW (ref 3.8–5.2)
RBC # FLD: 16.6 % — HIGH (ref 10.3–14.5)
SODIUM SERPL-SCNC: 133 MMOL/L — LOW (ref 135–145)
SPECIMEN SOURCE: SIGNIFICANT CHANGE UP
WBC # BLD: 9.78 K/UL — SIGNIFICANT CHANGE UP (ref 3.8–10.5)
WBC # FLD AUTO: 9.78 K/UL — SIGNIFICANT CHANGE UP (ref 3.8–10.5)

## 2018-05-10 PROCEDURE — 99232 SBSQ HOSP IP/OBS MODERATE 35: CPT

## 2018-05-10 RX ORDER — POTASSIUM CHLORIDE 20 MEQ
40 PACKET (EA) ORAL EVERY 4 HOURS
Qty: 0 | Refills: 0 | Status: COMPLETED | OUTPATIENT
Start: 2018-05-10 | End: 2018-05-10

## 2018-05-10 RX ADMIN — Medication 20 MILLIGRAM(S): at 05:40

## 2018-05-10 RX ADMIN — Medication 81 MILLIGRAM(S): at 16:54

## 2018-05-10 RX ADMIN — MEROPENEM 100 MILLIGRAM(S): 1 INJECTION INTRAVENOUS at 16:55

## 2018-05-10 RX ADMIN — Medication 12.5 MILLIGRAM(S): at 05:41

## 2018-05-10 RX ADMIN — HEPARIN SODIUM 5000 UNIT(S): 5000 INJECTION INTRAVENOUS; SUBCUTANEOUS at 05:40

## 2018-05-10 RX ADMIN — ATORVASTATIN CALCIUM 40 MILLIGRAM(S): 80 TABLET, FILM COATED ORAL at 21:21

## 2018-05-10 RX ADMIN — INSULIN GLARGINE 5 UNIT(S): 100 INJECTION, SOLUTION SUBCUTANEOUS at 23:11

## 2018-05-10 RX ADMIN — MEROPENEM 100 MILLIGRAM(S): 1 INJECTION INTRAVENOUS at 05:40

## 2018-05-10 RX ADMIN — LISINOPRIL 10 MILLIGRAM(S): 2.5 TABLET ORAL at 05:41

## 2018-05-10 RX ADMIN — CLOPIDOGREL BISULFATE 75 MILLIGRAM(S): 75 TABLET, FILM COATED ORAL at 16:54

## 2018-05-10 RX ADMIN — Medication 40 MILLIEQUIVALENT(S): at 10:56

## 2018-05-10 RX ADMIN — Medication 12.5 MILLIGRAM(S): at 16:54

## 2018-05-10 RX ADMIN — Medication 250 MILLIGRAM(S): at 14:35

## 2018-05-10 NOTE — CONSULT NOTE ADULT - PROBLEM SELECTOR RECOMMENDATION 2
Currently on broad spectrum antibiotics.  Still with persistent fevers.
cont current care:
-BP elevated. Goal BP is less than 130/80  - recommend titration of anti-hypertensives to achieve goal BP

## 2018-05-10 NOTE — PROGRESS NOTE ADULT - SUBJECTIVE AND OBJECTIVE BOX
Subjective/Objective: Patient feels well today, no fevers overnight.    MEDICATIONS  (STANDING):  aspirin  chewable 81 milliGRAM(s) Oral daily  atorvastatin 40 milliGRAM(s) Oral at bedtime  clopidogrel Tablet 75 milliGRAM(s) Oral daily  dextrose 5%. 1000 milliLiter(s) (50 mL/Hr) IV Continuous <Continuous>  dextrose 50% Injectable 12.5 Gram(s) IV Push once  dextrose 50% Injectable 25 Gram(s) IV Push once  dextrose 50% Injectable 25 Gram(s) IV Push once  furosemide    Tablet 20 milliGRAM(s) Oral daily  heparin  Injectable 5000 Unit(s) SubCutaneous every 12 hours  influenza   Vaccine 0.5 milliLiter(s) IntraMuscular once  insulin glargine Injectable (LANTUS) 5 Unit(s) SubCutaneous at bedtime  insulin lispro (HumaLOG) corrective regimen sliding scale   SubCutaneous three times a day before meals  insulin lispro (HumaLOG) corrective regimen sliding scale   SubCutaneous at bedtime  lidocaine   Patch 2 Patch Transdermal daily  lisinopril 10 milliGRAM(s) Oral daily  meropenem  IVPB      meropenem  IVPB 500 milliGRAM(s) IV Intermittent every 12 hours  metoprolol tartrate 12.5 milliGRAM(s) Oral two times a day  potassium chloride    Tablet ER 40 milliEquivalent(s) Oral every 4 hours  vancomycin  IVPB      vancomycin  IVPB 750 milliGRAM(s) IV Intermittent every 24 hours    MEDICATIONS  (PRN):  acetaminophen   Tablet 650 milliGRAM(s) Oral every 6 hours PRN For Temp greater than 38 C (100.4 F)  dextrose Gel 1 Dose(s) Oral once PRN Blood Glucose LESS THAN 70 milliGRAM(s)/deciliter  glucagon  Injectable 1 milliGRAM(s) IntraMuscular once PRN Glucose LESS THAN 70 milligrams/deciliter          Vital Signs Last 24 Hrs  T(C): 37.3 (10 May 2018 05:37), Max: 37.4 (09 May 2018 14:00)  T(F): 99.1 (10 May 2018 05:37), Max: 99.3 (09 May 2018 14:00)  HR: 80 (10 May 2018 05:37) (72 - 82)  BP: 111/56 (10 May 2018 05:37) (107/62 - 123/64)  BP(mean): --  RR: 16 (10 May 2018 05:37) (16 - 17)  SpO2: 96% (10 May 2018 05:37) (96% - 98%)  I&O's Detail    09 May 2018 07:01  -  10 May 2018 07:00  --------------------------------------------------------  IN:    Oral Fluid: 360 mL  Total IN: 360 mL    OUT:  Total OUT: 0 mL    Total NET: 360 mL      10 May 2018 07:01  -  10 May 2018 11:11  --------------------------------------------------------  IN:    Oral Fluid: 240 mL  Total IN: 240 mL    OUT:  Total OUT: 0 mL    Total NET: 240 mL      PHYSICAL EXAM  GEN: NAD, skin W & D  RESP: minimal crackles at bases  CV: nl S1S2  GI: soft, NT/ND  EXT: no C/C/E  NEURO: A & O X 3    EKG/ TELEM:    LABS:                          8.8    9.78  )-----------( 336      ( 10 May 2018 06:30 )             27.8       10 May 2018 06:30    133<L>  |  98     |  17     ----------------------------<  95     3.3<L>   |  25     |  0.89     09 May 2018 06:54    135    |  98     |  16     ----------------------------<  139<H>  3.6     |  26     |  1.07     Ca    9.0        10 May 2018 06:30  Ca    9.1        09 May 2018 06:54  Phos  2.6       10 May 2018 06:30  Phos  3.4       09 May 2018 06:54  Mg     2.0       10 May 2018 06:30  Mg     2.1       09 May 2018 06:54

## 2018-05-10 NOTE — PROGRESS NOTE ADULT - ASSESSMENT
Pulmonary input appreciated, CXR abnormalities represent fluid loculations in fissures with pleural effusion and pericarditis.  Afebrile since hyperthermia 5/8 2200    84F with DM, HTN, HLD, TIA and Osteoarthritis presented 5/1/18 for symptomatic hypoglycemia but also found to be in NSTEMI and admitted to CCU. Developed nonfocal fevers 5/1-5/3, then recurred 5/8, Tmax 105.6F with rise in white count to 17. Looks remarkably well.   Not clearly an infectious etiology. Blood cultures negative. Urine E. coli but she was asymptomatic. RVP negative. Repeat CXR 5/8 new bilateral midlung opacities, CT with pericardial effusion and loculated pleural effusion in both fissures, clinically not a bacterial pneumonia, not even coughing. Right arm ecchymosis without cellulitis.   Received Ceftriaxone 5/1, switched to and still on Zosyn since 5/2, Azithromycin added 5/8. Vancomycin 5/3-5/5.   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    Recommend   consider echocardiogram/Cochchicine  continue Meropenem 500mg IV q12h for now given fevers while on broad antibiotics   -restart Vancomycin 750mg q24h   will consider d/c antibiotics 5/11 if stable with negative cultures

## 2018-05-10 NOTE — PROGRESS NOTE ADULT - ASSESSMENT
83 YO F w/ hx of HTN , DM, TIA without residual deficits, no known CAD hx (stress test 11/2017 normal), presents to the ED with NSTEMI, s/p cath-medical managment for CAD. Course complicated by sepsis and anemia and now a possible PNA.

## 2018-05-10 NOTE — CONSULT NOTE ADULT - PROBLEM SELECTOR RECOMMENDATION 3
Patient on Tramadol at home, in addition to Tylenol.  Continue with Tylenol.  If pain is uncontrolled, consider adding Ultram back to regimen.
resolved
- c/w Lipitor 40 mg qhs

## 2018-05-10 NOTE — CONSULT NOTE ADULT - PROBLEM SELECTOR RECOMMENDATION 4
Patient follows with Dr. Iverson (Geriatrics) as an outpatient.  Goals of care have been ongoing in the community.  Patient has expressed focusing on non aggressive interventions.   Once acute medical issues have resolved, will need to readdress.
blood pressure seems m ore controlled
- patient noted to have serum calcium of 10.7 and PTH of 48 in January 2018. Patient has declined surgery in the past  - patient is currently normocalcemic  - trend calcium  - outpatient follow up

## 2018-05-10 NOTE — PROGRESS NOTE ADULT - PROBLEM SELECTOR PLAN 2
Afebrile at present, awaiting cultures, WBC normal. Continue meropenem and vancomycin. ID following.

## 2018-05-10 NOTE — CONSULT NOTE ADULT - PROBLEM SELECTOR RECOMMENDATION 9
pt has been febrile with not much of any respiratory symptoms: The ct chest is S/W loculated effusion in bilateral fissures: She also has small pleural effusion: I don't see any gross consolidation in the lung parenchyma: today her WBC is normal and she looks comfortable: her pro calcitonin is pending: Lungs are unlikely the source of her fever: She had minimal effusion son ct chest in november of 2017 on ct chest: Till 5/3: her chest x-ray was also without any effusions: However on 8th she had developed bilateral opacities and chest ct scan done showed loculated effusion in the fissures: In addtion, given appearance of new pericardial effusion, and recent MI, Dressler's syndrome seems smore likely: pt has been febrile with not much of any respiratory symptoms: The ct chest is C/W loculated effusion in bilateral fissures: She also has small pleural effusion: I don't see any gross consolidation in the lung parenchyma: today her WBC is normal and she looks comfortable: her pro calcitonin is pending: Lungs are unlikely the source of her fever: She had minimal effusion on ct chest in november of 2017: Till 5/3: her chest x-ray was also without any effusions: However on 8th she had developed bilateral opacities and chest ct scan done showed loculated effusion in the fissures: In addition, given appearance of new pericardial effusion, and recent MI, Dressler's syndrome seems smore likely: FOLLOW UP CULTURES AND PRO CALCITONIN

## 2018-05-10 NOTE — CONSULT NOTE ADULT - PROBLEM SELECTOR PROBLEM 1
Abnormal CT of the chest
Triple vessel coronary artery disease
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

## 2018-05-10 NOTE — PROGRESS NOTE ADULT - SUBJECTIVE AND OBJECTIVE BOX
Follow Up:      Interval History/ROS: denies pleuritic chest pain,     Allergies  No Known Allergies    ANTIMICROBIALS:  meropenem  IVPB    meropenem  IVPB 500 every 12 hours  vancomycin  IVPB    vancomycin  IVPB 750 every 24 hours    OTHER MEDS:  MEDICATIONS  (STANDING):  acetaminophen   Tablet 650 every 6 hours PRN  aspirin  chewable 81 daily  atorvastatin 40 at bedtime  clopidogrel Tablet 75 daily  dextrose 50% Injectable 12.5 once  dextrose 50% Injectable 25 once  dextrose 50% Injectable 25 once  dextrose Gel 1 once PRN  furosemide    Tablet 20 daily  glucagon  Injectable 1 once PRN  heparin  Injectable 5000 every 12 hours  influenza   Vaccine 0.5 once  insulin glargine Injectable (LANTUS) 5 at bedtime  insulin lispro (HumaLOG) corrective regimen sliding scale  three times a day before meals  insulin lispro (HumaLOG) corrective regimen sliding scale  at bedtime  lisinopril 10 daily  metoprolol tartrate 12.5 two times a day    Vital Signs Last 24 Hrs  T(C): 36.8 (10 May 2018 16:50), Max: 37.3 (09 May 2018 22:25)  T(F): 98.2 (10 May 2018 16:50), Max: 99.2 (09 May 2018 22:25)  HR: 75 (10 May 2018 16:50) (75 - 82)  BP: 154/70 (10 May 2018 16:50) (105/46 - 154/70)  BP(mean): --  RR: 1 (10 May 2018 16:50) (1 - 18)  SpO2: 98% (10 May 2018 16:50) (95% - 98%)    PHYSICAL EXAM:  General: thin NAD, Non-toxic  Neurology: A&Ox3, nonfocal  Respiratory: fine basilar rales  CV: RRR, S1S2, no murmurs, rubs or gallops  Abdominal: Soft, Non-tender, non-distended, normal bowel sounds  Extremities: No edema,   Line Sites: Clear  Skin: No rash                        8.8    9.78  )-----------( 336      ( 10 May 2018 06:30 )             27.8       05-10    133<L>  |  98  |  17  ----------------------------<  95  3.3<L>   |  25  |  0.89    Ca    9.0      10 May 2018 06:30  Phos  2.6     05-10  Mg     2.0     05-10      MICROBIOLOGY:  BLOOD  05-09-18 --  --  --      BLOOD  05-08-18 --  --  --      BLOOD PERIPHERAL  05-08-18 --  --  --      BLOOD  05-03-18 --  --  --      BLOOD PERIPHERAL  05-03-18 --  --  --      BLOOD  05-03-18 --  --  --      BLOOD VENOUS  05-02-18 --  --  --      BLOOD-CATHETER  05-02-18 --  --  --      URINE MIDSTREAM  05-01-18 --  --  Escherichia coli    RADIOLOGY:  < from: CT Chest No Cont (05.09.18 @ 11:53) >  Lungs/Pleura/Airways: Bilateral pleural effusions are seen with areas of   loculated fluid within the fissures bilaterally. A right lower lobe 4 mm   nodule is seen on series 2, image 75 and is new.    Visualized abdomen: Partially imaged multiple left-sided parapelvic cysts   as seen on previous exam. Otherwise, unremarkable appearance of the upper   abdomen.    Bones and soft tissues: Degenerative changes noted throughout the spine.    IMPRESSION:    New small pericardial effusion with mild haziness surrounding the   pericardium of uncertain significance. Given history of fever, consider   pericarditis. Reactive anterior mediastinal lymphadenopathy up to 1 cm    Bilateral pleural effusions with areas of loculation within the fissures   bilaterally    New right lower lobe 4 mm nodule when compared with prior study.    < end of copied text >      Jim Andre MD; Division of Infectious Disease; Pager: [phone]; nights and weekends: [phone]

## 2018-05-10 NOTE — CONSULT NOTE ADULT - PROBLEM SELECTOR PROBLEM 3
Hypoglycemia
Osteoarthritis, unspecified osteoarthritis type, unspecified site
Hyperlipidemia, unspecified hyperlipidemia type

## 2018-05-10 NOTE — PROGRESS NOTE ADULT - PROBLEM SELECTOR PLAN 1
Continue DAPT, lipitor, lisinopril, and metoprolol. [facility] with multivessel disease however plan is for medical management at this time.

## 2018-05-10 NOTE — CONSULT NOTE ADULT - SUBJECTIVE AND OBJECTIVE BOX
Patient is a 84y old  Female who presents with a chief complaint of Altered mental status (05 May 2018 11:18)      HPI:  84 year old female pmh of DM, HTN, HLD, TIA, arthritis presents with altered mental status this AM. Per patient at ~3 am her grandson found her half on the floor and half on her couch and confused. At the time she did not know what time or day it was. She does not remember anything before being found. Prior to this event she has been in her usual state of health. Per patients family at bedside the patient has been doing well, even seeming better than usual recently. Patient reports taking insulin regularly and monitoring her diet. She denies any chest pain, palpitations, nausea, vomiting, fever, chills, dysuria, hematuria, brbpr, melena, diarrhea, swelling, shortness of breath. (01 May 2018 11:43)    On presentation was found to have EKG changes and elevated cardiac enzymes concerning for NSTEMI and was admitted to CCU. Her first night she had a fever to 101F, then 102.7F  and 103.3F on 5/3 with chills and sweating. No other focal symptoms. Her urinalysis showed nitrites and moderate bacteria, grew pansensitive E. coli but she had no dysuria and received Ceftriaxone 1GM two doses, then broadened to Zosyn which she has been on since . Also received Vancomcyin 5/3-. Her fevers stopped 5/3 evening then recurred yesterday  up to 105.6F. Repeat chest XR showed new bilateral midlung opacities. Azithromycin was added to Zosyn. ID consulted.   She feels well today, eating breakfast, has been back to normal except some confusion with febrile episodes. No cough, no chest pain or shortness of breath. She is not planned for coronary intervention after discussing with cardiology. No runny nose, sore throat, sinus congestion. No abdominal pain or diarrhea, no vomiting. Never had dysuria, hematuria, increased frequency or foul urine odor. No skin rash or joint pains.   Lives with her  in [place], her daughter visits them often. No sick contacts, no traveling. No outdoor activity or insect/tick bites. Family has a dog but she is not around it much. No young children around. Takes no psychotropic meds.     pulmonary called for abnormal ct chest:   per son and pt , she has no  underlying pulmonary disease: she dnies having asthma, copd, pe or dvt and has no cough or phlegm: She had > 104 temp two days ago and for last 24 hours have been afebrile:     ?FOLLOWING PRESENT  [ x] Hx of PE/DVT, [x ] Hx COPD, [ x] Hx of Asthma, [ x] Hx of Hospitalization, [x ]  Hx of BiPAP/CPAP use, [ x] Hx of CARL    Allergies    No Known Allergies    Intolerances        PAST MEDICAL & SURGICAL HISTORY:  Arthritis  Bladder disorder  Bladder Prolapse, Acquired  Dry eyes  CVA (cerebrovascular accident): 2013  History of pancreatitis: &#x27;   Dyslipidemia  Diabetes mellitus type 2 in nonobese  HTN (hypertension)  Bladder Prolapse, Acquired: &#x27; ;  Insertion Pessary  S/P laparotomy: initially to remove pancreatic mass but did not visualize mass and closed: &#x27;       FAMILY HISTORY:  No pertinent family history in first degree relatives      Social History: [ x ] TOBACCO                  [ x ] ETOH                                 [ x ] IVDA/DRUGS    REVIEW OF SYSTEMS      General:	x    Skin/Breast:x  	  Ophthalmologic:x  	  ENMT:	x    Respiratory and Thorax: no cough, no phlegm, no SOB at rest,   	  Cardiovascular:	x    Gastrointestinal:	x    Genitourinary:	x    Musculoskeletal:	x    Neurological:	x    Psychiatric:	x    Hematology/Lymphatics:	x    Endocrine:	x    Allergic/Immunologic:	x    MEDICATIONS  (STANDING):  aspirin  chewable 81 milliGRAM(s) Oral daily  atorvastatin 40 milliGRAM(s) Oral at bedtime  clopidogrel Tablet 75 milliGRAM(s) Oral daily  dextrose 5%. 1000 milliLiter(s) (50 mL/Hr) IV Continuous <Continuous>  dextrose 50% Injectable 12.5 Gram(s) IV Push once  dextrose 50% Injectable 25 Gram(s) IV Push once  dextrose 50% Injectable 25 Gram(s) IV Push once  furosemide    Tablet 20 milliGRAM(s) Oral daily  heparin  Injectable 5000 Unit(s) SubCutaneous every 12 hours  influenza   Vaccine 0.5 milliLiter(s) IntraMuscular once  insulin glargine Injectable (LANTUS) 5 Unit(s) SubCutaneous at bedtime  insulin lispro (HumaLOG) corrective regimen sliding scale   SubCutaneous three times a day before meals  insulin lispro (HumaLOG) corrective regimen sliding scale   SubCutaneous at bedtime  lidocaine   Patch 2 Patch Transdermal daily  lisinopril 10 milliGRAM(s) Oral daily  meropenem  IVPB      meropenem  IVPB 500 milliGRAM(s) IV Intermittent every 12 hours  metoprolol tartrate 12.5 milliGRAM(s) Oral two times a day  potassium chloride    Tablet ER 40 milliEquivalent(s) Oral every 4 hours  vancomycin  IVPB      vancomycin  IVPB 750 milliGRAM(s) IV Intermittent every 24 hours    MEDICATIONS  (PRN):  acetaminophen   Tablet 650 milliGRAM(s) Oral every 6 hours PRN For Temp greater than 38 C (100.4 F)  dextrose Gel 1 Dose(s) Oral once PRN Blood Glucose LESS THAN 70 milliGRAM(s)/deciliter  glucagon  Injectable 1 milliGRAM(s) IntraMuscular once PRN Glucose LESS THAN 70 milligrams/deciliter       Vital Signs Last 24 Hrs  T(C): 37.3 (10 May 2018 05:37), Max: 37.4 (09 May 2018 14:00)  T(F): 99.1 (10 May 2018 05:37), Max: 99.3 (09 May 2018 14:00)  HR: 80 (10 May 2018 05:37) (72 - 82)  BP: 111/56 (10 May 2018 05:37) (107/62 - 123/64)  BP(mean): --  RR: 16 (10 May 2018 05:37) (16 - 17)  SpO2: 96% (10 May 2018 05:37) (96% - 98%)        I&O's Summary    09 May 2018 07:01  -  10 May 2018 07:00  --------------------------------------------------------  IN: 360 mL / OUT: 0 mL / NET: 360 mL        Physical Exam:   GENERAL: NAD, well-groomed, well-developed  HEENT: MATTHEW/   Atraumatic, Normocephalic  ENMT: No tonsillar erythema, exudates, or enlargement; Moist mucous membranes, Good dentition, No lesions  NECK: Supple, No JVD, Normal thyroid  CHEST/LUNG: crackles half fields bilaterally  CVS: Regular rate and rhythm; No murmurs, rubs, or gallops  GI: : Soft, Nontender, Nondistended; Bowel sounds present  NERVOUS SYSTEM:  Alert & Oriented X3  EXTREMITIES:  2+ Peripheral Pulses, No clubbing, cyanosis, or edema  LYMPH: No lymphadenopathy noted  SKIN: No rashes or lesions  ENDOCRINOLOGY: No Thyromegaly  PSYCH: Appropriate    Labs:                              8.8    9.78  )-----------( 336      ( 10 May 2018 06:30 )             27.8                         10.3   17.05 )-----------( 395      ( 09 May 2018 06:54 )             33.7                         9.7    6.84  )-----------( 361      ( 08 May 2018 06:00 )             31.0                         9.7    5.64  )-----------( 349      ( 07 May 2018 05:40 )             31.5     05-10    133<L>  |  98  |  17  ----------------------------<  95  3.3<L>   |  25  |  0.89  05-09    135  |  98  |  16  ----------------------------<  139<H>  3.6   |  26  |  1.07  05-08    136  |  103  |  11  ----------------------------<  119<H>  3.7   |  23  |  0.83  05-07    138  |  104  |  12  ----------------------------<  136<H>  3.9   |  21<L>  |  0.78    Ca    9.0      10 May 2018 06:30  Ca    9.1      09 May 2018 06:54  Phos  2.6     05-10  Phos  3.4     05-09  Mg     2.0     05-10  Mg     2.1     05-09      CAPILLARY BLOOD GLUCOSE      POCT Blood Glucose.: 84 mg/dL (10 May 2018 09:03)  POCT Blood Glucose.: 155 mg/dL (09 May 2018 22:11)  POCT Blood Glucose.: 194 mg/dL (09 May 2018 17:41)  POCT Blood Glucose.: 132 mg/dL (09 May 2018 12:34)        Urinalysis Basic - ( 08 May 2018 11:03 )    Color: COLORL / Appearance: CLEAR / S.009 / pH: 6.5  Gluc: NEGATIVE / Ketone: NEGATIVE  / Bili: NEGATIVE / Urobili: NORMAL mg/dL   Blood: NEGATIVE / Protein: 10 mg/dL / Nitrite: NEGATIVE   Leuk Esterase: NEGATIVE / RBC: 0-2 / WBC 0-2   Sq Epi: x / Non Sq Epi: x / Bacteria: x      D DImer    Cultures:           Wound culture:                 @ 13:36  Organism --  Culture w/ gram stain --  Specimen Source BLOOD    Wound culture:                 @ 09:35  Organism --  Culture w/ gram stain --  Specimen Source BLOOD PERIPHERAL    Wound culture:                 @ 18:06  Organism --  Culture w/ gram stain --  Specimen Source BLOOD    Wound culture:                 @ 16:42  Organism --  Culture w/ gram stain --  Specimen Source BLOOD PERIPHERAL      Abscess culture:              @ 13:36  Organism --  Gram Stain --  Specimen Source BLOOD    Abscess culture:              @ 09:35  Organism --  Gram Stain --  Specimen Source BLOOD PERIPHERAL    Abscess culture:              @ 18:06  Organism --  Gram Stain --  Specimen Source BLOOD    Abscess culture:              @ 16:42  Organism --  Gram Stain --  Specimen Source BLOOD PERIPHERAL        Tissue culture:            @ 13:36  Organism --  Gram Stain --  Specimen Source BLOOD    Tissue culture:            @ 09:35  Organism --  Gram Stain --  Specimen Source BLOOD PERIPHERAL    Tissue culture:            @ 18:06  Organism --  Gram Stain --  Specimen Source BLOOD    Tissue culture:            @ 16:42  Organism --  Gram Stain --  Specimen Source BLOOD PERIPHERAL      Body Fluid Smear & Culture:                         @ 13:36  AFB Smear  --  Culture Acid Fast Body Fluid w/ Smear  --  Culture Acid Fast Smear Concentrated   --    Culture Results:     --  Specimen Source BLOOD    Body Fluid Smear & Culture:                         @ 09:35  AFB Smear  --  Culture Acid Fast Body Fluid w/ Smear  --  Culture Acid Fast Smear Concentrated   --    Culture Results:     --  Specimen Source BLOOD PERIPHERAL    Body Fluid Smear & Culture:                         @ 18:06  AFB Smear  --  Culture Acid Fast Body Fluid w/ Smear  --  Culture Acid Fast Smear Concentrated   --    Culture Results:     --  Specimen Source BLOOD    Body Fluid Smear & Culture:                         @ 16:42  AFB Smear  --  Culture Acid Fast Body Fluid w/ Smear  --  Culture Acid Fast Smear Concentrated   --    Culture Results:     --  Specimen Source BLOOD PERIPHERAL        Rapid Respiratory Viral Panel Result         @ 08:45  Rapid RVP --  Coronovirus --  Adenovirus NOT DETECTED  Bordetella Pertussis NOT DETECTED  Chlamydia Pneumonia NOT DETECTED  Entero/RhinovirusNOT DETECTED  HKU1 Coronovirus --  HMPV Coronovirus NOT DETECTED  Influenza A NOT DETECTED (any subtype)  Influenza AH1 NOT DETECTED  Influenza AH1 2009 NOT DETECTED  Influenza AH3 NOT DETECTED  Influenza B NOT DETECTED  Mycoplasma Pneumoniae NOT DETECTED This nucleic acid amplification assay was performed using  the [employer]. The following pathogens are tested  for: Adenovirus, Coronavirus 229E, Coronavirus HKU1,  Coronavirus NL63, Coronavirus OC43, Human Metapneumovirus  (HMPV), Rhinovirus/Enterovirus, Influenza A H1, Influenza A  H1 2009 (Pandemic H1 ), Influenza A H3, Influenza A (Flu  A) subtype not identified, Influenza B, Parainfluenza Virus  (types 1, 2, 3, 4), Respiratory Syncytial Virus (RSV),  Bordetella pertussis, Chlamydophila pneumoniae, and  Mycoplasma pneumoniae. A negative [employer] result does not  always exclude the possibility of viral or bacterial  infection. Laboratory results should always be interpreted  in the context of clinical findings.  NL63 Coronovirus --  OC43 Coronovirus --  Parainfluenza 1 NOT DETECTED  Parainfluenza 2 NOT DETECTED  Parainfluenza 3 NOT DETECTED  Parainfluenza 4 NOT DETECTED  Resp Syncytial Virus NOT DETECTED    Rapid Respiratory Viral Panel Result         @ 16:10  Rapid RVP --  Coronovirus --  Adenovirus NOT DETECTED  Bordetella Pertussis NOT DETECTED  Chlamydia Pneumonia NOT DETECTED  Entero/RhinovirusNOT DETECTED  HKU1 Coronovirus --  HMPV Coronovirus NOT DETECTED  Influenza A NOT DETECTED (any subtype)  Influenza AH1 NOT DETECTED  Influenza AH1 2009 NOT DETECTED  Influenza AH3 NOT DETECTED  Influenza B NOT DETECTED  Mycoplasma Pneumoniae NOT DETECTED This nucleic acid amplification assay was performed using  the [employer]. The following pathogens are tested  for: Adenovirus, Coronavirus 229E, Coronavirus HKU1,  Coronavirus NL63, Coronavirus OC43, Human Metapneumovirus  (HMPV), Rhinovirus/Enterovirus, Influenza A H1, Influenza A  H1 2009 (Pandemic H1 2009), Influenza A H3, Influenza A (Flu  A) subtype not identified, Influenza B, Parainfluenza Virus  (types 1, 2, 3, 4), Respiratory Syncytial Virus (RSV),  Bordetella pertussis, Chlamydophila pneumoniae, and  Mycoplasma pneumoniae. A negative [employer] result does not  always exclude the possibility of viral or bacterial  infection. Laboratory results should always be interpreted  in the context of clinical findings.  NL63 Coronovirus --  OC43 Coronovirus --  Parainfluenza 1 NOT DETECTED  Parainfluenza 2 NOT DETECTED  Parainfluenza 3 NOT DETECTED  Parainfluenza 4 NOT DETECTED  Resp Syncytial Virus NOT DETECTED        Studies  Chest X-RAY  CT SCAN Chest   CT Abdomen  Venous Dopplers: LE:   Others      < from: CT Chest No Cont (18 @ 11:53) >  Mediastinum/Vessels/Heart: Small pericardial effusion is noted. Mild   haziness surrounding the pericardium of uncertain significance. A   prevascular lymph node measures 1 cm. No other lymphadenopathy is noted.   Coronary artery calcifications are noted.    Lungs/Pleura/Airways: Bilateral pleural effusions are seen with areas of   loculated fluid within the fissures bilaterally. A right lower lobe 4 mm   nodule is seen on series 2, image 75 and is new.    Visualized abdomen: Partially imaged multiple left-sided parapelvic cysts   as seen on previous exam. Otherwise, unremarkable appearance of the upper   abdomen.    Bones and soft tissues: Degenerative changes noted throughout the spine.    IMPRESSION:    New small pericardial effusion with mild haziness surrounding the   pericardium of uncertain significance. Given history of fever, consider   pericarditis. Reactive anterior mediastinal lymphadenopathy up to 1 cm    Bilateral pleural effusions with areas of loculation within the fissures   bilaterally    New right lower lobe 4 mm nodule when compared with prior study.                  ANNABELLE MCKINNON M.D., ATTENDING RADIOLOGIST  This document has been electronically signed. May  9 2018 12:17PM        < end of copied text >

## 2018-05-10 NOTE — PROGRESS NOTE ADULT - PROBLEM SELECTOR PLAN 1
Pt with  AM glucose less than 100 mg/dl this morning, will decrease lantus to 4 units to decrease risk of hypoglycemia.  Continue correctional insulin  target bg 100-200 mg/dl  Given patient's age, no need for tight control, a1c goal is 7-8%.    Plan to discharge on Basal insulin only.  She should follow up with her endocrinologist, Dr Benavides post discharge  [phone]  ( had appt schedule for May 8, but was inpatient, will need to reschedule)

## 2018-05-10 NOTE — PROGRESS NOTE ADULT - SUBJECTIVE AND OBJECTIVE BOX
Diabetes Follow up note    Interval History: reports having good appetite.  no nausea or vomiting, eating meals.    MEDICATIONS  (STANDING):  aspirin  chewable 81 milliGRAM(s) Oral daily  atorvastatin 40 milliGRAM(s) Oral at bedtime  clopidogrel Tablet 75 milliGRAM(s) Oral daily  dextrose 5%. 1000 milliLiter(s) (50 mL/Hr) IV Continuous <Continuous>  dextrose 50% Injectable 12.5 Gram(s) IV Push once  dextrose 50% Injectable 25 Gram(s) IV Push once  dextrose 50% Injectable 25 Gram(s) IV Push once  furosemide    Tablet 20 milliGRAM(s) Oral daily  heparin  Injectable 5000 Unit(s) SubCutaneous every 12 hours  influenza   Vaccine 0.5 milliLiter(s) IntraMuscular once  insulin glargine Injectable (LANTUS) 5 Unit(s) SubCutaneous at bedtime  insulin lispro (HumaLOG) corrective regimen sliding scale   SubCutaneous three times a day before meals  insulin lispro (HumaLOG) corrective regimen sliding scale   SubCutaneous at bedtime  lidocaine   Patch 2 Patch Transdermal daily  lisinopril 10 milliGRAM(s) Oral daily  meropenem  IVPB      meropenem  IVPB 500 milliGRAM(s) IV Intermittent every 12 hours  metoprolol tartrate 12.5 milliGRAM(s) Oral two times a day  potassium chloride    Tablet ER 40 milliEquivalent(s) Oral every 4 hours  vancomycin  IVPB      vancomycin  IVPB 750 milliGRAM(s) IV Intermittent every 24 hours    MEDICATIONS  (PRN):  acetaminophen   Tablet 650 milliGRAM(s) Oral every 6 hours PRN For Temp greater than 38 C (100.4 F)  dextrose Gel 1 Dose(s) Oral once PRN Blood Glucose LESS THAN 70 milliGRAM(s)/deciliter  glucagon  Injectable 1 milliGRAM(s) IntraMuscular once PRN Glucose LESS THAN 70 milligrams/deciliter      Allergies    No Known Allergies    PHYSICAL EXAM:  VITALS: T(C): 37.3 (05-10-18 @ 05:37)  T(F): 99.1 (05-10-18 @ 05:37), Max: 99.3 (05-09-18 @ 14:00)  HR: 80 (05-10-18 @ 05:37) (72 - 82)  BP: 111/56 (05-10-18 @ 05:37) (107/62 - 123/64)  RR:  (16 - 17)  SpO2:  (96% - 98%)  GENERAL: Lying in bed in NAD,   EYES: No proptosis,  HEENT:  Atraumatic, Normocephalic,   RESPIRATORY: Clear to auscultation bilaterally  CARDIOVASCULAR: Regular rhythm; No murmurs; no peripheral edema  GI: Soft, nontender, non distended, normal bowel sounds      POCT Blood Glucose.: 84 mg/dL (05-10-18 @ 09:03)  POCT Blood Glucose.: 155 mg/dL (05-09-18 @ 22:11)  POCT Blood Glucose.: 194 mg/dL (05-09-18 @ 17:41)  POCT Blood Glucose.: 132 mg/dL (05-09-18 @ 12:34)  POCT Blood Glucose.: 124 mg/dL (05-09-18 @ 08:41)  POCT Blood Glucose.: 266 mg/dL (05-08-18 @ 22:42)  POCT Blood Glucose.: 215 mg/dL (05-08-18 @ 18:10)  POCT Blood Glucose.: 115 mg/dL (05-08-18 @ 08:37)  POCT Blood Glucose.: 157 mg/dL (05-07-18 @ 22:25)  POCT Blood Glucose.: 130 mg/dL (05-07-18 @ 17:39)  POCT Blood Glucose.: 276 mg/dL (05-07-18 @ 13:21)        05-10    133<L>  |  98  |  17  ----------------------------<  95  3.3<L>   |  25  |  0.89    EGFR if : 69  EGFR if non : 60    Ca    9.0      05-10  Mg     2.0     05-10  Phos  2.6     05-10          Hemoglobin A1C, Whole Blood: 6.4 % <H> [4.0 - 5.6] (05-02-18 @ 05:54)

## 2018-05-11 LAB
BASOPHILS # BLD AUTO: 0.02 K/UL — SIGNIFICANT CHANGE UP (ref 0–0.2)
BASOPHILS NFR BLD AUTO: 0.3 % — SIGNIFICANT CHANGE UP (ref 0–2)
BUN SERPL-MCNC: 16 MG/DL — SIGNIFICANT CHANGE UP (ref 7–23)
CALCIUM SERPL-MCNC: 9.1 MG/DL — SIGNIFICANT CHANGE UP (ref 8.4–10.5)
CHLORIDE SERPL-SCNC: 99 MMOL/L — SIGNIFICANT CHANGE UP (ref 98–107)
CO2 SERPL-SCNC: 25 MMOL/L — SIGNIFICANT CHANGE UP (ref 22–31)
CREAT SERPL-MCNC: 0.74 MG/DL — SIGNIFICANT CHANGE UP (ref 0.5–1.3)
EOSINOPHIL # BLD AUTO: 0.28 K/UL — SIGNIFICANT CHANGE UP (ref 0–0.5)
EOSINOPHIL NFR BLD AUTO: 3.7 % — SIGNIFICANT CHANGE UP (ref 0–6)
GLUCOSE BLDC GLUCOMTR-MCNC: 124 MG/DL — HIGH (ref 70–99)
GLUCOSE BLDC GLUCOMTR-MCNC: 144 MG/DL — HIGH (ref 70–99)
GLUCOSE BLDC GLUCOMTR-MCNC: 168 MG/DL — HIGH (ref 70–99)
GLUCOSE BLDC GLUCOMTR-MCNC: 187 MG/DL — HIGH (ref 70–99)
GLUCOSE SERPL-MCNC: 111 MG/DL — HIGH (ref 70–99)
HCT VFR BLD CALC: 27.9 % — LOW (ref 34.5–45)
HGB BLD-MCNC: 8.8 G/DL — LOW (ref 11.5–15.5)
IMM GRANULOCYTES # BLD AUTO: 0.03 # — SIGNIFICANT CHANGE UP
IMM GRANULOCYTES NFR BLD AUTO: 0.4 % — SIGNIFICANT CHANGE UP (ref 0–1.5)
LYMPHOCYTES # BLD AUTO: 1.51 K/UL — SIGNIFICANT CHANGE UP (ref 1–3.3)
LYMPHOCYTES # BLD AUTO: 20.1 % — SIGNIFICANT CHANGE UP (ref 13–44)
MAGNESIUM SERPL-MCNC: 2.1 MG/DL — SIGNIFICANT CHANGE UP (ref 1.6–2.6)
MCHC RBC-ENTMCNC: 26 PG — LOW (ref 27–34)
MCHC RBC-ENTMCNC: 31.5 % — LOW (ref 32–36)
MCV RBC AUTO: 82.5 FL — SIGNIFICANT CHANGE UP (ref 80–100)
MONOCYTES # BLD AUTO: 0.52 K/UL — SIGNIFICANT CHANGE UP (ref 0–0.9)
MONOCYTES NFR BLD AUTO: 6.9 % — SIGNIFICANT CHANGE UP (ref 2–14)
NEUTROPHILS # BLD AUTO: 5.15 K/UL — SIGNIFICANT CHANGE UP (ref 1.8–7.4)
NEUTROPHILS NFR BLD AUTO: 68.6 % — SIGNIFICANT CHANGE UP (ref 43–77)
NRBC # FLD: 0 — SIGNIFICANT CHANGE UP
PHOSPHATE SERPL-MCNC: 2.4 MG/DL — LOW (ref 2.5–4.5)
PLATELET # BLD AUTO: 357 K/UL — SIGNIFICANT CHANGE UP (ref 150–400)
PMV BLD: 11.2 FL — SIGNIFICANT CHANGE UP (ref 7–13)
POTASSIUM SERPL-MCNC: 3.9 MMOL/L — SIGNIFICANT CHANGE UP (ref 3.5–5.3)
POTASSIUM SERPL-SCNC: 3.9 MMOL/L — SIGNIFICANT CHANGE UP (ref 3.5–5.3)
RBC # BLD: 3.38 M/UL — LOW (ref 3.8–5.2)
RBC # FLD: 16.7 % — HIGH (ref 10.3–14.5)
SODIUM SERPL-SCNC: 134 MMOL/L — LOW (ref 135–145)
WBC # BLD: 7.51 K/UL — SIGNIFICANT CHANGE UP (ref 3.8–10.5)
WBC # FLD AUTO: 7.51 K/UL — SIGNIFICANT CHANGE UP (ref 3.8–10.5)

## 2018-05-11 PROCEDURE — 99232 SBSQ HOSP IP/OBS MODERATE 35: CPT

## 2018-05-11 PROCEDURE — 99232 SBSQ HOSP IP/OBS MODERATE 35: CPT | Mod: GC

## 2018-05-11 RX ADMIN — HEPARIN SODIUM 5000 UNIT(S): 5000 INJECTION INTRAVENOUS; SUBCUTANEOUS at 06:14

## 2018-05-11 RX ADMIN — Medication 20 MILLIGRAM(S): at 06:13

## 2018-05-11 RX ADMIN — CLOPIDOGREL BISULFATE 75 MILLIGRAM(S): 75 TABLET, FILM COATED ORAL at 13:37

## 2018-05-11 RX ADMIN — LIDOCAINE 2 PATCH: 4 CREAM TOPICAL at 13:37

## 2018-05-11 RX ADMIN — Medication 250 MILLIGRAM(S): at 14:19

## 2018-05-11 RX ADMIN — INSULIN GLARGINE 5 UNIT(S): 100 INJECTION, SOLUTION SUBCUTANEOUS at 22:40

## 2018-05-11 RX ADMIN — Medication 12.5 MILLIGRAM(S): at 17:19

## 2018-05-11 RX ADMIN — MEROPENEM 100 MILLIGRAM(S): 1 INJECTION INTRAVENOUS at 17:19

## 2018-05-11 RX ADMIN — LISINOPRIL 10 MILLIGRAM(S): 2.5 TABLET ORAL at 06:13

## 2018-05-11 RX ADMIN — MEROPENEM 100 MILLIGRAM(S): 1 INJECTION INTRAVENOUS at 06:13

## 2018-05-11 RX ADMIN — HEPARIN SODIUM 5000 UNIT(S): 5000 INJECTION INTRAVENOUS; SUBCUTANEOUS at 17:19

## 2018-05-11 RX ADMIN — Medication 1: at 17:42

## 2018-05-11 RX ADMIN — Medication 81 MILLIGRAM(S): at 13:37

## 2018-05-11 RX ADMIN — ATORVASTATIN CALCIUM 40 MILLIGRAM(S): 80 TABLET, FILM COATED ORAL at 20:25

## 2018-05-11 RX ADMIN — Medication 12.5 MILLIGRAM(S): at 06:13

## 2018-05-11 NOTE — PROGRESS NOTE ADULT - PROBLEM SELECTOR PLAN 1
Continue DAPT, lipitor, lisinopril, and metoprolol. [facility] with multivessel disease however plan is for medical management at this time.

## 2018-05-11 NOTE — PROGRESS NOTE ADULT - ASSESSMENT
85 YO F w/ hx of HTN , DM, TIA without residual deficits, no known CAD hx (stress test 11/2017 normal), presents to the ED with NSTEMI, s/p cath with multivessels  CAD. Course complicated by sepsis 2/2 UTI, anemia and uncleared high ever

## 2018-05-11 NOTE — PROGRESS NOTE ADULT - SUBJECTIVE AND OBJECTIVE BOX
No acute overnight events. No further hypoglycemia. Patient was being fed by her son when visiting, he reported that she had good appetite.   Inpatient BG levels today are well controlled         MEDICATIONS  (STANDING):  aspirin  chewable 81 milliGRAM(s) Oral daily  atorvastatin 40 milliGRAM(s) Oral at bedtime  clopidogrel Tablet 75 milliGRAM(s) Oral daily  dextrose 5%. 1000 milliLiter(s) (50 mL/Hr) IV Continuous <Continuous>  dextrose 50% Injectable 12.5 Gram(s) IV Push once  dextrose 50% Injectable 25 Gram(s) IV Push once  dextrose 50% Injectable 25 Gram(s) IV Push once  furosemide    Tablet 20 milliGRAM(s) Oral daily  heparin  Injectable 5000 Unit(s) SubCutaneous every 12 hours  influenza   Vaccine 0.5 milliLiter(s) IntraMuscular once  insulin glargine Injectable (LANTUS) 5 Unit(s) SubCutaneous at bedtime  insulin lispro (HumaLOG) corrective regimen sliding scale   SubCutaneous three times a day before meals  insulin lispro (HumaLOG) corrective regimen sliding scale   SubCutaneous at bedtime  lidocaine   Patch 2 Patch Transdermal daily  lisinopril 10 milliGRAM(s) Oral daily  meropenem  IVPB      meropenem  IVPB 500 milliGRAM(s) IV Intermittent every 12 hours  metoprolol tartrate 12.5 milliGRAM(s) Oral two times a day  vancomycin  IVPB      vancomycin  IVPB 750 milliGRAM(s) IV Intermittent every 24 hours    MEDICATIONS  (PRN):  acetaminophen   Tablet 650 milliGRAM(s) Oral every 6 hours PRN For Temp greater than 38 C (100.4 F)  dextrose Gel 1 Dose(s) Oral once PRN Blood Glucose LESS THAN 70 milliGRAM(s)/deciliter  glucagon  Injectable 1 milliGRAM(s) IntraMuscular once PRN Glucose LESS THAN 70 milligrams/deciliter      Allergies  No Known Allergies        Review of Systems:  Constitutional: No fever  Eyes: No blurry vision  Neuro: No tremors  HEENT: No pain  Cardiovascular: No chest pain, palpitations  Respiratory: No SOB, no cough  GI: No nausea, vomiting, abdominal pain  : No dysuria  Skin: no rash  Psych: no depression  Endocrine: no polyuria, polydipsia      PHYSICAL EXAM:  VITALS: T(C): 36.7 (05-11-18 @ 06:12)  T(F): 98.1 (05-11-18 @ 06:12), Max: 98.2 (05-10-18 @ 16:50)  HR: 73 (05-11-18 @ 06:12) (70 - 75)  BP: 141/75 (05-11-18 @ 06:12) (110/63 - 154/70)  RR:  (1 - 16)  SpO2:  (96% - 98%)    GENERAL: NAD, thin elderly female   EYES: No proptosis, no lid lag, anicteric  HEENT:  Atraumatic, Normocephalic, moist mucous membranes  THYROID: Normal size, no palpable nodules  RESPIRATORY: Clear to auscultation bilaterally; No rales, rhonchi, wheezing, or rubs  CARDIOVASCULAR: Regular rate and rhythm; No murmurs; no peripheral edema  GI: Soft, nontender, non distended, normal bowel sounds  SKIN: Dry, intact, No rashes or lesions  MUSCULOSKELETAL: Full range of motion, normal strength  NEURO: sensation intact, extraocular movements intact, no tremor, normal reflexes  PSYCH: Alert and oriented x 3, normal affect, normal mood    POCT Blood Glucose.: 144 mg/dL (05-11-18 @ 12:38)  POCT Blood Glucose.: 124 mg/dL (05-11-18 @ 09:02)  POCT Blood Glucose.: 186 mg/dL (05-10-18 @ 22:44)  POCT Blood Glucose.: 144 mg/dL (05-10-18 @ 17:07)  POCT Blood Glucose.: 109 mg/dL (05-10-18 @ 12:24)  POCT Blood Glucose.: 84 mg/dL (05-10-18 @ 09:03)  POCT Blood Glucose.: 155 mg/dL (05-09-18 @ 22:11)  POCT Blood Glucose.: 194 mg/dL (05-09-18 @ 17:41)  POCT Blood Glucose.: 132 mg/dL (05-09-18 @ 12:34)  POCT Blood Glucose.: 124 mg/dL (05-09-18 @ 08:41)  POCT Blood Glucose.: 266 mg/dL (05-08-18 @ 22:42)  POCT Blood Glucose.: 215 mg/dL (05-08-18 @ 18:10)      05-11    134<L>  |  99  |  16  ----------------------------<  111<H>  3.9   |  25  |  0.74    EGFR if : 86  EGFR if non : 74    Ca    9.1      05-11  Mg     2.1     05-11  Phos  2.4     05-11            Thyroid Function Tests:      Hemoglobin A1C, Whole Blood: 6.4 % <H> [4.0 - 5.6] (05-02-18 @ 05:54)

## 2018-05-11 NOTE — PROGRESS NOTE ADULT - SUBJECTIVE AND OBJECTIVE BOX
Patient is a 84y old  Female who presents with a chief complaint of Altered mental status (05 May 2018 11:18)      Any change in ROS: Alert and awake:  no SOB :o chest pain     MEDICATIONS  (STANDING):  aspirin  chewable 81 milliGRAM(s) Oral daily  atorvastatin 40 milliGRAM(s) Oral at bedtime  clopidogrel Tablet 75 milliGRAM(s) Oral daily  dextrose 5%. 1000 milliLiter(s) (50 mL/Hr) IV Continuous <Continuous>  dextrose 50% Injectable 12.5 Gram(s) IV Push once  dextrose 50% Injectable 25 Gram(s) IV Push once  dextrose 50% Injectable 25 Gram(s) IV Push once  furosemide    Tablet 20 milliGRAM(s) Oral daily  heparin  Injectable 5000 Unit(s) SubCutaneous every 12 hours  influenza   Vaccine 0.5 milliLiter(s) IntraMuscular once  insulin glargine Injectable (LANTUS) 5 Unit(s) SubCutaneous at bedtime  insulin lispro (HumaLOG) corrective regimen sliding scale   SubCutaneous three times a day before meals  insulin lispro (HumaLOG) corrective regimen sliding scale   SubCutaneous at bedtime  lidocaine   Patch 2 Patch Transdermal daily  lisinopril 10 milliGRAM(s) Oral daily  meropenem  IVPB      meropenem  IVPB 500 milliGRAM(s) IV Intermittent every 12 hours  metoprolol tartrate 12.5 milliGRAM(s) Oral two times a day  vancomycin  IVPB      vancomycin  IVPB 750 milliGRAM(s) IV Intermittent every 24 hours    MEDICATIONS  (PRN):  acetaminophen   Tablet 650 milliGRAM(s) Oral every 6 hours PRN For Temp greater than 38 C (100.4 F)  dextrose Gel 1 Dose(s) Oral once PRN Blood Glucose LESS THAN 70 milliGRAM(s)/deciliter  glucagon  Injectable 1 milliGRAM(s) IntraMuscular once PRN Glucose LESS THAN 70 milligrams/deciliter    Vital Signs Last 24 Hrs  T(C): 36.7 (11 May 2018 06:12), Max: 37 (10 May 2018 13:01)  T(F): 98.1 (11 May 2018 06:12), Max: 98.6 (10 May 2018 13:01)  HR: 73 (11 May 2018 06:12) (70 - 78)  BP: 141/75 (11 May 2018 06:12) (105/46 - 154/70)  BP(mean): --  RR: 16 (11 May 2018 06:12) (1 - 18)  SpO2: 98% (11 May 2018 06:12) (95% - 98%)    I&O's Summary    10 May 2018 07:01  -  11 May 2018 07:00  --------------------------------------------------------  IN: 480 mL / OUT: 0 mL / NET: 480 mL          Physical Exam:   GENERAL: NAD, well-groomed, well-developed  HEENT: MATTHEW/   Atraumatic, Normocephalic  ENMT: No tonsillar erythema, exudates, or enlargement; Moist mucous membranes, Good dentition, No lesions  NECK: Supple, No JVD, Normal thyroid  CHEST/LUNG: no wheezing  CVS: Regular rate and rhythm; No murmurs, rubs, or gallops  GI: : Soft, Nontender, Nondistended; Bowel sounds present  NERVOUS SYSTEM:  Alert & Oriented X3  EXTREMITIES:  2+ Peripheral Pulses, No clubbing, cyanosis, or edema  LYMPH: No lymphadenopathy noted  SKIN: No rashes or lesions  ENDOCRINOLOGY: No Thyromegaly  PSYCH: Appropriate    Labs:                              8.8    7.51  )-----------( 357      ( 11 May 2018 05:45 )             27.9                         8.8    9.78  )-----------( 336      ( 10 May 2018 06:30 )             27.8                         10.3   17.05 )-----------( 395      ( 09 May 2018 06:54 )             33.7                         9.7    6.84  )-----------( 361      ( 08 May 2018 06:00 )             31.0     05-11    134<L>  |  99  |  16  ----------------------------<  111<H>  3.9   |  25  |  0.74  05-10    133<L>  |  98  |  17  ----------------------------<  95  3.3<L>   |  25  |  0.89  05-09    135  |  98  |  16  ----------------------------<  139<H>  3.6   |  26  |  1.07  05-08    136  |  103  |  11  ----------------------------<  119<H>  3.7   |  23  |  0.83    Ca    9.1      11 May 2018 05:45  Ca    9.0      10 May 2018 06:30  Phos  2.4     05-11  Phos  2.6     05-10  Mg     2.1     05-11  Mg     2.0     05-10      CAPILLARY BLOOD GLUCOSE      POCT Blood Glucose.: 144 mg/dL (11 May 2018 12:38)  POCT Blood Glucose.: 124 mg/dL (11 May 2018 09:02)  POCT Blood Glucose.: 186 mg/dL (10 May 2018 22:44)  POCT Blood Glucose.: 144 mg/dL (10 May 2018 17:07)            Procalcitonin, Serum: 28.93 ng/mL (05-10 @ 06:30)  Cultures:           Wound culture:                05-09 @ 13:07  Organism --  Culture w/ gram stain --  Specimen Source BLOOD    Wound culture:                05-08 @ 13:36  Organism --  Culture w/ gram stain --  Specimen Source BLOOD    Wound culture:                05-08 @ 09:35  Organism --  Culture w/ gram stain --  Specimen Source BLOOD PERIPHERAL      Abscess culture:             05-09 @ 13:07  Organism --  Gram Stain --  Specimen Source BLOOD    Abscess culture:             05-08 @ 13:36  Organism --  Gram Stain --  Specimen Source BLOOD    Abscess culture:             05-08 @ 09:35  Organism --  Gram Stain --  Specimen Source BLOOD PERIPHERAL        Tissue culture:           05-09 @ 13:07  Organism --  Gram Stain --  Specimen Source BLOOD    Tissue culture:           05-08 @ 13:36  Organism --  Gram Stain --  Specimen Source BLOOD    Tissue culture:           05-08 @ 09:35  Organism --  Gram Stain --  Specimen Source BLOOD PERIPHERAL      Body Fluid Smear & Culture:                        05-09 @ 13:07  AFB Smear  --  Culture Acid Fast Body Fluid w/ Smear  --  Culture Acid Fast Smear Concentrated   --    Culture Results:     --  Specimen Source BLOOD    Body Fluid Smear & Culture:                        05-08 @ 13:36  AFB Smear  --  Culture Acid Fast Body Fluid w/ Smear  --  Culture Acid Fast Smear Concentrated   --    Culture Results:     --  Specimen Source BLOOD    Body Fluid Smear & Culture:                        05-08 @ 09:35  AFB Smear  --  Culture Acid Fast Body Fluid w/ Smear  --  Culture Acid Fast Smear Concentrated   --    Culture Results:     --  Specimen Source BLOOD PERIPHERAL        Rapid Respiratory Viral Panel Result        05-09 @ 08:45  Rapid RVP --  Coronovirus --  Adenovirus NOT DETECTED  Bordetella Pertussis NOT DETECTED  Chlamydia Pneumonia NOT DETECTED  Entero/RhinovirusNOT DETECTED  HKU1 Coronovirus --  HMPV Coronovirus NOT DETECTED  Influenza A NOT DETECTED (any subtype)  Influenza AH1 NOT DETECTED  Influenza AH1 2009 NOT DETECTED  Influenza AH3 NOT DETECTED  Influenza B NOT DETECTED  Mycoplasma Pneumoniae NOT DETECTED This nucleic acid amplification assay was performed using  the [employer]. The following pathogens are tested  for: Adenovirus, Coronavirus 229E, Coronavirus HKU1,  Coronavirus NL63, Coronavirus OC43, Human Metapneumovirus  (HMPV), Rhinovirus/Enterovirus, Influenza A H1, Influenza A  H1 2009 (Pandemic H1 2009), Influenza A H3, Influenza A (Flu  A) subtype not identified, Influenza B, Parainfluenza Virus  (types 1, 2, 3, 4), Respiratory Syncytial Virus (RSV),  Bordetella pertussis, Chlamydophila pneumoniae, and  Mycoplasma pneumoniae. A negative FilmArray result does not  always exclude the possibility of viral or bacterial  infection. Laboratory results should always be interpreted  in the context of clinical findings.  NL63 Coronovirus --  OC43 Coronovirus --  Parainfluenza 1 NOT DETECTED  Parainfluenza 2 NOT DETECTED  Parainfluenza 3 NOT DETECTED  Parainfluenza 4 NOT DETECTED  Resp Syncytial Virus NOT DETECTED          Studies  Chest X-RAY  CT SCAN Chest   Venous Dopplers: LE:   CT Abdomen  Others        < from: CT Chest No Cont (05.09.18 @ 11:53) >  Tubes/Lines: None    Mediastinum/Vessels/Heart: Small pericardial effusion is noted. Mild   haziness surrounding the pericardium of uncertain significance. A   prevascular lymph node measures 1 cm. No other lymphadenopathy is noted.   Coronary artery calcifications are noted.    Lungs/Pleura/Airways: Bilateral pleural effusions are seen with areas of   loculated fluid within the fissures bilaterally. A right lower lobe 4 mm   nodule is seen on series 2, image 75 and is new.    Visualized abdomen: Partially imaged multiple left-sided parapelvic cysts   as seen on previous exam. Otherwise, unremarkable appearance of the upper   abdomen.    Bones and soft tissues: Degenerative changes noted throughout the spine.    IMPRESSION:    New small pericardial effusion with mild haziness surrounding the   pericardium of uncertain significance. Given history of fever, consider   pericarditis. Reactive anterior mediastinal lymphadenopathy up to 1 cm    Bilateral pleural effusions with areas of loculation within the fissures   bilaterally    New right lower lobe 4 mm nodule when compared with prior study.                  ANNABELLE MCKINNON M.D., ATTENDING RADIOLOGIST  This document has been electronically signed. May  9 2018 12:17PM              < end of copied text >

## 2018-05-11 NOTE — PROGRESS NOTE ADULT - ASSESSMENT
Afebrile since hyperthermia 5/8 2200    84F with DM, HTN, HLD, TIA and Osteoarthritis presented 5/1/18 for symptomatic hypoglycemia but also found to be in NSTEMI and admitted to CCU. Developed nonfocal fevers 5/1-5/3, then recurred 5/8, Tmax 105.6F with rise in white count to 17. Looks remarkably well.   Not clearly an infectious etiology. Blood cultures negative. Urine E. coli but she was asymptomatic. RVP negative. Repeat CXR 5/8 new bilateral midlung opacities, CT with pericardial effusion and loculated pleural effusion in both fissures, clinically not a bacterial pneumonia, not even coughing. Right arm ecchymosis without cellulitis. The high PCT may represent decline from even higher value during the hyperthermia.   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    Recommend   consider echocardiogram/Cochchicine  d/c antibiotics 5/11   Monitor off antibiotics    Patient will be seen by ID this weekend.

## 2018-05-11 NOTE — PROGRESS NOTE ADULT - PROBLEM SELECTOR PLAN 1
-Blood glucose has now stabilized on current regimen   -Can continue lantus 5 units at bedtime  -No additional meal time insulin necessary at this time, can be added if necessary as an outpatient.   -Can continue SS # 1   -Given her age, goal HgA1C of 7.5-8% for prevention of hypoglycemia     Discharge instructions:  -Can be discharged on Lantus only   -Will follow up with Dr. Benavides as an outpatient (Will need to reschedule her appointment, Dr. Benavides is aware of her hospital admission- office phone number is [phone]    Diabetes team: [phone] business hours  [phone] night/weekend

## 2018-05-11 NOTE — PROGRESS NOTE ADULT - SUBJECTIVE AND OBJECTIVE BOX
Follow Up:      Interval History/ROS: comfortable, no SOB, no pleuritic pain    Allergies  No Known Allergies    ANTIMICROBIALS:  meropenem  IVPB    meropenem  IVPB 500 every 12 hours  vancomycin  IVPB    vancomycin  IVPB 750 every 24 hours    OTHER MEDS:  MEDICATIONS  (STANDING):  acetaminophen   Tablet 650 every 6 hours PRN  aspirin  chewable 81 daily  atorvastatin 40 at bedtime  clopidogrel Tablet 75 daily  dextrose 50% Injectable 12.5 once  dextrose 50% Injectable 25 once  dextrose 50% Injectable 25 once  dextrose Gel 1 once PRN  furosemide    Tablet 20 daily  glucagon  Injectable 1 once PRN  heparin  Injectable 5000 every 12 hours  influenza   Vaccine 0.5 once  insulin glargine Injectable (LANTUS) 5 at bedtime  insulin lispro (HumaLOG) corrective regimen sliding scale  three times a day before meals  insulin lispro (HumaLOG) corrective regimen sliding scale  at bedtime  lisinopril 10 daily  metoprolol tartrate 12.5 two times a day    Vital Signs Last 24 Hrs  T(C): 36.6 (11 May 2018 17:18), Max: 36.9 (11 May 2018 15:00)  T(F): 97.8 (11 May 2018 17:18), Max: 98.5 (11 May 2018 15:00)  HR: 69 (11 May 2018 17:18) (67 - 73)  BP: 130/65 (11 May 2018 17:18) (110/63 - 141/75)  BP(mean): --  RR: 18 (11 May 2018 17:18) (16 - 18)  SpO2: 99% (11 May 2018 17:18) (96% - 99%)    PHYSICAL EXAM:  General: thin,  NAD, Non-toxic  Neurology: A&Ox3, nonfocal  Respiratory: Clear to auscultation bilaterally  CV: RRR, S1S2, no murmurs, rubs or gallops  Abdominal: Soft, Non-tender, non-distended  Extremities: No edema,   Line Sites: Clear  Skin: No rash                        8.8    7.51  )-----------( 357      ( 11 May 2018 05:45 )             27.9       05-11    134<L>  |  99  |  16  ----------------------------<  111<H>  3.9   |  25  |  0.74    Ca    9.1      11 May 2018 05:45  Phos  2.4     05-11  Mg     2.1     05-11    MICROBIOLOGY:  BLOOD  05-09-18 --  --  --      BLOOD  05-08-18 --  --  --      BLOOD PERIPHERAL  05-08-18 --  --  --      BLOOD  05-03-18 --  --  --      BLOOD PERIPHERAL  05-03-18 --  --  --      BLOOD  05-03-18 --  --  --      BLOOD VENOUS  05-02-18 --  --  --      BLOOD-CATHETER  05-02-18 --  --  --      URINE MIDSTREAM  05-01-18 --  --  Escherichia coli    RADIOLOGY:  < from: CT Chest No Cont (05.09.18 @ 11:53) >  New small pericardial effusion with mild haziness surrounding the   pericardium of uncertain significance. Given history of fever, consider   pericarditis. Reactive anterior mediastinal lymphadenopathy up to 1 cm    Bilateral pleural effusions with areas of loculation within the fissures   bilaterally    New right lower lobe 4 mm nodule when compared with prior study.    < end of copied text >      Jim Andre MD; Division of Infectious Disease; Pager: [phone]; nights and weekends: [phone]

## 2018-05-11 NOTE — PROGRESS NOTE ADULT - ASSESSMENT
84 year old woman with T2DM, primary hyperparathyroidism with osteoporosis, admitted with hypoglycemia in setting of reduced po intake and mixed 70/30 insulin use, also with newly diagnosed CAD now well controlled on current regimen       .

## 2018-05-11 NOTE — PROGRESS NOTE ADULT - SUBJECTIVE AND OBJECTIVE BOX
Tele: NST    Overnight: No fever for last 3 days     MEDICATIONS  (STANDING):  aspirin  chewable 81 milliGRAM(s) Oral daily  atorvastatin 40 milliGRAM(s) Oral at bedtime  clopidogrel Tablet 75 milliGRAM(s) Oral daily  furosemide    Tablet 20 milliGRAM(s) Oral daily  heparin  Injectable 5000 Unit(s) SubCutaneous every 12 hours  influenza   Vaccine 0.5 milliLiter(s) IntraMuscular once  insulin glargine Injectable (LANTUS) 5 Unit(s) SubCutaneous at bedtime  insulin lispro (HumaLOG) corrective regimen sliding scale   SubCutaneous three times a day before meals  insulin lispro (HumaLOG) corrective regimen sliding scale   SubCutaneous at bedtime  lidocaine   Patch 2 Patch Transdermal daily  lisinopril 10 milliGRAM(s) Oral daily  meropenem  IVPB 500 milliGRAM(s) IV Intermittent every 12 hours  metoprolol tartrate 12.5 milliGRAM(s) Oral two times a day  vancomycin  IVPB 750 milliGRAM(s) IV Intermittent every 24 hours    MEDICATIONS  (PRN):  acetaminophen   Tablet 650 milliGRAM(s) Oral every 6 hours PRN For Temp greater than 38 C (100.4 F)  dextrose Gel 1 Dose(s) Oral once PRN Blood Glucose LESS THAN 70 milliGRAM(s)/deciliter  glucagon  Injectable 1 milliGRAM(s) IntraMuscular once PRN Glucose LESS THAN 70 milligrams/deciliter          Vital Signs Last 24 Hrs  T(C): 36.7 (11 May 2018 06:12), Max: 37 (10 May 2018 13:01)  T(F): 98.1 (11 May 2018 06:12), Max: 98.6 (10 May 2018 13:01)  HR: 73 (11 May 2018 06:12) (70 - 78)  BP: 141/75 (11 May 2018 06:12) (105/46 - 154/70)  RR: 16 (11 May 2018 06:12) (1 - 18)  SpO2: 98% (11 May 2018 06:12) (95% - 98%)  I&O's Detail    10 May 2018 07:01  -  11 May 2018 07:00  --------------------------------------------------------  IN:    Oral Fluid: 480 mL  Total IN: 480 mL    OUT:  Total OUT: 0 mL    Total NET: 480 mL      Physical exam:   Gen- NAD   Resp- Clear   CV- S1S2 RRR  ABD- +BSx4 ND/NT  EXT- No edema   Neuro- A&Ox3                             8.8    7.51  )-----------( 357      ( 11 May 2018 05:45 )             27.9       11 May 2018 05:45    134<L>  |  99     |  16     ----------------------------<  111<H>  3.9     |  25     |  0.74   10 May 2018 06:30    133<L>  |  98     |  17     ----------------------------<  95     3.3<L>   |  25     |  0.89     Ca    9.1        11 May 2018 05:45  Ca    9.0        10 May 2018 06:30  Phos  2.4<L>     11 May 2018 05:45  Phos  2.6       10 May 2018 06:30  Mg     2.1       11 May 2018 05:45  Mg     2.0       10 May 2018 06:30 Tele: NST    Overnight: No fever for last 3 days     MEDICATIONS  (STANDING):  aspirin  chewable 81 milliGRAM(s) Oral daily  atorvastatin 40 milliGRAM(s) Oral at bedtime  clopidogrel Tablet 75 milliGRAM(s) Oral daily  furosemide    Tablet 20 milliGRAM(s) Oral daily  heparin  Injectable 5000 Unit(s) SubCutaneous every 12 hours  influenza   Vaccine 0.5 milliLiter(s) IntraMuscular once  insulin glargine Injectable (LANTUS) 5 Unit(s) SubCutaneous at bedtime  insulin lispro (HumaLOG) corrective regimen sliding scale   SubCutaneous three times a day before meals  insulin lispro (HumaLOG) corrective regimen sliding scale   SubCutaneous at bedtime  lidocaine   Patch 2 Patch Transdermal daily  lisinopril 10 milliGRAM(s) Oral daily  meropenem  IVPB 500 milliGRAM(s) IV Intermittent every 12 hours  metoprolol tartrate 12.5 milliGRAM(s) Oral two times a day  vancomycin  IVPB 750 milliGRAM(s) IV Intermittent every 24 hours    MEDICATIONS  (PRN):  acetaminophen   Tablet 650 milliGRAM(s) Oral every 6 hours PRN For Temp greater than 38 C (100.4 F)  dextrose Gel 1 Dose(s) Oral once PRN Blood Glucose LESS THAN 70 milliGRAM(s)/deciliter  glucagon  Injectable 1 milliGRAM(s) IntraMuscular once PRN Glucose LESS THAN 70 milligrams/deciliter          Vital Signs Last 24 Hrs  T(C): 36.7 (11 May 2018 06:12), Max: 37 (10 May 2018 13:01)  T(F): 98.1 (11 May 2018 06:12), Max: 98.6 (10 May 2018 13:01)  HR: 73 (11 May 2018 06:12) (70 - 78)  BP: 141/75 (11 May 2018 06:12) (105/46 - 154/70)  RR: 16 (11 May 2018 06:12) (1 - 18)  SpO2: 98% (11 May 2018 06:12) (95% - 98%)  I&O's Detail    10 May 2018 07:01  -  11 May 2018 07:00  --------------------------------------------------------  IN:    Oral Fluid: 480 mL  Total IN: 480 mL    OUT:  Total OUT: 0 mL    Total NET: 480 mL      Physical exam:   Gen- NAD   Resp- Crackles   CV- S1S2 RRR  ABD- +BSx4 ND/NT  EXT- No edema   Neuro- A&Ox3                             8.8    7.51  )-----------( 357      ( 11 May 2018 05:45 )             27.9       11 May 2018 05:45    134<L>  |  99     |  16     ----------------------------<  111<H>  3.9     |  25     |  0.74   10 May 2018 06:30    133<L>  |  98     |  17     ----------------------------<  95     3.3<L>   |  25     |  0.89     Ca    9.1        11 May 2018 05:45  Ca    9.0        10 May 2018 06:30  Phos  2.4<L>     11 May 2018 05:45  Phos  2.6       10 May 2018 06:30  Mg     2.1       11 May 2018 05:45  Mg     2.0       10 May 2018 06:30

## 2018-05-11 NOTE — PROGRESS NOTE ADULT - PROBLEM SELECTOR PLAN 1
ADI harmon, radiology attending about the ct scan chest has pericardial effusion, with pericardial thickening: has pl fluid in the fissure: no consolidation: The clinical picture as well as radiographically , it is suggestives of pericarditis:

## 2018-05-12 LAB
BASOPHILS # BLD AUTO: 0.03 K/UL — SIGNIFICANT CHANGE UP (ref 0–0.2)
BASOPHILS NFR BLD AUTO: 0.5 % — SIGNIFICANT CHANGE UP (ref 0–2)
BUN SERPL-MCNC: 16 MG/DL — SIGNIFICANT CHANGE UP (ref 7–23)
CALCIUM SERPL-MCNC: 8.9 MG/DL — SIGNIFICANT CHANGE UP (ref 8.4–10.5)
CHLORIDE SERPL-SCNC: 100 MMOL/L — SIGNIFICANT CHANGE UP (ref 98–107)
CO2 SERPL-SCNC: 27 MMOL/L — SIGNIFICANT CHANGE UP (ref 22–31)
CREAT SERPL-MCNC: 0.69 MG/DL — SIGNIFICANT CHANGE UP (ref 0.5–1.3)
EOSINOPHIL # BLD AUTO: 0.34 K/UL — SIGNIFICANT CHANGE UP (ref 0–0.5)
EOSINOPHIL NFR BLD AUTO: 5.2 % — SIGNIFICANT CHANGE UP (ref 0–6)
GLUCOSE BLDC GLUCOMTR-MCNC: 117 MG/DL — HIGH (ref 70–99)
GLUCOSE BLDC GLUCOMTR-MCNC: 173 MG/DL — HIGH (ref 70–99)
GLUCOSE BLDC GLUCOMTR-MCNC: 176 MG/DL — HIGH (ref 70–99)
GLUCOSE BLDC GLUCOMTR-MCNC: 193 MG/DL — HIGH (ref 70–99)
GLUCOSE SERPL-MCNC: 135 MG/DL — HIGH (ref 70–99)
HCT VFR BLD CALC: 28.8 % — LOW (ref 34.5–45)
HGB BLD-MCNC: 9 G/DL — LOW (ref 11.5–15.5)
IMM GRANULOCYTES # BLD AUTO: 0.04 # — SIGNIFICANT CHANGE UP
IMM GRANULOCYTES NFR BLD AUTO: 0.6 % — SIGNIFICANT CHANGE UP (ref 0–1.5)
LYMPHOCYTES # BLD AUTO: 1.57 K/UL — SIGNIFICANT CHANGE UP (ref 1–3.3)
LYMPHOCYTES # BLD AUTO: 24 % — SIGNIFICANT CHANGE UP (ref 13–44)
MAGNESIUM SERPL-MCNC: 2.1 MG/DL — SIGNIFICANT CHANGE UP (ref 1.6–2.6)
MCHC RBC-ENTMCNC: 25.9 PG — LOW (ref 27–34)
MCHC RBC-ENTMCNC: 31.3 % — LOW (ref 32–36)
MCV RBC AUTO: 83 FL — SIGNIFICANT CHANGE UP (ref 80–100)
MONOCYTES # BLD AUTO: 0.5 K/UL — SIGNIFICANT CHANGE UP (ref 0–0.9)
MONOCYTES NFR BLD AUTO: 7.7 % — SIGNIFICANT CHANGE UP (ref 2–14)
NEUTROPHILS # BLD AUTO: 4.05 K/UL — SIGNIFICANT CHANGE UP (ref 1.8–7.4)
NEUTROPHILS NFR BLD AUTO: 62 % — SIGNIFICANT CHANGE UP (ref 43–77)
NRBC # FLD: 0 — SIGNIFICANT CHANGE UP
PHOSPHATE SERPL-MCNC: 2.6 MG/DL — SIGNIFICANT CHANGE UP (ref 2.5–4.5)
PLATELET # BLD AUTO: 396 K/UL — SIGNIFICANT CHANGE UP (ref 150–400)
PMV BLD: 10.5 FL — SIGNIFICANT CHANGE UP (ref 7–13)
POTASSIUM SERPL-MCNC: 3.8 MMOL/L — SIGNIFICANT CHANGE UP (ref 3.5–5.3)
POTASSIUM SERPL-SCNC: 3.8 MMOL/L — SIGNIFICANT CHANGE UP (ref 3.5–5.3)
RBC # BLD: 3.47 M/UL — LOW (ref 3.8–5.2)
RBC # FLD: 16.4 % — HIGH (ref 10.3–14.5)
SODIUM SERPL-SCNC: 137 MMOL/L — SIGNIFICANT CHANGE UP (ref 135–145)
WBC # BLD: 6.53 K/UL — SIGNIFICANT CHANGE UP (ref 3.8–10.5)
WBC # FLD AUTO: 6.53 K/UL — SIGNIFICANT CHANGE UP (ref 3.8–10.5)

## 2018-05-12 PROCEDURE — 99233 SBSQ HOSP IP/OBS HIGH 50: CPT

## 2018-05-12 RX ADMIN — LIDOCAINE 2 PATCH: 4 CREAM TOPICAL at 04:24

## 2018-05-12 RX ADMIN — LIDOCAINE 2 PATCH: 4 CREAM TOPICAL at 21:04

## 2018-05-12 RX ADMIN — Medication 12.5 MILLIGRAM(S): at 17:03

## 2018-05-12 RX ADMIN — LISINOPRIL 10 MILLIGRAM(S): 2.5 TABLET ORAL at 05:39

## 2018-05-12 RX ADMIN — Medication 81 MILLIGRAM(S): at 09:21

## 2018-05-12 RX ADMIN — Medication 20 MILLIGRAM(S): at 05:39

## 2018-05-12 RX ADMIN — Medication 12.5 MILLIGRAM(S): at 05:39

## 2018-05-12 RX ADMIN — INSULIN GLARGINE 5 UNIT(S): 100 INJECTION, SOLUTION SUBCUTANEOUS at 21:53

## 2018-05-12 RX ADMIN — LIDOCAINE 2 PATCH: 4 CREAM TOPICAL at 09:21

## 2018-05-12 RX ADMIN — Medication 1: at 13:07

## 2018-05-12 RX ADMIN — Medication 1: at 17:06

## 2018-05-12 RX ADMIN — HEPARIN SODIUM 5000 UNIT(S): 5000 INJECTION INTRAVENOUS; SUBCUTANEOUS at 17:03

## 2018-05-12 RX ADMIN — ATORVASTATIN CALCIUM 40 MILLIGRAM(S): 80 TABLET, FILM COATED ORAL at 21:05

## 2018-05-12 RX ADMIN — HEPARIN SODIUM 5000 UNIT(S): 5000 INJECTION INTRAVENOUS; SUBCUTANEOUS at 05:40

## 2018-05-12 RX ADMIN — CLOPIDOGREL BISULFATE 75 MILLIGRAM(S): 75 TABLET, FILM COATED ORAL at 09:21

## 2018-05-12 NOTE — PROGRESS NOTE ADULT - SUBJECTIVE AND OBJECTIVE BOX
Patient is a 84y old  Female who presents with a chief complaint of Altered mental status (05 May 2018 11:18)    Any change in ROS: OOB to chair. denies sob, cough, sputum     MEDICATIONS  (STANDING):  aspirin  chewable 81 milliGRAM(s) Oral daily  atorvastatin 40 milliGRAM(s) Oral at bedtime  clopidogrel Tablet 75 milliGRAM(s) Oral daily  dextrose 5%. 1000 milliLiter(s) (50 mL/Hr) IV Continuous <Continuous>  dextrose 50% Injectable 12.5 Gram(s) IV Push once  dextrose 50% Injectable 25 Gram(s) IV Push once  dextrose 50% Injectable 25 Gram(s) IV Push once  furosemide    Tablet 20 milliGRAM(s) Oral daily  heparin  Injectable 5000 Unit(s) SubCutaneous every 12 hours  influenza   Vaccine 0.5 milliLiter(s) IntraMuscular once  insulin glargine Injectable (LANTUS) 5 Unit(s) SubCutaneous at bedtime  insulin lispro (HumaLOG) corrective regimen sliding scale   SubCutaneous three times a day before meals  insulin lispro (HumaLOG) corrective regimen sliding scale   SubCutaneous at bedtime  lidocaine   Patch 2 Patch Transdermal daily  lisinopril 10 milliGRAM(s) Oral daily  metoprolol tartrate 12.5 milliGRAM(s) Oral two times a day    MEDICATIONS  (PRN):  acetaminophen   Tablet 650 milliGRAM(s) Oral every 6 hours PRN For Temp greater than 38 C (100.4 F)  dextrose Gel 1 Dose(s) Oral once PRN Blood Glucose LESS THAN 70 milliGRAM(s)/deciliter  glucagon  Injectable 1 milliGRAM(s) IntraMuscular once PRN Glucose LESS THAN 70 milligrams/deciliter    Vital Signs Last 24 Hrs  T(C): 36.7 (12 May 2018 05:37), Max: 36.9 (11 May 2018 15:00)  T(F): 98.1 (12 May 2018 05:37), Max: 98.5 (11 May 2018 15:00)  HR: 72 (12 May 2018 05:37) (67 - 72)  BP: 145/66 (12 May 2018 05:37) (130/65 - 145/66)  BP(mean): --  RR: 18 (12 May 2018 05:37) (18 - 18)  SpO2: 97% (12 May 2018 05:37) (97% - 99%)    I&O's Summary    11 May 2018 07:01  -  12 May 2018 07:00  --------------------------------------------------------  IN: 300 mL / OUT: 0 mL / NET: 300 mL    12 May 2018 07:01  -  12 May 2018 12:25  --------------------------------------------------------  IN: 80 mL / OUT: 0 mL / NET: 80 mL      Physical Exam:   GENERAL: The patient comfortable with no apparent distress. frail   HEENT: Head is normocephalic and atraumatic.    NECK: Supple with no elevated JVP.  LUNGS: Clear to auscultation without wheeze and rhonchi.  HEART: S1 and S2 present without murmur.  ABDOMEN: Soft, nontender, and nondistended. No hepatosplenomegaly is noted.  EXTREMITIES: No edema or calf tenderness.  NEUROLOGIC: Grossly intact.    Labs:                 9.0    6.53  )-----------( 396      ( 12 May 2018 06:00 )             28.8                         8.8    7.51  )-----------( 357      ( 11 May 2018 05:45 )             27.9                         8.8    9.78  )-----------( 336      ( 10 May 2018 06:30 )             27.8                         10.3   17.05 )-----------( 395      ( 09 May 2018 06:54 )             33.7     05-12    137  |  100  |  16  ----------------------------<  135<H>  3.8   |  27  |  0.69  05-11    134<L>  |  99  |  16  ----------------------------<  111<H>  3.9   |  25  |  0.74  05-10    133<L>  |  98  |  17  ----------------------------<  95  3.3<L>   |  25  |  0.89  05-09    135  |  98  |  16  ----------------------------<  139<H>  3.6   |  26  |  1.07    Ca    8.9      12 May 2018 06:00  Ca    9.1      11 May 2018 05:45  Phos  2.6     05-12  Phos  2.4     05-11  Mg     2.1     05-12  Mg     2.1     05-11      CAPILLARY BLOOD GLUCOSE      POCT Blood Glucose.: 117 mg/dL (12 May 2018 09:16)  POCT Blood Glucose.: 168 mg/dL (11 May 2018 22:15)  POCT Blood Glucose.: 187 mg/dL (11 May 2018 17:26)  POCT Blood Glucose.: 144 mg/dL (11 May 2018 12:38)            Procalcitonin, Serum: 28.93 ng/mL (05-10 @ 06:30)  Cultures:           Wound culture:                05-09 @ 13:07  Organism --  Culture w/ gram stain --  Specimen Source BLOOD    Wound culture:                05-08 @ 13:36  Organism --  Culture w/ gram stain --  Specimen Source BLOOD    Wound culture:                05-08 @ 09:35  Organism --  Culture w/ gram stain --  Specimen Source BLOOD PERIPHERAL      Abscess culture:             05-09 @ 13:07  Organism --  Gram Stain --  Specimen Source BLOOD    Abscess culture:             05-08 @ 13:36  Organism --  Gram Stain --  Specimen Source BLOOD    Abscess culture:             05-08 @ 09:35  Organism --  Gram Stain --  Specimen Source BLOOD PERIPHERAL        Tissue culture:           05-09 @ 13:07  Organism --  Gram Stain --  Specimen Source BLOOD    Tissue culture:           05-08 @ 13:36  Organism --  Gram Stain --  Specimen Source BLOOD    Tissue culture:           05-08 @ 09:35  Organism --  Gram Stain --  Specimen Source BLOOD PERIPHERAL      Body Fluid Smear & Culture:                        05-09 @ 13:07  AFB Smear  --  Culture Acid Fast Body Fluid w/ Smear  --  Culture Acid Fast Smear Concentrated   --    Culture Results:     --  Specimen Source BLOOD    Body Fluid Smear & Culture:                        05-08 @ 13:36  AFB Smear  --  Culture Acid Fast Body Fluid w/ Smear  --  Culture Acid Fast Smear Concentrated   --    Culture Results:     --  Specimen Source BLOOD    Body Fluid Smear & Culture:                        05-08 @ 09:35  AFB Smear  --  Culture Acid Fast Body Fluid w/ Smear  --  Culture Acid Fast Smear Concentrated   --    Culture Results:     --  Specimen Source BLOOD PERIPHERAL        Rapid Respiratory Viral Panel Result        05-09 @ 08:45  Rapid RVP --  Coronovirus --  Adenovirus NOT DETECTED  Bordetella Pertussis NOT DETECTED  Chlamydia Pneumonia NOT DETECTED  Entero/RhinovirusNOT DETECTED  HKU1 Coronovirus --  HMPV Coronovirus NOT DETECTED  Influenza A NOT DETECTED (any subtype)  Influenza AH1 NOT DETECTED  Influenza AH1 2009 NOT DETECTED  Influenza AH3 NOT DETECTED  Influenza B NOT DETECTED  Mycoplasma Pneumoniae NOT DETECTED This nucleic acid amplification assay was performed using  the [employer]. The following pathogens are tested  for: Adenovirus, Coronavirus 229E, Coronavirus HKU1,  Coronavirus NL63, Coronavirus OC43, Human Metapneumovirus  (HMPV), Rhinovirus/Enterovirus, Influenza A H1, Influenza A  H1 2009 (Pandemic H1 2009), Influenza A H3, Influenza A (Flu  A) subtype not identified, Influenza B, Parainfluenza Virus  (types 1, 2, 3, 4), Respiratory Syncytial Virus (RSV),  Bordetella pertussis, Chlamydophila pneumoniae, and  Mycoplasma pneumoniae. A negative FilmArray result does not  always exclude the possibility of viral or bacterial  infection. Laboratory results should always be interpreted  in the context of clinical findings.  NL63 Coronovirus --  OC43 Coronovirus --  Parainfluenza 1 NOT DETECTED  Parainfluenza 2 NOT DETECTED  Parainfluenza 3 NOT DETECTED  Parainfluenza 4 NOT DETECTED  Resp Syncytial Virus NOT DETECTED    Studies  Chest X-RAY < from: Xray Chest 1 View- PORTABLE-Urgent (05.08.18 @ 09:56) >    EXAM:  XR CHEST PORTABLE URGENT 1V        PROCEDURE DATE:  May  8 2018         INTERPRETATION:  TIME OF EXAM: May 8, 2018 at 9:40 AM.    CLINICAL INFORMATION: Cough. Shortness of breath. Status post MI.    COMPARISON:  May 3, 2018.    TECHNIQUE:   AP Portable chest x-ray.    INTERPRETATION:     Heart size and the mediastinum cannot be accurately evaluated on this   projection.  There are new large focal bilateral opacities in the mid lungs could be   secondary to loculated pleural effusion in the fissures although   atelectasis or multifocal pneumonia is not excluded.  A new small loculated left pleural effusion is seen along the lateral   left hemithorax.  No pneumothorax is noted.  There is osteoarthritic degenerative change of the spine.              IMPRESSION:  New bilateral focal large opacities in the midlungs raising   the possibility of loculated pleural effusions in the fissures although   atelectasis or multifocal pneumonia is not excluded. CT scan of the chest   could be performed for further characterization if felt to be clinically   indicated.    New small loculated left pleural effusion along the lateral left   hemithorax.    < end of copied text >    CT SCAN Chest  < from: CT Chest No Cont (05.09.18 @ 11:53) >  EXAM:  CT CHEST        PROCEDURE DATE:  May  9 2018         INTERPRETATION:  Reason for Exam:  Fever.    CT of the chest was performed from the thoracic inlet to the level of the   adrenal glands without contrast injection.    Comparison: November 10, 2017    Tubes/Lines: None    Mediastinum/Vessels/Heart: Small pericardial effusion is noted. Mild   haziness surrounding the pericardium of uncertain significance. A   prevascular lymph node measures 1 cm. No other lymphadenopathy is noted.   Coronary artery calcifications are noted.    Lungs/Pleura/Airways: Bilateral pleural effusions are seen with areas of   loculated fluid within the fissures bilaterally. A right lower lobe 4 mm   nodule is seen on series 2, image 75 and is new.    Visualized abdomen: Partially imaged multiple left-sided parapelvic cysts   as seen on previous exam. Otherwise, unremarkable appearance of the upper   abdomen.    Bones and soft tissues: Degenerative changes noted throughout the spine.    IMPRESSION:    New small pericardial effusion with mild haziness surrounding the   pericardium of uncertain significance. Given history of fever, consider   pericarditis. Reactive anterior mediastinal lymphadenopathy up to 1 cm    Bilateral pleural effusions with areas of loculation within the fissures   bilaterally    New right lower lobe 4 mm nodule when compared with prior study.    < end of copied text >    Venous Dopplers: LE: < from: [facility] Duplex Lower Ext Vein Scan, Bilat (11.09.17 @ 17:28) >  EXAM:  DUPLEX SCAN EXT VEINS LOWER BI                            PROCEDURE DATE:  11/09/2017            INTERPRETATION:  CLINICAL INFORMATION: Right leg swelling.    COMPARISON: None available.    TECHNIQUE: Duplex sonography of the BILATERAL LOWERextremities with   color and spectral Doppler, with and without compression.      FINDINGS:    There is normal compressibility of the bilateral common femoral, femoral   and popliteal veins. No calf vein thrombosis is detected.    Doppler examinationshows normal spontaneous and phasic flow.    Left calf edema is noted.    IMPRESSION:     No evidence of bilateral lower extremity deep venous thrombosis.      < end of copied text >    CT Abdomen  Others  < from: TTE with Doppler (w/Cont) (05.01.18 @ 10:29) >    Patient name: YUE WINN  YOB: 1934   Age: 84 (F)   MR#: [id number]  Study Date: 5/1/2018  Location: O/PSonographer: Hanna Fournier  2nd Sonographer: Lauren Finkelstein  Study quality: Technically Limited  Referring Physician: Not Available Doctor, MD  Blood Pressure: 134/80 mmHg  Height: 152 cm  Weight: 38 kg  BSA: 1.3 m2  ------------------------------------------------------------------------  PROCEDURE: Transthoracic echocardiogram with 2-D, M-Mode  and complete spectral and color flow Doppler.  Verbal consent was obtained for injection of echo contrast.  Following  intravenous injection of contrast, harmonic  imaging was performed.[id number]  INDICATION: Chest pain, unspecified (R07.9), Abnormal  electrocardiogram (ECG) (EKG) (R94.31)  ------------------------------------------------------------------------  OBSERVATIONS:  Mitral Valve: Mitral annular calcification, otherwise  normal mitral valve. Mild mitral regurgitation.  Aortic Root: Normal aortic root.  Aortic Valve: Aortic valve not well visualized; appears  calcified.  Left Atrium: Normal left atrium.  Left Ventricle: Endocardial visualization enhanced with  intravenous injection of echo contrast (Definity). Mild  segmental left ventricular systolic dysfunction. The apex,  distal anterior, distal anterolateral and distal septal  walls are hypokinetic.  Right Heart: Normal right atrium. Normal right ventricular  size and function. Normal tricuspid valve. Mild tricuspid  regurgitation. Normal pulmonic valve.  Pericardium/PleuraNormal pericardium with no pericardial  effusion.  ------------------------------------------------------------------------  CONCLUSIONS:  1. Endocardial visualization enhanced with intravenous  injection of echo contrast (Definity). Mild segmental left  ventricular systolic dysfunction. The apex, distal  anterior, distal anterolateral and distal septal walls are  hypokinetic.  2. Normal right ventricular size and function.    < end of copied text >

## 2018-05-12 NOTE — PROGRESS NOTE ADULT - PROBLEM SELECTOR PLAN 1
ADI harmon, radiology attending about the ct scan chest has pericardial effusion, with pericardial thickening: has pl fluid in the fissure: no consolidation: The clinical picture as well as radiographically , it is suggestives of pericarditis:  5/12 no pneumonia, pericarditis post MI

## 2018-05-12 NOTE — PROGRESS NOTE ADULT - SUBJECTIVE AND OBJECTIVE BOX
Tele: NSR     Overnight: no complaints     MEDICATIONS  (STANDING):  aspirin  chewable 81 milliGRAM(s) Oral daily  atorvastatin 40 milliGRAM(s) Oral at bedtime  clopidogrel Tablet 75 milliGRAM(s) Oral daily  furosemide    Tablet 20 milliGRAM(s) Oral daily  heparin  Injectable 5000 Unit(s) SubCutaneous every 12 hours  influenza   Vaccine 0.5 milliLiter(s) IntraMuscular once  insulin glargine Injectable (LANTUS) 5 Unit(s) SubCutaneous at bedtime  insulin lispro (HumaLOG) corrective regimen sliding scale   SubCutaneous three times a day before meals  insulin lispro (HumaLOG) corrective regimen sliding scale   SubCutaneous at bedtime  lidocaine   Patch 2 Patch Transdermal daily  lisinopril 10 milliGRAM(s) Oral daily  metoprolol tartrate 12.5 milliGRAM(s) Oral two times a day    MEDICATIONS  (PRN):  acetaminophen   Tablet 650 milliGRAM(s) Oral every 6 hours PRN For Temp greater than 38 C (100.4 F)  dextrose Gel 1 Dose(s) Oral once PRN Blood Glucose LESS THAN 70 milliGRAM(s)/deciliter  glucagon  Injectable 1 milliGRAM(s) IntraMuscular once PRN Glucose LESS THAN 70 milligrams/deciliter          Vital Signs Last 24 Hrs  T(C): 36.7 (12 May 2018 05:37), Max: 36.9 (11 May 2018 15:00)  T(F): 98.1 (12 May 2018 05:37), Max: 98.5 (11 May 2018 15:00)  HR: 72 (12 May 2018 05:37) (67 - 72)  BP: 145/66 (12 May 2018 05:37) (130/65 - 145/66)  RR: 18 (12 May 2018 05:37) (18 - 18)  SpO2: 97% (12 May 2018 05:37) (97% - 99%)  I&O's Detail    11 May 2018 07:01  -  12 May 2018 07:00  --------------------------------------------------------  IN:    Oral Fluid: 300 mL  Total IN: 300 mL    OUT:  Total OUT: 0 mL    Total NET: 300 mL    Physical exam:   Gen- NAD  Resp- Crackles   CV- S1S2 RRR  ABD- +BSx4 ND/NT  EXT- No edema   Neuro- A&Ox3                            9.0    6.53  )-----------( 396      ( 12 May 2018 06:00 )             28.8       12 May 2018 06:00    137    |  100    |  16     ----------------------------<  135<H>  3.8     |  27     |  0.69   11 May 2018 05:45    134<L>  |  99     |  16     ----------------------------<  111<H>  3.9     |  25     |  0.74     Ca    8.9        12 May 2018 06:00  Ca    9.1        11 May 2018 05:45  Phos  2.6       12 May 2018 06:00  Phos  2.4<L>     11 May 2018 05:45  Mg     2.1       12 May 2018 06:00  Mg     2.1       11 May 2018 05:45

## 2018-05-12 NOTE — PROGRESS NOTE ADULT - SUBJECTIVE AND OBJECTIVE BOX
Follow Up:      Inverval History:    ROS:    Unobtainable becasue:  All other systems negative    fever [ ]     chills [ ]      headache  [ ]       chest pain [ ]           SOB  [ ]          abd pain  [ ]            nausea [ ]            vomiting [ ]          diarrhea [ ]  dysurea [ ]         rash  [ ]         joint swelling [ ]           edema  [ ]    Allergies  No Known Allergies        ANTIMICROBIALS:      OTHER MEDS:  acetaminophen   Tablet 650 milliGRAM(s) Oral every 6 hours PRN  aspirin  chewable 81 milliGRAM(s) Oral daily  atorvastatin 40 milliGRAM(s) Oral at bedtime  clopidogrel Tablet 75 milliGRAM(s) Oral daily  dextrose 5%. 1000 milliLiter(s) IV Continuous <Continuous>  dextrose 50% Injectable 12.5 Gram(s) IV Push once  dextrose 50% Injectable 25 Gram(s) IV Push once  dextrose 50% Injectable 25 Gram(s) IV Push once  dextrose Gel 1 Dose(s) Oral once PRN  furosemide    Tablet 20 milliGRAM(s) Oral daily  glucagon  Injectable 1 milliGRAM(s) IntraMuscular once PRN  heparin  Injectable 5000 Unit(s) SubCutaneous every 12 hours  influenza   Vaccine 0.5 milliLiter(s) IntraMuscular once  insulin glargine Injectable (LANTUS) 5 Unit(s) SubCutaneous at bedtime  insulin lispro (HumaLOG) corrective regimen sliding scale   SubCutaneous three times a day before meals  insulin lispro (HumaLOG) corrective regimen sliding scale   SubCutaneous at bedtime  lidocaine   Patch 2 Patch Transdermal daily  lisinopril 10 milliGRAM(s) Oral daily  metoprolol tartrate 12.5 milliGRAM(s) Oral two times a day      Vital Signs Last 24 Hrs  T(C): 36.7 (12 May 2018 05:37), Max: 36.9 (11 May 2018 15:00)  T(F): 98.1 (12 May 2018 05:37), Max: 98.5 (11 May 2018 15:00)  HR: 72 (12 May 2018 05:37) (67 - 72)  BP: 145/66 (12 May 2018 05:37) (130/65 - 145/66)  BP(mean): --  RR: 18 (12 May 2018 05:37) (18 - 18)  SpO2: 97% (12 May 2018 05:37) (97% - 99%)    Physical Exam:  General: thin,  NAD, Non-toxic  Neurology: A&Ox3, nonfocal  Respiratory: Clear to auscultation bilaterally  CV: RRR, S1S2, no murmurs, rubs or gallops  Abdominal: Soft, Non-tender, non-distended  Extremities: No edema,   Line Sites: Clear  Skin: No rash                          9.0    6.53  )-----------( 396      ( 12 May 2018 06:00 )             28.8       05-12    137  |  100  |  16  ----------------------------<  135<H>  3.8   |  27  |  0.69    Ca    8.9      12 May 2018 06:00  Phos  2.6     05-12  Mg     2.1     05-12            MICROBIOLOGY:  MICROBIOLOGY:  BLOOD  05-09-18 -- no growth       BLOOD PERIPHERAL  05-08-18 --  no growth   BLOOD  05-03-18 --  no growth      URINE MIDSTREAM  05-01-18 --  --  Escherichia coli      RVP negative           RADIOLOGY:  New small pericardial effusion with mild haziness surrounding the   pericardium of uncertain significance. Given history of fever, consider   pericarditis. Reactive anterior mediastinal lymphadenopathy up to 1 cm    Bilateral pleural effusions with areas of loculation within the fissures   bilaterally    New right lower lobe 4 mm nodule when compared with prior study.    < end of copied text > Follow Up:  Pt is being followed up for fever,    Inverval History: no fever and pt has no new symptoms,     ROS:    Unobtainable because  All other systems negative    fever [ ]     chills [ ]      headache  [ ]       chest pain [ ]           SOB  [ ]          abd pain  [ ]            nausea [ ]            vomiting [ ]          diarrhea [ ]  dysurea [ ]         rash  [ ]         joint swelling [ ]           edema  [ ]    Allergies  No Known Allergies        ANTIMICROBIALS:      OTHER MEDS:  acetaminophen   Tablet 650 milliGRAM(s) Oral every 6 hours PRN  aspirin  chewable 81 milliGRAM(s) Oral daily  atorvastatin 40 milliGRAM(s) Oral at bedtime  clopidogrel Tablet 75 milliGRAM(s) Oral daily  dextrose 5%. 1000 milliLiter(s) IV Continuous <Continuous>  dextrose 50% Injectable 12.5 Gram(s) IV Push once  dextrose 50% Injectable 25 Gram(s) IV Push once  dextrose 50% Injectable 25 Gram(s) IV Push once  dextrose Gel 1 Dose(s) Oral once PRN  furosemide    Tablet 20 milliGRAM(s) Oral daily  glucagon  Injectable 1 milliGRAM(s) IntraMuscular once PRN  heparin  Injectable 5000 Unit(s) SubCutaneous every 12 hours  influenza   Vaccine 0.5 milliLiter(s) IntraMuscular once  insulin glargine Injectable (LANTUS) 5 Unit(s) SubCutaneous at bedtime  insulin lispro (HumaLOG) corrective regimen sliding scale   SubCutaneous three times a day before meals  insulin lispro (HumaLOG) corrective regimen sliding scale   SubCutaneous at bedtime  lidocaine   Patch 2 Patch Transdermal daily  lisinopril 10 milliGRAM(s) Oral daily  metoprolol tartrate 12.5 milliGRAM(s) Oral two times a day      Vital Signs Last 24 Hrs  T(C): 36.7 (12 May 2018 05:37), Max: 36.9 (11 May 2018 15:00)  T(F): 98.1 (12 May 2018 05:37), Max: 98.5 (11 May 2018 15:00)  HR: 72 (12 May 2018 05:37) (67 - 72)  BP: 145/66 (12 May 2018 05:37) (130/65 - 145/66)  BP(mean): --  RR: 18 (12 May 2018 05:37) (18 - 18)  SpO2: 97% (12 May 2018 05:37) (97% - 99%)    Physical Exam:  General: thin,  NAD, Non-toxic  Neurology: A&Ox3, nonfocal  Respiratory: Clear to auscultation bilaterally  CV: RRR, S1S2, no murmurs, rubs or gallops  Abdominal: Soft, Non-tender, non-distended  Extremities: No edema,   Line Sites: Clear  Skin: No rash                          9.0    6.53  )-----------( 396      ( 12 May 2018 06:00 )             28.8       05-12    137  |  100  |  16  ----------------------------<  135<H>  3.8   |  27  |  0.69    Ca    8.9      12 May 2018 06:00  Phos  2.6     05-12  Mg     2.1     05-12            MICROBIOLOGY:  MICROBIOLOGY:  BLOOD  05-09-18 -- no growth       BLOOD PERIPHERAL  05-08-18 --  no growth   BLOOD  05-03-18 --  no growth      URINE MIDSTREAM  05-01-18 --  --  Escherichia coli      RVP negative           RADIOLOGY:  New small pericardial effusion with mild haziness surrounding the   pericardium of uncertain significance. Given history of fever, consider   pericarditis. Reactive anterior mediastinal lymphadenopathy up to 1 cm    Bilateral pleural effusions with areas of loculation within the fissures   bilaterally    New right lower lobe 4 mm nodule when compared with prior study.    < end of copied text >

## 2018-05-12 NOTE — PROGRESS NOTE ADULT - PROBLEM SELECTOR PLAN 1
Continue DAPT, lipitor, lisinopril. [facility] with multivessel disease however plan is for medical management at this time.  Increase Lopressor 25mg BID

## 2018-05-12 NOTE — PROGRESS NOTE ADULT - ASSESSMENT
83 YO F w/ hx of HTN , DM, TIA without residual deficits, no known CAD hx (stress test 11/2017 normal), presents to the ED with NSTEMI, s/p cath with multivessels  CAD. Course complicated by sepsis 2/2 UTI, anemia and  high ever?

## 2018-05-12 NOTE — PROGRESS NOTE ADULT - ASSESSMENT
Afebrile since hyperthermia 5/8 2200    84F with DM, HTN, HLD, TIA and Osteoarthritis presented 5/1/18 for symptomatic hypoglycemia but also found to be in NSTEMI and admitted to CCU. Developed nonfocal fevers 5/1-5/3, then recurred 5/8, Tmax 105.6F with rise in white count to 17. Looks remarkably well.   Not clearly an infectious etiology. Blood cultures negative. Urine E. coli but she was asymptomatic. RVP negative. Repeat CXR 5/8 new bilateral midlung opacities, CT with pericardial effusion and loculated pleural effusion in both fissures, clinically not a bacterial pneumonia, not even coughing. Right arm ecchymosis without cellulitis. The high PCT may represent decline from even higher value during the hyperthermia.   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  Pt has been on meropenem for the last three day and was discontinued yesterday as per the recommendations, pt was salvatore on vancomycin,   Today the opt has no fever and no white count seen, and no localizing symptoms     Recommend   consider echocardiogram  d/c antibiotics 5/11   Monitor off antibiotics Afebrile since hyperthermia 5/8 2200    84F with DM, HTN, HLD, TIA and Osteoarthritis presented 5/1/18 for symptomatic hypoglycemia but also found to be in NSTEMI and admitted to CCU. Developed nonfocal fevers 5/1-5/3, then recurred 5/8, Tmax 105.6F with rise in white count to 17. Looks remarkably well.   Not clearly an infectious etiology. Blood cultures negative. Urine E. coli but she was asymptomatic. RVP negative. Repeat CXR 5/8 new bilateral midlung opacities, CT with pericardial effusion and loculated pleural effusion in both fissures, clinically not a bacterial pneumonia. The high PCT may represent decline from even higher value during the hyperthermia.   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  Pt has been on meropenem for the last three day and was discontinued yesterday as per the recommendations, pt was salvatore on vancomycin,   Today the opt has no fever and no white count seen, and no localizing symptoms     Recommend   consider echocardiogram  antibiotics were discontinued yesterday    Monitor off antibiotics

## 2018-05-13 LAB
BACTERIA BLD CULT: SIGNIFICANT CHANGE UP
BACTERIA BLD CULT: SIGNIFICANT CHANGE UP
BASOPHILS # BLD AUTO: 0.02 K/UL — SIGNIFICANT CHANGE UP (ref 0–0.2)
BASOPHILS NFR BLD AUTO: 0.3 % — SIGNIFICANT CHANGE UP (ref 0–2)
BUN SERPL-MCNC: 19 MG/DL — SIGNIFICANT CHANGE UP (ref 7–23)
CALCIUM SERPL-MCNC: 9 MG/DL — SIGNIFICANT CHANGE UP (ref 8.4–10.5)
CHLORIDE SERPL-SCNC: 100 MMOL/L — SIGNIFICANT CHANGE UP (ref 98–107)
CO2 SERPL-SCNC: 29 MMOL/L — SIGNIFICANT CHANGE UP (ref 22–31)
CREAT SERPL-MCNC: 0.75 MG/DL — SIGNIFICANT CHANGE UP (ref 0.5–1.3)
EOSINOPHIL # BLD AUTO: 0.27 K/UL — SIGNIFICANT CHANGE UP (ref 0–0.5)
EOSINOPHIL NFR BLD AUTO: 4.5 % — SIGNIFICANT CHANGE UP (ref 0–6)
GLUCOSE BLDC GLUCOMTR-MCNC: 166 MG/DL — HIGH (ref 70–99)
GLUCOSE BLDC GLUCOMTR-MCNC: 189 MG/DL — HIGH (ref 70–99)
GLUCOSE BLDC GLUCOMTR-MCNC: 192 MG/DL — HIGH (ref 70–99)
GLUCOSE BLDC GLUCOMTR-MCNC: 96 MG/DL — SIGNIFICANT CHANGE UP (ref 70–99)
GLUCOSE SERPL-MCNC: 105 MG/DL — HIGH (ref 70–99)
HCT VFR BLD CALC: 30.2 % — LOW (ref 34.5–45)
HGB BLD-MCNC: 9.5 G/DL — LOW (ref 11.5–15.5)
IMM GRANULOCYTES # BLD AUTO: 0.04 # — SIGNIFICANT CHANGE UP
IMM GRANULOCYTES NFR BLD AUTO: 0.7 % — SIGNIFICANT CHANGE UP (ref 0–1.5)
LYMPHOCYTES # BLD AUTO: 1.68 K/UL — SIGNIFICANT CHANGE UP (ref 1–3.3)
LYMPHOCYTES # BLD AUTO: 28.1 % — SIGNIFICANT CHANGE UP (ref 13–44)
MAGNESIUM SERPL-MCNC: 2.2 MG/DL — SIGNIFICANT CHANGE UP (ref 1.6–2.6)
MCHC RBC-ENTMCNC: 26.4 PG — LOW (ref 27–34)
MCHC RBC-ENTMCNC: 31.5 % — LOW (ref 32–36)
MCV RBC AUTO: 83.9 FL — SIGNIFICANT CHANGE UP (ref 80–100)
MONOCYTES # BLD AUTO: 0.53 K/UL — SIGNIFICANT CHANGE UP (ref 0–0.9)
MONOCYTES NFR BLD AUTO: 8.9 % — SIGNIFICANT CHANGE UP (ref 2–14)
NEUTROPHILS # BLD AUTO: 3.44 K/UL — SIGNIFICANT CHANGE UP (ref 1.8–7.4)
NEUTROPHILS NFR BLD AUTO: 57.5 % — SIGNIFICANT CHANGE UP (ref 43–77)
NRBC # FLD: 0 — SIGNIFICANT CHANGE UP
PHOSPHATE SERPL-MCNC: 2.7 MG/DL — SIGNIFICANT CHANGE UP (ref 2.5–4.5)
PLATELET # BLD AUTO: 431 K/UL — HIGH (ref 150–400)
PMV BLD: 10.4 FL — SIGNIFICANT CHANGE UP (ref 7–13)
POTASSIUM SERPL-MCNC: 4.2 MMOL/L — SIGNIFICANT CHANGE UP (ref 3.5–5.3)
POTASSIUM SERPL-SCNC: 4.2 MMOL/L — SIGNIFICANT CHANGE UP (ref 3.5–5.3)
RBC # BLD: 3.6 M/UL — LOW (ref 3.8–5.2)
RBC # FLD: 16.6 % — HIGH (ref 10.3–14.5)
SODIUM SERPL-SCNC: 139 MMOL/L — SIGNIFICANT CHANGE UP (ref 135–145)
WBC # BLD: 5.98 K/UL — SIGNIFICANT CHANGE UP (ref 3.8–10.5)
WBC # FLD AUTO: 5.98 K/UL — SIGNIFICANT CHANGE UP (ref 3.8–10.5)

## 2018-05-13 PROCEDURE — 93306 TTE W/DOPPLER COMPLETE: CPT | Mod: 26

## 2018-05-13 RX ORDER — METOPROLOL TARTRATE 50 MG
25 TABLET ORAL
Qty: 0 | Refills: 0 | Status: DISCONTINUED | OUTPATIENT
Start: 2018-05-13 | End: 2018-05-14

## 2018-05-13 RX ADMIN — ATORVASTATIN CALCIUM 40 MILLIGRAM(S): 80 TABLET, FILM COATED ORAL at 21:04

## 2018-05-13 RX ADMIN — CLOPIDOGREL BISULFATE 75 MILLIGRAM(S): 75 TABLET, FILM COATED ORAL at 17:03

## 2018-05-13 RX ADMIN — Medication 1: at 17:49

## 2018-05-13 RX ADMIN — LIDOCAINE 2 PATCH: 4 CREAM TOPICAL at 12:56

## 2018-05-13 RX ADMIN — HEPARIN SODIUM 5000 UNIT(S): 5000 INJECTION INTRAVENOUS; SUBCUTANEOUS at 04:58

## 2018-05-13 RX ADMIN — Medication 25 MILLIGRAM(S): at 17:03

## 2018-05-13 RX ADMIN — LIDOCAINE 2 PATCH: 4 CREAM TOPICAL at 23:00

## 2018-05-13 RX ADMIN — INSULIN GLARGINE 5 UNIT(S): 100 INJECTION, SOLUTION SUBCUTANEOUS at 22:35

## 2018-05-13 RX ADMIN — Medication 1: at 12:56

## 2018-05-13 RX ADMIN — Medication 81 MILLIGRAM(S): at 17:03

## 2018-05-13 RX ADMIN — Medication 20 MILLIGRAM(S): at 04:58

## 2018-05-13 RX ADMIN — HEPARIN SODIUM 5000 UNIT(S): 5000 INJECTION INTRAVENOUS; SUBCUTANEOUS at 17:03

## 2018-05-13 RX ADMIN — Medication 12.5 MILLIGRAM(S): at 04:58

## 2018-05-13 RX ADMIN — LISINOPRIL 10 MILLIGRAM(S): 2.5 TABLET ORAL at 04:58

## 2018-05-13 NOTE — PROGRESS NOTE ADULT - PROBLEM SELECTOR PLAN 2
Cont current care:  5/12 cardiology note appreciated. continue current meds  5/13 echo noted. on dual antiplatelet. cardiology following

## 2018-05-13 NOTE — PROGRESS NOTE ADULT - SUBJECTIVE AND OBJECTIVE BOX
Patient is a 84y old  Female who presents with a chief complaint of Altered mental status (05 May 2018 11:18)    Any change in ROS: doing ok. denies sob, cough, sputum. on room air     MEDICATIONS  (STANDING):  aspirin  chewable 81 milliGRAM(s) Oral daily  atorvastatin 40 milliGRAM(s) Oral at bedtime  clopidogrel Tablet 75 milliGRAM(s) Oral daily  dextrose 5%. 1000 milliLiter(s) (50 mL/Hr) IV Continuous <Continuous>  dextrose 50% Injectable 12.5 Gram(s) IV Push once  dextrose 50% Injectable 25 Gram(s) IV Push once  dextrose 50% Injectable 25 Gram(s) IV Push once  furosemide    Tablet 20 milliGRAM(s) Oral daily  heparin  Injectable 5000 Unit(s) SubCutaneous every 12 hours  influenza   Vaccine 0.5 milliLiter(s) IntraMuscular once  insulin glargine Injectable (LANTUS) 5 Unit(s) SubCutaneous at bedtime  insulin lispro (HumaLOG) corrective regimen sliding scale   SubCutaneous three times a day before meals  insulin lispro (HumaLOG) corrective regimen sliding scale   SubCutaneous at bedtime  lidocaine   Patch 2 Patch Transdermal daily  lisinopril 10 milliGRAM(s) Oral daily  metoprolol tartrate 12.5 milliGRAM(s) Oral two times a day    MEDICATIONS  (PRN):  acetaminophen   Tablet 650 milliGRAM(s) Oral every 6 hours PRN For Temp greater than 38 C (100.4 F)  dextrose Gel 1 Dose(s) Oral once PRN Blood Glucose LESS THAN 70 milliGRAM(s)/deciliter  glucagon  Injectable 1 milliGRAM(s) IntraMuscular once PRN Glucose LESS THAN 70 milligrams/deciliter    Vital Signs Last 24 Hrs  T(C): 37.2 (13 May 2018 04:56), Max: 37.5 (12 May 2018 21:04)  T(F): 99 (13 May 2018 04:56), Max: 99.5 (12 May 2018 21:04)  HR: 70 (13 May 2018 04:56) (69 - 77)  BP: 131/70 (13 May 2018 04:56) (128/43 - 149/64)  BP(mean): --  RR: 16 (13 May 2018 04:56) (16 - 19)  SpO2: 99% (13 May 2018 04:56) (98% - 100%)    I&O's Summary    12 May 2018 07:01  -  13 May 2018 07:00  --------------------------------------------------------  IN: 430 mL / OUT: 500 mL / NET: -70 mL    Physical Exam:   GENERAL: The patient comfortable with no apparent distress. frail   HEENT: Head is normocephalic and atraumatic.    NECK: Supple with no elevated JVP.  LUNGS: Clear to auscultation without wheeze and rhonchi.  HEART: S1 and S2 present. murmur 3/6   ABDOMEN: Soft, nontender, and nondistended. No hepatosplenomegaly is noted.  EXTREMITIES: No edema or calf tenderness.  NEUROLOGIC: Grossly intact.    Labs:                 9.5    5.98  )-----------( 431      ( 13 May 2018 05:36 )             30.2                         9.0    6.53  )-----------( 396      ( 12 May 2018 06:00 )             28.8                         8.8    7.51  )-----------( 357      ( 11 May 2018 05:45 )             27.9                         8.8    9.78  )-----------( 336      ( 10 May 2018 06:30 )             27.8     05-13    139  |  100  |  19  ----------------------------<  105<H>  4.2   |  29  |  0.75  05-12    137  |  100  |  16  ----------------------------<  135<H>  3.8   |  27  |  0.69  05-11    134<L>  |  99  |  16  ----------------------------<  111<H>  3.9   |  25  |  0.74  05-10    133<L>  |  98  |  17  ----------------------------<  95  3.3<L>   |  25  |  0.89    Ca    9.0      13 May 2018 05:36  Ca    8.9      12 May 2018 06:00  Phos  2.7     05-13  Phos  2.6     05-12  Mg     2.2     05-13  Mg     2.1     05-12      CAPILLARY BLOOD GLUCOSE      POCT Blood Glucose.: 96 mg/dL (13 May 2018 08:39)  POCT Blood Glucose.: 193 mg/dL (12 May 2018 21:41)  POCT Blood Glucose.: 173 mg/dL (12 May 2018 17:04)  POCT Blood Glucose.: 176 mg/dL (12 May 2018 12:55)            Procalcitonin, Serum: 28.93 ng/mL (05-10 @ 06:30)  Cultures:           Wound culture:                05-09 @ 13:07  Organism --  Culture w/ gram stain --  Specimen Source BLOOD    Wound culture:                05-08 @ 13:36  Organism --  Culture w/ gram stain --  Specimen Source BLOOD    Wound culture:                05-08 @ 09:35  Organism --  Culture w/ gram stain --  Specimen Source BLOOD PERIPHERAL      Abscess culture:             05-09 @ 13:07  Organism --  Gram Stain --  Specimen Source BLOOD    Abscess culture:             05-08 @ 13:36  Organism --  Gram Stain --  Specimen Source BLOOD    Abscess culture:             05-08 @ 09:35  Organism --  Gram Stain --  Specimen Source BLOOD PERIPHERAL        Tissue culture:           05-09 @ 13:07  Organism --  Gram Stain --  Specimen Source BLOOD    Tissue culture:           05-08 @ 13:36  Organism --  Gram Stain --  Specimen Source BLOOD    Tissue culture:           05-08 @ 09:35  Organism --  Gram Stain --  Specimen Source BLOOD PERIPHERAL      Body Fluid Smear & Culture:                        05-09 @ 13:07  AFB Smear  --  Culture Acid Fast Body Fluid w/ Smear  --  Culture Acid Fast Smear Concentrated   --    Culture Results:     --  Specimen Source BLOOD    Body Fluid Smear & Culture:                        05-08 @ 13:36  AFB Smear  --  Culture Acid Fast Body Fluid w/ Smear  --  Culture Acid Fast Smear Concentrated   --    Culture Results:     --  Specimen Source BLOOD    Body Fluid Smear & Culture:                        05-08 @ 09:35  AFB Smear  --  Culture Acid Fast Body Fluid w/ Smear  --  Culture Acid Fast Smear Concentrated   --    Culture Results:     --  Specimen Source BLOOD PERIPHERAL        Rapid Respiratory Viral Panel Result        05-09 @ 08:45  Rapid RVP --  Coronovirus --  Adenovirus NOT DETECTED  Bordetella Pertussis NOT DETECTED  Chlamydia Pneumonia NOT DETECTED  Entero/RhinovirusNOT DETECTED  HKU1 Coronovirus --  HMPV Coronovirus NOT DETECTED  Influenza A NOT DETECTED (any subtype)  Influenza AH1 NOT DETECTED  Influenza AH1 2009 NOT DETECTED  Influenza AH3 NOT DETECTED  Influenza B NOT DETECTED  Mycoplasma Pneumoniae NOT DETECTED This nucleic acid amplification assay was performed using  the [employer] FilmArray. The following pathogens are tested  for: Adenovirus, Coronavirus 229E, Coronavirus HKU1,  Coronavirus NL63, Coronavirus OC43, Human Metapneumovirus  (HMPV), Rhinovirus/Enterovirus, Influenza A H1, Influenza A  H1 2009 (Pandemic H1 2009), Influenza A H3, Influenza A (Flu  A) subtype not identified, Influenza B, Parainfluenza Virus  (types 1, 2, 3, 4), Respiratory Syncytial Virus (RSV),  Bordetella pertussis, Chlamydophila pneumoniae, and  Mycoplasma pneumoniae. A negative FilmArray result does not  always exclude the possibility of viral or bacterial  infection. Laboratory results should always be interpreted  in the context of clinical findings.  NL63 Coronovirus --  OC43 Coronovirus --  Parainfluenza 1 NOT DETECTED  Parainfluenza 2 NOT DETECTED  Parainfluenza 3 NOT DETECTED  Parainfluenza 4 NOT DETECTED  Resp Syncytial Virus NOT DETECTED      Studies  Chest X-RAY < from: Xray Chest 1 View- PORTABLE-Urgent (05.08.18 @ 09:56) >  EXAM:  XR CHEST PORTABLE URGENT 1V        PROCEDURE DATE:  May  8 2018         INTERPRETATION:  TIME OF EXAM: May 8, 2018 at 9:40 AM.    CLINICAL INFORMATION: Cough. Shortness of breath. Status post MI.    COMPARISON:  May 3, 2018.    TECHNIQUE:   AP Portable chest x-ray.    INTERPRETATION:     Heart size and the mediastinum cannot be accurately evaluated on this   projection.  There are new large focal bilateral opacities in the mid lungs could be   secondary to loculated pleural effusion in the fissures although   atelectasis or multifocal pneumonia is not excluded.  A new small loculated left pleural effusion is seen along the lateral   left hemithorax.  No pneumothorax is noted.  There is osteoarthritic degenerative change of the spine.              IMPRESSION:  New bilateral focal large opacities in the midlungs raising   the possibility of loculated pleural effusions in the fissures although   atelectasis or multifocal pneumonia is not excluded. CT scan of the chest   could be performed for further characterization if felt to be clinically   indicated.    New small loculated left pleural effusion along the lateral left   hemithorax.    < end of copied text >    CT SCAN Chest < from: CT Chest No Cont (05.09.18 @ 11:53) >  EXAM:  CT CHEST        PROCEDURE DATE:  May  9 2018         INTERPRETATION:  Reason for Exam:  Fever.    CT of the chest was performed from the thoracic inlet to the level of the   adrenal glands without contrast injection.    Comparison: November 10, 2017    Tubes/Lines: None    Mediastinum/Vessels/Heart: Small pericardial effusion is noted. Mild   haziness surrounding the pericardium of uncertain significance. A   prevascular lymph node measures 1 cm. No other lymphadenopathy is noted.   Coronary artery calcifications are noted.    Lungs/Pleura/Airways: Bilateral pleural effusions are seen with areas of   loculated fluid within the fissures bilaterally. A right lower lobe 4 mm   nodule is seen on series 2, image 75 and is new.    Visualized abdomen: Partially imaged multiple left-sided parapelvic cysts   as seen on previous exam. Otherwise, unremarkable appearance of the upper   abdomen.    Bones and soft tissues: Degenerative changes noted throughout the spine.    IMPRESSION:    New small pericardial effusion with mild haziness surrounding the   pericardium of uncertain significance. Given history of fever, consider   pericarditis. Reactive anterior mediastinal lymphadenopathy up to 1 cm    Bilateral pleural effusions with areas of loculation within the fissures   bilaterally    New right lower lobe 4 mm nodule when compared with prior study.    < end of copied text >    Venous Dopplers: LE: < from: [facility] Duplex Lower Ext Vein Scan, Bilat (11.09.17 @ 17:28) >    EXAM:  DUPLEX SCAN EXT VEINS LOWER BI                            PROCEDURE DATE:  11/09/2017            INTERPRETATION:  CLINICAL INFORMATION: Right leg swelling.    COMPARISON: None available.    TECHNIQUE: Duplex sonography of the BILATERAL LOWERextremities with   color and spectral Doppler, with and without compression.      FINDINGS:    There is normal compressibility of the bilateral common femoral, femoral   and popliteal veins. No calf vein thrombosis is detected.    Doppler examinationshows normal spontaneous and phasic flow.    Left calf edema is noted.    IMPRESSION:     No evidence of bilateral lower extremity deep venous thrombosis.    < end of copied text >    CT Abdomen  Others  < from: Transthoracic Echocardiogram (05.13.18 @ 09:11) >  Patient name: YUE WINN  YOB: 1934   Age: 84 (F)   MR#: [id number]  Study Date: 5/13/2018  Location: [id number]: Hanna Fournier  Study quality: Technically good  Referring Physician: Denilson Degroot MD  Blood Pressure: 131/70 mmHg  Weight: 40 kg  ------------------------------------------------------------------------  PROCEDURE: Transthoracic echocardiogram with 2-D, M-Mode  and complete spectral and color flow Doppler.  INDICATION: Pericardial effusion (noninflammatory) (I31.3)  ------------------------------------------------------------------------  DIMENSIONS:  Dimensions:     Normal Values:  LA:     3.8 cm    2.0 - 4.0 cm  Ao:     2.8 cm    2.0 - 3.8 cm  SEPTUM: 0.7 cm    0.6 - 1.2 cm  PWT:    1.0 cm    0.6 - 1.1 cm  LVIDd:  3.5 cm    3.0 - 5.6 cm  LVIDs:  2.4 cm    1.8 - 4.0 cm  Derived Variables:  RWT: 0.57  Fractional short: 31 %  Ejection Fraction (Teicholtz): 60 %  ------------------------------------------------------------------------  OBSERVATIONS:  Mitral Valve: Normal mitral valve.  Aortic Root: Normal aortic root.  Aortic Valve: Normal trileaflet aortic valve.  Left Atrium: Normal left atrium.  Left Ventricle: Normal left ventricular systolic function.  No segmental wall motion abnormalities. Normal left  ventricular internal dimensions and wall thicknesses. Mild  diastolic dysfunction (Stage I).  Right Heart: Normal right atrium. Normal right ventricular  size and function. Normal tricuspid valve. Normal pulmonic  valve.  Pericardium/PleuraThickened pericardium with trace  pericardial effusion.  ------------------------------------------------------------------------  CONCLUSIONS:  1. Normal left ventricular systolic function. No segmental  wall motion abnormalities.  2. Mild diastolic dysfunction (Stage I).  3. Normal right ventricular size and function.  4. Thickened pericardium with trace pericardial effusion.  ------------------------------------------------------------------------    < end of copied text >

## 2018-05-13 NOTE — PROGRESS NOTE ADULT - ASSESSMENT
85 YO F w/ hx of HTN , DM, TIA without residual deficits, no known CAD hx (stress test 11/2017 normal), presents to the ED with NSTEMI, s/p cath with multivessels  CAD. Course complicated by sepsis 2/2 UTI, anemia and  high ever?

## 2018-05-13 NOTE — PROGRESS NOTE ADULT - SUBJECTIVE AND OBJECTIVE BOX
Tele: NSR    Overnight: no complaints     MEDICATIONS  (STANDING):  aspirin  chewable 81 milliGRAM(s) Oral daily  atorvastatin 40 milliGRAM(s) Oral at bedtime  clopidogrel Tablet 75 milliGRAM(s) Oral daily  furosemide    Tablet 20 milliGRAM(s) Oral daily  heparin  Injectable 5000 Unit(s) SubCutaneous every 12 hours  influenza   Vaccine 0.5 milliLiter(s) IntraMuscular once  insulin glargine Injectable (LANTUS) 5 Unit(s) SubCutaneous at bedtime  insulin lispro (HumaLOG) corrective regimen sliding scale   SubCutaneous three times a day before meals  insulin lispro (HumaLOG) corrective regimen sliding scale   SubCutaneous at bedtime  lidocaine   Patch 2 Patch Transdermal daily  lisinopril 10 milliGRAM(s) Oral daily  metoprolol tartrate 12.5 milliGRAM(s) Oral two times a day    MEDICATIONS  (PRN):  acetaminophen   Tablet 650 milliGRAM(s) Oral every 6 hours PRN For Temp greater than 38 C (100.4 F)  dextrose Gel 1 Dose(s) Oral once PRN Blood Glucose LESS THAN 70 milliGRAM(s)/deciliter  glucagon  Injectable 1 milliGRAM(s) IntraMuscular once PRN Glucose LESS THAN 70 milligrams/deciliter          Vital Signs Last 24 Hrs  T(C): 37.2 (13 May 2018 04:56), Max: 37.5 (12 May 2018 21:04)  T(F): 99 (13 May 2018 04:56), Max: 99.5 (12 May 2018 21:04)  HR: 70 (13 May 2018 04:56) (69 - 77)  BP: 131/70 (13 May 2018 04:56) (128/43 - 149/64)  RR: 16 (13 May 2018 04:56) (16 - 19)  SpO2: 99% (13 May 2018 04:56) (98% - 100%)  I&O's Detail    12 May 2018 07:01  -  13 May 2018 07:00  --------------------------------------------------------  IN:    Oral Fluid: 430 mL  Total IN: 430 mL    OUT:    Voided: 500 mL  Total OUT: 500 mL    Total NET: -70 mL      13 May 2018 07:01  -  13 May 2018 11:32  --------------------------------------------------------  IN:    Oral Fluid: 240 mL  Total IN: 240 mL    OUT:  Total OUT: 0 mL    Total NET: 240 mL    Physical exam:   Gen- NAD  Resp- Crackles   CV- S1S2 RRR  ABD- +BSx4 ND/NT  EXT- no edema   Neuro- A&Ox3                             9.5    5.98  )-----------( 431      ( 13 May 2018 05:36 )             30.2       13 May 2018 05:36    139    |  100    |  19     ----------------------------<  105<H>  4.2     |  29     |  0.75   12 May 2018 06:00    137    |  100    |  16     ----------------------------<  135<H>  3.8     |  27     |  0.69     Ca    9.0        13 May 2018 05:36  Ca    8.9        12 May 2018 06:00  Phos  2.7       13 May 2018 05:36  Phos  2.6       12 May 2018 06:00  Mg     2.2       13 May 2018 05:36  Mg     2.1       12 May 2018 06:00

## 2018-05-13 NOTE — PROGRESS NOTE ADULT - PROBLEM SELECTOR PLAN 1
ADI harmon, radiology attending about the ct scan chest has pericardial effusion, with pericardial thickening: has pl fluid in the fissure: no consolidation: The clinical picture as well as radiographically , it is suggestives of pericarditis:  5/12 no pneumonia, pericarditis post MI  5/13 stable.

## 2018-05-13 NOTE — PROGRESS NOTE ADULT - PROBLEM SELECTOR PLAN 1
Continue DAPT, lipitor, lisinopril. [facility] with multivessel disease however plan is for medical management at this time.  Increase Lopressor 25mg BID

## 2018-05-14 VITALS
OXYGEN SATURATION: 100 % | SYSTOLIC BLOOD PRESSURE: 118 MMHG | RESPIRATION RATE: 17 BRPM | TEMPERATURE: 98 F | DIASTOLIC BLOOD PRESSURE: 46 MMHG | HEART RATE: 59 BPM

## 2018-05-14 LAB
BACTERIA BLD CULT: SIGNIFICANT CHANGE UP
BASOPHILS # BLD AUTO: 0.03 K/UL — SIGNIFICANT CHANGE UP (ref 0–0.2)
BASOPHILS NFR BLD AUTO: 0.4 % — SIGNIFICANT CHANGE UP (ref 0–2)
BUN SERPL-MCNC: 19 MG/DL — SIGNIFICANT CHANGE UP (ref 7–23)
CALCIUM SERPL-MCNC: 9.3 MG/DL — SIGNIFICANT CHANGE UP (ref 8.4–10.5)
CHLORIDE SERPL-SCNC: 99 MMOL/L — SIGNIFICANT CHANGE UP (ref 98–107)
CO2 SERPL-SCNC: 30 MMOL/L — SIGNIFICANT CHANGE UP (ref 22–31)
CREAT SERPL-MCNC: 0.78 MG/DL — SIGNIFICANT CHANGE UP (ref 0.5–1.3)
EOSINOPHIL # BLD AUTO: 0.31 K/UL — SIGNIFICANT CHANGE UP (ref 0–0.5)
EOSINOPHIL NFR BLD AUTO: 4.3 % — SIGNIFICANT CHANGE UP (ref 0–6)
GLUCOSE BLDC GLUCOMTR-MCNC: 120 MG/DL — HIGH (ref 70–99)
GLUCOSE BLDC GLUCOMTR-MCNC: 143 MG/DL — HIGH (ref 70–99)
GLUCOSE SERPL-MCNC: 130 MG/DL — HIGH (ref 70–99)
HCT VFR BLD CALC: 32.8 % — LOW (ref 34.5–45)
HGB BLD-MCNC: 10 G/DL — LOW (ref 11.5–15.5)
IMM GRANULOCYTES # BLD AUTO: 0.06 # — SIGNIFICANT CHANGE UP
IMM GRANULOCYTES NFR BLD AUTO: 0.8 % — SIGNIFICANT CHANGE UP (ref 0–1.5)
LYMPHOCYTES # BLD AUTO: 1.87 K/UL — SIGNIFICANT CHANGE UP (ref 1–3.3)
LYMPHOCYTES # BLD AUTO: 26.1 % — SIGNIFICANT CHANGE UP (ref 13–44)
MAGNESIUM SERPL-MCNC: 2.3 MG/DL — SIGNIFICANT CHANGE UP (ref 1.6–2.6)
MCHC RBC-ENTMCNC: 25.3 PG — LOW (ref 27–34)
MCHC RBC-ENTMCNC: 30.5 % — LOW (ref 32–36)
MCV RBC AUTO: 83 FL — SIGNIFICANT CHANGE UP (ref 80–100)
MONOCYTES # BLD AUTO: 0.62 K/UL — SIGNIFICANT CHANGE UP (ref 0–0.9)
MONOCYTES NFR BLD AUTO: 8.6 % — SIGNIFICANT CHANGE UP (ref 2–14)
NEUTROPHILS # BLD AUTO: 4.28 K/UL — SIGNIFICANT CHANGE UP (ref 1.8–7.4)
NEUTROPHILS NFR BLD AUTO: 59.8 % — SIGNIFICANT CHANGE UP (ref 43–77)
NRBC # FLD: 0 — SIGNIFICANT CHANGE UP
PHOSPHATE SERPL-MCNC: 3.3 MG/DL — SIGNIFICANT CHANGE UP (ref 2.5–4.5)
PLATELET # BLD AUTO: 476 K/UL — HIGH (ref 150–400)
PMV BLD: 10.5 FL — SIGNIFICANT CHANGE UP (ref 7–13)
POTASSIUM SERPL-MCNC: 4.1 MMOL/L — SIGNIFICANT CHANGE UP (ref 3.5–5.3)
POTASSIUM SERPL-SCNC: 4.1 MMOL/L — SIGNIFICANT CHANGE UP (ref 3.5–5.3)
RBC # BLD: 3.95 M/UL — SIGNIFICANT CHANGE UP (ref 3.8–5.2)
RBC # FLD: 16.5 % — HIGH (ref 10.3–14.5)
SODIUM SERPL-SCNC: 137 MMOL/L — SIGNIFICANT CHANGE UP (ref 135–145)
WBC # BLD: 7.17 K/UL — SIGNIFICANT CHANGE UP (ref 3.8–10.5)
WBC # FLD AUTO: 7.17 K/UL — SIGNIFICANT CHANGE UP (ref 3.8–10.5)

## 2018-05-14 PROCEDURE — 99232 SBSQ HOSP IP/OBS MODERATE 35: CPT

## 2018-05-14 RX ORDER — ATORVASTATIN CALCIUM 80 MG/1
1 TABLET, FILM COATED ORAL
Qty: 30 | Refills: 0 | OUTPATIENT
Start: 2018-05-14 | End: 2018-06-12

## 2018-05-14 RX ORDER — CLOPIDOGREL BISULFATE 75 MG/1
1 TABLET, FILM COATED ORAL
Qty: 30 | Refills: 0 | OUTPATIENT
Start: 2018-05-14 | End: 2018-06-12

## 2018-05-14 RX ORDER — FUROSEMIDE 40 MG
1 TABLET ORAL
Qty: 30 | Refills: 0 | OUTPATIENT
Start: 2018-05-14 | End: 2018-06-12

## 2018-05-14 RX ORDER — INSULIN LISPRO 100 [IU]/ML
15 INJECTION, SUSPENSION SUBCUTANEOUS
Qty: 0 | Refills: 0 | COMMUNITY

## 2018-05-14 RX ORDER — LISINOPRIL 2.5 MG/1
1 TABLET ORAL
Qty: 0 | Refills: 0 | COMMUNITY
Start: 2018-05-14

## 2018-05-14 RX ORDER — ENOXAPARIN SODIUM 100 MG/ML
5 INJECTION SUBCUTANEOUS
Qty: 3 | Refills: 0 | OUTPATIENT
Start: 2018-05-14 | End: 2018-06-12

## 2018-05-14 RX ORDER — AMLODIPINE BESYLATE 2.5 MG/1
1 TABLET ORAL
Qty: 0 | Refills: 0 | COMMUNITY

## 2018-05-14 RX ORDER — METOPROLOL TARTRATE 50 MG
1 TABLET ORAL
Qty: 60 | Refills: 0 | OUTPATIENT
Start: 2018-05-14 | End: 2018-06-12

## 2018-05-14 RX ORDER — LIDOCAINE 4 G/100G
1 CREAM TOPICAL
Qty: 1 | Refills: 0 | OUTPATIENT
Start: 2018-05-14 | End: 2018-06-12

## 2018-05-14 RX ADMIN — LISINOPRIL 10 MILLIGRAM(S): 2.5 TABLET ORAL at 05:52

## 2018-05-14 RX ADMIN — HEPARIN SODIUM 5000 UNIT(S): 5000 INJECTION INTRAVENOUS; SUBCUTANEOUS at 05:52

## 2018-05-14 RX ADMIN — Medication 20 MILLIGRAM(S): at 05:52

## 2018-05-14 RX ADMIN — Medication 25 MILLIGRAM(S): at 05:52

## 2018-05-14 RX ADMIN — Medication 25 MILLIGRAM(S): at 15:13

## 2018-05-14 RX ADMIN — Medication 81 MILLIGRAM(S): at 15:13

## 2018-05-14 RX ADMIN — CLOPIDOGREL BISULFATE 75 MILLIGRAM(S): 75 TABLET, FILM COATED ORAL at 15:13

## 2018-05-14 NOTE — PROGRESS NOTE ADULT - SUBJECTIVE AND OBJECTIVE BOX
Subjective/Objective: Patient resting in bed, no overnight events noted.    MEDICATIONS  (STANDING):  aspirin  chewable 81 milliGRAM(s) Oral daily  atorvastatin 40 milliGRAM(s) Oral at bedtime  clopidogrel Tablet 75 milliGRAM(s) Oral daily  dextrose 5%. 1000 milliLiter(s) (50 mL/Hr) IV Continuous <Continuous>  dextrose 50% Injectable 12.5 Gram(s) IV Push once  dextrose 50% Injectable 25 Gram(s) IV Push once  dextrose 50% Injectable 25 Gram(s) IV Push once  furosemide    Tablet 20 milliGRAM(s) Oral daily  heparin  Injectable 5000 Unit(s) SubCutaneous every 12 hours  influenza   Vaccine 0.5 milliLiter(s) IntraMuscular once  insulin glargine Injectable (LANTUS) 5 Unit(s) SubCutaneous at bedtime  insulin lispro (HumaLOG) corrective regimen sliding scale   SubCutaneous three times a day before meals  insulin lispro (HumaLOG) corrective regimen sliding scale   SubCutaneous at bedtime  lidocaine   Patch 2 Patch Transdermal daily  lisinopril 10 milliGRAM(s) Oral daily  metoprolol tartrate 25 milliGRAM(s) Oral two times a day    MEDICATIONS  (PRN):  acetaminophen   Tablet 650 milliGRAM(s) Oral every 6 hours PRN For Temp greater than 38 C (100.4 F)  dextrose Gel 1 Dose(s) Oral once PRN Blood Glucose LESS THAN 70 milliGRAM(s)/deciliter  glucagon  Injectable 1 milliGRAM(s) IntraMuscular once PRN Glucose LESS THAN 70 milligrams/deciliter          Vital Signs Last 24 Hrs  T(C): 36.8 (14 May 2018 05:40), Max: 37.3 (13 May 2018 21:03)  T(F): 98.2 (14 May 2018 05:40), Max: 99.2 (13 May 2018 21:03)  HR: 63 (14 May 2018 05:40) (62 - 74)  BP: 125/68 (14 May 2018 05:40) (125/68 - 141/61)  BP(mean): --  RR: 17 (14 May 2018 05:40) (17 - 17)  SpO2: 99% (14 May 2018 05:40) (97% - 99%)  I&O's Detail    13 May 2018 07:01  -  14 May 2018 07:00  --------------------------------------------------------  IN:    Oral Fluid: 540 mL  Total IN: 540 mL    OUT:  Total OUT: 0 mL    Total NET: 540 mL    PHYSICAL EXAM  GEN: NAD, skin W & D  RESP: CTA  CV: nl S1S2  GI: soft, NT/ND  EXT: no C/C/E  NEURO: A & O X 3      EKG/ TELEM: NSR    LABS:                          10.0   7.17  )-----------( 476      ( 14 May 2018 06:30 )             32.8       14 May 2018 06:30    137    |  99     |  19     ----------------------------<  130<H>  4.1     |  30     |  0.78     13 May 2018 05:36    139    |  100    |  19     ----------------------------<  105<H>  4.2     |  29     |  0.75     Ca    9.3        14 May 2018 06:30  Ca    9.0        13 May 2018 05:36  Phos  3.3       14 May 2018 06:30  Phos  2.7       13 May 2018 05:36  Mg     2.3       14 May 2018 06:30  Mg     2.2       13 May 2018 05:36

## 2018-05-14 NOTE — PROGRESS NOTE ADULT - SUBJECTIVE AND OBJECTIVE BOX
Follow Up:      Interval History/ROS: denies complaints: no SOB, no Pleuritic chest pain    Allergies  No Known Allergies    ANTIMICROBIALS:      OTHER MEDS:  MEDICATIONS  (STANDING):  acetaminophen   Tablet 650 every 6 hours PRN  aspirin  chewable 81 daily  atorvastatin 40 at bedtime  clopidogrel Tablet 75 daily  dextrose 50% Injectable 12.5 once  dextrose 50% Injectable 25 once  dextrose 50% Injectable 25 once  dextrose Gel 1 once PRN  furosemide    Tablet 20 daily  glucagon  Injectable 1 once PRN  heparin  Injectable 5000 every 12 hours  influenza   Vaccine 0.5 once  insulin glargine Injectable (LANTUS) 5 at bedtime  insulin lispro (HumaLOG) corrective regimen sliding scale  three times a day before meals  insulin lispro (HumaLOG) corrective regimen sliding scale  at bedtime  lisinopril 10 daily  metoprolol tartrate 25 two times a day    Vital Signs Last 24 Hrs  T(C): 36.8 (14 May 2018 05:40), Max: 37.3 (13 May 2018 21:03)  T(F): 98.2 (14 May 2018 05:40), Max: 99.2 (13 May 2018 21:03)  HR: 63 (14 May 2018 05:40) (62 - 74)  BP: 125/68 (14 May 2018 05:40) (125/68 - 141/61)  BP(mean): --  RR: 17 (14 May 2018 05:40) (17 - 17)  SpO2: 99% (14 May 2018 05:40) (97% - 99%)    PHYSICAL EXAM:  General: thin,  NAD, Non-toxic  Neurology: A&Ox3, nonfocal  Respiratory: Clear to auscultation bilaterallym anteriorally  CV: RRR, S1S2, no murmurs, rubs or gallops  Abdominal: Soft, Non-tender, non-distended, normal bowel sounds  Line Sites: Clear  Skin: No rash                        10.0   7.17  )-----------( 476      ( 14 May 2018 06:30 )             32.8       05-14    137  |  99  |  19  ----------------------------<  130<H>  4.1   |  30  |  0.78    Ca    9.3      14 May 2018 06:30  Phos  3.3     05-14  Mg     2.3     05-14            MICROBIOLOGY:  BLOOD  05-09-18 --  --  --      BLOOD  05-08-18 --  --  --      BLOOD PERIPHERAL  05-08-18 --  --  --      BLOOD  05-03-18 --  --  --      BLOOD PERIPHERAL  05-03-18 --  --  --      BLOOD  05-03-18 --  --  --      BLOOD VENOUS  05-02-18 --  --  --      BLOOD-CATHETER  05-02-18 --  --  --      URINE MIDSTREAM  05-01-18 --  --  Escherichia coli      RADIOLOGY:  < from: CT Chest No Cont (05.09.18 @ 11:53) >  New small pericardial effusion with mild haziness surrounding the   pericardium of uncertain significance. Given history of fever, consider   pericarditis. Reactive anterior mediastinal lymphadenopathy up to 1 cm    Bilateral pleural effusions with areas of loculation within the fissures   bilaterally    New right lower lobe 4 mm nodule when compared with prior study.    < end of copied text >      Jim Andre MD; Division of Infectious Disease; Pager: [phone]; nights and weekends: [phone]

## 2018-05-14 NOTE — PROGRESS NOTE ADULT - ASSESSMENT
83 YO F w/ hx of HTN , DM, TIA without residual deficits, no known CAD hx (stress test 11/2017 normal), presents to the ED with NSTEMI, s/p cath with multivessels  CAD. Course complicated by sepsis 2/2 UTI, anemia and  high ever? 83 YO F w/ hx of HTN , DM, TIA without residual deficits, no known CAD hx (stress test 11/2017 normal), presents to the ED with NSTEMI, s/p cath with multivessels  CAD. Course complicated by sepsis 2/2 UTI, anemia and fever but workup negative.

## 2018-05-14 NOTE — PROGRESS NOTE ADULT - ATTENDING COMMENTS
Patient seen and examined. Agree with above assessment and plan  Patient with CAD- may require high risk PCI  Discussed plan with Dr. Davidson  [facility] for now
Patient seen and examined  Above verified  Stable off antimicrobials  Continue observation  tailor plan for ID issues  per course,results.Will defer to primary team on management of other issues.  Infectious Diseases Service will cover over rest of weekend.  Please call  if issues
Shruthi Anderson MD  pager   Diabetes team: [phone] business hours  [phone] night/weekend
Shruthi Anderson MD  pager   Diabetes team: [phone] business hours  [phone] night/weekend
Shruthi Anderson MD  pager   Diabetes team: [phone] business hours  [phone] night/weekend
Shruthi Anderson MD  pager   Diabetes team: [phone] business hours  [phone] night/weekend
Patient seen and examined.  Out of bed. Afebrile overnight  1. 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 Fever- Now afebrile  ID input appreciated, will check TTE; will continue ABx per their reccomendations  3. DVT ppx- subq heparin
Patient seen and examined.  Out of bed. Afebrile overnight  1. 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 Fever- Now afebrile since yesterday  ID input appreciated, will continue ABx per their reccomendations  3. DVT ppx- subq heparin
Patient seen and examined.  Agree with above  DC today
5/13: She has been stable off antibtiocs: non septic: no leucocytosis
5/13: She has been stable off antibtiocs: non septic: no leucocytosis  5/14: pt has been doing ok : no SOB : no fever : stable off antibiotics:
cassy pericarditis , the reason for fever!
Patient seen and examined. No longer febrile. Out of bed  1. CAD- multivessel CAD on C after EKG changes and elevated troponin- NSTEMI  On ASA and Plavix  Will readdress future plan of intervention versus medical mgmt once patient stabilizes from infectious standpoint. Will discuss with Dr. Davidson  2.UTI- Being treated and doing well. now afebrile  -Continue Zosyn   3. DVT ppx- subq heparin
Patient seen and examined. No longer febrile. Out of bed  1. CAD- multivessel CAD on [facility] after EKG changes and elevated troponin- NSTEMI  On ASA and Plavix  plan for medical mgmt   Discussed with Dr. Davidson  2.UTI- Being treated and doing well. now afebrile  -cont Abx  3. DVT ppx- subq heparin
Agree with above assessment and plan  Fever improved with ABX  Cont current meds  Cont to monitor mental status
Patient seen and examined.  Out of bed. Now febrile overnight  1. CAD- multivessel CAD on LHC after EKG changes and elevated troponin- NSTEMI  On ASA and Plavix  plan for medical mgmt   Discussed with Dr. Davidson  2. Fever- CT chest today. now with fever overnight; will add azithromycin for atypical coverage. will monitor temp curve  -may require ID consult if fever doesn't decrease  3. DVT ppx- subq heparin
Patient seen and examined. Events yesterday noted. Spiked fever to 103.3, became hypotensive and given 1L of fluid and on vasopressin overnight.  1. CAD- multivessel CAD on LHC after EKG changes and elevated troponin- NSTEMI  On ASA and Plavix  Will readdress future plan of intervention versus medical mgmt once patient stabilizes from infectious standpoint. Will discuss with Dr. Davidson  2. Sepsis- Febrile overnight, sanchez placed. Given tylenol  RVP negative  Cdiff negative  Blood Cx pending  UCx pending  Repeat UA  -Continue Zosyn and Vanco for now, if spikes again, consider CT chest  3. DVT ppx- subq heparin
Patient seen and examined.  Out of bed  1. CAD- multivessel CAD on LHC after EKG changes and elevated troponin- NSTEMI  On ASA and Plavix  plan for medical mgmt   Discussed with Dr. Davidson  2. Fever- now with fever overnight; will add azithromycin for atypical coverage. will monitor temp curve  3. DVT ppx- subq heparin

## 2018-05-14 NOTE — PROGRESS NOTE ADULT - PROBLEM SELECTOR PLAN 2
Cont current care:  5/12 cardiology note appreciated. continue current meds  5/13 echo noted. on dual antiplatelet. cardiology following  5/14: stable

## 2018-05-14 NOTE — PROGRESS NOTE ADULT - ASSESSMENT
Afebrile since hyperthermia 5/8 2200  normal WBC  Cultures negative  clinically stable off antibiotic since 5/11    84F with DM, HTN, HLD, TIA and Osteoarthritis presented 5/1/18 for symptomatic hypoglycemia but also found to be in NSTEMI and admitted to CCU. Developed nonfocal fevers 5/1-5/3, then recurred 5/8, Tmax 105.6F with rise in white count to 17. Looks remarkably well.   Not clearly an infectious etiology. Blood cultures negative. Urine E. coli but she was asymptomatic. RVP negative. Repeat CXR 5/8 new bilateral midlung opacities, CT with pericardial effusion and loculated pleural effusion in both fissures, clinically not a bacterial pneumonia, not even coughing. Right arm ecchymosis without cellulitis. The high PCT may represent decline from even higher value during the hyperthermia.   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    Pneumococcal vaccination if needed  Signing off case  Please reconsult ID if needed

## 2018-05-14 NOTE — PROGRESS NOTE ADULT - SUBJECTIVE AND OBJECTIVE BOX
No acute overnight events. No further hypoglycemia. Pateint with good appetite, may be discharged today.          MEDICATIONS  (STANDING):  aspirin  chewable 81 milliGRAM(s) Oral daily  atorvastatin 40 milliGRAM(s) Oral at bedtime  clopidogrel Tablet 75 milliGRAM(s) Oral daily  dextrose 5%. 1000 milliLiter(s) (50 mL/Hr) IV Continuous <Continuous>  dextrose 50% Injectable 12.5 Gram(s) IV Push once  dextrose 50% Injectable 25 Gram(s) IV Push once  dextrose 50% Injectable 25 Gram(s) IV Push once  furosemide    Tablet 20 milliGRAM(s) Oral daily  heparin  Injectable 5000 Unit(s) SubCutaneous every 12 hours  influenza   Vaccine 0.5 milliLiter(s) IntraMuscular once  insulin glargine Injectable (LANTUS) 5 Unit(s) SubCutaneous at bedtime  insulin lispro (HumaLOG) corrective regimen sliding scale   SubCutaneous three times a day before meals  insulin lispro (HumaLOG) corrective regimen sliding scale   SubCutaneous at bedtime  lidocaine   Patch 2 Patch Transdermal daily  lisinopril 10 milliGRAM(s) Oral daily  meropenem  IVPB      meropenem  IVPB 500 milliGRAM(s) IV Intermittent every 12 hours  metoprolol tartrate 12.5 milliGRAM(s) Oral two times a day  vancomycin  IVPB      vancomycin  IVPB 750 milliGRAM(s) IV Intermittent every 24 hours    MEDICATIONS  (PRN):  acetaminophen   Tablet 650 milliGRAM(s) Oral every 6 hours PRN For Temp greater than 38 C (100.4 F)  dextrose Gel 1 Dose(s) Oral once PRN Blood Glucose LESS THAN 70 milliGRAM(s)/deciliter  glucagon  Injectable 1 milliGRAM(s) IntraMuscular once PRN Glucose LESS THAN 70 milligrams/deciliter      Allergies  No Known Allergies        Review of Systems:  Constitutional: No fever    PHYSICAL EXAM:  Vital Signs Last 24 Hrs  T(C): 36.8 (14 May 2018 12:30), Max: 37.3 (13 May 2018 21:03)  T(F): 98.3 (14 May 2018 12:30), Max: 99.2 (13 May 2018 21:03)  HR: 59 (14 May 2018 12:30) (59 - 74)  BP: 118/46 (14 May 2018 12:30) (118/46 - 141/61)  BP(mean): --  RR: 17 (14 May 2018 12:30) (17 - 17)  SpO2: 100% (14 May 2018 12:30) (97% - 100%)    GENERAL: NAD, thin elderly female   EYES: No proptosis, no lid lag, anicteric  HEENT:  Atraumatic, Normocephalic, moist mucous membranes  THYROID: Normal size, no palpable nodules  RESPIRATORY: Clear to auscultation bilaterally; No rales, rhonchi, wheezing, or rubs  CARDIOVASCULAR: Regular rate and rhythm; No murmurs; no peripheral edema  GI: Soft, nontender, non distended, normal bowel sounds  SKIN: Dry, intact, No rashes or lesions  MUSCULOSKELETAL: Full range of motion, normal strength  NEURO: sensation intact, extraocular movements intact, no tremor, normal reflexes  PSYCH: Alert and oriented x 3, normal affect, normal mood    CAPILLARY BLOOD GLUCOSE  POCT Blood Glucose.: 143 mg/dL (14 May 2018 12:45)  POCT Blood Glucose.: 120 mg/dL (14 May 2018 08:32)  POCT Blood Glucose.: 192 mg/dL (13 May 2018 22:14)  POCT Blood Glucose.: 166 mg/dL (13 May 2018 17:15)    POCT Blood Glucose.: 144 mg/dL (05-11-18 @ 12:38)  POCT Blood Glucose.: 124 mg/dL (05-11-18 @ 09:02)  POCT Blood Glucose.: 186 mg/dL (05-10-18 @ 22:44)  POCT Blood Glucose.: 144 mg/dL (05-10-18 @ 17:07)  POCT Blood Glucose.: 109 mg/dL (05-10-18 @ 12:24)  POCT Blood Glucose.: 84 mg/dL (05-10-18 @ 09:03)  POCT Blood Glucose.: 155 mg/dL (05-09-18 @ 22:11)  POCT Blood Glucose.: 194 mg/dL (05-09-18 @ 17:41)  POCT Blood Glucose.: 132 mg/dL (05-09-18 @ 12:34)  POCT Blood Glucose.: 124 mg/dL (05-09-18 @ 08:41)  POCT Blood Glucose.: 266 mg/dL (05-08-18 @ 22:42)  POCT Blood Glucose.: 215 mg/dL (05-08-18 @ 18:10)              Hemoglobin A1C, Whole Blood: 6.4 % <H> [4.0 - 5.6] (05-02-18 @ 05:54)

## 2018-05-14 NOTE — PROGRESS NOTE ADULT - SUBJECTIVE AND OBJECTIVE BOX
Patient is a 84y old  Female who presents with a chief complaint of Altered mental status (05 May 2018 11:18)      Any change in ROS: Doing pretty good:     MEDICATIONS  (STANDING):  aspirin  chewable 81 milliGRAM(s) Oral daily  atorvastatin 40 milliGRAM(s) Oral at bedtime  clopidogrel Tablet 75 milliGRAM(s) Oral daily  dextrose 5%. 1000 milliLiter(s) (50 mL/Hr) IV Continuous <Continuous>  dextrose 50% Injectable 12.5 Gram(s) IV Push once  dextrose 50% Injectable 25 Gram(s) IV Push once  dextrose 50% Injectable 25 Gram(s) IV Push once  furosemide    Tablet 20 milliGRAM(s) Oral daily  heparin  Injectable 5000 Unit(s) SubCutaneous every 12 hours  influenza   Vaccine 0.5 milliLiter(s) IntraMuscular once  insulin glargine Injectable (LANTUS) 5 Unit(s) SubCutaneous at bedtime  insulin lispro (HumaLOG) corrective regimen sliding scale   SubCutaneous three times a day before meals  insulin lispro (HumaLOG) corrective regimen sliding scale   SubCutaneous at bedtime  lidocaine   Patch 2 Patch Transdermal daily  lisinopril 10 milliGRAM(s) Oral daily  metoprolol tartrate 25 milliGRAM(s) Oral two times a day    MEDICATIONS  (PRN):  acetaminophen   Tablet 650 milliGRAM(s) Oral every 6 hours PRN For Temp greater than 38 C (100.4 F)  dextrose Gel 1 Dose(s) Oral once PRN Blood Glucose LESS THAN 70 milliGRAM(s)/deciliter  glucagon  Injectable 1 milliGRAM(s) IntraMuscular once PRN Glucose LESS THAN 70 milligrams/deciliter    Vital Signs Last 24 Hrs  T(C): 36.8 (14 May 2018 12:30), Max: 37.3 (13 May 2018 21:03)  T(F): 98.3 (14 May 2018 12:30), Max: 99.2 (13 May 2018 21:03)  HR: 59 (14 May 2018 12:30) (59 - 74)  BP: 118/46 (14 May 2018 12:30) (118/46 - 141/61)  BP(mean): --  RR: 17 (14 May 2018 12:30) (17 - 17)  SpO2: 100% (14 May 2018 12:30) (97% - 100%)    I&O's Summary    13 May 2018 07:01  -  14 May 2018 07:00  --------------------------------------------------------  IN: 540 mL / OUT: 0 mL / NET: 540 mL    14 May 2018 07:01  -  14 May 2018 12:43  --------------------------------------------------------  IN: 240 mL / OUT: 0 mL / NET: 240 mL          Physical Exam:   GENERAL: NAD, well-groomed, well-developed  HEENT: MATTHEW/   Atraumatic, Normocephalic  ENMT: No tonsillar erythema, exudates, or enlargement; Moist mucous membranes, Good dentition, No lesions  NECK: Supple, No JVD, Normal thyroid  CHEST/LUNG: bibasilar crackles+  CVS: Regular rate and rhythm; No murmurs, rubs, or gallops  GI: : Soft, Nontender, Nondistended; Bowel sounds present  NERVOUS SYSTEM:  Alert & Oriented X3  EXTREMITIES:  2+ Peripheral Pulses, No clubbing, cyanosis, or edema  LYMPH: No lymphadenopathy noted  SKIN: No rashes or lesions  ENDOCRINOLOGY: No Thyromegaly  PSYCH: Appropriate    Labs:                              10.0   7.17  )-----------( 476      ( 14 May 2018 06:30 )             32.8                         9.5    5.98  )-----------( 431      ( 13 May 2018 05:36 )             30.2                         9.0    6.53  )-----------( 396      ( 12 May 2018 06:00 )             28.8                         8.8    7.51  )-----------( 357      ( 11 May 2018 05:45 )             27.9     05-14    137  |  99  |  19  ----------------------------<  130<H>  4.1   |  30  |  0.78  05-13    139  |  100  |  19  ----------------------------<  105<H>  4.2   |  29  |  0.75  05-12    137  |  100  |  16  ----------------------------<  135<H>  3.8   |  27  |  0.69  05-11    134<L>  |  99  |  16  ----------------------------<  111<H>  3.9   |  25  |  0.74    Ca    9.3      14 May 2018 06:30  Ca    9.0      13 May 2018 05:36  Phos  3.3     05-14  Phos  2.7     05-13  Mg     2.3     05-14  Mg     2.2     05-13      CAPILLARY BLOOD GLUCOSE      POCT Blood Glucose.: 120 mg/dL (14 May 2018 08:32)  POCT Blood Glucose.: 192 mg/dL (13 May 2018 22:14)  POCT Blood Glucose.: 166 mg/dL (13 May 2018 17:15)            Cultures:           Wound culture:                05-09 @ 13:07  Organism --  Culture w/ gram stain --  Specimen Source BLOOD    Wound culture:                05-08 @ 13:36  Organism --  Culture w/ gram stain --  Specimen Source BLOOD    Wound culture:                05-08 @ 09:35  Organism --  Culture w/ gram stain --  Specimen Source BLOOD PERIPHERAL      Abscess culture:             05-09 @ 13:07  Organism --  Gram Stain --  Specimen Source BLOOD    Abscess culture:             05-08 @ 13:36  Organism --  Gram Stain --  Specimen Source BLOOD    Abscess culture:             05-08 @ 09:35  Organism --  Gram Stain --  Specimen Source BLOOD PERIPHERAL        Tissue culture:           05-09 @ 13:07  Organism --  Gram Stain --  Specimen Source BLOOD    Tissue culture:           05-08 @ 13:36  Organism --  Gram Stain --  Specimen Source BLOOD    Tissue culture:           05-08 @ 09:35  Organism --  Gram Stain --  Specimen Source BLOOD PERIPHERAL      Body Fluid Smear & Culture:                        05-09 @ 13:07  AFB Smear  --  Culture Acid Fast Body Fluid w/ Smear  --  Culture Acid Fast Smear Concentrated   --    Culture Results:     --  Specimen Source BLOOD    Body Fluid Smear & Culture:                        05-08 @ 13:36  AFB Smear  --  Culture Acid Fast Body Fluid w/ Smear  --  Culture Acid Fast Smear Concentrated   --    Culture Results:     --  Specimen Source BLOOD    Body Fluid Smear & Culture:                        05-08 @ 09:35  AFB Smear  --  Culture Acid Fast Body Fluid w/ Smear  --  Culture Acid Fast Smear Concentrated   --    Culture Results:     --  Specimen Source BLOOD PERIPHERAL        Rapid Respiratory Viral Panel Result        05-09 @ 08:45  Rapid RVP --  Coronovirus --  Adenovirus NOT DETECTED  Bordetella Pertussis NOT DETECTED  Chlamydia Pneumonia NOT DETECTED  Entero/RhinovirusNOT DETECTED  HKU1 Coronovirus --  HMPV Coronovirus NOT DETECTED  Influenza A NOT DETECTED (any subtype)  Influenza AH1 NOT DETECTED  Influenza AH1 2009 NOT DETECTED  Influenza AH3 NOT DETECTED  Influenza B NOT DETECTED  Mycoplasma Pneumoniae NOT DETECTED This nucleic acid amplification assay was performed using  the [employer]. The following pathogens are tested  for: Adenovirus, Coronavirus 229E, Coronavirus HKU1,  Coronavirus NL63, Coronavirus OC43, Human Metapneumovirus  (HMPV), Rhinovirus/Enterovirus, Influenza A H1, Influenza A  H1 2009 (Pandemic H1 2009), Influenza A H3, Influenza A (Flu  A) subtype not identified, Influenza B, Parainfluenza Virus  (types 1, 2, 3, 4), Respiratory Syncytial Virus (RSV),  Bordetella pertussis, Chlamydophila pneumoniae, and  Mycoplasma pneumoniae. A negative FilmArray result does not  always exclude the possibility of viral or bacterial  infection. Laboratory results should always be interpreted  in the context of clinical findings.  NL63 Coronovirus --  OC43 Coronovirus --  Parainfluenza 1 NOT DETECTED  Parainfluenza 2 NOT DETECTED  Parainfluenza 3 NOT DETECTED  Parainfluenza 4 NOT DETECTED  Resp Syncytial Virus NOT DETECTED      < from: CT Chest No Cont (05.09.18 @ 11:53) >    Tubes/Lines: None    Mediastinum/Vessels/Heart: Small pericardial effusion is noted. Mild   haziness surrounding the pericardium of uncertain significance. A   prevascular lymph node measures 1 cm. No other lymphadenopathy is noted.   Coronary artery calcifications are noted.    Lungs/Pleura/Airways: Bilateral pleural effusions are seen with areas of   loculated fluid within the fissures bilaterally. A right lower lobe 4 mm   nodule is seen on series 2, image 75 and is new.    Visualized abdomen: Partially imaged multiple left-sided parapelvic cysts   as seen on previous exam. Otherwise, unremarkable appearance of the upper   abdomen.    Bones and soft tissues: Degenerative changes noted throughout the spine.    IMPRESSION:    New small pericardial effusion with mild haziness surrounding the   pericardium of uncertain significance. Given history of fever, consider   pericarditis. Reactive anterior mediastinal lymphadenopathy up to 1 cm    Bilateral pleural effusions with areas of loculation within the fissures   bilaterally    New right lower lobe 4 mm nodule when compared with prior study.                  ANNABELLE MCKINNON M.D., ATTENDING RADIOLOGIST  This document has been electronically signed. May  9 2018 12:17PM    < end of copied text >      Studies  Chest X-RAY  CT SCAN Chest   Venous Dopplers: LE:   CT Abdomen  Others

## 2018-05-14 NOTE — PROGRESS NOTE ADULT - PROVIDER SPECIALTY LIST ADULT
CCU
Cardiology
Endocrinology
Infectious Disease
Pulmonology
Infectious Disease
CCU
CCU

## 2018-05-14 NOTE — PROGRESS NOTE ADULT - PROBLEM SELECTOR PLAN 1
-Blood glucose has now stabilized on current regimen   -Can continue lantus 5 units at bedtime  -No additional meal time insulin necessary at this time, can be added if necessary as an outpatient.   -Can continue low correction scale ac/hs  -Given her age, goal HgA1C of 7.5-8% for prevention of hypoglycemia     Discharge instructions:  -Can be discharged on Lantus SOLOSTAR PEN 5 units sq qhs  -Will follow up with Dr. Benavides as an outpatient (Will need to reschedule her appointment, Dr. Benavides is aware of her hospital admission- office phone number is [phone].  Daughter will call.    Diabetes team: [phone] business hours  [phone] night/weekend

## 2018-05-14 NOTE — PROGRESS NOTE ADULT - PROBLEM SELECTOR PLAN 1
Continue DAPT, lipitor, lisinopril, and metoprolol. Medical management for multivessel disease. Repeat ECHO with normal LV function.

## 2018-05-14 NOTE — PROGRESS NOTE ADULT - PROBLEM SELECTOR PLAN 1
ADI harmon, radiology attending about the ct scan chest has pericardial effusion, with pericardial thickening: has pl fluid in the fissure: no consolidation: The clinical picture as well as radiographically , it is suggestives of pericarditis:  5/12 no pneumonia, pericarditis post MI  5/13 stable.  5/14: stable: no resp issues

## 2018-05-16 ENCOUNTER — CLINICAL ADVICE (OUTPATIENT)
Age: 83
End: 2018-05-16

## 2018-05-16 RX ORDER — LISINOPRIL 2.5 MG/1
1 TABLET ORAL
Qty: 30 | Refills: 0 | OUTPATIENT
Start: 2018-05-16 | End: 2018-06-14

## 2018-05-23 ENCOUNTER — INPATIENT (INPATIENT)
Facility: HOSPITAL | Age: 83
LOS: 3 days | Discharge: HOME HEALTH | End: 2018-05-27
Attending: INTERNAL MEDICINE | Admitting: INTERNAL MEDICINE
Payer: MEDICARE

## 2018-05-23 VITALS
SYSTOLIC BLOOD PRESSURE: 150 MMHG | OXYGEN SATURATION: 98 % | HEART RATE: 102 BPM | TEMPERATURE: 98 F | DIASTOLIC BLOOD PRESSURE: 95 MMHG | RESPIRATION RATE: 18 BRPM

## 2018-05-23 DIAGNOSIS — E16.0 DRUG-INDUCED HYPOGLYCEMIA WITHOUT COMA: ICD-10-CM

## 2018-05-23 LAB
ALBUMIN SERPL ELPH-MCNC: 2.5 G/DL — LOW (ref 3.3–5)
ALP SERPL-CCNC: 79 U/L — SIGNIFICANT CHANGE UP (ref 40–120)
ALT FLD-CCNC: 27 U/L — SIGNIFICANT CHANGE UP (ref 4–33)
AST SERPL-CCNC: 34 U/L — HIGH (ref 4–32)
BASE EXCESS BLDV CALC-SCNC: 9.1 MMOL/L — SIGNIFICANT CHANGE UP
BASOPHILS # BLD AUTO: 0.02 K/UL — SIGNIFICANT CHANGE UP (ref 0–0.2)
BASOPHILS NFR BLD AUTO: 0.3 % — SIGNIFICANT CHANGE UP (ref 0–2)
BILIRUB SERPL-MCNC: 0.4 MG/DL — SIGNIFICANT CHANGE UP (ref 0.2–1.2)
BLOOD GAS VENOUS - CREATININE: 0.7 MG/DL — SIGNIFICANT CHANGE UP (ref 0.5–1.3)
BUN SERPL-MCNC: 19 MG/DL — SIGNIFICANT CHANGE UP (ref 7–23)
CALCIUM SERPL-MCNC: 9.5 MG/DL — SIGNIFICANT CHANGE UP (ref 8.4–10.5)
CHLORIDE BLDV-SCNC: 100 MMOL/L — SIGNIFICANT CHANGE UP (ref 96–108)
CHLORIDE SERPL-SCNC: 98 MMOL/L — SIGNIFICANT CHANGE UP (ref 98–107)
CO2 SERPL-SCNC: 32 MMOL/L — HIGH (ref 22–31)
CREAT SERPL-MCNC: 0.71 MG/DL — SIGNIFICANT CHANGE UP (ref 0.5–1.3)
EOSINOPHIL # BLD AUTO: 0.04 K/UL — SIGNIFICANT CHANGE UP (ref 0–0.5)
EOSINOPHIL NFR BLD AUTO: 0.6 % — SIGNIFICANT CHANGE UP (ref 0–6)
GAS PNL BLDV: 134 MMOL/L — LOW (ref 136–146)
GLUCOSE BLDV-MCNC: 33 — CRITICAL LOW (ref 70–99)
GLUCOSE SERPL-MCNC: 33 MG/DL — CRITICAL LOW (ref 70–99)
HCO3 BLDV-SCNC: 32 MMOL/L — HIGH (ref 20–27)
HCT VFR BLD CALC: 30.7 % — LOW (ref 34.5–45)
HCT VFR BLDV CALC: 30.6 % — LOW (ref 34.5–45)
HGB BLD-MCNC: 9.5 G/DL — LOW (ref 11.5–15.5)
HGB BLDV-MCNC: 9.9 G/DL — LOW (ref 11.5–15.5)
IMM GRANULOCYTES # BLD AUTO: 0.03 # — SIGNIFICANT CHANGE UP
IMM GRANULOCYTES NFR BLD AUTO: 0.5 % — SIGNIFICANT CHANGE UP (ref 0–1.5)
LACTATE BLDV-MCNC: 1.1 MMOL/L — SIGNIFICANT CHANGE UP (ref 0.5–2)
LYMPHOCYTES # BLD AUTO: 2.33 K/UL — SIGNIFICANT CHANGE UP (ref 1–3.3)
LYMPHOCYTES # BLD AUTO: 35.6 % — SIGNIFICANT CHANGE UP (ref 13–44)
MCHC RBC-ENTMCNC: 25.1 PG — LOW (ref 27–34)
MCHC RBC-ENTMCNC: 30.9 % — LOW (ref 32–36)
MCV RBC AUTO: 81.2 FL — SIGNIFICANT CHANGE UP (ref 80–100)
MONOCYTES # BLD AUTO: 0.62 K/UL — SIGNIFICANT CHANGE UP (ref 0–0.9)
MONOCYTES NFR BLD AUTO: 9.5 % — SIGNIFICANT CHANGE UP (ref 2–14)
NEUTROPHILS # BLD AUTO: 3.51 K/UL — SIGNIFICANT CHANGE UP (ref 1.8–7.4)
NEUTROPHILS NFR BLD AUTO: 53.5 % — SIGNIFICANT CHANGE UP (ref 43–77)
NRBC # FLD: 0 — SIGNIFICANT CHANGE UP
PCO2 BLDV: 51 MMHG — SIGNIFICANT CHANGE UP (ref 41–51)
PH BLDV: 7.43 PH — SIGNIFICANT CHANGE UP (ref 7.32–7.43)
PLATELET # BLD AUTO: 472 K/UL — HIGH (ref 150–400)
PMV BLD: 10.5 FL — SIGNIFICANT CHANGE UP (ref 7–13)
PO2 BLDV: 35 MMHG — SIGNIFICANT CHANGE UP (ref 35–40)
POTASSIUM BLDV-SCNC: 6.3 MMOL/L — CRITICAL HIGH (ref 3.4–4.5)
POTASSIUM SERPL-MCNC: 4.5 MMOL/L — SIGNIFICANT CHANGE UP (ref 3.5–5.3)
POTASSIUM SERPL-SCNC: 4.5 MMOL/L — SIGNIFICANT CHANGE UP (ref 3.5–5.3)
PROT SERPL-MCNC: 7.3 G/DL — SIGNIFICANT CHANGE UP (ref 6–8.3)
RBC # BLD: 3.78 M/UL — LOW (ref 3.8–5.2)
RBC # FLD: 16.4 % — HIGH (ref 10.3–14.5)
SAO2 % BLDV: 61.5 % — SIGNIFICANT CHANGE UP (ref 60–85)
SODIUM SERPL-SCNC: 136 MMOL/L — SIGNIFICANT CHANGE UP (ref 135–145)
WBC # BLD: 6.55 K/UL — SIGNIFICANT CHANGE UP (ref 3.8–10.5)
WBC # FLD AUTO: 6.55 K/UL — SIGNIFICANT CHANGE UP (ref 3.8–10.5)

## 2018-05-23 PROCEDURE — 71045 X-RAY EXAM CHEST 1 VIEW: CPT | Mod: 26

## 2018-05-23 RX ORDER — DEXTROSE 50 % IN WATER 50 %
50 SYRINGE (ML) INTRAVENOUS ONCE
Qty: 0 | Refills: 0 | Status: COMPLETED | OUTPATIENT
Start: 2018-05-23 | End: 2018-05-23

## 2018-05-23 RX ADMIN — Medication 50 MILLILITER(S): at 21:55

## 2018-05-23 NOTE — ED ADULT TRIAGE NOTE - CHIEF COMPLAINT QUOTE
Pt BIBA from home for hypoglycemia, blood sugar in 20s at home, pt was AMS, given 1 amp of D50, pt back at baseline. Pt ate and took insulin as usual.  in triage

## 2018-05-23 NOTE — ED ADULT NURSE NOTE - OBJECTIVE STATEMENT
Pt received to rm 4 aaox3 ambulatory Per Pt's daughter at bedside Pt was at home with grandson when she became weak and confused and Pt states  "could not keep her dentures in her mouth" Pt BIBEMS and found to have blood glucose level of 40, 20 g Iv access obtained by REID Burton and 1 amp of D50 administered as ordered.  Pt now baseline mental status with no complaints.  Portable CXR in progress will monitor.

## 2018-05-23 NOTE — ED PROVIDER NOTE - MEDICAL DECISION MAKING DETAILS
83 y/o F with h/o HTN, DM BIBEMS for hypoglycemia. labs, cxr, IVF. Likely medication related. Will admit for med adjustment as this is second presentation this month for hypoglycemia

## 2018-05-23 NOTE — ED PROVIDER NOTE - ATTENDING CONTRIBUTION TO CARE
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 Patient was found to be with FSG in 20s and given one amp of D50,  in triage and dropped to 40 upon arrival to room. Given second amp of D50. Patient was recently admitted for hypoglycemia and UTI. Has been taking her medications as prescribed. Took 18U of Humalog this morning. +Generalized weakness. Denies fever, chills, chest pain, SOB, abdominal pain, nausea/vomiting/diarrhea, dysuria.    GOPI LONG, ATTENDING NOTE:      GEN: Patient is awake and alert and in no acute distress.    HEENT: Normocephalic/atraumatic.  Airway patent.  No oropharyngeal edema.  No stridor.  Auricles are normal.  Neck supple.  Sclera are non-icteric/Conjunctiva are not injected. EOMI.  No dentition.  RESP: Lungs bibasilar rales.    CV: Heart is regular rate and rhythm.    ABD: Abdomen is soft, not distended +BS.  Non-tender to palpation.  No rebound/no guarding.  MSK: Back is nontender, no CVAT.  Moving all 4 extremities.     NEURO: Neurologically grossly intact.     PSYCH: Psychiatrically normal mood and affect.  No apparent risk to self or others.     DR. LONG, ATTENDING MD:    I performed a face to face bedside interview with patient regarding history of present illness, review of symptoms and past medical history. I completed an independent physical exam.  I have discussed patient's plan of care with the team of health care providers.   I agree with note as stated above, having amended the EMR as needed to reflect my findings. I have discussed the assessment and plan of care.  This includes during the time I functioned as the attending physician for this patient.

## 2018-05-23 NOTE — ED PROVIDER NOTE - OBJECTIVE STATEMENT
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 Patient was found to be with FSG in 20s and given one amp of D50,  in triage and dropped to 40 upon arrival to room. Given second amp of D50. Patient was recently admitted for hypoglycemia and UTI. Has been taking her medications as prescribed. Took 18U of humalog this morning. +Generalized weakness. Denies fever, chills, chest pain, SOB, abdominal pain, nausea/vomiting/diarrhea, dysuria.

## 2018-05-24 DIAGNOSIS — I10 ESSENTIAL (PRIMARY) HYPERTENSION: ICD-10-CM

## 2018-05-24 DIAGNOSIS — G93.41 METABOLIC ENCEPHALOPATHY: ICD-10-CM

## 2018-05-24 DIAGNOSIS — E78.5 HYPERLIPIDEMIA, UNSPECIFIED: ICD-10-CM

## 2018-05-24 DIAGNOSIS — I25.10 ATHEROSCLEROTIC HEART DISEASE OF NATIVE CORONARY ARTERY WITHOUT ANGINA PECTORIS: ICD-10-CM

## 2018-05-24 DIAGNOSIS — Z29.9 ENCOUNTER FOR PROPHYLACTIC MEASURES, UNSPECIFIED: ICD-10-CM

## 2018-05-24 DIAGNOSIS — D64.9 ANEMIA, UNSPECIFIED: ICD-10-CM

## 2018-05-24 DIAGNOSIS — E11.9 TYPE 2 DIABETES MELLITUS WITHOUT COMPLICATIONS: ICD-10-CM

## 2018-05-24 DIAGNOSIS — E16.0 DRUG-INDUCED HYPOGLYCEMIA WITHOUT COMA: ICD-10-CM

## 2018-05-24 LAB
ALBUMIN SERPL ELPH-MCNC: 2.3 G/DL — LOW (ref 3.3–5)
ALP SERPL-CCNC: 75 U/L — SIGNIFICANT CHANGE UP (ref 40–120)
ALT FLD-CCNC: 21 U/L — SIGNIFICANT CHANGE UP (ref 4–33)
APPEARANCE UR: SIGNIFICANT CHANGE UP
APTT BLD: 29.3 SEC — SIGNIFICANT CHANGE UP (ref 27.5–37.4)
AST SERPL-CCNC: 26 U/L — SIGNIFICANT CHANGE UP (ref 4–32)
BACTERIA # UR AUTO: SIGNIFICANT CHANGE UP
BASOPHILS # BLD AUTO: 0.02 K/UL — SIGNIFICANT CHANGE UP (ref 0–0.2)
BASOPHILS NFR BLD AUTO: 0.4 % — SIGNIFICANT CHANGE UP (ref 0–2)
BILIRUB SERPL-MCNC: 0.3 MG/DL — SIGNIFICANT CHANGE UP (ref 0.2–1.2)
BILIRUB UR-MCNC: NEGATIVE — SIGNIFICANT CHANGE UP
BLOOD UR QL VISUAL: NEGATIVE — SIGNIFICANT CHANGE UP
BUN SERPL-MCNC: 21 MG/DL — SIGNIFICANT CHANGE UP (ref 7–23)
CALCIUM SERPL-MCNC: 9.2 MG/DL — SIGNIFICANT CHANGE UP (ref 8.4–10.5)
CHLORIDE SERPL-SCNC: 99 MMOL/L — SIGNIFICANT CHANGE UP (ref 98–107)
CO2 SERPL-SCNC: 30 MMOL/L — SIGNIFICANT CHANGE UP (ref 22–31)
COLOR SPEC: YELLOW — SIGNIFICANT CHANGE UP
CREAT SERPL-MCNC: 0.68 MG/DL — SIGNIFICANT CHANGE UP (ref 0.5–1.3)
EOSINOPHIL # BLD AUTO: 0.11 K/UL — SIGNIFICANT CHANGE UP (ref 0–0.5)
EOSINOPHIL NFR BLD AUTO: 2.4 % — SIGNIFICANT CHANGE UP (ref 0–6)
GLUCOSE SERPL-MCNC: 112 MG/DL — HIGH (ref 70–99)
GLUCOSE UR-MCNC: NEGATIVE — SIGNIFICANT CHANGE UP
HCT VFR BLD CALC: 29.7 % — LOW (ref 34.5–45)
HGB BLD-MCNC: 9.2 G/DL — LOW (ref 11.5–15.5)
IMM GRANULOCYTES # BLD AUTO: 0.02 # — SIGNIFICANT CHANGE UP
IMM GRANULOCYTES NFR BLD AUTO: 0.4 % — SIGNIFICANT CHANGE UP (ref 0–1.5)
INR BLD: 1.23 — HIGH (ref 0.88–1.17)
KETONES UR-MCNC: NEGATIVE — SIGNIFICANT CHANGE UP
LEUKOCYTE ESTERASE UR-ACNC: SIGNIFICANT CHANGE UP
LYMPHOCYTES # BLD AUTO: 1.37 K/UL — SIGNIFICANT CHANGE UP (ref 1–3.3)
LYMPHOCYTES # BLD AUTO: 29.4 % — SIGNIFICANT CHANGE UP (ref 13–44)
MAGNESIUM SERPL-MCNC: 2.4 MG/DL — SIGNIFICANT CHANGE UP (ref 1.6–2.6)
MCHC RBC-ENTMCNC: 25.4 PG — LOW (ref 27–34)
MCHC RBC-ENTMCNC: 31 % — LOW (ref 32–36)
MCV RBC AUTO: 82 FL — SIGNIFICANT CHANGE UP (ref 80–100)
MONOCYTES # BLD AUTO: 0.51 K/UL — SIGNIFICANT CHANGE UP (ref 0–0.9)
MONOCYTES NFR BLD AUTO: 10.9 % — SIGNIFICANT CHANGE UP (ref 2–14)
NEUTROPHILS # BLD AUTO: 2.63 K/UL — SIGNIFICANT CHANGE UP (ref 1.8–7.4)
NEUTROPHILS NFR BLD AUTO: 56.5 % — SIGNIFICANT CHANGE UP (ref 43–77)
NITRITE UR-MCNC: NEGATIVE — SIGNIFICANT CHANGE UP
NRBC # FLD: 0 — SIGNIFICANT CHANGE UP
PH UR: 6 — SIGNIFICANT CHANGE UP (ref 4.6–8)
PHOSPHATE SERPL-MCNC: 3.1 MG/DL — SIGNIFICANT CHANGE UP (ref 2.5–4.5)
PLATELET # BLD AUTO: 423 K/UL — HIGH (ref 150–400)
PMV BLD: 10.4 FL — SIGNIFICANT CHANGE UP (ref 7–13)
POTASSIUM SERPL-MCNC: 4.3 MMOL/L — SIGNIFICANT CHANGE UP (ref 3.5–5.3)
POTASSIUM SERPL-SCNC: 4.3 MMOL/L — SIGNIFICANT CHANGE UP (ref 3.5–5.3)
PROT SERPL-MCNC: 6.8 G/DL — SIGNIFICANT CHANGE UP (ref 6–8.3)
PROT UR-MCNC: 10 MG/DL — SIGNIFICANT CHANGE UP
PROTHROM AB SERPL-ACNC: 14.2 SEC — HIGH (ref 9.8–13.1)
RBC # BLD: 3.62 M/UL — LOW (ref 3.8–5.2)
RBC # FLD: 16.3 % — HIGH (ref 10.3–14.5)
RBC CASTS # UR COMP ASSIST: SIGNIFICANT CHANGE UP (ref 0–?)
SODIUM SERPL-SCNC: 137 MMOL/L — SIGNIFICANT CHANGE UP (ref 135–145)
SP GR SPEC: 1.01 — SIGNIFICANT CHANGE UP (ref 1–1.04)
SQUAMOUS # UR AUTO: SIGNIFICANT CHANGE UP
UROBILINOGEN FLD QL: NORMAL MG/DL — SIGNIFICANT CHANGE UP
WBC # BLD: 4.66 K/UL — SIGNIFICANT CHANGE UP (ref 3.8–10.5)
WBC # FLD AUTO: 4.66 K/UL — SIGNIFICANT CHANGE UP (ref 3.8–10.5)
WBC UR QL: HIGH (ref 0–?)

## 2018-05-24 PROCEDURE — 99223 1ST HOSP IP/OBS HIGH 75: CPT

## 2018-05-24 PROCEDURE — 99222 1ST HOSP IP/OBS MODERATE 55: CPT

## 2018-05-24 RX ORDER — ATORVASTATIN CALCIUM 80 MG/1
40 TABLET, FILM COATED ORAL AT BEDTIME
Qty: 0 | Refills: 0 | Status: DISCONTINUED | OUTPATIENT
Start: 2018-05-24 | End: 2018-05-27

## 2018-05-24 RX ORDER — ACETAMINOPHEN 500 MG
650 TABLET ORAL EVERY 6 HOURS
Qty: 0 | Refills: 0 | Status: DISCONTINUED | OUTPATIENT
Start: 2018-05-24 | End: 2018-05-27

## 2018-05-24 RX ORDER — INSULIN LISPRO 100/ML
VIAL (ML) SUBCUTANEOUS
Qty: 0 | Refills: 0 | Status: DISCONTINUED | OUTPATIENT
Start: 2018-05-24 | End: 2018-05-27

## 2018-05-24 RX ORDER — SODIUM CHLORIDE 9 MG/ML
1000 INJECTION, SOLUTION INTRAVENOUS
Qty: 0 | Refills: 0 | Status: DISCONTINUED | OUTPATIENT
Start: 2018-05-24 | End: 2018-05-27

## 2018-05-24 RX ORDER — DEXTROSE 50 % IN WATER 50 %
12.5 SYRINGE (ML) INTRAVENOUS ONCE
Qty: 0 | Refills: 0 | Status: DISCONTINUED | OUTPATIENT
Start: 2018-05-24 | End: 2018-05-27

## 2018-05-24 RX ORDER — INSULIN GLARGINE 100 [IU]/ML
5 INJECTION, SOLUTION SUBCUTANEOUS AT BEDTIME
Qty: 0 | Refills: 0 | Status: DISCONTINUED | OUTPATIENT
Start: 2018-05-24 | End: 2018-05-25

## 2018-05-24 RX ORDER — ASPIRIN/CALCIUM CARB/MAGNESIUM 324 MG
81 TABLET ORAL DAILY
Qty: 0 | Refills: 0 | Status: DISCONTINUED | OUTPATIENT
Start: 2018-05-24 | End: 2018-05-27

## 2018-05-24 RX ORDER — HEPARIN SODIUM 5000 [USP'U]/ML
5000 INJECTION INTRAVENOUS; SUBCUTANEOUS EVERY 12 HOURS
Qty: 0 | Refills: 0 | Status: DISCONTINUED | OUTPATIENT
Start: 2018-05-24 | End: 2018-05-27

## 2018-05-24 RX ORDER — CLOPIDOGREL BISULFATE 75 MG/1
75 TABLET, FILM COATED ORAL DAILY
Qty: 0 | Refills: 0 | Status: DISCONTINUED | OUTPATIENT
Start: 2018-05-24 | End: 2018-05-27

## 2018-05-24 RX ORDER — FUROSEMIDE 40 MG
20 TABLET ORAL DAILY
Qty: 0 | Refills: 0 | Status: DISCONTINUED | OUTPATIENT
Start: 2018-05-24 | End: 2018-05-27

## 2018-05-24 RX ORDER — DEXTROSE 50 % IN WATER 50 %
15 SYRINGE (ML) INTRAVENOUS ONCE
Qty: 0 | Refills: 0 | Status: DISCONTINUED | OUTPATIENT
Start: 2018-05-24 | End: 2018-05-27

## 2018-05-24 RX ORDER — GLUCAGON INJECTION, SOLUTION 0.5 MG/.1ML
1 INJECTION, SOLUTION SUBCUTANEOUS ONCE
Qty: 0 | Refills: 0 | Status: DISCONTINUED | OUTPATIENT
Start: 2018-05-24 | End: 2018-05-27

## 2018-05-24 RX ORDER — METOPROLOL TARTRATE 50 MG
25 TABLET ORAL
Qty: 0 | Refills: 0 | Status: DISCONTINUED | OUTPATIENT
Start: 2018-05-24 | End: 2018-05-27

## 2018-05-24 RX ORDER — LISINOPRIL 2.5 MG/1
10 TABLET ORAL DAILY
Qty: 0 | Refills: 0 | Status: DISCONTINUED | OUTPATIENT
Start: 2018-05-24 | End: 2018-05-27

## 2018-05-24 RX ADMIN — Medication 2: at 17:34

## 2018-05-24 RX ADMIN — HEPARIN SODIUM 5000 UNIT(S): 5000 INJECTION INTRAVENOUS; SUBCUTANEOUS at 18:28

## 2018-05-24 RX ADMIN — Medication 20 MILLIGRAM(S): at 13:38

## 2018-05-24 RX ADMIN — Medication 81 MILLIGRAM(S): at 13:38

## 2018-05-24 RX ADMIN — INSULIN GLARGINE 5 UNIT(S): 100 INJECTION, SOLUTION SUBCUTANEOUS at 22:40

## 2018-05-24 RX ADMIN — ATORVASTATIN CALCIUM 40 MILLIGRAM(S): 80 TABLET, FILM COATED ORAL at 22:46

## 2018-05-24 RX ADMIN — Medication 1: at 12:58

## 2018-05-24 RX ADMIN — Medication 25 MILLIGRAM(S): at 18:28

## 2018-05-24 RX ADMIN — LISINOPRIL 10 MILLIGRAM(S): 2.5 TABLET ORAL at 13:38

## 2018-05-24 RX ADMIN — CLOPIDOGREL BISULFATE 75 MILLIGRAM(S): 75 TABLET, FILM COATED ORAL at 13:38

## 2018-05-24 NOTE — H&P ADULT - NSHPREVIEWOFSYSTEMS_GEN_ALL_CORE
CONSTITUTIONAL: No fever, weight loss, or fatigue  EYES: No eye pain, visual disturbances, or discharge  ENMT:  No difficulty hearing, tinnitus, vertigo; No sinus or throat pain  NECK: No pain or stiffness  BREASTS: No pain, masses, or nipple discharge  RESPIRATORY: No cough, wheezing, chills or hemoptysis; No shortness of breath  CARDIOVASCULAR: No chest pain, palpitations, dizziness, or leg swelling  GASTROINTESTINAL: No abdominal or epigastric pain. No nausea, vomiting, or hematemesis; No diarrhea or constipation. No melena or hematochezia.  GENITOURINARY: No dysuria, frequency, hematuria, or incontinence  NEUROLOGICAL: No headaches, memory loss, loss of strength, numbness, or tremors  SKIN: No itching, burning, rashes, or lesions   LYMPH NODES: No enlarged glands  ENDOCRINE: No heat or cold intolerance; No hair loss  MUSCULOSKELETAL: No muscle or back pain  PSYCHIATRIC: confusion, altered mentation  HEME/LYMPH: No easy bruising, or bleeding gums  ALLERGY AND IMMUNOLOGIC: No hives or eczema

## 2018-05-24 NOTE — H&P ADULT - PROBLEM SELECTOR PLAN 3
-as above  -go back to lantus 5 units hs and humalog ISS  -endocrine consult, needs diabetes teaching/education -multivessel disease on cath, recent NSTEMI  -recent NSTEMI, cardiac cath revealed multivessel CAD  -c/w DAPT (ASA, plavix), statin, lopressor, lisinopril  -f/u cardiology as outpt, consider revascularization procedure (stent vs. CABG)  -recent echo with normal LVEF 60%, trace pericardial effusion

## 2018-05-24 NOTE — PHYSICAL THERAPY INITIAL EVALUATION ADULT - GENERAL OBSERVATIONS, REHAB EVAL
Consult received, chart reviewed. Patient received supine in bed, NAD Patient agreed to EVALUATION from Physical Therapist.

## 2018-05-24 NOTE — PHYSICAL THERAPY INITIAL EVALUATION ADULT - ADDITIONAL COMMENTS
Patient left supine in bed as received, NAD, call bell in reach, all lines/devices intact, RN aware.

## 2018-05-24 NOTE — H&P ADULT - PROBLEM SELECTOR PLAN 4
-c/w lopressor and lisinopril, lasix, monitor bp -as above  -go back to lantus 5 units hs and humalog ISS  -endocrine consult, needs diabetes teaching/education

## 2018-05-24 NOTE — CONSULT NOTE ADULT - ASSESSMENT
84F DM2 overly controlled (HbA1c 6.4%) s/p second admission with hypoglycemic event following use of mixed insulin.

## 2018-05-24 NOTE — CONSULT NOTE ADULT - SUBJECTIVE AND OBJECTIVE BOX
HPI:  83 y/o F with HTN, DM-2, TIA without residual deficits, no known CAD hx (stress test 11/2017 normal), recent admission to hospital for NSTEMI, s/p cardiac cath with multivessel CAD, course c/b fever and asymptomatic UTI (e.coli), discharged on lantus 5 units hs, presented with altered mental status, confusion, due to symptomatic hypoglycemia. Patient was found to be with FSG in 20s and given one amp of D50,  in triage and dropped to 40 upon arrival to room. Given second amp of D50.    Endocrine history:  She sees endocrinologist Dr. Benavides at our practice. Previously managed with Humalog 75/25 (22u AM and 10u PM) but changed to Lantus 5u after hypoglycemia noted at prior hospital admission. She then followed up at our office and was noted with hyperglycemia so she was changed back to 75/25 at lower doses (18u AM and 7u PM). However she developed a second hypoglycemic event leading to the current admission with AMS now resolved and she is back to baseline. Good po intake. Patient currently denies complaints.      PAST MEDICAL & SURGICAL HISTORY:  Arthritis  Bladder disorder  Bladder Prolapse, Acquired  Dry eyes  CVA (cerebrovascular accident): 1/2013  History of pancreatitis: &#x27; 2008  Dyslipidemia  Diabetes mellitus type 2 in nonobese  HTN (hypertension)  Bladder Prolapse, Acquired: &#x27; 2013;  Insertion Pessary  S/P laparotomy: initially to remove pancreatic mass but did not visualize mass and closed: &#x27; 2008      FAMILY HISTORY:  No pertinent family history in first degree relatives      Social History: No tobacco    Outpatient Medications: see HPI    MEDICATIONS  (STANDING):  aspirin  chewable 81 milliGRAM(s) Oral daily  atorvastatin 40 milliGRAM(s) Oral at bedtime  clopidogrel Tablet 75 milliGRAM(s) Oral daily  dextrose 5%. 1000 milliLiter(s) (50 mL/Hr) IV Continuous <Continuous>  dextrose 50% Injectable 12.5 Gram(s) IV Push once  furosemide    Tablet 20 milliGRAM(s) Oral daily  heparin  Injectable 5000 Unit(s) SubCutaneous every 12 hours  insulin glargine Injectable (LANTUS) 5 Unit(s) SubCutaneous at bedtime  insulin lispro (HumaLOG) corrective regimen sliding scale   SubCutaneous three times a day before meals  lisinopril 10 milliGRAM(s) Oral daily  metoprolol tartrate 25 milliGRAM(s) Oral two times a day    MEDICATIONS  (PRN):  acetaminophen   Tablet 650 milliGRAM(s) Oral every 6 hours PRN mild pain or fever>101f  dextrose 40% Gel 15 Gram(s) Oral once PRN Blood Glucose LESS THAN 70 milliGRAM(s)/deciliter  glucagon  Injectable 1 milliGRAM(s) IntraMuscular once PRN Glucose LESS THAN 70 milligrams/deciliter      Allergies    No Known Allergies    Intolerances      Review of Systems:  Constitutional: No fever  Eyes: No blurry vision  Neuro: No tremors  HEENT: No pain  Cardiovascular: No chest pain, palpitations  Respiratory: No SOB, no cough  GI: No nausea, vomiting, abdominal pain  : No dysuria  Skin: no rash  Psych: no depression  Endocrine: no polyuria, polydipsia  Hem/lymph: no swelling  Osteoporosis: no fractures    ALL OTHER SYSTEMS REVIEWED AND NEGATIVE      PHYSICAL EXAM:  VITALS: T(C): 36.5 (05-24-18 @ 13:36)  T(F): 97.7 (05-24-18 @ 13:36), Max: 98.8 (05-24-18 @ 00:15)  HR: 95 (05-24-18 @ 13:36) (82 - 96)  BP: 124/72 (05-24-18 @ 13:36) (123/62 - 138/58)  RR:  (16 - 18)  SpO2:  (97% - 100%)  Wt(kg): --  GENERAL: NAD, well-groomed, well-developed  EYES: No proptosis, no lid lag, anicteric  HEENT:  Atraumatic, Normocephalic, moist mucous membranes  THYROID: Normal size, no palpable nodules  RESPIRATORY: Clear to auscultation bilaterally; No rales, rhonchi, wheezing  CARDIOVASCULAR: Regular rate and rhythm; No murmurs; no peripheral edema  GI: Soft, nontender, non distended, normal bowel sounds  SKIN: Dry, intact, No rashes or lesions  MUSCULOSKELETAL: Full range of motion, normal strength  NEURO: sensation intact, extraocular movements intact, no tremor  PSYCH: Alert and oriented x 3, normal affect, normal mood  CUSHING'S SIGNS: no striae      CAPILLARY BLOOD GLUCOSE      POCT Blood Glucose.: 205 mg/dL (24 May 2018 17:19)  POCT Blood Glucose.: 154 mg/dL (24 May 2018 12:04)  POCT Blood Glucose.: 112 mg/dL (24 May 2018 08:32)  POCT Blood Glucose.: 127 mg/dL (24 May 2018 02:19)  POCT Blood Glucose.: 147 mg/dL (24 May 2018 00:45)  POCT Blood Glucose.: 188 mg/dL (23 May 2018 23:12)  POCT Blood Glucose.: 40 mg/dL (23 May 2018 21:42)                            9.2    4.66  )-----------( 423      ( 24 May 2018 07:35 )             29.7       05-24    137  |  99  |  21  ----------------------------<  112<H>  4.3   |  30  |  0.68    EGFR if : 93  EGFR if non : 80    Ca    9.2      05-24  Mg     2.4     05-24  Phos  3.1     05-24    TPro  6.8  /  Alb  2.3<L>  /  TBili  0.3  /  DBili  x   /  AST  26  /  ALT  21  /  AlkPhos  75  05-24      Thyroid Function Tests:      Hemoglobin A1C, Whole Blood: 6.4 % <H> [4.0 - 5.6] (05-02-18 @ 05:54)      05-02 Chol 98<L> LDL 42 HDL 41<L> Trig 99    Radiology:

## 2018-05-24 NOTE — PATIENT PROFILE ADULT. - VISION (WITH CORRECTIVE LENSES IF THE PATIENT USUALLY WEARS THEM):
bifocal glasses/Partially impaired: cannot see medication labels or newsprint, but can see obstacles in path, and the surrounding layout; can count fingers at arm's length

## 2018-05-24 NOTE — PHYSICAL THERAPY INITIAL EVALUATION ADULT - CRITERIA FOR SKILLED THERAPEUTIC INTERVENTIONS
predicted duration of therapy intervention/rehab potential/anticipated discharge recommendation/impairments found/therapy frequency/anticipated equipment needs at discharge

## 2018-05-24 NOTE — CONSULT NOTE ADULT - PROBLEM SELECTOR RECOMMENDATION 9
Hypoglycemia now resolved. Patient is eating well.    While inpatient agree with Lantus 5 u qhs and Humalog low scale before meals.    DC plan: Would discontinue Humalog 75/25 and plan for dc on Lantus alone vs. Lantus plus Dpp4 TBD. Would aim for liberalized glucose control, HbA1c target 7.5-8%. Avoid further hypoglycemia and tolerate some hyperglycemia.  Outpatient follow up with Dr. Benavides [phone].

## 2018-05-24 NOTE — H&P ADULT - NSHPPHYSICALEXAM_GEN_ALL_CORE
PHYSICAL EXAM:  GENERAL: frail elderly woman in no acute distress  HEAD:  Atraumatic, Normocephalic  EYES: EOMI, PERRLA, conjunctiva and sclera clear  NECK: Supple, No JVD  CHEST/LUNG: Clear to auscultation bilaterally; No wheeze  HEART: Regular rate and rhythm; systolic ejection murmur RSB  ABDOMEN: Soft, Nontender, Nondistended; Bowel sounds present  EXTREMITIES:  2+ Peripheral Pulses, No clubbing, cyanosis, or edema  PSYCH: AAOx3  NEUROLOGY: non-focal

## 2018-05-24 NOTE — H&P ADULT - PROBLEM SELECTOR PLAN 2
-multivessel disease on cath, recent NSTEMI  -recent NSTEMI, cardiac cath revealed multivessel CAD  -c/w DAPT (ASA, plavix), statin, lopressor, lisinopril  -f/u cardiology as outpt, consider revascularization procedure (stent vs. CABG)  -recent echo with normal LVEF 60%, trace pericardial effusion -due to excessive insulin, she was discharged on lantus 5 units hs and then went back to humalog 18 units am and 7 units pm per PCP Dr. Benavides  -endocrine consult  -encourage po intake, hypoglycemic protocol  -will go back on lantus 5 units hs and low humalog ISS, monitor FS closely  -check A1c, last A1c 6.4 on 5/2/18

## 2018-05-24 NOTE — H&P ADULT - ASSESSMENT
83 y/o F with HTN, DM-2, TIA without residual deficits, no known CAD hx (stress test 11/2017 normal), recent admission to hospital for NSTEMI, s/p cardiac cath with multivessel CAD, admitted for acute metabolic encephalopathy secondary to hypoglycemia after given herself 18 units humalog in am

## 2018-05-24 NOTE — PHYSICAL THERAPY INITIAL EVALUATION ADULT - PLANNED THERAPY INTERVENTIONS, PT EVAL
balance training/bed mobility training/gait training/strengthening/postural re-education/transfer training

## 2018-05-24 NOTE — H&P ADULT - PROBLEM SELECTOR PLAN 1
-due to excessive insulin, she was discharged on lantus 5 units hs and then went back to humalog 18 units am and 7 units pm per PCP Dr. Benavides  -endocrine consult  -encourage po intake, hypoglycemic protocol  -will go back on lantus 5 units hs and low humalog ISS, monitor FS closely  -check A1c, last A1c 6.4 on 5/2/18 -due to symptomatic hypoglycemia from excessive insulin   -avoid hypoglycemia, loose glycemic control  -encourage po intake

## 2018-05-24 NOTE — H&P ADULT - NSHPLABSRESULTS_GEN_ALL_CORE
LABS:                        9.2    4.66  )-----------( 423      ( 24 May 2018 07:35 )             29.7         137  |  99  |  21  ----------------------------<  112<H>  4.3   |  30  |  0.68    Ca    9.2      24 May 2018 07:35  Phos  3.1       Mg     2.4         TPro  6.8  /  Alb  2.3<L>  /  TBili  0.3  /  DBili  x   /  AST  26  /  ALT  21  /  AlkPhos  75  24      PT/INR - ( 24 May 2018 07:35 )   PT: 14.2 SEC;   INR: 1.23          PTT - ( 24 May 2018 07:35 )  PTT:29.3 SEC      Urinalysis Basic - ( 23 May 2018 06:10 )    Color: YELLOW / Appearance: HAZY / S.014 / pH: 6.0  Gluc: NEGATIVE / Ketone: NEGATIVE  / Bili: NEGATIVE / Urobili: NORMAL mg/dL   Blood: NEGATIVE / Protein: 10 mg/dL / Nitrite: NEGATIVE   Leuk Esterase: TRACE / RBC: 2-5 / WBC 5-10   Sq Epi: OCC / Non Sq Epi: x / Bacteria: FEW        VBG  @ 21:50  pH: 7.43/pCO2: 51 /pO2: 35/HCO3: 32/lactate: 1.1        EKG:      RADIOLOGY & ADDITIONAL TESTS:  < from: Xray Chest 1 View- PORTABLE-Urgent (18 @ 22:29) >    IMPRESSION:   Clear lungs.    < from: Transthoracic Echocardiogram (18 @ 09:11) >    Ejection Fraction (Teicholtz): 60 %  ------------------------------------------------------------------------  OBSERVATIONS:  Mitral Valve: Normal mitral valve.  Aortic Root: Normal aortic root.  Aortic Valve: Normal trileaflet aortic valve.  Left Atrium: Normal left atrium.  Left Ventricle: Normal left ventricular systolic function.  No segmental wall motion abnormalities. Normal left  ventricular internal dimensions and wall thicknesses. Mild  diastolic dysfunction (Stage I).  Right Heart: Normal right atrium. Normal right ventricular  size and function. Normal tricuspid valve. Normal pulmonic  valve.  Pericardium/PleuraThickened pericardium with trace  pericardial effusion.  ------------------------------------------------------------------------  CONCLUSIONS:  1. Normal left ventricular systolic function. No segmental  wall motion abnormalities.  2. Mild diastolic dysfunction (Stage I).  3. Normal right ventricular size and function.  4. Thickened pericardium with trace pericardial effusion.

## 2018-05-24 NOTE — H&P ADULT - HISTORY OF PRESENT ILLNESS
83 y/o F with HTN, DM-2, TIA without residual deficits, no known CAD hx (stress test 11/2017 normal), recent admission to hospital for NSTEMI, s/p cardiac cath with multivessel CAD, course c/b fever and asymptomatic UTI (e.coli), discharged on lantus 5 units hs, presented with altered mental status, confusion, due to symptomatic hypoglycemia. Patient was found to be with FSG in 20s and given one amp of D50,  in triage and dropped to 40 upon arrival to room. Given second amp of D50. Patient is a poor historian, case d/w pt's daughter over the phone, per daughter, her PCP Dr. Benavides changed her back to humalog 18 units am and 7 units pm. Patient has been eating ok, had waffle, oatmeal and scrambled egg in the morning, took 18 units of humalog and then developed confusion and hypolycemia and generalized weakness as above. She denies fever, chills, chest pain, SOB, abdominal pain, nausea/vomiting/diarrhea, dysuria.

## 2018-05-25 ENCOUNTER — TRANSCRIPTION ENCOUNTER (OUTPATIENT)
Age: 83
End: 2018-05-25

## 2018-05-25 DIAGNOSIS — R50.9 FEVER, UNSPECIFIED: ICD-10-CM

## 2018-05-25 LAB
ALBUMIN SERPL ELPH-MCNC: 2.4 G/DL — LOW (ref 3.3–5)
ALP SERPL-CCNC: 71 U/L — SIGNIFICANT CHANGE UP (ref 40–120)
ALT FLD-CCNC: 15 U/L — SIGNIFICANT CHANGE UP (ref 4–33)
AST SERPL-CCNC: 17 U/L — SIGNIFICANT CHANGE UP (ref 4–32)
BASOPHILS # BLD AUTO: 0.03 K/UL — SIGNIFICANT CHANGE UP (ref 0–0.2)
BASOPHILS NFR BLD AUTO: 0.5 % — SIGNIFICANT CHANGE UP (ref 0–2)
BILIRUB SERPL-MCNC: 0.4 MG/DL — SIGNIFICANT CHANGE UP (ref 0.2–1.2)
BUN SERPL-MCNC: 26 MG/DL — HIGH (ref 7–23)
CALCIUM SERPL-MCNC: 9.1 MG/DL — SIGNIFICANT CHANGE UP (ref 8.4–10.5)
CHLORIDE SERPL-SCNC: 96 MMOL/L — LOW (ref 98–107)
CO2 SERPL-SCNC: 29 MMOL/L — SIGNIFICANT CHANGE UP (ref 22–31)
CREAT SERPL-MCNC: 0.87 MG/DL — SIGNIFICANT CHANGE UP (ref 0.5–1.3)
EOSINOPHIL # BLD AUTO: 0.13 K/UL — SIGNIFICANT CHANGE UP (ref 0–0.5)
EOSINOPHIL NFR BLD AUTO: 2.3 % — SIGNIFICANT CHANGE UP (ref 0–6)
GLUCOSE SERPL-MCNC: 89 MG/DL — SIGNIFICANT CHANGE UP (ref 70–99)
HBA1C BLD-MCNC: 6.5 % — HIGH (ref 4–5.6)
HCT VFR BLD CALC: 27.7 % — LOW (ref 34.5–45)
HGB BLD-MCNC: 8.6 G/DL — LOW (ref 11.5–15.5)
IMM GRANULOCYTES # BLD AUTO: 0.02 # — SIGNIFICANT CHANGE UP
IMM GRANULOCYTES NFR BLD AUTO: 0.4 % — SIGNIFICANT CHANGE UP (ref 0–1.5)
LYMPHOCYTES # BLD AUTO: 2.06 K/UL — SIGNIFICANT CHANGE UP (ref 1–3.3)
LYMPHOCYTES # BLD AUTO: 36.2 % — SIGNIFICANT CHANGE UP (ref 13–44)
MAGNESIUM SERPL-MCNC: 2.2 MG/DL — SIGNIFICANT CHANGE UP (ref 1.6–2.6)
MCHC RBC-ENTMCNC: 25.6 PG — LOW (ref 27–34)
MCHC RBC-ENTMCNC: 31 % — LOW (ref 32–36)
MCV RBC AUTO: 82.4 FL — SIGNIFICANT CHANGE UP (ref 80–100)
MONOCYTES # BLD AUTO: 0.52 K/UL — SIGNIFICANT CHANGE UP (ref 0–0.9)
MONOCYTES NFR BLD AUTO: 9.1 % — SIGNIFICANT CHANGE UP (ref 2–14)
NEUTROPHILS # BLD AUTO: 2.93 K/UL — SIGNIFICANT CHANGE UP (ref 1.8–7.4)
NEUTROPHILS NFR BLD AUTO: 51.5 % — SIGNIFICANT CHANGE UP (ref 43–77)
NRBC # FLD: 0 — SIGNIFICANT CHANGE UP
PHOSPHATE SERPL-MCNC: 2.4 MG/DL — LOW (ref 2.5–4.5)
PLATELET # BLD AUTO: 447 K/UL — HIGH (ref 150–400)
PMV BLD: 10.4 FL — SIGNIFICANT CHANGE UP (ref 7–13)
POTASSIUM SERPL-MCNC: 4.2 MMOL/L — SIGNIFICANT CHANGE UP (ref 3.5–5.3)
POTASSIUM SERPL-SCNC: 4.2 MMOL/L — SIGNIFICANT CHANGE UP (ref 3.5–5.3)
PROT SERPL-MCNC: 6.9 G/DL — SIGNIFICANT CHANGE UP (ref 6–8.3)
RBC # BLD: 3.36 M/UL — LOW (ref 3.8–5.2)
RBC # FLD: 16 % — HIGH (ref 10.3–14.5)
SODIUM SERPL-SCNC: 134 MMOL/L — LOW (ref 135–145)
SPECIMEN SOURCE: SIGNIFICANT CHANGE UP
WBC # BLD: 5.69 K/UL — SIGNIFICANT CHANGE UP (ref 3.8–10.5)
WBC # FLD AUTO: 5.69 K/UL — SIGNIFICANT CHANGE UP (ref 3.8–10.5)

## 2018-05-25 PROCEDURE — 99233 SBSQ HOSP IP/OBS HIGH 50: CPT

## 2018-05-25 PROCEDURE — 99232 SBSQ HOSP IP/OBS MODERATE 35: CPT

## 2018-05-25 RX ORDER — CEFTRIAXONE 500 MG/1
INJECTION, POWDER, FOR SOLUTION INTRAMUSCULAR; INTRAVENOUS
Qty: 0 | Refills: 0 | Status: DISCONTINUED | OUTPATIENT
Start: 2018-05-25 | End: 2018-05-26

## 2018-05-25 RX ORDER — CEFTRIAXONE 500 MG/1
1 INJECTION, POWDER, FOR SOLUTION INTRAMUSCULAR; INTRAVENOUS ONCE
Qty: 0 | Refills: 0 | Status: COMPLETED | OUTPATIENT
Start: 2018-05-25 | End: 2018-05-25

## 2018-05-25 RX ORDER — INSULIN GLARGINE 100 [IU]/ML
4 INJECTION, SOLUTION SUBCUTANEOUS AT BEDTIME
Qty: 0 | Refills: 0 | Status: DISCONTINUED | OUTPATIENT
Start: 2018-05-25 | End: 2018-05-27

## 2018-05-25 RX ORDER — CEFTRIAXONE 500 MG/1
1 INJECTION, POWDER, FOR SOLUTION INTRAMUSCULAR; INTRAVENOUS EVERY 24 HOURS
Qty: 0 | Refills: 0 | Status: DISCONTINUED | OUTPATIENT
Start: 2018-05-26 | End: 2018-05-26

## 2018-05-25 RX ADMIN — HEPARIN SODIUM 5000 UNIT(S): 5000 INJECTION INTRAVENOUS; SUBCUTANEOUS at 17:56

## 2018-05-25 RX ADMIN — HEPARIN SODIUM 5000 UNIT(S): 5000 INJECTION INTRAVENOUS; SUBCUTANEOUS at 05:39

## 2018-05-25 RX ADMIN — Medication 1: at 12:27

## 2018-05-25 RX ADMIN — CEFTRIAXONE 100 GRAM(S): 500 INJECTION, POWDER, FOR SOLUTION INTRAMUSCULAR; INTRAVENOUS at 11:19

## 2018-05-25 RX ADMIN — ATORVASTATIN CALCIUM 40 MILLIGRAM(S): 80 TABLET, FILM COATED ORAL at 22:19

## 2018-05-25 RX ADMIN — CLOPIDOGREL BISULFATE 75 MILLIGRAM(S): 75 TABLET, FILM COATED ORAL at 11:19

## 2018-05-25 RX ADMIN — Medication 20 MILLIGRAM(S): at 05:39

## 2018-05-25 RX ADMIN — INSULIN GLARGINE 4 UNIT(S): 100 INJECTION, SOLUTION SUBCUTANEOUS at 22:19

## 2018-05-25 RX ADMIN — Medication 25 MILLIGRAM(S): at 17:57

## 2018-05-25 RX ADMIN — Medication 81 MILLIGRAM(S): at 11:19

## 2018-05-25 NOTE — DISCHARGE NOTE ADULT - MEDICATION SUMMARY - MEDICATIONS TO CHANGE
I will SWITCH the dose or number of times a day I take the medications listed below when I get home from the hospital:    Lantus Solostar Pen 100 units/mL subcutaneous solution  -- 5 unit(s) subcutaneous once a day (at bedtime)   -- Do not drink alcoholic beverages when taking this medication.  It is very important that you take or use this exactly as directed.  Do not skip doses or discontinue unless directed by your doctor.  Keep in refrigerator.  Do not freeze.

## 2018-05-25 NOTE — DISCHARGE NOTE ADULT - HOME CARE AGENCY
[facility] [phone]. Nurse to visit on the day following discharge. Other appropriate services to be arranged thereafter.   Please contact the home care agency at the above phone number if you have not heard from them by approximately 12 noon on the day after your hospital discharge.

## 2018-05-25 NOTE — DISCHARGE NOTE ADULT - PLAN OF CARE
Monitor your glucose levels closely 4 times per day.  Follow up with your endocrinologist within 2 weeks of discharge. Continue consistent carbohydrate diet.  Monitor blood glucose levels throughout the day before meals and at bedtime.  Record blood sugars and bring to outpatient providers appointment in order to be reviewed by your doctor for management modifications.  Be aware of diabetes management symptoms including feeling cool and clammy may be related to low glucose levels.  Feeling hot and dry may indicate high glucose levels.  If  you feel these symptoms, check your blood sugar.  Make regular podiatry appointments in order to have feet checked for wounds and toe nails cut by a doctor to prevent infections. glucose control Glucose control Continue consistent carbohydrate diet.  Monitor blood glucose levels throughout the day before meals and at bedtime.  Record blood sugars and bring to outpatient providers appointment in order to be reviewed by your doctor for management modifications.  Be aware of diabetes management symptoms including feeling cool and clammy may be related to low glucose levels.  Feeling hot and dry may indicate high glucose levels.  If  you feel these symptoms, check your blood sugar.  Make regular podiatry appointments in order to have feet checked for wounds and toe nails cut by a doctor to prevent infections.  Latke Latus 4 Units daily and Prandin before meals  only**** follow up with your PCP in 1 week Dr. Benavides Symptoms control continue ASA, Plavix, statin  -ECHO 5/18 - normal LVEF 60%, trace pericardial effusion  -F/U Cardio outpatient for possible revascularization (stent v CABG)

## 2018-05-25 NOTE — DISCHARGE NOTE ADULT - CARE PLAN
Principal Discharge DX:	Hypoglycemia due to insulin  Secondary Diagnosis:	Diabetes mellitus type 2 in nonobese Principal Discharge DX:	Hypoglycemia due to insulin  Assessment and plan of treatment:	Monitor your glucose levels closely 4 times per day.  Follow up with your endocrinologist within 2 weeks of discharge.  Secondary Diagnosis:	Diabetes mellitus type 2 in nonobese  Assessment and plan of treatment:	Continue consistent carbohydrate diet.  Monitor blood glucose levels throughout the day before meals and at bedtime.  Record blood sugars and bring to outpatient providers appointment in order to be reviewed by your doctor for management modifications.  Be aware of diabetes management symptoms including feeling cool and clammy may be related to low glucose levels.  Feeling hot and dry may indicate high glucose levels.  If  you feel these symptoms, check your blood sugar.  Make regular podiatry appointments in order to have feet checked for wounds and toe nails cut by a doctor to prevent infections. Principal Discharge DX:	Hypoglycemia due to insulin  Goal:	glucose control  Assessment and plan of treatment:	Monitor your glucose levels closely 4 times per day.  Follow up with your endocrinologist within 2 weeks of discharge.  Secondary Diagnosis:	Diabetes mellitus type 2 in nonobese  Goal:	Glucose control  Assessment and plan of treatment:	Continue consistent carbohydrate diet.  Monitor blood glucose levels throughout the day before meals and at bedtime.  Record blood sugars and bring to outpatient providers appointment in order to be reviewed by your doctor for management modifications.  Be aware of diabetes management symptoms including feeling cool and clammy may be related to low glucose levels.  Feeling hot and dry may indicate high glucose levels.  If  you feel these symptoms, check your blood sugar.  Make regular podiatry appointments in order to have feet checked for wounds and toe nails cut by a doctor to prevent infections.  Latke Latus 4 Units daily and Prandin before meals  only**** follow up with your PCP in 1 week Dr. Benavides  Secondary Diagnosis:	CAD (coronary artery disease)  Goal:	Symptoms control  Assessment and plan of treatment:	continue ASA, Plavix, statin  -ECHO 5/18 - normal LVEF 60%, trace pericardial effusion  -F/U Cardio outpatient for possible revascularization (stent v CABG)

## 2018-05-25 NOTE — DISCHARGE NOTE ADULT - PATIENT PORTAL LINK FT
You can access the [facility] Patient Portal, offered by [facility], by registering with the following website: [email or web address]

## 2018-05-25 NOTE — DISCHARGE NOTE ADULT - CARE PROVIDER_API CALL
Enzo Benavides (DO), EndocrinologyMetabDiabetes  [address] [place] [address]  Phone: [phone]  Fax: [phone]

## 2018-05-25 NOTE — DISCHARGE NOTE ADULT - HOSPITAL COURSE
85 y/o F with HTN, DM-2, TIA without residual deficits, no known CAD hx (stress test 11/2017 normal), recent admission to hospital for NSTEMI, s/p cardiac cath with multivessel CAD, admitted for acute metabolic encephalopathy secondary to hypoglycemia after given herself 18 units humalog in am  Acute metabolic encephalopathy. Hypoglycemia due to insulin    -due to symptomatic hypoglycemia from excessive insulin   -avoid hypoglycemia, loose glycemic control    FEVER 5/24  UTI  +UCx - f/u spec. and ssens.-- on CTX    Hypoglycemia due to insulin.    -due to excessive insulin, she was discharged on lantus 5 units hs and then went back to humalog 18 units am and 7 units pm per PCP Dr. Benavides  -endocrine recommendations obtained - patient to f/u with her outpatient endocrinologist    CAD (coronary artery disease  -multivessel disease on cath, recent NSTEMI- cardiac cath revealed multivessel CAD  -c/w DAPT (ASA, plavix), statin, lopressor, lisinopril  -f/u cardiology as outpt, consider revascularization procedure (stent vs. CABG)  -recent echo with normal LVEF 60%, trace pericardial effusion. -as above    HTN (hypertension).  -c/w lopressor and lisinopril, lasix, monitor bp. c/w statin     Dyslipidemia.   -c/w statin.       Anemia-monitor CBC    dispo: home 83 y/o F with HTN, DM-2, TIA without residual deficits, no known CAD hx (stress test 11/2017 normal), recent admission to hospital for NSTEMI, s/p cardiac cath with multivessel CAD, admitted for acute metabolic encephalopathy secondary to hypoglycemia after given herself 18 units humalog in am  Acute metabolic encephalopathy. Hypoglycemia due to insulin    -due to symptomatic hypoglycemia from excessive insulin   -avoid hypoglycemia, loose glycemic control    FEVER 5/24, UTI+UCx - completed CTX    Hypoglycemia due to insulin-due to excessive insulin, she was discharged on lantus 5 units hs and then went back to humalog 18 units am and 7 units pm per PCP Dr. Benavides  -endocrine recommendations obtained - patient to f/u with her outpatient endocrinologist  CAD -multivessel disease on cath, recent NSTEMI- cardiac cath revealed multivessel CAD-c/w DAPT (ASA, plavix), statin, lopressor, lisinopril  -f/u cardiology as outpt, consider revascularization procedure (stent vs. CABG)  -recent echo with normal LVEF 60%, trace pericardial effusion. -as above    HTN (hypertension).  -c/w lopressor and lisinopril, lasix, monitor bp. c/w statin     Dyslipidemia.   -c/w statin.       Anemia-stable H/H     dispo: home 83 y/o F with HTN, DM-2, TIA without residual deficits, no known CAD hx (stress test 11/2017 normal), recent admission to hospital for NSTEMI, s/p cardiac cath with multivessel CAD, admitted for acute metabolic encephalopathy secondary to hypoglycemia after given herself 18 units humalog in am    Acute metabolic encephalopathy. Hypoglycemia due to insulin    -due to symptomatic hypoglycemia from excessive insulin   -avoid hypoglycemia, loose glycemic control    One episode of isolated FEVER 5/24, Pt didn't feel feverish and felt well during whole hospitalization.  UTI+UCx - however, pt asymptomatic. no indication for abx.     Hypoglycemia due to insulin-due to excessive insulin, optimized by house endo - patient to f/u with her outpatient endocrinologist    CAD -multivessel disease on cath, recent NSTEMI- cardiac cath revealed multivessel CAD-c/w DAPT (ASA, plavix), statin, lopressor, lisinopril  -f/u cardiology as outpt, consider revascularization procedure (stent vs. CABG)  -recent echo with normal LVEF 60%, trace pericardial effusion.     HTN (hypertension).  -c/w lopressor and lisinopril, lasix, monitor bp. c/w statin     Dyslipidemia.   -c/w statin.       Anemia-stable H/H     dispo: home

## 2018-05-25 NOTE — DISCHARGE NOTE ADULT - MEDICATION SUMMARY - MEDICATIONS TO TAKE
I will START or STAY ON the medications listed below when I get home from the hospital:    pen needles  -- Use as directed at bedtime  -- Indication: For DM    aspirin 81 mg oral tablet, chewable  -- 1 tab(s) by mouth once a day  -- Indication: For CAD (coronary artery disease)    Tylenol 500 mg oral tablet  -- 2 tab(s) by mouth every 8 hours, As Needed  -- Indication: For Pain     lisinopril 10 mg oral tablet  -- 1 tab(s) by mouth once a day  -- Indication: For HTN (hypertension)    Prandin 0.5 mg oral tablet  -- 1 tab(s) by mouth 3 times a day (before meals)     -- Do not drink alcoholic beverages when taking this medication.  It is very important that you take or use this exactly as directed.  Do not skip doses or discontinue unless directed by your doctor.  Obtain medical advice before taking any non-prescription drugs as some may affect the action of this medication.    -- Indication: For Diabetes mellitus type 2 in nonobese    Lantus Solostar Pen 100 units/mL subcutaneous solution  -- 4 unit(s) subcutaneous once a day (at bedtime)     -- Do not drink alcoholic beverages when taking this medication.  It is very important that you take or use this exactly as directed.  Do not skip doses or discontinue unless directed by your doctor.  Keep in refrigerator.  Do not freeze.    -- Indication: For Diabetes mellitus type 2 in nonobese    atorvastatin 40 mg oral tablet  -- 1 tab(s) by mouth once a day (at bedtime)  -- Indication: For Dyslipidemia    clopidogrel 75 mg oral tablet  -- 1 tab(s) by mouth once a day  -- Indication: For CAD (coronary artery disease)    metoprolol tartrate 25 mg oral tablet  -- 1 tab(s) by mouth 2 times a day  -- Indication: For CAD (coronary artery disease)    lidocaine 5% topical film  -- Apply on skin to affected area once a day   -- Indication: For Pain control     furosemide 20 mg oral tablet  -- 1 tab(s) by mouth once a day  -- Indication: For CAD (coronary artery disease)    Glucagon Emergency Kit for Low Blood Sugar 1 mg injection  -- 1 milligram(s) intravenously     -- Indication: For Hypoglycemia due to insulin

## 2018-05-26 LAB
-  AMIKACIN: SIGNIFICANT CHANGE UP
-  AMPICILLIN/SULBACTAM: SIGNIFICANT CHANGE UP
-  AMPICILLIN: SIGNIFICANT CHANGE UP
-  AMPICILLIN: SIGNIFICANT CHANGE UP
-  AZTREONAM: SIGNIFICANT CHANGE UP
-  CEFAZOLIN: SIGNIFICANT CHANGE UP
-  CEFEPIME: SIGNIFICANT CHANGE UP
-  CEFOXITIN: SIGNIFICANT CHANGE UP
-  CEFTAZIDIME: SIGNIFICANT CHANGE UP
-  CEFTRIAXONE: SIGNIFICANT CHANGE UP
-  CIPROFLOXACIN: SIGNIFICANT CHANGE UP
-  CIPROFLOXACIN: SIGNIFICANT CHANGE UP
-  ERTAPENEM: SIGNIFICANT CHANGE UP
-  GENTAMICIN: SIGNIFICANT CHANGE UP
-  IMIPENEM: SIGNIFICANT CHANGE UP
-  LEVOFLOXACIN: SIGNIFICANT CHANGE UP
-  MEROPENEM: SIGNIFICANT CHANGE UP
-  NITROFURANTOIN: SIGNIFICANT CHANGE UP
-  NITROFURANTOIN: SIGNIFICANT CHANGE UP
-  PIPERACILLIN/TAZOBACTAM: SIGNIFICANT CHANGE UP
-  TETRACYCLINE: SIGNIFICANT CHANGE UP
-  TIGECYCLINE: SIGNIFICANT CHANGE UP
-  TOBRAMYCIN: SIGNIFICANT CHANGE UP
-  TRIMETHOPRIM/SULFAMETHOXAZOLE: SIGNIFICANT CHANGE UP
-  VANCOMYCIN: SIGNIFICANT CHANGE UP
ALBUMIN SERPL ELPH-MCNC: 2.3 G/DL — LOW (ref 3.3–5)
ALP SERPL-CCNC: 72 U/L — SIGNIFICANT CHANGE UP (ref 40–120)
ALT FLD-CCNC: 14 U/L — SIGNIFICANT CHANGE UP (ref 4–33)
AST SERPL-CCNC: 20 U/L — SIGNIFICANT CHANGE UP (ref 4–32)
BACTERIA UR CULT: SIGNIFICANT CHANGE UP
BASOPHILS # BLD AUTO: 0.02 K/UL — SIGNIFICANT CHANGE UP (ref 0–0.2)
BASOPHILS NFR BLD AUTO: 0.4 % — SIGNIFICANT CHANGE UP (ref 0–2)
BILIRUB SERPL-MCNC: 0.4 MG/DL — SIGNIFICANT CHANGE UP (ref 0.2–1.2)
BUN SERPL-MCNC: 28 MG/DL — HIGH (ref 7–23)
CALCIUM SERPL-MCNC: 9.4 MG/DL — SIGNIFICANT CHANGE UP (ref 8.4–10.5)
CHLORIDE SERPL-SCNC: 96 MMOL/L — LOW (ref 98–107)
CO2 SERPL-SCNC: 25 MMOL/L — SIGNIFICANT CHANGE UP (ref 22–31)
CREAT SERPL-MCNC: 0.77 MG/DL — SIGNIFICANT CHANGE UP (ref 0.5–1.3)
EOSINOPHIL # BLD AUTO: 0.11 K/UL — SIGNIFICANT CHANGE UP (ref 0–0.5)
EOSINOPHIL NFR BLD AUTO: 2.1 % — SIGNIFICANT CHANGE UP (ref 0–6)
GLUCOSE SERPL-MCNC: 77 MG/DL — SIGNIFICANT CHANGE UP (ref 70–99)
HCT VFR BLD CALC: 31 % — LOW (ref 34.5–45)
HGB BLD-MCNC: 9.3 G/DL — LOW (ref 11.5–15.5)
IMM GRANULOCYTES # BLD AUTO: 0.03 # — SIGNIFICANT CHANGE UP
IMM GRANULOCYTES NFR BLD AUTO: 0.6 % — SIGNIFICANT CHANGE UP (ref 0–1.5)
LYMPHOCYTES # BLD AUTO: 2.05 K/UL — SIGNIFICANT CHANGE UP (ref 1–3.3)
LYMPHOCYTES # BLD AUTO: 40 % — SIGNIFICANT CHANGE UP (ref 13–44)
MAGNESIUM SERPL-MCNC: 2.4 MG/DL — SIGNIFICANT CHANGE UP (ref 1.6–2.6)
MCHC RBC-ENTMCNC: 24.9 PG — LOW (ref 27–34)
MCHC RBC-ENTMCNC: 30 % — LOW (ref 32–36)
MCV RBC AUTO: 82.9 FL — SIGNIFICANT CHANGE UP (ref 80–100)
METHOD TYPE: SIGNIFICANT CHANGE UP
METHOD TYPE: SIGNIFICANT CHANGE UP
MONOCYTES # BLD AUTO: 0.4 K/UL — SIGNIFICANT CHANGE UP (ref 0–0.9)
MONOCYTES NFR BLD AUTO: 7.8 % — SIGNIFICANT CHANGE UP (ref 2–14)
NEUTROPHILS # BLD AUTO: 2.51 K/UL — SIGNIFICANT CHANGE UP (ref 1.8–7.4)
NEUTROPHILS NFR BLD AUTO: 49.1 % — SIGNIFICANT CHANGE UP (ref 43–77)
NRBC # FLD: 0 — SIGNIFICANT CHANGE UP
ORGANISM # SPEC MICROSCOPIC CNT: SIGNIFICANT CHANGE UP
PHOSPHATE SERPL-MCNC: 3 MG/DL — SIGNIFICANT CHANGE UP (ref 2.5–4.5)
PLATELET # BLD AUTO: 421 K/UL — HIGH (ref 150–400)
PMV BLD: 10.4 FL — SIGNIFICANT CHANGE UP (ref 7–13)
POTASSIUM SERPL-MCNC: 5.1 MMOL/L — SIGNIFICANT CHANGE UP (ref 3.5–5.3)
POTASSIUM SERPL-SCNC: 5.1 MMOL/L — SIGNIFICANT CHANGE UP (ref 3.5–5.3)
PROT SERPL-MCNC: 7.2 G/DL — SIGNIFICANT CHANGE UP (ref 6–8.3)
RBC # BLD: 3.74 M/UL — LOW (ref 3.8–5.2)
RBC # FLD: 16.3 % — HIGH (ref 10.3–14.5)
SODIUM SERPL-SCNC: 136 MMOL/L — SIGNIFICANT CHANGE UP (ref 135–145)
WBC # BLD: 5.12 K/UL — SIGNIFICANT CHANGE UP (ref 3.8–10.5)
WBC # FLD AUTO: 5.12 K/UL — SIGNIFICANT CHANGE UP (ref 3.8–10.5)

## 2018-05-26 PROCEDURE — 99233 SBSQ HOSP IP/OBS HIGH 50: CPT

## 2018-05-26 RX ORDER — GLUCAGON INJECTION, SOLUTION 0.5 MG/.1ML
1 INJECTION, SOLUTION SUBCUTANEOUS
Qty: 1 | Refills: 0 | OUTPATIENT
Start: 2018-05-26

## 2018-05-26 RX ORDER — ENOXAPARIN SODIUM 100 MG/ML
4 INJECTION SUBCUTANEOUS
Qty: 1 | Refills: 0 | OUTPATIENT
Start: 2018-05-26

## 2018-05-26 RX ORDER — REPAGLINIDE 1 MG/1
1 TABLET ORAL
Qty: 90 | Refills: 0 | OUTPATIENT
Start: 2018-05-26 | End: 2018-06-24

## 2018-05-26 RX ADMIN — Medication 20 MILLIGRAM(S): at 05:14

## 2018-05-26 RX ADMIN — CLOPIDOGREL BISULFATE 75 MILLIGRAM(S): 75 TABLET, FILM COATED ORAL at 11:36

## 2018-05-26 RX ADMIN — ATORVASTATIN CALCIUM 40 MILLIGRAM(S): 80 TABLET, FILM COATED ORAL at 21:30

## 2018-05-26 RX ADMIN — CEFTRIAXONE 100 GRAM(S): 500 INJECTION, POWDER, FOR SOLUTION INTRAMUSCULAR; INTRAVENOUS at 11:36

## 2018-05-26 RX ADMIN — Medication 1: at 12:48

## 2018-05-26 RX ADMIN — HEPARIN SODIUM 5000 UNIT(S): 5000 INJECTION INTRAVENOUS; SUBCUTANEOUS at 05:14

## 2018-05-26 RX ADMIN — Medication 1: at 17:17

## 2018-05-26 RX ADMIN — LISINOPRIL 10 MILLIGRAM(S): 2.5 TABLET ORAL at 05:15

## 2018-05-26 RX ADMIN — INSULIN GLARGINE 4 UNIT(S): 100 INJECTION, SOLUTION SUBCUTANEOUS at 21:30

## 2018-05-26 RX ADMIN — Medication 25 MILLIGRAM(S): at 17:17

## 2018-05-26 RX ADMIN — Medication 25 MILLIGRAM(S): at 05:14

## 2018-05-26 RX ADMIN — Medication 81 MILLIGRAM(S): at 11:36

## 2018-05-26 NOTE — CHART NOTE - NSCHARTNOTEFT_GEN_A_CORE
Spoke with Endo, recommend to discharge on Lantus 4 U QHS, Prandin 0.5 mg QAC TID, glucagon emergency kit at discharge.    ADS  [id number]

## 2018-05-27 VITALS
DIASTOLIC BLOOD PRESSURE: 71 MMHG | SYSTOLIC BLOOD PRESSURE: 123 MMHG | RESPIRATION RATE: 18 BRPM | OXYGEN SATURATION: 100 % | TEMPERATURE: 98 F | HEART RATE: 88 BPM

## 2018-05-27 PROCEDURE — 99239 HOSP IP/OBS DSCHRG MGMT >30: CPT

## 2018-05-27 RX ORDER — REPAGLINIDE 1 MG/1
1 TABLET ORAL
Qty: 90 | Refills: 0 | OUTPATIENT
Start: 2018-05-27 | End: 2018-06-25

## 2018-05-27 RX ORDER — ENOXAPARIN SODIUM 100 MG/ML
4 INJECTION SUBCUTANEOUS
Qty: 100 | Refills: 0 | OUTPATIENT
Start: 2018-05-27 | End: 2018-06-25

## 2018-05-27 RX ORDER — GLUCAGON INJECTION, SOLUTION 0.5 MG/.1ML
1 INJECTION, SOLUTION SUBCUTANEOUS
Qty: 1 | Refills: 0 | OUTPATIENT
Start: 2018-05-27

## 2018-05-27 RX ADMIN — CLOPIDOGREL BISULFATE 75 MILLIGRAM(S): 75 TABLET, FILM COATED ORAL at 13:01

## 2018-05-27 RX ADMIN — Medication 25 MILLIGRAM(S): at 05:23

## 2018-05-27 RX ADMIN — Medication 4: at 13:00

## 2018-05-27 RX ADMIN — Medication 81 MILLIGRAM(S): at 13:01

## 2018-05-27 RX ADMIN — Medication 1: at 08:32

## 2018-05-27 RX ADMIN — HEPARIN SODIUM 5000 UNIT(S): 5000 INJECTION INTRAVENOUS; SUBCUTANEOUS at 05:23

## 2018-05-27 RX ADMIN — LISINOPRIL 10 MILLIGRAM(S): 2.5 TABLET ORAL at 05:23

## 2018-05-27 RX ADMIN — Medication 20 MILLIGRAM(S): at 05:23

## 2018-05-27 NOTE — PROGRESS NOTE ADULT - PROBLEM SELECTOR PLAN 8
monitor CBC, no indication for transfusion, keep Hgb>8 with cardiac disease

## 2018-05-27 NOTE — PROGRESS NOTE ADULT - ASSESSMENT
85 y/o F with HTN, DM-2, TIA without residual deficits, no known CAD hx (stress test 11/2017 normal), recent admission to hospital for NSTEMI, s/p cardiac cath with multivessel CAD, admitted for acute metabolic encephalopathy secondary to hypoglycemia after given herself 18 units humalog in am
83 y/o F with HTN, DM-2, TIA without residual deficits, no known CAD hx (stress test 11/2017 normal), recent admission to hospital for NSTEMI, s/p cardiac cath with multivessel CAD, admitted for acute metabolic encephalopathy secondary to hypoglycemia after given herself 18 units humalog in am
85 y/o F with HTN, DM-2, TIA without residual deficits, no known CAD hx (stress test 11/2017 normal), recent admission to hospital for NSTEMI, s/p cardiac cath with multivessel CAD, admitted for acute metabolic encephalopathy secondary to hypoglycemia after given herself 18 units humalog in am
84F DM2 overly controlled (HbA1c 6.4%) s/p second admission with hypoglycemic event following use of mixed insulin.

## 2018-05-27 NOTE — PROGRESS NOTE ADULT - PROBLEM SELECTOR PLAN 6
-c/w lopressor and lisinopril, lasix, monitor bp

## 2018-05-27 NOTE — PROGRESS NOTE ADULT - SUBJECTIVE AND OBJECTIVE BOX
Patient is a 84y old  Female who presents with a chief complaint of Hypoglycemia with acute metabolic encephalopathy (25 May 2018 11:03)      SUBJECTIVE / OVERNIGHT EVENTS:  Pt doing well, no complaints. no event    MEDICATIONS  (STANDING):  aspirin  chewable 81 milliGRAM(s) Oral daily  atorvastatin 40 milliGRAM(s) Oral at bedtime  clopidogrel Tablet 75 milliGRAM(s) Oral daily  dextrose 5%. 1000 milliLiter(s) (50 mL/Hr) IV Continuous <Continuous>  dextrose 50% Injectable 12.5 Gram(s) IV Push once  furosemide    Tablet 20 milliGRAM(s) Oral daily  heparin  Injectable 5000 Unit(s) SubCutaneous every 12 hours  insulin glargine Injectable (LANTUS) 4 Unit(s) SubCutaneous at bedtime  insulin lispro (HumaLOG) corrective regimen sliding scale   SubCutaneous three times a day before meals  lisinopril 10 milliGRAM(s) Oral daily  metoprolol tartrate 25 milliGRAM(s) Oral two times a day    MEDICATIONS  (PRN):  acetaminophen   Tablet 650 milliGRAM(s) Oral every 6 hours PRN mild pain or fever>101f  dextrose 40% Gel 15 Gram(s) Oral once PRN Blood Glucose LESS THAN 70 milliGRAM(s)/deciliter  glucagon  Injectable 1 milliGRAM(s) IntraMuscular once PRN Glucose LESS THAN 70 milligrams/deciliter      T(C): 36.8 (05-27-18 @ 12:51), Max: 37.1 (05-26-18 @ 21:27)  HR: 88 (05-27-18 @ 12:51) (88 - 95)  BP: 123/71 (05-27-18 @ 12:51) (114/65 - 135/75)  RR: 18 (05-27-18 @ 12:51) (18 - 18)  SpO2: 100% (05-27-18 @ 12:51) (97% - 100%)  CAPILLARY BLOOD GLUCOSE      POCT Blood Glucose.: 327 mg/dL (27 May 2018 12:05)  POCT Blood Glucose.: 166 mg/dL (27 May 2018 08:27)  POCT Blood Glucose.: 202 mg/dL (26 May 2018 21:22)  POCT Blood Glucose.: 197 mg/dL (26 May 2018 17:12)    I&O's Summary      PHYSICAL EXAM:  GENERAL: NAD, well-developed  HEAD:  Atraumatic, Normocephalic  EYES: EOMI, PERRLA, conjunctiva and sclera clear  NECK: Supple, No JVD  CHEST/LUNG: Clear to auscultation bilaterally; No wheeze  HEART: s1 s2, regular rhythm and rate   ABDOMEN: Soft, Nontender, Nondistended; Bowel sounds present  EXTREMITIES:  2+ Peripheral Pulses, No clubbing, cyanosis, or edema  PSYCH: AAOx3, calm   NEUROLOGY: non-focal  SKIN: No rashes or lesions    LABS:                        9.3    5.12  )-----------( 421      ( 26 May 2018 05:38 )             31.0     05-26    136  |  96<L>  |  28<H>  ----------------------------<  77  5.1   |  25  |  0.77    Ca    9.4      26 May 2018 05:38  Phos  3.0     05-26  Mg     2.4     05-26    TPro  7.2  /  Alb  2.3<L>  /  TBili  0.4  /  DBili  x   /  AST  20  /  ALT  14  /  AlkPhos  72  05-26              RADIOLOGY & ADDITIONAL TESTS:    Imaging Personally Reviewed:    Consultant(s) Notes Reviewed:      Care Discussed with Consultants/Other Providers:
Patient is a 84y old  Female who presents with a chief complaint of Hypoglycemia with acute metabolic encephalopathy (25 May 2018 11:03)      SUBJECTIVE / OVERNIGHT EVENTS:  Pt feels well, no complaints. denied dysuria     MEDICATIONS  (STANDING):  aspirin  chewable 81 milliGRAM(s) Oral daily  atorvastatin 40 milliGRAM(s) Oral at bedtime  clopidogrel Tablet 75 milliGRAM(s) Oral daily  dextrose 5%. 1000 milliLiter(s) (50 mL/Hr) IV Continuous <Continuous>  dextrose 50% Injectable 12.5 Gram(s) IV Push once  furosemide    Tablet 20 milliGRAM(s) Oral daily  heparin  Injectable 5000 Unit(s) SubCutaneous every 12 hours  insulin glargine Injectable (LANTUS) 4 Unit(s) SubCutaneous at bedtime  insulin lispro (HumaLOG) corrective regimen sliding scale   SubCutaneous three times a day before meals  lisinopril 10 milliGRAM(s) Oral daily  metoprolol tartrate 25 milliGRAM(s) Oral two times a day    MEDICATIONS  (PRN):  acetaminophen   Tablet 650 milliGRAM(s) Oral every 6 hours PRN mild pain or fever>101f  dextrose 40% Gel 15 Gram(s) Oral once PRN Blood Glucose LESS THAN 70 milliGRAM(s)/deciliter  glucagon  Injectable 1 milliGRAM(s) IntraMuscular once PRN Glucose LESS THAN 70 milligrams/deciliter      T(C): 36.5 (05-26-18 @ 12:17), Max: 37.4 (05-25-18 @ 22:05)  HR: 90 (05-26-18 @ 17:16) (87 - 91)  BP: 114/65 (05-26-18 @ 17:16) (100/64 - 133/70)  RR: 17 (05-26-18 @ 12:17) (17 - 18)  SpO2: 98% (05-26-18 @ 12:17) (97% - 98%)  CAPILLARY BLOOD GLUCOSE      POCT Blood Glucose.: 197 mg/dL (26 May 2018 17:12)  POCT Blood Glucose.: 160 mg/dL (26 May 2018 12:18)  POCT Blood Glucose.: 99 mg/dL (26 May 2018 08:22)  POCT Blood Glucose.: 228 mg/dL (25 May 2018 22:17)    I&O's Summary      PHYSICAL EXAM:  GENERAL: NAD, well-developed  HEAD:  Atraumatic, Normocephalic  EYES: EOMI, PERRLA, conjunctiva and sclera clear  NECK: Supple, No JVD  CHEST/LUNG: Clear to auscultation bilaterally; No wheeze  HEART: s1 s2, regular rhythm and rate   ABDOMEN: Soft, Nontender, Nondistended; Bowel sounds present  EXTREMITIES:  2+ Peripheral Pulses, No clubbing, cyanosis, or edema  PSYCH: AAOx3, calm   NEUROLOGY: non-focal  SKIN: No rashes or lesions    LABS:                        9.3    5.12  )-----------( 421      ( 26 May 2018 05:38 )             31.0     05-26    136  |  96<L>  |  28<H>  ----------------------------<  77  5.1   |  25  |  0.77    Ca    9.4      26 May 2018 05:38  Phos  3.0     05-26  Mg     2.4     05-26    TPro  7.2  /  Alb  2.3<L>  /  TBili  0.4  /  DBili  x   /  AST  20  /  ALT  14  /  AlkPhos  72  05-26              RADIOLOGY & ADDITIONAL TESTS:    Imaging Personally Reviewed:    Consultant(s) Notes Reviewed:      Care Discussed with Consultants/Other Providers:
Chief Complaint: Hypoglycemia    History: Feels well, tolerating po, no further hypoglycemia    MEDICATIONS  (STANDING):  aspirin  chewable 81 milliGRAM(s) Oral daily  atorvastatin 40 milliGRAM(s) Oral at bedtime  cefTRIAXone   IVPB      clopidogrel Tablet 75 milliGRAM(s) Oral daily  dextrose 5%. 1000 milliLiter(s) (50 mL/Hr) IV Continuous <Continuous>  dextrose 50% Injectable 12.5 Gram(s) IV Push once  furosemide    Tablet 20 milliGRAM(s) Oral daily  heparin  Injectable 5000 Unit(s) SubCutaneous every 12 hours  insulin glargine Injectable (LANTUS) 5 Unit(s) SubCutaneous at bedtime  insulin lispro (HumaLOG) corrective regimen sliding scale   SubCutaneous three times a day before meals  lisinopril 10 milliGRAM(s) Oral daily  metoprolol tartrate 25 milliGRAM(s) Oral two times a day    MEDICATIONS  (PRN):  acetaminophen   Tablet 650 milliGRAM(s) Oral every 6 hours PRN mild pain or fever>101f  dextrose 40% Gel 15 Gram(s) Oral once PRN Blood Glucose LESS THAN 70 milliGRAM(s)/deciliter  glucagon  Injectable 1 milliGRAM(s) IntraMuscular once PRN Glucose LESS THAN 70 milligrams/deciliter      Allergies    No Known Allergies    Intolerances      Review of Systems:  ALL OTHER SYSTEMS REVIEWED AND NEGATIVE      PHYSICAL EXAM:  VITALS: T(C): 36.9 (05-25-18 @ 14:30)  T(F): 98.4 (05-25-18 @ 14:30), Max: 100.2 (05-24-18 @ 22:39)  HR: 92 (05-25-18 @ 17:55) (86 - 93)  BP: 129/75 (05-25-18 @ 17:55) (107/63 - 142/85)  RR:  (18 - 18)  SpO2:  (88% - 100%)  Wt(kg): --  GENERAL: NAD, well-groomed, well-developed  EYES: No proptosis, no lid lag, anicteric  HEENT:  Atraumatic, Normocephalic, moist mucous membranes  SKIN: Dry, intact, No rashes or lesions  PSYCH: Alert and oriented x 3, normal affect, normal mood      CAPILLARY BLOOD GLUCOSE      POCT Blood Glucose.: 136 mg/dL (25 May 2018 17:31)  POCT Blood Glucose.: 200 mg/dL (25 May 2018 12:16)  POCT Blood Glucose.: 91 mg/dL (25 May 2018 08:28)  POCT Blood Glucose.: 185 mg/dL (24 May 2018 22:14)      05-25    134<L>  |  96<L>  |  26<H>  ----------------------------<  89  4.2   |  29  |  0.87    EGFR if : 71  EGFR if non : 61    Ca    9.1      05-25  Mg     2.2     05-25  Phos  2.4     05-25    TPro  6.9  /  Alb  2.4<L>  /  TBili  0.4  /  DBili  x   /  AST  17  /  ALT  15  /  AlkPhos  71  05-25          Thyroid Function Tests:      Hemoglobin A1C, Whole Blood: 6.5 % <H> [4.0 - 5.6] (05-25-18 @ 05:20)  Hemoglobin A1C, Whole Blood: 6.4 % <H> [4.0 - 5.6] (05-02-18 @ 05:54)
Patient is a 84y old  Female who presents with a chief complaint of hypoglycemia with acute metabolic encephalopathy (25 May 2018 11:03)      SUBJECTIVE / OVERNIGHT EVENTS:  Pt had an episode of low grade fever last night. She said she didn't feel the fever, no complaint at this time     MEDICATIONS  (STANDING):  aspirin  chewable 81 milliGRAM(s) Oral daily  atorvastatin 40 milliGRAM(s) Oral at bedtime  cefTRIAXone   IVPB      clopidogrel Tablet 75 milliGRAM(s) Oral daily  dextrose 5%. 1000 milliLiter(s) (50 mL/Hr) IV Continuous <Continuous>  dextrose 50% Injectable 12.5 Gram(s) IV Push once  furosemide    Tablet 20 milliGRAM(s) Oral daily  heparin  Injectable 5000 Unit(s) SubCutaneous every 12 hours  insulin glargine Injectable (LANTUS) 5 Unit(s) SubCutaneous at bedtime  insulin lispro (HumaLOG) corrective regimen sliding scale   SubCutaneous three times a day before meals  lisinopril 10 milliGRAM(s) Oral daily  metoprolol tartrate 25 milliGRAM(s) Oral two times a day    MEDICATIONS  (PRN):  acetaminophen   Tablet 650 milliGRAM(s) Oral every 6 hours PRN mild pain or fever>101f  dextrose 40% Gel 15 Gram(s) Oral once PRN Blood Glucose LESS THAN 70 milliGRAM(s)/deciliter  glucagon  Injectable 1 milliGRAM(s) IntraMuscular once PRN Glucose LESS THAN 70 milligrams/deciliter      T(C): 36.9 (05-25-18 @ 14:30), Max: 37.9 (05-24-18 @ 22:39)  HR: 86 (05-25-18 @ 14:30) (78 - 93)  BP: 133/69 (05-25-18 @ 14:30) (107/63 - 142/85)  RR: 18 (05-25-18 @ 14:30) (18 - 18)  SpO2: 88% (05-25-18 @ 14:30) (88% - 100%)  CAPILLARY BLOOD GLUCOSE      POCT Blood Glucose.: 200 mg/dL (25 May 2018 12:16)  POCT Blood Glucose.: 91 mg/dL (25 May 2018 08:28)  POCT Blood Glucose.: 185 mg/dL (24 May 2018 22:14)  POCT Blood Glucose.: 205 mg/dL (24 May 2018 17:19)    I&O's Summary      PHYSICAL EXAM:  GENERAL: NAD, well-developed  HEAD:  Atraumatic, Normocephalic  EYES: EOMI, PERRLA, conjunctiva and sclera clear  NECK: Supple, No JVD  CHEST/LUNG: Clear to auscultation bilaterally; No wheeze  HEART: s1 s2, regular rhythm and rate   ABDOMEN: Soft, Nontender, Nondistended; Bowel sounds present  EXTREMITIES:  2+ Peripheral Pulses, No clubbing, cyanosis, or edema  PSYCH: AAOx3, calm   NEUROLOGY: non-focal  SKIN: No rashes or lesions    LABS:                        8.6    5.69  )-----------( 447      ( 25 May 2018 05:20 )             27.7     05-25    134<L>  |  96<L>  |  26<H>  ----------------------------<  89  4.2   |  29  |  0.87    Ca    9.1      25 May 2018 05:20  Phos  2.4     05-25  Mg     2.2     05-25    TPro  6.9  /  Alb  2.4<L>  /  TBili  0.4  /  DBili  x   /  AST  17  /  ALT  15  /  AlkPhos  71  05-25    PT/INR - ( 24 May 2018 07:35 )   PT: 14.2 SEC;   INR: 1.23          PTT - ( 24 May 2018 07:35 )  PTT:29.3 SEC          RADIOLOGY & ADDITIONAL TESTS:    Imaging Personally Reviewed:    Consultant(s) Notes Reviewed:      Care Discussed with Consultants/Other Providers:

## 2018-05-27 NOTE — PROGRESS NOTE ADULT - PROBLEM SELECTOR PLAN 4
-multivessel disease on cath, recent NSTEMI  -recent NSTEMI, cardiac cath revealed multivessel CAD  -c/w DAPT (ASA, plavix), statin, lopressor, lisinopril  -f/u cardiology as outpt, consider revascularization procedure (stent vs. CABG)  -recent echo with normal LVEF 60%, trace pericardial effusion

## 2018-05-27 NOTE — PROGRESS NOTE ADULT - PROBLEM SELECTOR PLAN 1
While inpatient:  Decrease Lantus to 4 u qhs  continue low correction scale qac    DC plan:  No further Humalog 75/25.  Will recommend Lantus (finalize dose before dc) and prandin 0.5mg qac TID  *Please also prescribe glucagon emergency kit at dc  (Will avoid DPP4 as history of pancreatitis is mentioned in her chart).  Discussed plan with patient and family members at bedside.  Outpatient follow up with Dr. Benavides.
Pt non-toxic, WBC wnl  ucx + , f/u identification   since pt has low grade fever, will start empiric abx
Pt non-toxic, WBC wnl, only one isolated episode of fever  ucx + , 2 species, pt repetitively denied dysuria. Based on guideline, no indication for abx
Pt non-toxic, WBC wnl, only one isolated episode of fever  ucx + , 2 species, pt repetitively denied dysuria. Based on guideline, no indication for abx  Pt has remained afebrile

## 2018-05-27 NOTE — PROGRESS NOTE ADULT - PROBLEM SELECTOR PROBLEM 5
Diabetes mellitus type 2 in nonobese

## 2018-05-27 NOTE — PROGRESS NOTE ADULT - PROBLEM SELECTOR PLAN 2
-due to symptomatic hypoglycemia from excessive insulin   f/u endo rec  -encourage po intake
resolved. -due to symptomatic hypoglycemia from excessive insulin   f/u endo rec  -encourage po intake
resolved. -due to symptomatic hypoglycemia from excessive insulin   f/u endo rec  -encourage po intake

## 2018-05-27 NOTE — PROGRESS NOTE ADULT - PROBLEM SELECTOR PLAN 3
-due to excessive insulin, she was discharged on lantus 5 units hs and then went back to humalog 18 units am and 7 units pm per PCP Dr. Benavides  f/u Endo  -encourage po intake, hypoglycemic protocol  A1c 6.5

## 2018-05-27 NOTE — PROGRESS NOTE ADULT - PROBLEM SELECTOR PLAN 5
-as above  -go back to lantus 5 units hs and humalog ISS  f/u endo
-as above  -go back to lantus 4 units hs and humalog ISS  f/u endo
-as above  -go back to lantus 5 units hs and humalog ISS  f/u endo

## 2018-06-15 ENCOUNTER — CLINICAL ADVICE (OUTPATIENT)
Age: 83
End: 2018-06-15

## 2018-07-13 ENCOUNTER — CHART COPY (OUTPATIENT)
Age: 83
End: 2018-07-13

## 2018-07-18 ENCOUNTER — APPOINTMENT (OUTPATIENT)
Dept: ENDOCRINOLOGY | Facility: CLINIC | Age: 83
End: 2018-07-18
Payer: MEDICARE

## 2018-07-18 VITALS
BODY MASS INDEX: 16.17 KG/M2 | DIASTOLIC BLOOD PRESSURE: 70 MMHG | WEIGHT: 89 LBS | OXYGEN SATURATION: 94 % | HEIGHT: 62.4 IN | SYSTOLIC BLOOD PRESSURE: 130 MMHG | HEART RATE: 71 BPM

## 2018-07-18 PROCEDURE — 99214 OFFICE O/P EST MOD 30 MIN: CPT

## 2018-07-18 RX ORDER — DULOXETINE HYDROCHLORIDE 30 MG/1
30 CAPSULE, DELAYED RELEASE PELLETS ORAL
Qty: 53 | Refills: 0 | Status: DISCONTINUED | COMMUNITY
Start: 2018-04-24 | End: 2018-07-18

## 2018-07-18 RX ORDER — PRAVASTATIN SODIUM 10 MG/1
10 TABLET ORAL
Qty: 30 | Refills: 2 | Status: DISCONTINUED | COMMUNITY
Start: 2018-04-18 | End: 2018-07-18

## 2018-07-18 RX ORDER — ENALAPRIL MALEATE 10 MG/1
10 TABLET ORAL TWICE DAILY
Qty: 180 | Refills: 3 | Status: DISCONTINUED | COMMUNITY
Start: 2017-11-12 | End: 2018-07-18

## 2018-08-03 ENCOUNTER — OTHER (OUTPATIENT)
Age: 83
End: 2018-08-03

## 2018-08-03 RX ORDER — SITAGLIPTIN 25 MG/1
25 TABLET, FILM COATED ORAL DAILY
Qty: 90 | Refills: 3 | Status: DISCONTINUED | COMMUNITY
Start: 2018-07-18 | End: 2018-08-03

## 2018-09-05 DIAGNOSIS — S72.001D FRACTURE OF UNSPECIFIED PART OF NECK OF RIGHT FEMUR, SUBSEQUENT ENCOUNTER FOR CLOSED FRACTURE WITH ROUTINE HEALING: ICD-10-CM

## 2018-09-05 DIAGNOSIS — I25.10 ATHEROSCLEROTIC HEART DISEASE OF NATIVE CORONARY ARTERY W/OUT ANGINA PECTORIS: ICD-10-CM

## 2018-09-05 DIAGNOSIS — S72.009A FRACTURE OF UNSPECIFIED PART OF NECK OF UNSPECIFIED FEMUR, INITIAL ENCOUNTER FOR CLOSED FRACTURE: ICD-10-CM

## 2018-09-05 RX ORDER — CLOPIDOGREL 75 MG/1
75 TABLET, FILM COATED ORAL
Qty: 90 | Refills: 1 | Status: ACTIVE | COMMUNITY
Start: 2018-07-18

## 2018-09-05 RX ORDER — ASPIRIN 325 MG/1
325 TABLET ORAL TWICE DAILY
Refills: 0 | Status: ACTIVE | COMMUNITY
Start: 2018-09-05

## 2018-11-02 ENCOUNTER — APPOINTMENT (OUTPATIENT)
Dept: ENDOCRINOLOGY | Facility: CLINIC | Age: 83
End: 2018-11-02
Payer: MEDICARE

## 2018-11-02 VITALS
BODY MASS INDEX: 16.54 KG/M2 | DIASTOLIC BLOOD PRESSURE: 90 MMHG | OXYGEN SATURATION: 99 % | HEART RATE: 103 BPM | SYSTOLIC BLOOD PRESSURE: 150 MMHG | WEIGHT: 91 LBS | HEIGHT: 62.4 IN

## 2018-11-02 PROCEDURE — 77080 DXA BONE DENSITY AXIAL: CPT | Mod: GA

## 2018-11-02 PROCEDURE — G0008: CPT

## 2018-11-02 PROCEDURE — 99214 OFFICE O/P EST MOD 30 MIN: CPT | Mod: 25

## 2018-11-02 PROCEDURE — 96372 THER/PROPH/DIAG INJ SC/IM: CPT

## 2018-11-02 PROCEDURE — 90662 IIV NO PRSV INCREASED AG IM: CPT

## 2018-11-02 RX ORDER — INSULIN LISPRO 100 [IU]/ML
100 INJECTION, SOLUTION INTRAVENOUS; SUBCUTANEOUS
Qty: 1 | Refills: 3 | Status: DISCONTINUED | COMMUNITY
Start: 2018-08-03 | End: 2018-11-02

## 2018-11-02 RX ORDER — DENOSUMAB 60 MG/ML
60 INJECTION SUBCUTANEOUS
Qty: 1 | Refills: 0 | Status: COMPLETED | OUTPATIENT
Start: 2018-11-02

## 2018-11-02 RX ADMIN — DENOSUMAB 0 MG/ML: 60 INJECTION SUBCUTANEOUS at 00:00

## 2018-11-05 LAB
25(OH)D3 SERPL-MCNC: 41.4 NG/ML
ALBUMIN SERPL ELPH-MCNC: 4.4 G/DL
ALP BLD-CCNC: 136 U/L
ALT SERPL-CCNC: 19 U/L
ANION GAP SERPL CALC-SCNC: 13 MMOL/L
AST SERPL-CCNC: 20 U/L
BASOPHILS # BLD AUTO: 0.02 K/UL
BASOPHILS NFR BLD AUTO: 0.3 %
BILIRUB SERPL-MCNC: 0.4 MG/DL
BUN SERPL-MCNC: 24 MG/DL
CALCIUM SERPL-MCNC: 11.1 MG/DL
CALCIUM SERPL-MCNC: 11.1 MG/DL
CHLORIDE SERPL-SCNC: 102 MMOL/L
CHOLEST SERPL-MCNC: 141 MG/DL
CHOLEST/HDLC SERPL: 2.6 RATIO
CO2 SERPL-SCNC: 25 MMOL/L
CREAT SERPL-MCNC: 0.79 MG/DL
EOSINOPHIL # BLD AUTO: 0.27 K/UL
EOSINOPHIL NFR BLD AUTO: 4 %
GLUCOSE SERPL-MCNC: 167 MG/DL
HBA1C MFR BLD HPLC: 7.9 %
HCT VFR BLD CALC: 43.8 %
HDLC SERPL-MCNC: 55 MG/DL
HGB BLD-MCNC: 13.7 G/DL
IMM GRANULOCYTES NFR BLD AUTO: 0.1 %
LDLC SERPL CALC-MCNC: 69 MG/DL
LYMPHOCYTES # BLD AUTO: 3.07 K/UL
LYMPHOCYTES NFR BLD AUTO: 45.5 %
MAN DIFF?: NORMAL
MCHC RBC-ENTMCNC: 27.7 PG
MCHC RBC-ENTMCNC: 31.3 GM/DL
MCV RBC AUTO: 88.7 FL
MONOCYTES # BLD AUTO: 0.71 K/UL
MONOCYTES NFR BLD AUTO: 10.5 %
NEUTROPHILS # BLD AUTO: 2.67 K/UL
NEUTROPHILS NFR BLD AUTO: 39.6 %
PARATHYROID HORMONE INTACT: 61 PG/ML
PHOSPHATE SERPL-MCNC: 3 MG/DL
PLATELET # BLD AUTO: 269 K/UL
POTASSIUM SERPL-SCNC: 4.6 MMOL/L
PROT SERPL-MCNC: 8.3 G/DL
RBC # BLD: 4.94 M/UL
RBC # FLD: 14.5 %
SODIUM SERPL-SCNC: 140 MMOL/L
T4 FREE SERPL-MCNC: 1.2 NG/DL
TRIGL SERPL-MCNC: 87 MG/DL
TSH SERPL-ACNC: 3.02 UIU/ML
VIT B12 SERPL-MCNC: 439 PG/ML
WBC # FLD AUTO: 6.75 K/UL

## 2019-01-16 ENCOUNTER — RX RENEWAL (OUTPATIENT)
Age: 84
End: 2019-01-16

## 2019-01-18 ENCOUNTER — RX RENEWAL (OUTPATIENT)
Age: 84
End: 2019-01-18

## 2019-01-21 ENCOUNTER — RX RENEWAL (OUTPATIENT)
Age: 84
End: 2019-01-21

## 2019-02-14 ENCOUNTER — MEDICATION RENEWAL (OUTPATIENT)
Age: 84
End: 2019-02-14

## 2019-02-14 ENCOUNTER — RX RENEWAL (OUTPATIENT)
Age: 84
End: 2019-02-14

## 2019-02-14 RX ORDER — SITAGLIPTIN 25 MG/1
25 TABLET, FILM COATED ORAL DAILY
Qty: 90 | Refills: 3 | Status: DISCONTINUED | COMMUNITY
Start: 2018-11-02 | End: 2019-02-14

## 2019-05-06 ENCOUNTER — APPOINTMENT (OUTPATIENT)
Dept: ENDOCRINOLOGY | Facility: CLINIC | Age: 84
End: 2019-05-06
Payer: MEDICARE

## 2019-05-06 VITALS — SYSTOLIC BLOOD PRESSURE: 160 MMHG | DIASTOLIC BLOOD PRESSURE: 80 MMHG

## 2019-05-06 VITALS
HEART RATE: 108 BPM | OXYGEN SATURATION: 99 % | WEIGHT: 93.25 LBS | SYSTOLIC BLOOD PRESSURE: 140 MMHG | BODY MASS INDEX: 16.94 KG/M2 | DIASTOLIC BLOOD PRESSURE: 96 MMHG | HEIGHT: 62.4 IN

## 2019-05-06 PROCEDURE — 96372 THER/PROPH/DIAG INJ SC/IM: CPT

## 2019-05-06 PROCEDURE — 99214 OFFICE O/P EST MOD 30 MIN: CPT | Mod: 25

## 2019-05-06 RX ORDER — ENALAPRIL MALEATE 2.5 MG/1
2.5 TABLET ORAL DAILY
Qty: 30 | Refills: 5 | Status: DISCONTINUED | COMMUNITY
Start: 2018-07-18 | End: 2019-05-06

## 2019-05-06 RX ORDER — DENOSUMAB 60 MG/ML
60 INJECTION SUBCUTANEOUS
Qty: 1 | Refills: 0 | Status: COMPLETED | OUTPATIENT
Start: 2019-05-06

## 2019-05-06 RX ADMIN — DENOSUMAB 0 MG/ML: 60 INJECTION SUBCUTANEOUS at 00:00

## 2019-05-06 NOTE — PHYSICAL EXAM
[Alert] : alert [No Acute Distress] : no acute distress [Normal Sclera/Conjunctiva] : normal sclera/conjunctiva [EOMI] : extra ocular movement intact [Supple] : the neck was supple [No LAD] : no lymphadenopathy [Thyroid Not Enlarged] : the thyroid was not enlarged [No Accessory Muscle Use] : no accessory muscle use [Clear to Auscultation] : lungs were clear to auscultation bilaterally [Normal S1, S2] : normal S1 and S2 [Regular Rhythm] : with a regular rhythm [Normal Bowel Sounds] : normal bowel sounds [Not Tender] : non-tender [Soft] : abdomen soft [Not Distended] : not distended [Kyphosis] : kyphosis present [No Stigmata of Cushings Syndrome] : no stigmata of cushings syndrome [No Clubbing, Cyanosis] : no clubbing  or cyanosis of the fingernails [No Rash] : no rash [Right Foot Was Examined] : right foot ~C was examined [Left Foot Was Examined] : left foot ~C was examined [Normal] : normal [1+] : 1+ in the dorsalis pedis [Diminished Throughout Both Feet] : diminished tactile sensation with monofilament testing throughout both feet [Normal Reflexes] : deep tendon reflexes were 2+ and symmetric [Normal Affect] : the affect was normal [Normal Mood] : the mood was normal [FreeTextEntry1] : deformity [de-identified] : b/l edema left greater than right [de-identified] : 2/6 ELIDIA [FreeTextEntry2] : onychomycosis [FreeTextEntry5] : deformity [FreeTextEntry6] : onychomycosis

## 2019-05-06 NOTE — REVIEW OF SYSTEMS
[Fatigue] : fatigue [Joint Pain] : joint pain [Poor Balance] : poor balance [Difficulty Walking] : difficulty walking [Swelling] : swelling [Negative] : Gastrointestinal [All other systems negative] : All other systems negative [Recent Weight Loss (___ Lbs)] : no recent weight loss [Polyuria] : no polyuria [Recent Weight Gain (___ Lbs)] : no recent weight gain [Polydipsia] : no polydipsia [Hair Loss] : no hair loss

## 2019-05-06 NOTE — ASSESSMENT
[Diabetes Foot Care] : diabetes foot care [Denosumab Therapy] : Risks  and benefits of denosumab therapy were discussed with the patient including eczema, cellulitis, osteonecrosis of the jaw and atypical femur fractures [FreeTextEntry1] : 85 y.o. female with h/o Type 2 DM, HTN, hyperlipidemia, osteoporosis and primary hyperparathyroidism.\par 1. Type 2 DM- Good overall control. No need for tight glycemic control given patient's age. Encouraged carbohydrate consistent diet. Given limited data, will continue Lantus 5 units daily and Januvia 50 mg daily. Encouraged patient and family to keep in touch with blood glucose readings and to vary monitoring times. Will check CMP, HBa1c and urine microalb/cr ratio.\par 2. HTN- BP is above goal. Will increase Enalapril to 5 mg daily and will continue Metoprolol\par 3. Hyperlipidemia- Will check lipids and will continue statin\par 4. Osteoporosis- DEXA scan performed in July 2018 shows left total hip -2.9 with 7.8% decrease, left femoral neck -3.4 with 14.4% decrease and 1/3 radius -2.6 with 3.8% decrease. Given increased risk of fracture, Prolia given today from office supply. Will repeat DEXA scan in 1 year after resuming Prolia at next visit. Will check serum calcium and 25 vitamin D level and will continue vitamin D supplement. No calcium supplements given primary hyperparathyroidism. Fall precaution reviewed. \par 5. Primary hyperparathyroidism- Has declined surgery in the past. Will monitor for now. Encouraged hydration. Will check serum calcium, intact PTH and 25 vitamin D level. \par \par Follow up in 6 months for Prolia and DEXA scan\par Follow up with opthalmology and PCP

## 2019-05-06 NOTE — HISTORY OF PRESENT ILLNESS
[___] : [unfilled] [FreeTextEntry1] : 85 y.o. female with h/o Type 2 DM, HTN, hyperlipidemia and osteoporosis here for follow up visit. Diagnosed with valvular heart disease. Follows with cardiology at [place]. No HAs and no change in vision. Does have facial droop with h/o CVA in the past. Feeling much better and ambulating. Checking FS every morning and are 110 to 140s. Taking Lantus 5 units daily and Januvia 50 mg daily. Had hypoglycemia in the past on Humalog 75/25 mix regimen. Past due for optho and does have retinopathy. Saw podiatry 2 weeks ago. \par \par Hand pain has improved. Did have fall with right hip fracture requiring surgery in August 2018 at [place]. Has had falls twice but no other fractures. Began treatment with Prolia in June 2016. Had dose of Prolia was on November 3, 2017 and resumed on July 18, 2018. Treated with Alendronate from May 2011 through May 2015 then had 1 year drug holiday. DEXA scan in May 2016 showed spine -0.3, total hip -2.5 and left femoral neck -2.6, 1/3 radius -2.2. DEXA scan performed in July 2018 shows left total hip -2.9 with 7.8% decrease, left femoral neck -3.4 with 14.4% decrease and 1/3 radius -2.6 with 3.8% decrease. No dental issues. Has dentures. No jaw pain.\par \par H/o primary hyperparathyroidism, not interested in surgical management. No calcium supplements, Takes vitamin D 1,000 Iu daily. Serum calcium and intact PTH levels have been stable.  [Hypoglycemia] : not hypoglycemic

## 2019-05-07 LAB
25(OH)D3 SERPL-MCNC: 39.3 NG/ML
ALBUMIN SERPL ELPH-MCNC: 4.2 G/DL
ALP BLD-CCNC: 124 U/L
ALT SERPL-CCNC: 21 U/L
ANION GAP SERPL CALC-SCNC: 13 MMOL/L
AST SERPL-CCNC: 21 U/L
BASOPHILS # BLD AUTO: 0.04 K/UL
BASOPHILS NFR BLD AUTO: 0.8 %
BILIRUB SERPL-MCNC: 0.4 MG/DL
BUN SERPL-MCNC: 21 MG/DL
CALCIUM SERPL-MCNC: 10.6 MG/DL
CALCIUM SERPL-MCNC: 10.6 MG/DL
CHLORIDE SERPL-SCNC: 100 MMOL/L
CHOLEST SERPL-MCNC: 136 MG/DL
CHOLEST/HDLC SERPL: 2.2 RATIO
CO2 SERPL-SCNC: 27 MMOL/L
CREAT SERPL-MCNC: 0.71 MG/DL
EOSINOPHIL # BLD AUTO: 0.19 K/UL
EOSINOPHIL NFR BLD AUTO: 3.6 %
ESTIMATED AVERAGE GLUCOSE: 180 MG/DL
GLUCOSE SERPL-MCNC: 251 MG/DL
HBA1C MFR BLD HPLC: 7.9 %
HCT VFR BLD CALC: 44.7 %
HDLC SERPL-MCNC: 61 MG/DL
HGB BLD-MCNC: 13.7 G/DL
IMM GRANULOCYTES NFR BLD AUTO: 0.6 %
LDLC SERPL CALC-MCNC: 58 MG/DL
LYMPHOCYTES # BLD AUTO: 1.88 K/UL
LYMPHOCYTES NFR BLD AUTO: 35.6 %
MAN DIFF?: NORMAL
MCHC RBC-ENTMCNC: 28.7 PG
MCHC RBC-ENTMCNC: 30.6 GM/DL
MCV RBC AUTO: 93.5 FL
MONOCYTES # BLD AUTO: 0.47 K/UL
MONOCYTES NFR BLD AUTO: 8.9 %
NEUTROPHILS # BLD AUTO: 2.67 K/UL
NEUTROPHILS NFR BLD AUTO: 50.5 %
PARATHYROID HORMONE INTACT: 54 PG/ML
PHOSPHATE SERPL-MCNC: 3 MG/DL
PLATELET # BLD AUTO: 238 K/UL
POTASSIUM SERPL-SCNC: 4.7 MMOL/L
PROT SERPL-MCNC: 8.1 G/DL
RBC # BLD: 4.78 M/UL
RBC # FLD: 13.2 %
SODIUM SERPL-SCNC: 140 MMOL/L
T4 FREE SERPL-MCNC: 1.2 NG/DL
TRIGL SERPL-MCNC: 87 MG/DL
TSH SERPL-ACNC: 3.59 UIU/ML
WBC # FLD AUTO: 5.28 K/UL

## 2019-06-13 ENCOUNTER — RX RENEWAL (OUTPATIENT)
Age: 84
End: 2019-06-13

## 2019-06-17 ENCOUNTER — RX RENEWAL (OUTPATIENT)
Age: 84
End: 2019-06-17

## 2019-08-11 NOTE — ED PROVIDER NOTE - CROS ED ROS STATEMENT
POST-OP DIAGNOSIS:  Incomplete  11-Aug-2019 12:15:39  Bhumika Jones
all other ROS negative except as per HPI

## 2019-10-11 ENCOUNTER — APPOINTMENT (OUTPATIENT)
Dept: ENDOCRINOLOGY | Facility: CLINIC | Age: 84
End: 2019-10-11
Payer: MEDICARE

## 2019-10-11 ENCOUNTER — APPOINTMENT (OUTPATIENT)
Dept: GERIATRICS | Facility: CLINIC | Age: 84
End: 2019-10-11
Payer: MEDICARE

## 2019-10-11 VITALS — SYSTOLIC BLOOD PRESSURE: 160 MMHG | DIASTOLIC BLOOD PRESSURE: 80 MMHG

## 2019-10-11 VITALS
DIASTOLIC BLOOD PRESSURE: 70 MMHG | HEART RATE: 98 BPM | SYSTOLIC BLOOD PRESSURE: 120 MMHG | RESPIRATION RATE: 16 BRPM | HEIGHT: 62.4 IN | WEIGHT: 99 LBS | OXYGEN SATURATION: 98 % | BODY MASS INDEX: 17.99 KG/M2 | TEMPERATURE: 98.6 F

## 2019-10-11 VITALS
WEIGHT: 99 LBS | HEIGHT: 62.4 IN | DIASTOLIC BLOOD PRESSURE: 82 MMHG | HEART RATE: 117 BPM | OXYGEN SATURATION: 98 % | SYSTOLIC BLOOD PRESSURE: 144 MMHG | BODY MASS INDEX: 17.99 KG/M2

## 2019-10-11 DIAGNOSIS — L03.119 CELLULITIS OF UNSPECIFIED PART OF LIMB: ICD-10-CM

## 2019-10-11 DIAGNOSIS — R26.81 UNSTEADINESS ON FEET: ICD-10-CM

## 2019-10-11 LAB
GLUCOSE BLDC GLUCOMTR-MCNC: 405
HBA1C MFR BLD HPLC: 7.3

## 2019-10-11 PROCEDURE — 99214 OFFICE O/P EST MOD 30 MIN: CPT

## 2019-10-11 PROCEDURE — 83036 HEMOGLOBIN GLYCOSYLATED A1C: CPT | Mod: QW

## 2019-10-11 PROCEDURE — G0008: CPT

## 2019-10-11 PROCEDURE — 90653 IIV ADJUVANT VACCINE IM: CPT

## 2019-10-11 PROCEDURE — 82962 GLUCOSE BLOOD TEST: CPT

## 2019-10-11 PROCEDURE — 99215 OFFICE O/P EST HI 40 MIN: CPT | Mod: 25

## 2019-10-11 NOTE — ASSESSMENT
[Diabetes Foot Care] : diabetes foot care [Denosumab Therapy] : Risks  and benefits of denosumab therapy were discussed with the patient including eczema, cellulitis, osteonecrosis of the jaw and atypical femur fractures [FreeTextEntry1] : 85 y.o. female with h/o Type 2 DM, HTN, hyperlipidemia, osteoporosis and primary hyperparathyroidism.\par 1. Type 2 DM- Good overall control with Hba1c of 7.3%. No need for tight glycemic control given patient's age. Encouraged carbohydrate consistent diet. Will increase Lantus to 7 units daily and will continue Januvia 50 mg daily. Encouraged patient and family to keep in touch with blood glucose readings and to vary monitoring times. Will check CMP and urine microalb/cr ratio.\par 2. HTN- BP is above goal. Will increase Enalapril to 5 mg BID and will continue Metoprolol\par 3. Hyperlipidemia- Will check lipids and will continue statin\par 4. Osteoporosis- DEXA scan performed in July 2018 shows left total hip -2.9 with 7.8% decrease, left femoral neck -3.4 with 14.4% decrease and 1/3 radius -2.6 with 3.8% decrease. Given increased risk of fracture, Prolia given in May 2019 from office supply. Will repeat DEXA scan in 1 year after resuming Prolia at next visit in November with RN. Will check serum calcium and 25 vitamin D level and will continue vitamin D supplement. No calcium supplements given primary hyperparathyroidism. Fall precaution reviewed. \par 5. Primary hyperparathyroidism- Has declined surgery in the past. Will monitor for now. Encouraged hydration. Will check serum calcium, intact PTH and 25 vitamin D level. \par \par Follow up next month for Prolia and DEXA and then 6 months with me\par Follow up with opthalmology and PCP for left LE swelling and erythema\par Flu vaccine given today

## 2019-10-11 NOTE — REVIEW OF SYSTEMS
[Fatigue] : fatigue [Recent Weight Loss (___ Lbs)] : no recent weight loss [Recent Weight Gain (___ Lbs)] : no recent weight gain [Polyuria] : no polyuria [Hair Loss] : no hair loss [Joint Pain] : joint pain [Difficulty Walking] : difficulty walking [Poor Balance] : poor balance [Swelling] : swelling [Polydipsia] : no polydipsia [Negative] : Respiratory [All other systems negative] : All other systems negative

## 2019-10-11 NOTE — HISTORY OF PRESENT ILLNESS
[___] : [unfilled] [FreeTextEntry1] : 85 y.o. female with h/o Type 2 DM, HTN, hyperlipidemia and osteoporosis here for follow up visit. Diagnosed with valvular heart disease. Follows with cardiology at [facility]. No HAs and no change in vision. Does have facial droop with h/o CVA in the past. Feeling much better and ambulating. Checking FS every morning and are 140s. FS is 405 today but ate waffle with syrup and toast without protein. Taking Lantus 5 units daily and Januvia 50 mg daily. Had hypoglycemia in the past on Humalog 75/25 mix regimen but nothing recent. Past due for optho and does have retinopathy. Saw podiatry recently. \par \par Hand pain has improved. Did have fall with right hip fracture requiring surgery in August 2018 at [facility]. Has had fall since last visit but no other fractures. Began treatment with Prolia in June 2016. Had dose of Prolia was on November 3, 2017 and resumed on July 18, 2018. Treated with Alendronate from May 2011 through May 2015 then had 1 year drug holiday. DEXA scan in May 2016 showed spine -0.3, total hip -2.5 and left femoral neck -2.6, 1/3 radius -2.2. DEXA scan performed in July 2018 shows left total hip -2.9 with 7.8% decrease, left femoral neck -3.4 with 14.4% decrease and 1/3 radius -2.6 with 3.8% decrease. No dental issues. Has dentures. No jaw pain.\par \par H/o primary hyperparathyroidism, not interested in surgical management. No calcium supplements. Takes vitamin D 1,000 Iu daily. Serum calcium and intact PTH levels have been stable.  [Hypoglycemia] : not hypoglycemic

## 2019-10-11 NOTE — ASSESSMENT
[FreeTextEntry1] : \par patient is unable to ambulate for long distances due to OA, pain, and unsteadiness.  she at increased risk of falls. Would benefit from having a transport wheelchair. she has a caregiver to help assist.\par

## 2019-10-11 NOTE — HISTORY OF PRESENT ILLNESS
[Initial Evaluation] : an initial evaluation of [Skin Redness] : skin redness [Skin Warmth] : skin warmth [Localized Edema] : localized edema [Currently Experiencing] : The patient is currently experiencing symptoms. [Fever] : no fever [Chills] : no chills [Skin Pain] : no skin pain [Skin Ulceration] : no skin ulceration [Serosanguineous Drainage] : no serosanguineous drainage [Purulent Drainage] : no purulent drainage [Enlarged Lymph Nodes] : no enlarged lymph nodes [Joint Pain] : no joint pain [Joint Stiffness] : no joint stiffness [FreeTextEntry1] : 85yoF presents with her dtr for acute visit for new ankle swelling.\par \par was on hospice since last visit.  \par \par also with difficulty ambulating with unsteady gait, unable to walk long distances due to fatigue/sob.\par denies recent falls.\par has not has PT recently.\par \par

## 2019-10-11 NOTE — PHYSICAL EXAM
[General Appearance - Alert] : alert [General Appearance - In No Acute Distress] : in no acute distress [Sclera] : the sclera and conjunctiva were normal [Extraocular Movements] : extraocular movements were intact [Neck Appearance] : the appearance of the neck was normal [] : no respiratory distress [Respiration, Rhythm And Depth] : normal respiratory rhythm and effort [Exaggerated Use Of Accessory Muscles For Inspiration] : no accessory muscle use [Auscultation Breath Sounds / Voice Sounds] : lungs were clear to auscultation bilaterally [Heart Rate And Rhythm] : heart rate was normal and rhythm regular [Heart Sounds] : normal S1 and S2 [FreeTextEntry1] : swelling and erythema to left medial ankle, severely dry cracked skin of bilateral feet.  fungal infection of toes

## 2019-10-11 NOTE — PHYSICAL EXAM
[Alert] : alert [No Acute Distress] : no acute distress [Normal Sclera/Conjunctiva] : normal sclera/conjunctiva [EOMI] : extra ocular movement intact [Supple] : the neck was supple [No LAD] : no lymphadenopathy [No Accessory Muscle Use] : no accessory muscle use [Thyroid Not Enlarged] : the thyroid was not enlarged [Clear to Auscultation] : lungs were clear to auscultation bilaterally [Normal Bowel Sounds] : normal bowel sounds [Regular Rhythm] : with a regular rhythm [Normal S1, S2] : normal S1 and S2 [Not Tender] : non-tender [Soft] : abdomen soft [Not Distended] : not distended [No Clubbing, Cyanosis] : no clubbing  or cyanosis of the fingernails [Kyphosis] : kyphosis present [No Stigmata of Cushings Syndrome] : no stigmata of cushings syndrome [No Rash] : no rash [Right Foot Was Examined] : right foot ~C was examined [Normal] : normal [Left Foot Was Examined] : left foot ~C was examined [1+] : 1+ in the dorsalis pedis [Diminished Throughout Both Feet] : diminished tactile sensation with monofilament testing throughout both feet [Normal Affect] : the affect was normal [Normal Reflexes] : deep tendon reflexes were 2+ and symmetric [de-identified] : 2/6 ELIDIA [Normal Mood] : the mood was normal [de-identified] : b/l edema left greater than right; erythema noted and warmth noted at left ankle [FreeTextEntry1] : deformity [FreeTextEntry5] : deformity [FreeTextEntry6] : onychomycosis [FreeTextEntry2] : onychomycosis

## 2019-10-14 LAB
25(OH)D3 SERPL-MCNC: 25.1 NG/ML
ALBUMIN SERPL ELPH-MCNC: 4.3 G/DL
ALP BLD-CCNC: 80 U/L
ALT SERPL-CCNC: 12 U/L
ANION GAP SERPL CALC-SCNC: 12 MMOL/L
AST SERPL-CCNC: 14 U/L
BASOPHILS # BLD AUTO: 0.05 K/UL
BASOPHILS NFR BLD AUTO: 0.8 %
BILIRUB SERPL-MCNC: 0.3 MG/DL
BUN SERPL-MCNC: 20 MG/DL
CALCIUM SERPL-MCNC: 10.5 MG/DL
CALCIUM SERPL-MCNC: 10.5 MG/DL
CHLORIDE SERPL-SCNC: 98 MMOL/L
CHOLEST SERPL-MCNC: 123 MG/DL
CHOLEST/HDLC SERPL: 2.3 RATIO
CO2 SERPL-SCNC: 26 MMOL/L
CREAT SERPL-MCNC: 0.78 MG/DL
EOSINOPHIL # BLD AUTO: 0.07 K/UL
EOSINOPHIL NFR BLD AUTO: 1.1 %
GLUCOSE SERPL-MCNC: 378 MG/DL
HCT VFR BLD CALC: 40.2 %
HDLC SERPL-MCNC: 53 MG/DL
HGB BLD-MCNC: 12.5 G/DL
IMM GRANULOCYTES NFR BLD AUTO: 0.3 %
LDLC SERPL CALC-MCNC: 55 MG/DL
LYMPHOCYTES # BLD AUTO: 1.53 K/UL
LYMPHOCYTES NFR BLD AUTO: 23.9 %
MAN DIFF?: NORMAL
MCHC RBC-ENTMCNC: 28.7 PG
MCHC RBC-ENTMCNC: 31.1 GM/DL
MCV RBC AUTO: 92.4 FL
MONOCYTES # BLD AUTO: 0.65 K/UL
MONOCYTES NFR BLD AUTO: 10.2 %
NEUTROPHILS # BLD AUTO: 4.07 K/UL
NEUTROPHILS NFR BLD AUTO: 63.7 %
PARATHYROID HORMONE INTACT: 46 PG/ML
PHOSPHATE SERPL-MCNC: 2.7 MG/DL
PLATELET # BLD AUTO: 245 K/UL
POTASSIUM SERPL-SCNC: 4.5 MMOL/L
PROT SERPL-MCNC: 7.8 G/DL
RBC # BLD: 4.35 M/UL
RBC # FLD: 13 %
SODIUM SERPL-SCNC: 136 MMOL/L
TRIGL SERPL-MCNC: 76 MG/DL
TSH SERPL-ACNC: 2.63 UIU/ML
WBC # FLD AUTO: 6.39 K/UL

## 2019-11-06 ENCOUNTER — APPOINTMENT (OUTPATIENT)
Dept: ENDOCRINOLOGY | Facility: CLINIC | Age: 84
End: 2019-11-06

## 2019-12-03 ENCOUNTER — MEDICATION RENEWAL (OUTPATIENT)
Age: 84
End: 2019-12-03

## 2019-12-09 ENCOUNTER — RX RENEWAL (OUTPATIENT)
Age: 84
End: 2019-12-09

## 2020-01-16 ENCOUNTER — APPOINTMENT (OUTPATIENT)
Dept: ENDOCRINOLOGY | Facility: CLINIC | Age: 85
End: 2020-01-16
Payer: MEDICARE

## 2020-01-16 ENCOUNTER — CLINICAL ADVICE (OUTPATIENT)
Age: 85
End: 2020-01-16

## 2020-01-16 PROCEDURE — ZZZZZ: CPT

## 2020-01-28 ENCOUNTER — APPOINTMENT (OUTPATIENT)
Dept: ENDOCRINOLOGY | Facility: CLINIC | Age: 85
End: 2020-01-28
Payer: MEDICARE

## 2020-01-28 ENCOUNTER — LABORATORY RESULT (OUTPATIENT)
Age: 85
End: 2020-01-28

## 2020-01-28 ENCOUNTER — RESULT CHARGE (OUTPATIENT)
Age: 85
End: 2020-01-28

## 2020-01-28 VITALS
HEIGHT: 62 IN | TEMPERATURE: 97.6 F | BODY MASS INDEX: 17.85 KG/M2 | WEIGHT: 97.03 LBS | HEART RATE: 85 BPM | OXYGEN SATURATION: 96 % | RESPIRATION RATE: 16 BRPM

## 2020-01-28 VITALS — SYSTOLIC BLOOD PRESSURE: 160 MMHG | DIASTOLIC BLOOD PRESSURE: 80 MMHG

## 2020-01-28 PROCEDURE — 96372 THER/PROPH/DIAG INJ SC/IM: CPT

## 2020-01-28 PROCEDURE — 95251 CONT GLUC MNTR ANALYSIS I&R: CPT

## 2020-01-28 PROCEDURE — 95250 CONT GLUC MNTR PHYS/QHP EQP: CPT

## 2020-01-28 PROCEDURE — 99214 OFFICE O/P EST MOD 30 MIN: CPT | Mod: 25

## 2020-01-28 PROCEDURE — 36415 COLL VENOUS BLD VENIPUNCTURE: CPT

## 2020-01-28 RX ORDER — DENOSUMAB 60 MG/ML
60 INJECTION SUBCUTANEOUS
Qty: 1 | Refills: 0 | Status: COMPLETED | OUTPATIENT
Start: 2020-01-28

## 2020-01-28 RX ADMIN — DENOSUMAB 0 MG/ML: 60 INJECTION SUBCUTANEOUS at 00:00

## 2020-01-28 NOTE — ASSESSMENT
[FreeTextEntry1] : 85 y.o. female with h/o Type 2 DM, HTN, hyperlipidemia, osteoporosis and primary hyperparathyroidism.\par 1. Type 2 DM- Fair control with Hba1c of 8.3% today. No need for tight glycemic control given patient's age. Encouraged carbohydrate consistent diet. Freestyle Sravanthi download reviewed. Will decrease Lantus to 5 units daily and will discontinue Januvia 50 mg daily. Will start Prandin 1 mg QAC. Encouraged patient and family to keep in touch with blood glucose readings and to vary monitoring times. New Sravanthi placed today to asses new regimen. Will check CMP and urine microalb/cr ratio.\par 2. HTN- BP is above goal. Will increase Enalapril to 10 mg BID and will continue Metoprolol\par 3. Hyperlipidemia- Will check lipids and will continue statin\par 4. Osteoporosis- DEXA scan performed in July 2018 shows left total hip -2.9 with 7.8% decrease, left femoral neck -3.4 with 14.4% decrease and 1/3 radius -2.6 with 3.8% decrease. Given increased risk of fracture, Prolia given today from office supply. Will repeat DEXA scan at next visit. Will check serum calcium and 25 vitamin D level and will continue vitamin D supplement. No calcium supplements given primary hyperparathyroidism. Fall precaution reviewed. \par 5. Primary hyperparathyroidism- Has declined surgery in the past. Will monitor for now. Encouraged hydration. Will check serum calcium, intact PTH and 25 vitamin D level. \par \par Follow up in 6 months for Prolia and DEXA and then 6 months with me\par Follow up with opthalmology  [Diabetes Foot Care] : diabetes foot care [Denosumab Therapy] : Risks  and benefits of denosumab therapy were discussed with the patient including eczema, cellulitis, osteonecrosis of the jaw and atypical femur fractures

## 2020-01-28 NOTE — HISTORY OF PRESENT ILLNESS
[FreeTextEntry1] : 85 y.o. female with h/o Type 2 DM, HTN, hyperlipidemia and osteoporosis here for follow up visit. Diagnosed with valvular heart disease. Follows with cardiology at [facility]. No HAs and no change in vision. Does have facial droop with h/o CVA in the past. Feeling much better and ambulating. Checking FS every morning and are 100s to 140s. FS were running high after the holidays and had Sravanthi Pro placed on 1/16/2020. Taking Lantus 7 units daily and Januvia 50 mg daily. Had hypoglycemia in the past on Humalog 75/25 mix regimen but nothing recent. Past due for opthalmology and does have retinopathy. Saw podiatry recently. \par \par Hand pain has improved. Did have fall with right hip fracture requiring surgery in August 2018 at [facility]. Has had fall since last visit but no other fractures. Began treatment with Prolia in June 2016. Had dose of Prolia was on November 3, 2017 and resumed on July 18, 2018. Treated with Alendronate from May 2011 through May 2015 then had 1 year drug holiday. DEXA scan in May 2016 showed spine -0.3, total hip -2.5 and left femoral neck -2.6, 1/3 radius -2.2. DEXA scan performed in July 2018 shows left total hip -2.9 with 7.8% decrease, left femoral neck -3.4 with 14.4% decrease and 1/3 radius -2.6 with 3.8% decrease. No dental issues. Has dentures. No jaw pain.\par \par H/o primary hyperparathyroidism, not interested in surgical management. No calcium supplements. Takes vitamin D 1,000 Iu daily. Serum calcium and intact PTH levels have been stable.  [___] : [unfilled] [Hypoglycemia] : not hypoglycemic

## 2020-01-28 NOTE — REVIEW OF SYSTEMS
[Fatigue] : fatigue [Recent Weight Gain (___ Lbs)] : no recent weight gain [Recent Weight Loss (___ Lbs)] : no recent weight loss [Polyuria] : no polyuria [Joint Pain] : joint pain [Hair Loss] : no hair loss [Poor Balance] : poor balance [Difficulty Walking] : difficulty walking [Polydipsia] : no polydipsia [Swelling] : swelling [Negative] : Gastrointestinal [All other systems negative] : All other systems negative

## 2020-01-28 NOTE — PHYSICAL EXAM
[Alert] : alert [No Acute Distress] : no acute distress [Normal Sclera/Conjunctiva] : normal sclera/conjunctiva [EOMI] : extra ocular movement intact [Supple] : the neck was supple [Thyroid Not Enlarged] : the thyroid was not enlarged [No LAD] : no lymphadenopathy [No Accessory Muscle Use] : no accessory muscle use [Clear to Auscultation] : lungs were clear to auscultation bilaterally [Normal S1, S2] : normal S1 and S2 [Regular Rhythm] : with a regular rhythm [Normal Bowel Sounds] : normal bowel sounds [Soft] : abdomen soft [Not Tender] : non-tender [Not Distended] : not distended [Kyphosis] : kyphosis present [No Stigmata of Cushings Syndrome] : no stigmata of cushings syndrome [No Clubbing, Cyanosis] : no clubbing  or cyanosis of the fingernails [Right Foot Was Examined] : right foot ~C was examined [No Rash] : no rash [Left Foot Was Examined] : left foot ~C was examined [Normal] : normal [1+] : 1+ in the dorsalis pedis [Diminished Throughout Both Feet] : diminished tactile sensation with monofilament testing throughout both feet [Normal Reflexes] : deep tendon reflexes were 2+ and symmetric [Normal Affect] : the affect was normal [Normal Mood] : the mood was normal [de-identified] : 2/6 ELIDIA [de-identified] : b/l edema left greater than right [FreeTextEntry1] : deformity [FreeTextEntry2] : onychomycosis [FreeTextEntry5] : deformity [FreeTextEntry6] : onychomycosis

## 2020-01-29 LAB
25(OH)D3 SERPL-MCNC: 27.8 NG/ML
ALBUMIN SERPL ELPH-MCNC: 4.4 G/DL
ALP BLD-CCNC: 138 U/L
ALT SERPL-CCNC: 26 U/L
ANION GAP SERPL CALC-SCNC: 11 MMOL/L
AST SERPL-CCNC: 24 U/L
BASOPHILS # BLD AUTO: 0.04 K/UL
BASOPHILS NFR BLD AUTO: 0.7 %
BILIRUB SERPL-MCNC: 0.5 MG/DL
BUN SERPL-MCNC: 17 MG/DL
CALCIUM SERPL-MCNC: 10.5 MG/DL
CALCIUM SERPL-MCNC: 10.5 MG/DL
CHLORIDE SERPL-SCNC: 100 MMOL/L
CHOLEST SERPL-MCNC: 137 MG/DL
CHOLEST/HDLC SERPL: 2.6 RATIO
CO2 SERPL-SCNC: 26 MMOL/L
CREAT SERPL-MCNC: 0.74 MG/DL
EOSINOPHIL # BLD AUTO: 0.12 K/UL
EOSINOPHIL NFR BLD AUTO: 2.1 %
GLUCOSE SERPL-MCNC: 223 MG/DL
HBA1C MFR BLD HPLC: 8.3
HCT VFR BLD CALC: 43.5 %
HDLC SERPL-MCNC: 53 MG/DL
HGB BLD-MCNC: 13.7 G/DL
IMM GRANULOCYTES NFR BLD AUTO: 0.4 %
LDLC SERPL CALC-MCNC: 67 MG/DL
LYMPHOCYTES # BLD AUTO: 1.65 K/UL
LYMPHOCYTES NFR BLD AUTO: 29.2 %
MAN DIFF?: NORMAL
MCHC RBC-ENTMCNC: 28.2 PG
MCHC RBC-ENTMCNC: 31.5 GM/DL
MCV RBC AUTO: 89.7 FL
MONOCYTES # BLD AUTO: 0.51 K/UL
MONOCYTES NFR BLD AUTO: 9 %
NEUTROPHILS # BLD AUTO: 3.31 K/UL
NEUTROPHILS NFR BLD AUTO: 58.6 %
PARATHYROID HORMONE INTACT: 59 PG/ML
PHOSPHATE SERPL-MCNC: 2.9 MG/DL
PLATELET # BLD AUTO: 228 K/UL
POTASSIUM SERPL-SCNC: 4.4 MMOL/L
PROT SERPL-MCNC: 8.4 G/DL
RBC # BLD: 4.85 M/UL
RBC # FLD: 13.2 %
SODIUM SERPL-SCNC: 138 MMOL/L
TRIGL SERPL-MCNC: 85 MG/DL
TSH SERPL-ACNC: 2.99 UIU/ML
WBC # FLD AUTO: 5.65 K/UL

## 2020-05-07 NOTE — PHYSICAL THERAPY INITIAL EVALUATION ADULT - THERAPY FREQUENCY, PT EVAL
"This encounter was conducted via Zoom .   Verbal consent was obtained. Patient's identity was verified.    INITIAL PSYCHIATRIC EVALUATION      This provider informed the patient their medical records are totally confidential except for the use by other providers involved in their care, or if the patient signs a release, or to report instances of child or elder abuse, or if it is determined they are an immediate risk to harm themselves or others.      CHIEF COMPLAINT  \"Not doing well with depression\"    HISTORY OF PRESENT ILLNESS  Kendall Eckert is a 36 y.o. old male comes in today to establish care and for evaluation of worsening depression.  I did reviewed all outpatient psychiatry follow up notes over last 3 years, and patient is new to the clinic.    Patient reports diagnosis of depression for last 20 years and reports struggling with depression for the most part since then.  Patient had difficulty elaborating on stressors or life changes that might have triggered this depression for him.  Patient reports not feeling happy since that age and reports trial of multiple antidepressant and mood stabilizers in the past.  Patient describes worsening depression with low energy, low motivation, increased sleep, hopelessness, feelings of guilt and passive death wishes.  Initial part of the session was dedicated to the safety assessment and patient reports not having any specific thoughts and reports having chronic passive death wishes of \" if something bad happens to me that will be okay\" but denies any intent to harm himself.  Patient expressed marked interest in getting better and describes his parents as main support.  Patient is currently in [place] taking care of his parents and the current COVID-19 situation.  Patient was educated on importance of calling 911.  Information was also sent to his my chart on how to deal with suicidal thoughts.    Patient describes history of bipolar 2 disorder but on evaluation he is " "not meeting full criteria for hypomanic symptoms.  He denies.  Lasting for more than 3 days where he feels less need for sleep with high energy, racing thoughts, easy distractibility, increased irritability but he does report only one symptom of feeling \" very good\", which could be attributed to lifting of of his chronic depressed mood.    Patient denies any history of psychosis and denies any history of trauma or any symptoms consistent with PTSD    Discussed the treatment planning recommendation in detail as following:  · Discussed the plan of using lithium as an augmentation agent and to consider discontinuation of risperidone  · Risperidone was discontinued because patient reports having vivid nightmares since he was initiated on Seroquel and later on risperidone.  Patient has history of akathisia-like symptoms on Abilify and Zyprexa.  Discussed that the symptoms are likely EPS symptoms from antipsychotic and will consider first discontinuation of risperidone.  · Patient was initiated on prazosin by his primary care physician for vivid nightmares management, which are not with trauma team at this point.  Patient agreed with plan of considering risperidone first and then next session will consider discontinuation of prazosin if clinically indicated.  · Patient was initiated on sertraline 4 days ago at 25 mg daily.  Agreed with plan of reaching 50 mg daily dose after 3 more days for depression and anxiety management.  · Discussed the plan of continuing Lamictal  mg daily for mood stabilization and depression augmentation.  We will assess in future if medication discontinuation is indicated.  · Discussed the importance of considering psychotherapy in addition to medication management and also motivated to consider PHP if depression is not showing improvement and stability.  Patient is currently in [place] taking care of his parents during this COVID-19 situation and he will update me once he returns back " "to [place].        PSYCHIATRIC REVIEW OF SYSTEMS: denies manic symptoms, denies psychotic symptoms including AH / VH, denies OCD symptoms, denies restrictive eating or purging, denies trauma related symptoms, see HPI for depressive symptoms and see HPI for anxeity symptoms      MEDICAL REVIEW OF SYSTEMS:   Constitutional negative   Eyes negative   Ears/Nose/Mouth/Throat negative   Cardiovascular negative   Respiratory negative   Gastrointestinal negative   Genitourinary negative   Muscular negative   Integumentary negative   Neurological negative   Endocrine negative   Hematologic/Lymphatic negative     CURRENT MEDICATIONS:  Current Outpatient Medications   Medication Sig Dispense Refill   • naproxen (ALEVE) 220 MG tablet Take 440 mg by mouth as needed.     • FLUoxetine (PROZAC) 20 MG Cap Take 20 mg by mouth 2 times a day.     • buPROPion (WELLBUTRIN XL) 300 MG XL tablet Take 300 mg by mouth every morning.     • atorvastatin (LIPITOR) 40 MG Tab Take 40 mg by mouth every day.       No current facility-administered medications for this visit.          ALLERGIES:  Patient has no known allergies.      PAST PSYCHIATRIC HISTORY  Prior psychiatric hospitalization: March 2020 ([facility])  Prior Self harm/suicide attempt: no  Prior Diagnosis: bipolar 2 disorder, anxiety    PAST PSYCHIATRIC MEDICATIONS  · Fluoxetine  · Wellbutrin-made him more anxious  · Seroquel- not helpful  · Abilify-akathisia  · Seroquel-akathisia  · Xanax  · Klonopin  · Denies any trial of ECT or TMS    FAMILY HISTORY  Psychiatric diagnosis: Mother with history of depression  History of suicide attempts:  no  Substance abuse history:  no    SUBSTANCE USE HISTORY:  ALCOHOL: few beers every other day  TOBACCO: no  CANNABIS: daily \"less amount\" per patient: has reduced the amount from past  OPIOIDS: no  PRESCRIPTION MEDICATIONS: no  OTHERS: no  History of inpatient/outpatient rehab treatment: none    SOCIAL HISTORY  Childhood: born in [place] " "and describes childhood as ok  Education: Masters in business management  in Special Education: no  Intellectual Disability: no  Employment: Was working in [employer] but not working due to COVID-19 (laid off)  Relationship: single  Kids: no  Current living situation: lives with parents now  Current/past legal issues: no  History of emotional/physical/sexual abuse - no    MEDICAL HISTORY  Past Medical History:   Diagnosis Date   • Depression    • Hyperlipidemia      No past surgical history on file.      PHYSICAL EXAMINAION:  Vital signs: Ht 1.727 m (5' 8\")   Wt 97.5 kg (215 lb)   BMI 32.69 kg/m²   Musculoskeletal: Normal gait.   Abnormal movements: none    MENTAL STATUS EXAMINATION      General:   - Grooming and hygiene: Casual,   - Apparent distress: none,   - Behavior: Tense  - Eye Contact:  Good,   - no psychomotor agitation or retardation    - Participation: Active verbal participation  Orientation: Alert and Fully Oriented to person, place and time  Mood: Depressed and Anxious  Affect: Constricted,  Thought Process: Logical and Goal-directed  Thought Content: Denies suicidal or homicidal ideations, intent or plan Within normal limits  Perception: Denies auditory or visual hallucinations. No delusions noted Within normal limits  Attention span and concentration: Intact   Speech:Rate within normal limits and Volume within normal limits  Language: Appropriate   Insight: Adequate  Judgment: Good  Recent and remote memory: No gross evidence of memory deficits      DEPRESSION SCREENING:  Depression Screen (PHQ-2/PHQ-9) 7/7/2019 5/8/2020   PHQ-2 Total Score 2 -   PHQ-2 Total Score - 6   PHQ-9 Total Score 7 -   PHQ-9 Total Score - 22       Interpretation of PHQ-9 Total Score   Score Severity   1-4 No Depression   5-9 Mild Depression   10-14 Moderate Depression   15-19 Moderately Severe Depression   20-27 Severe Depression      SAFETY ASSESSMENT - SELF:    Does patient acknowledge current or past symptoms of " dangerousness to self? no  History of suicide by family member: no  History of suicide by friend/significant other: no  Recent change in amount/specificity/intensity of suicidal thoughts or self-harm behavior? no  Current access to firearms, medications, or other identified means of suicide/self-harm? no  Protective factors present: parents       SAFETY ASSESSMENT - OTHERS:    Does patient acknowledge current or past symptoms of aggressive behavior or risk to others? no  Recent change in amount/specificity/intensity of thoughts or threats to harm others? no  Current access to firearms/other identified means of harm? no       CURRENT RISK:       Suicidal: Low       Homicidal: Low       Self-Harm: Low       Relapse: Low       Crisis Safety Plan Reviewed Not Indicated    MEDICAL RECORDS/LABS/DIAGNOSTIC TESTS REVIEWED    [facility] records -   [id number]   No data.     ASSESSMENT  Patient is 36-year-old male with history of major depressive disorder and is currently on combination of sertraline, Lamictal, risperidone and prazosin.  No improvement in depression or anxiety symptoms.  Patient reports history of bipolar 2 disorder given by past provider but is currently not meeting criteria for bipolar manic or hypomanic symptoms in the past.  Patient will benefit from psychotherapy in addition to medication management and will continue to motivate patient in coming sessions.      DIFFERENTIAL DIAGNOSES  · Major depressive disorder, recurrent, severe without psychotic features rule out Bipolar 2 disorder.  · Generalized anxiety disorder  · Cannabis abuse  · Alcohol abuse      PLAN:  (1) Major depressive disorder, recurrent, severe without psychotic features rule out Bipolar 2 disorder  • Worsening with PHQ 9 of 22  • Increase sertraline to 50 mg daily for depression and anxiety management.  Given 1 month supply with no refill.  • Continue Lamictal  mg daily for depression augmentation.  • Discontinue risperidone and add  Lithobid 450 mg daily after dinner for depression augmentation and mood stabilization and to help with passive suicidal thoughts.  Given 30-day supply with no refill.  • Lab work ordered for lithium level, CBC, CMP and TSH.  • Continue prazosin and in next session we will consider discontinuation option.  • Motivated to consider psychotherapy or partial hospitalization program but patient is currently not interested as he is in [place].  • Medication options, alternatives (including no medications) and medication risks/benefits/side effects were discussed in detail.  • Explained importance of contraceptive measures while on psychotropic medications, educated to let provider know if ever pregnant or wanting to become pregnant. Verbalized understanding.  • The patient was advised to call, message provider on motifyhart, or come in to the clinic if symptoms worsen or if any future questions/issues regarding their medications arise; the patient verbalized understanding and agreement.    • The patient was educated to call 911, call the suicide hotline, or go to local ER if having thoughts of suicide or homicide; verbalized understanding.    (2) Generalized Anxiety disorder  • Worsening with GAD7 of 7  • Increase sertraline to 50 mg daily for depression and anxiety management.  Given 1 month supply with no refill.  • Continue Lamictal  mg daily for depression augmentation.  • Continue prazosin and in next session we will consider discontinuation option.  • Motivated to consider psychotherapy or partial hospitalization program but patient is currently not interested as he is in [place].    (3) alcohol abuse and cannabis abuse:  · Motivated to consider reduction in amount and gradual discontinuation.      Return to clinic in 2 weeks or sooner if symptoms worsen.  Next Appointment: May 22 at 10:30 am.    The proposed treatment plan was discussed with the patient who was provided the opportunity to ask questions and  make suggestions regarding alternative treatment. Patient verbalized understanding and expressed agreement with the plan.     Thank you for allowing me to participate in the care of this patient.    Choco Weathers M.D.  05/08/20    CC:   Pcp Pt States None    This note was created using voice recognition software (Dragon). The accuracy of the dictation is limited by the abilities of the software. I have reviewed the note prior to signing, however some errors in grammar and context are still possible. If you have any questions related to this note please do not hesitate to contact our office.    3-5x/week

## 2020-09-16 ENCOUNTER — APPOINTMENT (OUTPATIENT)
Dept: ENDOCRINOLOGY | Facility: CLINIC | Age: 85
End: 2020-09-16
Payer: MEDICARE

## 2020-09-16 VITALS
HEIGHT: 62 IN | OXYGEN SATURATION: 98 % | BODY MASS INDEX: 18.4 KG/M2 | DIASTOLIC BLOOD PRESSURE: 80 MMHG | WEIGHT: 100 LBS | TEMPERATURE: 98 F | HEART RATE: 80 BPM | SYSTOLIC BLOOD PRESSURE: 150 MMHG

## 2020-09-16 VITALS — SYSTOLIC BLOOD PRESSURE: 140 MMHG | DIASTOLIC BLOOD PRESSURE: 80 MMHG

## 2020-09-16 DIAGNOSIS — Z23 ENCOUNTER FOR IMMUNIZATION: ICD-10-CM

## 2020-09-16 LAB
GLUCOSE BLDC GLUCOMTR-MCNC: 190
HBA1C MFR BLD HPLC: 7.6

## 2020-09-16 PROCEDURE — 96372 THER/PROPH/DIAG INJ SC/IM: CPT

## 2020-09-16 PROCEDURE — 77080 DXA BONE DENSITY AXIAL: CPT

## 2020-09-16 PROCEDURE — G0008: CPT

## 2020-09-16 PROCEDURE — 90662 IIV NO PRSV INCREASED AG IM: CPT

## 2020-09-16 PROCEDURE — 99214 OFFICE O/P EST MOD 30 MIN: CPT | Mod: 25

## 2020-09-16 PROCEDURE — ZZZZZ: CPT

## 2020-09-16 PROCEDURE — 83036 HEMOGLOBIN GLYCOSYLATED A1C: CPT | Mod: QW

## 2020-09-16 PROCEDURE — 82962 GLUCOSE BLOOD TEST: CPT

## 2020-09-16 RX ORDER — DENOSUMAB 60 MG/ML
60 INJECTION SUBCUTANEOUS
Qty: 1 | Refills: 0 | Status: COMPLETED | OUTPATIENT
Start: 2020-09-16

## 2020-09-16 RX ADMIN — DENOSUMAB 0 MG/ML: 60 INJECTION SUBCUTANEOUS at 00:00

## 2020-09-16 NOTE — ASSESSMENT
[Diabetes Foot Care] : diabetes foot care [Denosumab Therapy] : Risks  and benefits of denosumab therapy were discussed with the patient including eczema, cellulitis, osteonecrosis of the jaw and atypical femur fractures [FreeTextEntry1] : 86 y.o. female with h/o Type 2 DM, HTN, hyperlipidemia, osteoporosis and primary hyperparathyroidism.\par 1. Type 2 DM- Fair control with Hba1c of 7.6% today. No need for tight glycemic control given patient's age. Encouraged carbohydrate consistent diet.  Will continue Lantus 5 units daily and Prandin 1 mg QAC. Encouraged patient and family to keep in touch with blood glucose readings and to vary monitoring times. Will check CMP and urine microalb/cr ratio.\par 2. HTN- BP is above goal and monitor.  Will continue Enalapril 10 mg BID and will continue Metoprolol\par 3. Hyperlipidemia- Will check lipids and will continue statin\par 4. Osteoporosis- DEXA scan performed today shows left total hip -2.5 with 8% increase, left femoral neck -2.8 with 12.8% increase and 1/3 radius -3.3 with 7.5% decrease. Given increased risk of fracture, Prolia given today from office supply. Will repeat DEXA scan in 2021 given decrease in BMD at 1/3 radius. Suspect bone loss related to hyperparathyroidism. Will check serum calcium and 25 vitamin D level and will continue vitamin D supplement. No calcium supplements given primary hyperparathyroidism. Fall precaution reviewed. \par 5. Primary hyperparathyroidism- Has declined surgery in the past. Will monitor for now. Encouraged hydration. Will check serum calcium, intact PTH and 25 vitamin D level. \par \par Follow up in 6 months for Prolia and Type 2 DM\par Follow up with opthalmology \par Flu vaccine given today

## 2020-09-16 NOTE — REVIEW OF SYSTEMS
[Recent Weight Gain (___ Lbs)] : recent weight gain: [unfilled] lbs [Fatigue] : no fatigue [SOB on Exertion] : shortness of breath on exertion [Polyuria] : no polyuria [Joint Pain] : joint pain [Pain/Numbness of Digits] : no pain/numbness of digits [Negative] : Endocrine

## 2020-09-16 NOTE — HISTORY OF PRESENT ILLNESS
[___] : [unfilled] [FreeTextEntry1] : 86 y.o. female with h/o Type 2 DM, HTN, hyperlipidemia and osteoporosis here for follow up visit. Diagnosed with valvular heart disease. Follows with cardiology at [place]. No HAs and no change in vision. Does have facial droop with h/o CVA in the past. Feeling much better and ambulating. Checking FS every morning and are 120s. Taking Lantus 5 units daily and Repaglinide 1 mg QAC. Had hypoglycemia in the past on Humalog 75/25 mix regimen but nothing recently. Past due for opthalmology and does have retinopathy. Saw podiatry in January 2020. \par \par Hand pain has improved. Did have fall with right hip fracture requiring surgery in August 2018 at [place]. Has had fall since last visit but no other fractures. Began treatment with Prolia in June 2016. Had dose of Prolia was on November 3, 2017 and resumed on July 18, 2018. Treated with Alendronate from May 2011 through May 2015 then had 1 year drug holiday. DEXA scan in May 2016 showed spine -0.3, total hip -2.5 and left femoral neck -2.6, 1/3 radius -2.2. DEXA scan performed in July 2018 shows left total hip -2.9 with 7.8% decrease, left femoral neck -3.4 with 14.4% decrease and 1/3 radius -2.6 with 3.8% decrease. No dental issues. Has dentures. No jaw pain.\par \par H/o primary hyperparathyroidism, not interested in surgical management. No calcium supplements. Takes vitamin D 1,000 Iu daily. Serum calcium and intact PTH levels have been stable.  [Hypoglycemia] : not hypoglycemic

## 2020-09-16 NOTE — PHYSICAL EXAM
[Alert] : alert [Normal Sclera/Conjunctiva] : normal sclera/conjunctiva [No Acute Distress] : no acute distress [EOMI] : extra ocular movement intact [No LAD] : no lymphadenopathy [Thyroid Not Enlarged] : the thyroid was not enlarged [No Respiratory Distress] : no respiratory distress [Normal S1, S2] : normal S1 and S2 [Clear to Auscultation] : lungs were clear to auscultation bilaterally [Regular Rhythm] : with a regular rhythm [No Edema] : no peripheral edema [Normal Bowel Sounds] : normal bowel sounds [Not Tender] : non-tender [Soft] : abdomen soft [Not Distended] : not distended [No Spinal Tenderness] : no spinal tenderness [Normal Anterior Cervical Nodes] : no anterior cervical lymphadenopathy [Kyphosis] : kyphosis present [No Clubbing, Cyanosis] : no clubbing  or cyanosis of the fingernails [No Rash] : no rash [Right Foot Was Examined] : right foot ~C was examined [Left Foot Was Examined] : left foot ~C was examined [1+] : 1+ in the dorsalis pedis [Diminished Throughout Both Feet] : diminished tactile sensation with monofilament testing throughout both feet [Normal Reflexes] : deep tendon reflexes were 2+ and symmetric [Normal Affect] : the affect was normal [Normal Mood] : the mood was normal [FreeTextEntry1] : callus [FreeTextEntry2] : hammer toes [FreeTextEntry5] : callus [FreeTextEntry6] : hammer toes

## 2020-09-18 LAB
25(OH)D3 SERPL-MCNC: 23.8 NG/ML
ALBUMIN SERPL ELPH-MCNC: 4.4 G/DL
ALP BLD-CCNC: 111 U/L
ALT SERPL-CCNC: 27 U/L
ANION GAP SERPL CALC-SCNC: 10 MMOL/L
AST SERPL-CCNC: 28 U/L
BASOPHILS # BLD AUTO: 0.06 K/UL
BASOPHILS NFR BLD AUTO: 0.8 %
BILIRUB SERPL-MCNC: 0.6 MG/DL
BUN SERPL-MCNC: 19 MG/DL
CALCIUM SERPL-MCNC: 10.3 MG/DL
CALCIUM SERPL-MCNC: 10.3 MG/DL
CHLORIDE SERPL-SCNC: 100 MMOL/L
CHOLEST SERPL-MCNC: 143 MG/DL
CHOLEST/HDLC SERPL: 2.6 RATIO
CO2 SERPL-SCNC: 28 MMOL/L
CREAT SERPL-MCNC: 0.85 MG/DL
EOSINOPHIL # BLD AUTO: 0.23 K/UL
EOSINOPHIL NFR BLD AUTO: 3.3 %
GLUCOSE SERPL-MCNC: 204 MG/DL
HCT VFR BLD CALC: 48.8 %
HDLC SERPL-MCNC: 56 MG/DL
HGB BLD-MCNC: 14.9 G/DL
IMM GRANULOCYTES NFR BLD AUTO: 0.4 %
LDLC SERPL CALC-MCNC: 75 MG/DL
LYMPHOCYTES # BLD AUTO: 1.55 K/UL
LYMPHOCYTES NFR BLD AUTO: 21.9 %
MAN DIFF?: NORMAL
MCHC RBC-ENTMCNC: 28.5 PG
MCHC RBC-ENTMCNC: 30.5 GM/DL
MCV RBC AUTO: 93.3 FL
MONOCYTES # BLD AUTO: 0.51 K/UL
MONOCYTES NFR BLD AUTO: 7.2 %
NEUTROPHILS # BLD AUTO: 4.69 K/UL
NEUTROPHILS NFR BLD AUTO: 66.4 %
PARATHYROID HORMONE INTACT: 69 PG/ML
PHOSPHATE SERPL-MCNC: 3.3 MG/DL
PLATELET # BLD AUTO: 216 K/UL
POTASSIUM SERPL-SCNC: 4.8 MMOL/L
PROT SERPL-MCNC: 8.4 G/DL
RBC # BLD: 5.23 M/UL
RBC # FLD: 13.8 %
SODIUM SERPL-SCNC: 139 MMOL/L
TRIGL SERPL-MCNC: 62 MG/DL
TSH SERPL-ACNC: 3.16 UIU/ML
WBC # FLD AUTO: 7.07 K/UL

## 2020-12-16 ENCOUNTER — RX RENEWAL (OUTPATIENT)
Age: 85
End: 2020-12-16

## 2021-02-18 ENCOUNTER — RX RENEWAL (OUTPATIENT)
Age: 86
End: 2021-02-18

## 2021-02-19 ENCOUNTER — RX RENEWAL (OUTPATIENT)
Age: 86
End: 2021-02-19

## 2021-03-08 ENCOUNTER — RX RENEWAL (OUTPATIENT)
Age: 86
End: 2021-03-08

## 2021-03-17 ENCOUNTER — APPOINTMENT (OUTPATIENT)
Dept: ENDOCRINOLOGY | Facility: CLINIC | Age: 86
End: 2021-03-17
Payer: MEDICARE

## 2021-03-17 VITALS
TEMPERATURE: 98 F | OXYGEN SATURATION: 98 % | HEIGHT: 62 IN | HEART RATE: 93 BPM | SYSTOLIC BLOOD PRESSURE: 120 MMHG | BODY MASS INDEX: 16.93 KG/M2 | WEIGHT: 92 LBS | DIASTOLIC BLOOD PRESSURE: 80 MMHG

## 2021-03-17 LAB
GLUCOSE BLDC GLUCOMTR-MCNC: 348
HBA1C MFR BLD HPLC: 8.7

## 2021-03-17 PROCEDURE — 96372 THER/PROPH/DIAG INJ SC/IM: CPT

## 2021-03-17 PROCEDURE — 99072 ADDL SUPL MATRL&STAF TM PHE: CPT

## 2021-03-17 PROCEDURE — 82962 GLUCOSE BLOOD TEST: CPT

## 2021-03-17 PROCEDURE — 83036 HEMOGLOBIN GLYCOSYLATED A1C: CPT | Mod: QW

## 2021-03-17 PROCEDURE — 99214 OFFICE O/P EST MOD 30 MIN: CPT | Mod: 25

## 2021-03-17 RX ORDER — ENALAPRIL MALEATE 10 MG/1
10 TABLET ORAL TWICE DAILY
Qty: 180 | Refills: 3 | Status: DISCONTINUED | COMMUNITY
Start: 2019-05-06 | End: 2021-03-17

## 2021-03-17 RX ORDER — DENOSUMAB 60 MG/ML
60 INJECTION SUBCUTANEOUS
Qty: 1 | Refills: 0 | Status: COMPLETED | OUTPATIENT
Start: 2021-03-17

## 2021-03-17 RX ADMIN — DENOSUMAB 0 MG/ML: 60 INJECTION SUBCUTANEOUS at 00:00

## 2021-03-17 NOTE — PHYSICAL EXAM
[Alert] : alert [No Acute Distress] : no acute distress [Normal Sclera/Conjunctiva] : normal sclera/conjunctiva [EOMI] : extra ocular movement intact [No LAD] : no lymphadenopathy [Thyroid Not Enlarged] : the thyroid was not enlarged [No Respiratory Distress] : no respiratory distress [Clear to Auscultation] : lungs were clear to auscultation bilaterally [Normal S1, S2] : normal S1 and S2 [Regular Rhythm] : with a regular rhythm [No Edema] : no peripheral edema [Normal Bowel Sounds] : normal bowel sounds [Not Tender] : non-tender [Not Distended] : not distended [Soft] : abdomen soft [Normal Anterior Cervical Nodes] : no anterior cervical lymphadenopathy [No Spinal Tenderness] : no spinal tenderness [Kyphosis] : kyphosis present [No Clubbing, Cyanosis] : no clubbing  or cyanosis of the fingernails [No Rash] : no rash [Right Foot Was Examined] : right foot ~C was examined [Left Foot Was Examined] : left foot ~C was examined [1+] : 1+ in the dorsalis pedis [Diminished Throughout Both Feet] : diminished tactile sensation with monofilament testing throughout both feet [Normal Reflexes] : deep tendon reflexes were 2+ and symmetric [Normal Affect] : the affect was normal [Normal Mood] : the mood was normal [FreeTextEntry1] : callus [FreeTextEntry2] : hammer toes [FreeTextEntry5] : callus [FreeTextEntry6] : hammer toes

## 2021-03-17 NOTE — HISTORY OF PRESENT ILLNESS
[___] : [unfilled] [FreeTextEntry1] : 86 y.o. female with h/o Type 2 DM, HTN, hyperlipidemia and osteoporosis here for follow up visit. Diagnosed with valvular heart disease. Follows with cardiology at [place]. No HAs and no change in vision. Does have facial droop with h/o CVA in the past. Feeling much better and ambulating. Checking FS every morning and are 130s. Taking Lantus 5 units daily, Januvia 50 mg daily and Repaglinide 1 mg QAC. Had hypoglycemia in the past on Humalog 75/25 mix regimen but nothing recently. Past due for opthalmology and does have retinopathy. Saw podiatry recently. Does have proteinuria. \par \par Hand pain has improved. Did have fall with right hip fracture requiring surgery in August 2018 at [place]. Has had fall since last visit but no other fractures. Began treatment with Prolia in June 2016. Had dose of Prolia was on November 3, 2017 and resumed on July 18, 2018. Treated with Alendronate from May 2011 through May 2015 then had 1 year drug holiday. DEXA scan in May 2016 showed spine -0.3, total hip -2.5 and left femoral neck -2.6, 1/3 radius -2.2. DEXA scan performed in July 2018 shows left total hip -2.9 with 7.8% decrease, left femoral neck -3.4 with 14.4% decrease and 1/3 radius -2.6 with 3.8% decrease. DEXA scan performed in 9/2020 shows left total hip -2.5 with 8% increase, left femoral neck -2.8 with 12.8% increase and 1/3 radius -3.3 with 7.5% decrease. No dental issues. Has dentures. No jaw pain.\par \par H/o primary hyperparathyroidism, not interested in surgical management. No calcium supplements. Takes vitamin D 1,000 Iu daily. Serum calcium and intact PTH levels have been stable.  [Hypoglycemia] : not hypoglycemic

## 2021-03-17 NOTE — ASSESSMENT
[Diabetes Foot Care] : diabetes foot care [Denosumab Therapy] : Risks  and benefits of denosumab therapy were discussed with the patient including eczema, cellulitis, osteonecrosis of the jaw and atypical femur fractures [FreeTextEntry1] : 86 y.o. female with h/o Type 2 DM, HTN, hyperlipidemia, osteoporosis and primary hyperparathyroidism.\par \par 1. Type 2 DM- Suboptimal control with Hba1c of 8.7% today. No need for tight glycemic control given patient's age. Encouraged carbohydrate consistent diet.  Will continue Lantus 5 units daily, Januvia 50 mg daily and will increase Prandin to 2 mg QAC. Encouraged patient and family to keep in touch with blood glucose readings and to vary monitoring times. Will check CMP and urine microalb/cr ratio.\par \par 2. HTN- BP is at goal and monitor.  Will continue Enalapril 5 mg BID and will continue Metoprolol\par \par 3. Hyperlipidemia- Will check lipids and will continue statin\par \par 4. Osteoporosis- DEXA scan performed in 9/2020 shows left total hip -2.5 with 8% increase, left femoral neck -2.8 with 12.8% increase and 1/3 radius -3.3 with 7.5% decrease. Given increased risk of fracture, Prolia given today from office supply. Will repeat DEXA scan in 2022 given decrease in BMD at 1/3 radius. Suspect bone loss related to hyperparathyroidism. Will check serum calcium and 25 vitamin D level and will continue vitamin D supplement. No calcium supplements given primary hyperparathyroidism. Fall precaution reviewed. \par \par 5. Primary hyperparathyroidism- Has declined surgery in the past. Will monitor for now. Encouraged hydration. Will check serum calcium, intact PTH and 25 vitamin D level. \par \par Follow up in 6 months for Prolia and Type 2 DM\par Follow up with opthalmology \par

## 2021-03-17 NOTE — REVIEW OF SYSTEMS
[SOB on Exertion] : shortness of breath on exertion [Joint Pain] : joint pain [Negative] : Endocrine [Fatigue] : no fatigue [Recent Weight Gain (___ Lbs)] : no recent weight gain [Recent Weight Loss (___ Lbs)] : recent weight loss: [unfilled] lbs [Dysphagia] : no dysphagia [Neck Pain] : no neck pain [Hearing Loss] : hearing loss [Dysphonia] : no dysphonia [Polyuria] : no polyuria [Pain/Numbness of Digits] : no pain/numbness of digits [Swelling] : no swelling

## 2021-03-18 LAB
25(OH)D3 SERPL-MCNC: 26.1 NG/ML
ALBUMIN SERPL ELPH-MCNC: 4.2 G/DL
ALP BLD-CCNC: 113 U/L
ALT SERPL-CCNC: 22 U/L
ANION GAP SERPL CALC-SCNC: 9 MMOL/L
AST SERPL-CCNC: 22 U/L
BASOPHILS # BLD AUTO: 0.04 K/UL
BASOPHILS NFR BLD AUTO: 0.6 %
BILIRUB SERPL-MCNC: 0.5 MG/DL
BUN SERPL-MCNC: 31 MG/DL
CALCIUM SERPL-MCNC: 10.8 MG/DL
CALCIUM SERPL-MCNC: 10.8 MG/DL
CHLORIDE SERPL-SCNC: 99 MMOL/L
CHOLEST SERPL-MCNC: 150 MG/DL
CO2 SERPL-SCNC: 28 MMOL/L
CREAT SERPL-MCNC: 0.84 MG/DL
EOSINOPHIL # BLD AUTO: 0.16 K/UL
EOSINOPHIL NFR BLD AUTO: 2.5 %
GLUCOSE SERPL-MCNC: 359 MG/DL
HCT VFR BLD CALC: 42 %
HDLC SERPL-MCNC: 53 MG/DL
HGB BLD-MCNC: 13.1 G/DL
IMM GRANULOCYTES NFR BLD AUTO: 0.6 %
LDLC SERPL CALC-MCNC: 81 MG/DL
LYMPHOCYTES # BLD AUTO: 1.87 K/UL
LYMPHOCYTES NFR BLD AUTO: 29.6 %
MAN DIFF?: NORMAL
MCHC RBC-ENTMCNC: 27.6 PG
MCHC RBC-ENTMCNC: 31.2 GM/DL
MCV RBC AUTO: 88.6 FL
MONOCYTES # BLD AUTO: 0.46 K/UL
MONOCYTES NFR BLD AUTO: 7.3 %
NEUTROPHILS # BLD AUTO: 3.75 K/UL
NEUTROPHILS NFR BLD AUTO: 59.4 %
NONHDLC SERPL-MCNC: 97 MG/DL
PARATHYROID HORMONE INTACT: 64 PG/ML
PHOSPHATE SERPL-MCNC: 2.9 MG/DL
PLATELET # BLD AUTO: 272 K/UL
POTASSIUM SERPL-SCNC: 5.3 MMOL/L
PROT SERPL-MCNC: 8.5 G/DL
RBC # BLD: 4.74 M/UL
RBC # FLD: 13.4 %
SODIUM SERPL-SCNC: 136 MMOL/L
TRIGL SERPL-MCNC: 78 MG/DL
TSH SERPL-ACNC: 3.57 UIU/ML
WBC # FLD AUTO: 6.32 K/UL

## 2021-03-21 ENCOUNTER — RX RENEWAL (OUTPATIENT)
Age: 86
End: 2021-03-21

## 2021-04-09 ENCOUNTER — APPOINTMENT (OUTPATIENT)
Dept: UROGYNECOLOGY | Facility: CLINIC | Age: 86
End: 2021-04-09

## 2021-05-04 ENCOUNTER — RX RENEWAL (OUTPATIENT)
Age: 86
End: 2021-05-04

## 2021-07-14 NOTE — PROGRESS NOTE ADULT - ASSESSMENT
Alert and oriented. Patient appeared to be sleeping well in between encounters. No complaints of pain. Purewick on for the overnight and with good output. Lbm 07/13.  No SOB. Call light in reach. Continue POC.       Patient's most recent vital signs are:     Vital signs:  BP: 122/67  Temp: 96.5  HR: 61  RR: 16  SpO2: 96 %     Patient does not have new respiratory symptoms.  Patient does not have new sore throat.  Patient does not have a fever greater than 99.5.          84 year old female pmh of HTN, hLD< TIA/CVA (no deficit), DM, p/w AMS 2/2 hypoglycemia and found to have ST depression and ST elevation admitted to telemetry for r/o ACS.

## 2021-07-16 NOTE — ED PROVIDER NOTE - CONSTITUTIONAL MANNER
Pt resting in bed with call light in reach, bed ready at this time. No distress noted, pt breaths easy and unlabored.        [facility]  07/15/21 [id number] appropriate for situation

## 2021-08-10 NOTE — ED ADULT NURSE NOTE - ED STAT RN HANDOFF DETAILS
report given to Jerome MATHEWS Arava Counseling:  Patient counseled regarding adverse effects of Arava including but not limited to nausea, vomiting, abnormalities in liver function tests. Patients may develop mouth sores, rash, diarrhea, and abnormalities in blood counts. The patient understands that monitoring is required including LFTs and blood counts.  There is a rare possibility of scarring of the liver and lung problems that can occur when taking methotrexate. Persistent nausea, loss of appetite, pale stools, dark urine, cough, and shortness of breath should be reported immediately. Patient advised to discontinue Arava treatment and consult with a physician prior to attempting conception. The patient will have to undergo a treatment to eliminate Arava from the body prior to conception.

## 2021-08-18 ENCOUNTER — RX RENEWAL (OUTPATIENT)
Age: 86
End: 2021-08-18

## 2021-09-07 NOTE — ED PROVIDER NOTE - CROS ED GU ALL NEG
Called and spoke with mom  Stated she's noticed a lot of cerumen build up in pt's ears; more in the right than the left  Mom has been using a q-tip to try and clean out his ear, but stated will go through 3-4 for just his right ear  Per mom, does not insert q-tip into ear canal, just the outer area  Advised not to insert into canal due to risk of pushing ear wax too deep and causing perforation  Pt does speak, but mom feels like he's not pronouncing words as he should due to cerumen  No pain, no drainage of any sort from ears  Mom requesting appt for Friday, appt scheduled for 8am 9/10 with MIKE  negative...

## 2021-09-22 ENCOUNTER — APPOINTMENT (OUTPATIENT)
Dept: ENDOCRINOLOGY | Facility: CLINIC | Age: 86
End: 2021-09-22

## 2021-10-28 ENCOUNTER — NON-APPOINTMENT (OUTPATIENT)
Age: 86
End: 2021-10-28

## 2021-10-28 ENCOUNTER — MED ADMIN CHARGE (OUTPATIENT)
Age: 86
End: 2021-10-28

## 2021-10-28 ENCOUNTER — APPOINTMENT (OUTPATIENT)
Dept: ENDOCRINOLOGY | Facility: CLINIC | Age: 86
End: 2021-10-28
Payer: MEDICARE

## 2021-10-28 VITALS — TEMPERATURE: 98 F | BODY MASS INDEX: 17.98 KG/M2 | HEIGHT: 58.7 IN | WEIGHT: 88 LBS

## 2021-10-28 PROCEDURE — 77080 DXA BONE DENSITY AXIAL: CPT

## 2021-10-28 PROCEDURE — 96372 THER/PROPH/DIAG INJ SC/IM: CPT

## 2021-10-28 RX ORDER — DENOSUMAB 60 MG/ML
60 INJECTION SUBCUTANEOUS
Qty: 1 | Refills: 0 | Status: COMPLETED | OUTPATIENT
Start: 2021-10-28

## 2021-10-28 RX ADMIN — DENOSUMAB 60 MG/ML: 60 INJECTION SUBCUTANEOUS at 00:00

## 2021-10-29 ENCOUNTER — NON-APPOINTMENT (OUTPATIENT)
Age: 86
End: 2021-10-29

## 2021-11-01 LAB
25(OH)D3 SERPL-MCNC: 21.9 NG/ML
ALBUMIN SERPL ELPH-MCNC: 3.8 G/DL
ALP BLD-CCNC: 90 U/L
ALT SERPL-CCNC: 35 U/L
ANION GAP SERPL CALC-SCNC: 12 MMOL/L
AST SERPL-CCNC: 36 U/L
BILIRUB SERPL-MCNC: 0.4 MG/DL
BUN SERPL-MCNC: 23 MG/DL
CALCIUM SERPL-MCNC: 10.4 MG/DL
CHLORIDE SERPL-SCNC: 104 MMOL/L
CO2 SERPL-SCNC: 24 MMOL/L
CREAT SERPL-MCNC: 0.75 MG/DL
GLUCOSE SERPL-MCNC: 206 MG/DL
POTASSIUM SERPL-SCNC: 4.4 MMOL/L
PROT SERPL-MCNC: 8.1 G/DL
SODIUM SERPL-SCNC: 140 MMOL/L

## 2021-12-10 ENCOUNTER — APPOINTMENT (OUTPATIENT)
Dept: ENDOCRINOLOGY | Facility: CLINIC | Age: 86
End: 2021-12-10

## 2022-01-05 ENCOUNTER — RX RENEWAL (OUTPATIENT)
Age: 87
End: 2022-01-05

## 2022-01-21 ENCOUNTER — APPOINTMENT (OUTPATIENT)
Dept: UROGYNECOLOGY | Facility: CLINIC | Age: 87
End: 2022-01-21

## 2022-03-02 ENCOUNTER — NON-APPOINTMENT (OUTPATIENT)
Age: 87
End: 2022-03-02

## 2022-03-04 ENCOUNTER — NON-APPOINTMENT (OUTPATIENT)
Age: 87
End: 2022-03-04

## 2022-03-16 ENCOUNTER — RX RENEWAL (OUTPATIENT)
Age: 87
End: 2022-03-16

## 2022-03-18 ENCOUNTER — APPOINTMENT (OUTPATIENT)
Dept: ENDOCRINOLOGY | Facility: CLINIC | Age: 87
End: 2022-03-18

## 2022-03-28 ENCOUNTER — NON-APPOINTMENT (OUTPATIENT)
Age: 87
End: 2022-03-28

## 2022-04-28 ENCOUNTER — APPOINTMENT (OUTPATIENT)
Dept: ENDOCRINOLOGY | Facility: CLINIC | Age: 87
End: 2022-04-28

## 2022-05-03 ENCOUNTER — APPOINTMENT (OUTPATIENT)
Dept: ENDOCRINOLOGY | Facility: CLINIC | Age: 87
End: 2022-05-03
Payer: MEDICARE

## 2022-05-03 VITALS
DIASTOLIC BLOOD PRESSURE: 80 MMHG | HEIGHT: 58.7 IN | WEIGHT: 87 LBS | SYSTOLIC BLOOD PRESSURE: 195 MMHG | TEMPERATURE: 98.4 F | OXYGEN SATURATION: 98 % | BODY MASS INDEX: 17.77 KG/M2 | HEART RATE: 92 BPM

## 2022-05-03 VITALS — SYSTOLIC BLOOD PRESSURE: 160 MMHG | DIASTOLIC BLOOD PRESSURE: 70 MMHG

## 2022-05-03 LAB — HBA1C MFR BLD HPLC: 8.1

## 2022-05-03 PROCEDURE — 99215 OFFICE O/P EST HI 40 MIN: CPT | Mod: 25

## 2022-05-03 PROCEDURE — 36415 COLL VENOUS BLD VENIPUNCTURE: CPT

## 2022-05-03 PROCEDURE — 96372 THER/PROPH/DIAG INJ SC/IM: CPT

## 2022-05-03 PROCEDURE — 83036 HEMOGLOBIN GLYCOSYLATED A1C: CPT | Mod: QW

## 2022-05-03 RX ORDER — FLASH GLUCOSE SENSOR
KIT MISCELLANEOUS
Qty: 2 | Refills: 5 | Status: ACTIVE | COMMUNITY
Start: 2022-03-04 | End: 1900-01-01

## 2022-05-03 RX ORDER — FLASH GLUCOSE SCANNING READER
EACH MISCELLANEOUS
Qty: 1 | Refills: 0 | Status: ACTIVE | COMMUNITY
Start: 2022-03-04 | End: 1900-01-01

## 2022-05-03 RX ORDER — SITAGLIPTIN 50 MG/1
50 TABLET, FILM COATED ORAL
Qty: 90 | Refills: 1 | Status: DISCONTINUED | COMMUNITY
Start: 2019-02-14 | End: 2022-05-03

## 2022-05-03 RX ORDER — DENOSUMAB 60 MG/ML
60 INJECTION SUBCUTANEOUS
Qty: 1 | Refills: 0 | Status: COMPLETED | OUTPATIENT
Start: 2022-05-03

## 2022-05-03 RX ADMIN — DENOSUMAB 0 MG/ML: 60 INJECTION SUBCUTANEOUS at 00:00

## 2022-05-03 NOTE — REASON FOR VISIT
[Follow - Up] : a follow-up visit [DM Type 2] : DM Type 2 [Osteoporosis] : osteoporosis [Family Member] : family member

## 2022-05-03 NOTE — REVIEW OF SYSTEMS
[Recent Weight Loss (___ Lbs)] : recent weight loss: [unfilled] lbs [Hearing Loss] : hearing loss [SOB on Exertion] : shortness of breath on exertion [Joint Pain] : joint pain [Negative] : Endocrine [Fatigue] : no fatigue [Recent Weight Gain (___ Lbs)] : no recent weight gain [Dysphagia] : no dysphagia [Neck Pain] : no neck pain [Dysphonia] : no dysphonia [Polyuria] : no polyuria [Pain/Numbness of Digits] : no pain/numbness of digits [Swelling] : no swelling

## 2022-05-03 NOTE — PHYSICAL EXAM
[Alert] : alert [No Acute Distress] : no acute distress [Normal Sclera/Conjunctiva] : normal sclera/conjunctiva [EOMI] : extra ocular movement intact [No LAD] : no lymphadenopathy [Thyroid Not Enlarged] : the thyroid was not enlarged [No Respiratory Distress] : no respiratory distress [Clear to Auscultation] : lungs were clear to auscultation bilaterally [Normal S1, S2] : normal S1 and S2 [Regular Rhythm] : with a regular rhythm [No Edema] : no peripheral edema [Normal Bowel Sounds] : normal bowel sounds [Not Tender] : non-tender [Not Distended] : not distended [Soft] : abdomen soft [Normal Anterior Cervical Nodes] : no anterior cervical lymphadenopathy [No Spinal Tenderness] : no spinal tenderness [Kyphosis] : kyphosis present [No Clubbing, Cyanosis] : no clubbing  or cyanosis of the fingernails [No Rash] : no rash [1+] : 1+ in the dorsalis pedis [Diminished Throughout Both Feet] : diminished tactile sensation with monofilament testing throughout both feet [Normal Reflexes] : deep tendon reflexes were 2+ and symmetric [Normal Affect] : the affect was normal [Normal Mood] : the mood was normal [Right foot was examined, including] : right foot ~C was examined, including visual inspection with sensory and pulse exams [Left foot was examined, including] : left foot ~C was examined, including visual inspection with sensory and pulse exams [de-identified] : 3/6 ELIDIA [FreeTextEntry1] : callus [FreeTextEntry2] : hammer toes [FreeTextEntry6] : hammer toes [FreeTextEntry5] : callus

## 2022-05-03 NOTE — ASSESSMENT
[Diabetes Foot Care] : diabetes foot care [Denosumab Therapy] : Risks  and benefits of denosumab therapy were discussed with the patient including eczema, cellulitis, osteonecrosis of the jaw and atypical femur fractures [FreeTextEntry1] : 88 y.o. female with h/o Type 2 DM, HTN, hyperlipidemia, osteoporosis and primary hyperparathyroidism.\par \par 1. Type 2 DM- Fair control with Hba1c of 8.1% today. No need for tight glycemic control given patient's age. Encouraged a carbohydrate consistent diet.  Will continue Lantus 5 units daily and Prandin 2 mg QAC for now given limited data. Encouraged patient and family to keep in touch with blood glucose readings and to vary monitoring times. Will start using [employer] to help obtain more information. Will check CMP and urine alb/cr ratio.\par \par 2. HTN- BP is above goal.  Will increase Enalapril to 10 mg BID and will continue Metoprolol\par \par 3. Hyperlipidemia- Will check lipids and will continue statin\par \par 4. Osteoporosis- DEXA scan performed in October 2021 shows left total hip -2.9 with 2.4% decrease, left femoral neck -2.9 with 4% decrease and 1/3 radius -2.5 with 9.1% increase. Given increased risk of fracture, Prolia given today from office supply. Will repeat DEXA scan in 2023. Will check serum calcium and 25 vitamin D level and will continue vitamin D supplement. No calcium supplements given primary hyperparathyroidism. Fall precaution reviewed. \par \par 5. Primary hyperparathyroidism- Has declined surgery in the past. Will monitor for now. Encouraged hydration. Will check serum calcium, intact PTH and 25 vitamin D level. \par \par Follow up in 6 months for Prolia and Type 2 DM\par Follow up with opthalmology \par Labs drawn in the office today

## 2022-05-03 NOTE — HISTORY OF PRESENT ILLNESS
[FreeTextEntry1] : 88 y.o. female with h/o Type 2 DM, HTN, hyperlipidemia and osteoporosis here for follow up visit and Prolia injection. Diagnosed with valvular heart disease. Follows with cardiology at [place]. No HAs and no change in vision. Does have facial droop with h/o CVA in the past. Feeling much better and ambulating. Checking FS every morning and are 78 to 274. Taking Lantus 5 units SQ daily and Repaglinide 2 mg QAC. Not taking Januvia now. Had hypoglycemia in the past on Humalog 75/25 mix regimen but nothing recently. Past due for opthalmology and does have retinopathy. Saw podiatry recently. Does have proteinuria. \par \par Hand pain has improved. Did have fall with right hip fracture requiring surgery in August 2018 at [place]. Has had fall since last visit but no other fractures. Began treatment with Prolia in June 2016. Had dose of Prolia was on November 3, 2017 and resumed on July 18, 2018. Treated with Alendronate from May 2011 through May 2015 then had 1 year drug holiday. DEXA scan in May 2016 showed spine -0.3, total hip -2.5 and left femoral neck -2.6, 1/3 radius -2.2. DEXA scan performed in July 2018 shows left total hip -2.9 with 7.8% decrease, left femoral neck -3.4 with 14.4% decrease and 1/3 radius -2.6 with 3.8% decrease. DEXA scan performed in 9/2020 shows left total hip -2.5 with 8% increase, left femoral neck -2.8 with 12.8% increase and 1/3 radius -3.3 with 7.5% decrease.  DEXA scan performed in October 2021 shows left total hip -2.9 with 2.4% decrease, left femoral neck -2.9 with 4% decrease and 1/3 radius -2.5 with 9.1% increase. No dental issues. Has dentures. No jaw pain.\par \par In regards to primary hyperparathyroidism, not interested in surgical management. No calcium supplements. Takes vitamin D 1,000 Iu daily. Serum calcium and intact PTH levels have been stable.

## 2022-05-04 LAB
25(OH)D3 SERPL-MCNC: 15.2 NG/ML
ALBUMIN SERPL ELPH-MCNC: 4 G/DL
ALP BLD-CCNC: 119 U/L
ALT SERPL-CCNC: 24 U/L
ANION GAP SERPL CALC-SCNC: 11 MMOL/L
AST SERPL-CCNC: 27 U/L
BASOPHILS # BLD AUTO: 0.03 K/UL
BASOPHILS NFR BLD AUTO: 0.5 %
BILIRUB SERPL-MCNC: 0.3 MG/DL
BUN SERPL-MCNC: 34 MG/DL
CALCIUM SERPL-MCNC: 10.8 MG/DL
CALCIUM SERPL-MCNC: 10.8 MG/DL
CHLORIDE SERPL-SCNC: 101 MMOL/L
CHOLEST SERPL-MCNC: 150 MG/DL
CO2 SERPL-SCNC: 27 MMOL/L
CREAT SERPL-MCNC: 1.05 MG/DL
CREAT SPEC-SCNC: 59 MG/DL
EGFR: 51 ML/MIN/1.73M2
EOSINOPHIL # BLD AUTO: 0.17 K/UL
EOSINOPHIL NFR BLD AUTO: 2.6 %
GLUCOSE SERPL-MCNC: 325 MG/DL
HCT VFR BLD CALC: 39 %
HDLC SERPL-MCNC: 49 MG/DL
HGB BLD-MCNC: 12.3 G/DL
IMM GRANULOCYTES NFR BLD AUTO: 0.3 %
LDLC SERPL CALC-MCNC: 81 MG/DL
LYMPHOCYTES # BLD AUTO: 2.53 K/UL
LYMPHOCYTES NFR BLD AUTO: 38.8 %
MAN DIFF?: NORMAL
MCHC RBC-ENTMCNC: 28.9 PG
MCHC RBC-ENTMCNC: 31.5 GM/DL
MCV RBC AUTO: 91.5 FL
MICROALBUMIN 24H UR DL<=1MG/L-MCNC: 8.1 MG/DL
MICROALBUMIN/CREAT 24H UR-RTO: 137 MG/G
MONOCYTES # BLD AUTO: 0.6 K/UL
MONOCYTES NFR BLD AUTO: 9.2 %
NEUTROPHILS # BLD AUTO: 3.17 K/UL
NEUTROPHILS NFR BLD AUTO: 48.6 %
NONHDLC SERPL-MCNC: 100 MG/DL
PARATHYROID HORMONE INTACT: 63 PG/ML
PHOSPHATE SERPL-MCNC: 2.9 MG/DL
PLATELET # BLD AUTO: 261 K/UL
POTASSIUM SERPL-SCNC: 4.7 MMOL/L
PROT SERPL-MCNC: 9 G/DL
RBC # BLD: 4.26 M/UL
RBC # FLD: 14 %
SODIUM SERPL-SCNC: 139 MMOL/L
TRIGL SERPL-MCNC: 96 MG/DL
TSH SERPL-ACNC: 2.87 UIU/ML
WBC # FLD AUTO: 6.52 K/UL

## 2022-07-20 NOTE — ED ADULT NURSE NOTE - PT NEEDS ASSIST
"S:  Sitting in bed, extubated, suctioning his mouth by himself.     O:   /72   Pulse 85   Temp 98.8 °F (37.1 °C)   Resp (!) 24   Ht 5' 9.02" (1.753 m)   Wt 75.7 kg (166 lb 14.2 oz)   SpO2 98%   BMI 24.63 kg/m²   On exam: pt is awake and alert, speech is better, although still meager but more intelligible and appropriate then the last few days.   no face droop, no VF cut, EOMI, soft voice, but no aphasia.  The LUE is barely antigravity, and the LUE is also 4/5, there is no weakness on the R.  Head: NCAT, Skin warm and dry, breathing not labored, no knee or ankle swelling so signs of lymphedema, muscle bulk is normal, mood: good.    A/p:   Acute encephalopathy, with speech production difficulites and respiratory distress requiring intubationx2 and transfer to the ICU.   Now extubated  DDX: medication side effect +/- pneumonia.   - given his clinical improvement today, I do not think AIDP is on the table.   Meningitis panel is also negative including HSV, HSV PCR is pending, Low suspicion for meningitis, 12 wbc and 117 pr, secondary to neck surgery?   - d/c MRI lumbar spine.   - will wait for HSV PCR and few other CSF results, and for his clinical improvement to continue and persist, and I think we can start tapering off the meningitis abx.   - will continue to follow.        " yes

## 2022-09-19 NOTE — DISCHARGE NOTE ADULT - DISCHARGE TO
Gertrudis Fitzpatrick called in about Myriam's recent medication change. On Friday and Saturday, Pieter Chowan was getting dizzy. It was to the point that it was hard for her to function. Mother checked a random blood sugar and it was 160-170. BP was 90/60 which is a little lower than normal for Pieter Chowan. Gertrudis Fitzpatrick had Achilles Chowan stop Norvasc 2.5mg tablet and went back to taking Metoprolol 50mg bid instead of 25mg bid . States that her dizziness has improved. Gertrudis Fitzpatrick would like to know if she can d/c Norvasc and stay on Metoprolol 50mg bid?
Mom advised
Yep that's fine stay on the metoprolol
Home with Home Care

## 2022-12-12 NOTE — ED PROVIDER NOTE - CONSULTING PHYSICIAN
Problem: At Risk for Falls  Goal: # Takes action to control fall-related risks  Outcome: Outcome Met, Continue evaluating goal progress toward completion     Problem: Pressure Injury, Risk for  Goal: # Skin remains intact  Outcome: Outcome Met, Continue evaluating goal progress toward completion      neurology

## 2022-12-14 ENCOUNTER — RX RENEWAL (OUTPATIENT)
Age: 87
End: 2022-12-14

## 2022-12-28 RX ORDER — DENOSUMAB 60 MG/ML
60 INJECTION SUBCUTANEOUS
Qty: 1 | Refills: 0 | Status: ACTIVE | COMMUNITY
Start: 2019-04-18

## 2023-01-10 ENCOUNTER — APPOINTMENT (OUTPATIENT)
Dept: ENDOCRINOLOGY | Facility: CLINIC | Age: 88
End: 2023-01-10
Payer: MEDICARE

## 2023-01-10 VITALS
HEART RATE: 102 BPM | HEIGHT: 58.7 IN | SYSTOLIC BLOOD PRESSURE: 219 MMHG | RESPIRATION RATE: 20 BRPM | TEMPERATURE: 97.2 F | WEIGHT: 84 LBS | DIASTOLIC BLOOD PRESSURE: 96 MMHG | OXYGEN SATURATION: 99 % | BODY MASS INDEX: 17.16 KG/M2

## 2023-01-10 VITALS — DIASTOLIC BLOOD PRESSURE: 80 MMHG | SYSTOLIC BLOOD PRESSURE: 160 MMHG

## 2023-01-10 VITALS — DIASTOLIC BLOOD PRESSURE: 92 MMHG | SYSTOLIC BLOOD PRESSURE: 222 MMHG

## 2023-01-10 LAB — HBA1C MFR BLD HPLC: 9.7

## 2023-01-10 PROCEDURE — 83036 HEMOGLOBIN GLYCOSYLATED A1C: CPT | Mod: QW

## 2023-01-10 PROCEDURE — 96372 THER/PROPH/DIAG INJ SC/IM: CPT

## 2023-01-10 PROCEDURE — 36415 COLL VENOUS BLD VENIPUNCTURE: CPT

## 2023-01-10 PROCEDURE — 99215 OFFICE O/P EST HI 40 MIN: CPT | Mod: 25

## 2023-01-10 RX ORDER — CEPHALEXIN 500 MG/1
500 TABLET ORAL
Qty: 14 | Refills: 0 | Status: DISCONTINUED | COMMUNITY
Start: 2019-10-11 | End: 2023-01-10

## 2023-01-10 RX ORDER — INSULIN GLARGINE 100 [IU]/ML
100 INJECTION, SOLUTION SUBCUTANEOUS
Qty: 1 | Refills: 3 | Status: ACTIVE | COMMUNITY
Start: 2018-07-18 | End: 1900-01-01

## 2023-01-10 RX ORDER — DENOSUMAB 60 MG/ML
60 INJECTION SUBCUTANEOUS
Qty: 1 | Refills: 0 | Status: COMPLETED | OUTPATIENT
Start: 2023-01-10

## 2023-01-10 RX ORDER — PEN NEEDLE, DIABETIC 32GX 5/32"
32G X 4 MM NEEDLE, DISPOSABLE MISCELLANEOUS
Qty: 100 | Refills: 3 | Status: ACTIVE | COMMUNITY
Start: 2023-01-10 | End: 1900-01-01

## 2023-01-10 RX ADMIN — DENOSUMAB 0 MG/ML: 60 INJECTION SUBCUTANEOUS at 00:00

## 2023-01-10 NOTE — HISTORY OF PRESENT ILLNESS
[FreeTextEntry1] : 88 y.o. female with h/o Type 2 DM, HTN, hyperlipidemia and osteoporosis here for follow up visit and Prolia injection. Diagnosed with valvular heart disease. Follows with cardiology at [place]. No HAs and but report a change in vision of left eye. Does have facial droop with h/o CVA in the past. Feeling fine and ambulating. Checking FS every morning but having issue with glucometer. Taking Lantus 5 units SQ daily and Repaglinide 2 mg QAC. However medications appear to be  and bottle is full so not clear if she is truly taking her medications consistently. Not taking Januvia now. Had hypoglycemia in the past on Humalog 75/25 mix regimen but nothing recently. Past due for opthalmology and does have retinopathy. Saw podiatry recently. Does have proteinuria. \par \par Hand pain has improved. Did have fall with right hip fracture requiring surgery in 2018 at [place]. Has had fall since last visit but no other fractures. Began treatment with Prolia in 2016. Had dose of Prolia was on November 3, 2017 and resumed on 2018. Treated with Alendronate from May 2011 through May 2015 then had 1 year drug holiday. \par \par DEXA scan in May 2016 showed spine -0.3, total hip -2.5 and left femoral neck -2.6, 1/3 radius -2.2. \par DEXA scan in 2018 shows left total hip -2.9 with 7.8% decrease, left femoral neck -3.4 with 14.4% decrease and 1/3 radius -2.6 with 3.8% decrease. \par DEXA scan in 2020 shows left total hip -2.5 with 8% increase, left femoral neck -2.8 with 12.8% increase and 1/3 radius -3.3 with 7.5% decrease.  \par DEXA scan in 2021 shows left total hip -2.9 with 2.4% decrease, left femoral neck -2.9 with 4% decrease and 1/3 radius -2.5 with 9.1% increase. \par \par No dental issues. Has dentures. No jaw pain.\par \par In regards to primary hyperparathyroidism, not interested in surgical management. No calcium supplements. Takes vitamin D 1,000 Iu daily. Serum calcium and intact PTH levels have been stable.

## 2023-01-10 NOTE — PHYSICAL EXAM
[Alert] : alert [No Acute Distress] : no acute distress [Normal Sclera/Conjunctiva] : normal sclera/conjunctiva [EOMI] : extra ocular movement intact [No LAD] : no lymphadenopathy [Thyroid Not Enlarged] : the thyroid was not enlarged [No Respiratory Distress] : no respiratory distress [Clear to Auscultation] : lungs were clear to auscultation bilaterally [Normal S1, S2] : normal S1 and S2 [Regular Rhythm] : with a regular rhythm [No Edema] : no peripheral edema [Normal Bowel Sounds] : normal bowel sounds [Not Tender] : non-tender [Not Distended] : not distended [Soft] : abdomen soft [Normal Anterior Cervical Nodes] : no anterior cervical lymphadenopathy [No Spinal Tenderness] : no spinal tenderness [Kyphosis] : kyphosis present [No Clubbing, Cyanosis] : no clubbing  or cyanosis of the fingernails [No Rash] : no rash [Right foot was examined, including] : right foot ~C was examined, including visual inspection with sensory and pulse exams [Left foot was examined, including] : left foot ~C was examined, including visual inspection with sensory and pulse exams [1+] : 1+ in the dorsalis pedis [Diminished Throughout Both Feet] : diminished tactile sensation with monofilament testing throughout both feet [Normal Reflexes] : deep tendon reflexes were 2+ and symmetric [Normal Affect] : the affect was normal [Normal Mood] : the mood was normal [de-identified] : 3/6 ELIDIA [FreeTextEntry1] : callus [FreeTextEntry2] : hammer toes [FreeTextEntry5] : callus [FreeTextEntry6] : hammer toes

## 2023-01-10 NOTE — ASSESSMENT
[Diabetes Foot Care] : diabetes foot care [Denosumab Therapy] : Risks  and benefits of denosumab therapy were discussed with the patient including eczema, cellulitis, osteonecrosis of the jaw and atypical femur fractures [FreeTextEntry1] : 88 y.o. female with h/o Type 2 DM, HTN, hyperlipidemia, osteoporosis and primary hyperparathyroidism.\par \par 1. Type 2 DM- Poor control with Hba1c of 9.7% today. No need for tight glycemic control given patient's age. Encouraged a carbohydrate consistent diet. Recommend taking Lantus 5 units daily and Prandin 2 mg QAC for now given limited data. Medications were all ordered and recommend disposing of all  medications. Encouraged patient and family to keep in touch with blood glucose readings and to vary monitoring times.  Will check CMP and urine alb/cr ratio. \par \par 2. HTN- BP is above goal. Discussed risks of CVA.  Will increase Enalapril to 10 mg BID and will continue Metoprolol. Stress compliance. \par \par 3. Hyperlipidemia- Will check lipids and will continue statin\par \par 4. Osteoporosis- DEXA scan performed in 2021 shows left total hip -2.9 with 2.4% decrease, left femoral neck -2.9 with 4% decrease and 1/3 radius -2.5 with 9.1% increase. Given increased risk of fracture, Prolia given today from office supply. Will repeat DEXA scan in . Will check serum calcium and 25 vitamin D level and will continue vitamin D supplement. No calcium supplements given primary hyperparathyroidism. Fall precaution reviewed. \par \par 5. Primary hyperparathyroidism- Has declined surgery in the past. Will monitor for now. Encouraged hydration. Will check serum calcium, intact PTH and 25 vitamin D level. \par \par Follow up in 3 months for DM and in  6 months for Prolia \par Follow up with opthalmology \par Labs drawn in the office today

## 2023-01-10 NOTE — REASON FOR VISIT
[Follow - Up] : a follow-up visit [DM Type 2] : DM Type 2 [Osteoporosis] : osteoporosis [Family Member] : family member [Hyperparathyroidism] : hyperparathyroidism

## 2023-01-10 NOTE — REVIEW OF SYSTEMS
[Recent Weight Loss (___ Lbs)] : recent weight loss: [unfilled] lbs [Hearing Loss] : hearing loss [SOB on Exertion] : shortness of breath on exertion [Joint Pain] : joint pain [Negative] : Endocrine [Fatigue] : no fatigue [Recent Weight Gain (___ Lbs)] : no recent weight gain [Blurred Vision] : blurred vision [Dysphagia] : no dysphagia [Neck Pain] : no neck pain [Dysphonia] : no dysphonia [Polyuria] : no polyuria [Pain/Numbness of Digits] : no pain/numbness of digits [Stress] : stress [Swelling] : no swelling

## 2023-01-19 ENCOUNTER — EMERGENCY (EMERGENCY)
Facility: HOSPITAL | Age: 88
LOS: 1 days | Discharge: HOME | End: 2023-01-19
Attending: EMERGENCY MEDICINE | Admitting: EMERGENCY MEDICINE
Payer: MEDICARE

## 2023-01-19 ENCOUNTER — NON-APPOINTMENT (OUTPATIENT)
Age: 88
End: 2023-01-19

## 2023-01-19 VITALS
TEMPERATURE: 99 F | DIASTOLIC BLOOD PRESSURE: 90 MMHG | SYSTOLIC BLOOD PRESSURE: 204 MMHG | HEART RATE: 89 BPM | OXYGEN SATURATION: 100 % | RESPIRATION RATE: 16 BRPM

## 2023-01-19 VITALS
TEMPERATURE: 98 F | OXYGEN SATURATION: 100 % | RESPIRATION RATE: 19 BRPM | HEART RATE: 87 BPM | SYSTOLIC BLOOD PRESSURE: 160 MMHG | DIASTOLIC BLOOD PRESSURE: 89 MMHG

## 2023-01-19 LAB
A1C WITH ESTIMATED AVERAGE GLUCOSE RESULT: 10.7 % — HIGH (ref 4–5.6)
ALBUMIN SERPL ELPH-MCNC: 4.1 G/DL — SIGNIFICANT CHANGE UP (ref 3.3–5)
ALP SERPL-CCNC: 128 U/L — HIGH (ref 40–120)
ALT FLD-CCNC: 30 U/L — SIGNIFICANT CHANGE UP (ref 4–33)
ANION GAP SERPL CALC-SCNC: 10 MMOL/L — SIGNIFICANT CHANGE UP (ref 7–14)
AST SERPL-CCNC: 26 U/L — SIGNIFICANT CHANGE UP (ref 4–32)
B-OH-BUTYR SERPL-SCNC: <0 MMOL/L — SIGNIFICANT CHANGE UP (ref 0–0.4)
BASOPHILS # BLD AUTO: 0.02 K/UL — SIGNIFICANT CHANGE UP (ref 0–0.2)
BASOPHILS NFR BLD AUTO: 0.3 % — SIGNIFICANT CHANGE UP (ref 0–2)
BILIRUB SERPL-MCNC: 0.4 MG/DL — SIGNIFICANT CHANGE UP (ref 0.2–1.2)
BLOOD GAS VENOUS COMPREHENSIVE RESULT: SIGNIFICANT CHANGE UP
BUN SERPL-MCNC: 21 MG/DL — SIGNIFICANT CHANGE UP (ref 7–23)
CALCIUM SERPL-MCNC: 10.2 MG/DL — SIGNIFICANT CHANGE UP (ref 8.4–10.5)
CHLORIDE SERPL-SCNC: 98 MMOL/L — SIGNIFICANT CHANGE UP (ref 98–107)
CO2 SERPL-SCNC: 27 MMOL/L — SIGNIFICANT CHANGE UP (ref 22–31)
CREAT SERPL-MCNC: 0.8 MG/DL — SIGNIFICANT CHANGE UP (ref 0.5–1.3)
EGFR: 71 ML/MIN/1.73M2 — SIGNIFICANT CHANGE UP
EOSINOPHIL # BLD AUTO: 0.04 K/UL — SIGNIFICANT CHANGE UP (ref 0–0.5)
EOSINOPHIL NFR BLD AUTO: 0.6 % — SIGNIFICANT CHANGE UP (ref 0–6)
ESTIMATED AVERAGE GLUCOSE: 260 — SIGNIFICANT CHANGE UP
GLUCOSE SERPL-MCNC: 542 MG/DL — CRITICAL HIGH (ref 70–99)
HCT VFR BLD CALC: 41 % — SIGNIFICANT CHANGE UP (ref 34.5–45)
HGB BLD-MCNC: 12.9 G/DL — SIGNIFICANT CHANGE UP (ref 11.5–15.5)
IANC: 4.19 K/UL — SIGNIFICANT CHANGE UP (ref 1.8–7.4)
IMM GRANULOCYTES NFR BLD AUTO: 0.3 % — SIGNIFICANT CHANGE UP (ref 0–0.9)
LIDOCAIN IGE QN: 64 U/L — HIGH (ref 7–60)
LYMPHOCYTES # BLD AUTO: 1.54 K/UL — SIGNIFICANT CHANGE UP (ref 1–3.3)
LYMPHOCYTES # BLD AUTO: 24.4 % — SIGNIFICANT CHANGE UP (ref 13–44)
MAGNESIUM SERPL-MCNC: 2.1 MG/DL — SIGNIFICANT CHANGE UP (ref 1.6–2.6)
MCHC RBC-ENTMCNC: 27.8 PG — SIGNIFICANT CHANGE UP (ref 27–34)
MCHC RBC-ENTMCNC: 31.5 GM/DL — LOW (ref 32–36)
MCV RBC AUTO: 88.4 FL — SIGNIFICANT CHANGE UP (ref 80–100)
MONOCYTES # BLD AUTO: 0.49 K/UL — SIGNIFICANT CHANGE UP (ref 0–0.9)
MONOCYTES NFR BLD AUTO: 7.8 % — SIGNIFICANT CHANGE UP (ref 2–14)
NEUTROPHILS # BLD AUTO: 4.19 K/UL — SIGNIFICANT CHANGE UP (ref 1.8–7.4)
NEUTROPHILS NFR BLD AUTO: 66.6 % — SIGNIFICANT CHANGE UP (ref 43–77)
NRBC # BLD: 0 /100 WBCS — SIGNIFICANT CHANGE UP (ref 0–0)
NRBC # FLD: 0 K/UL — SIGNIFICANT CHANGE UP (ref 0–0)
PLATELET # BLD AUTO: 258 K/UL — SIGNIFICANT CHANGE UP (ref 150–400)
POTASSIUM SERPL-MCNC: 4.8 MMOL/L — SIGNIFICANT CHANGE UP (ref 3.5–5.3)
POTASSIUM SERPL-SCNC: 4.8 MMOL/L — SIGNIFICANT CHANGE UP (ref 3.5–5.3)
PROT SERPL-MCNC: 9 G/DL — HIGH (ref 6–8.3)
RBC # BLD: 4.64 M/UL — SIGNIFICANT CHANGE UP (ref 3.8–5.2)
RBC # FLD: 13.2 % — SIGNIFICANT CHANGE UP (ref 10.3–14.5)
SODIUM SERPL-SCNC: 135 MMOL/L — SIGNIFICANT CHANGE UP (ref 135–145)
WBC # BLD: 6.3 K/UL — SIGNIFICANT CHANGE UP (ref 3.8–10.5)
WBC # FLD AUTO: 6.3 K/UL — SIGNIFICANT CHANGE UP (ref 3.8–10.5)

## 2023-01-19 PROCEDURE — 99284 EMERGENCY DEPT VISIT MOD MDM: CPT

## 2023-01-19 PROCEDURE — 71045 X-RAY EXAM CHEST 1 VIEW: CPT | Mod: 26

## 2023-01-19 RX ORDER — URINE ACETONE TEST STRIPS
STRIP MISCELLANEOUS
Qty: 20 | Refills: 3 | Status: ACTIVE | COMMUNITY
Start: 2023-01-19 | End: 1900-01-01

## 2023-01-19 RX ORDER — SODIUM CHLORIDE 9 MG/ML
1000 INJECTION INTRAMUSCULAR; INTRAVENOUS; SUBCUTANEOUS ONCE
Refills: 0 | Status: COMPLETED | OUTPATIENT
Start: 2023-01-19 | End: 2023-01-19

## 2023-01-19 RX ORDER — INSULIN GLARGINE 100 [IU]/ML
5 INJECTION, SOLUTION SUBCUTANEOUS ONCE
Refills: 0 | Status: COMPLETED | OUTPATIENT
Start: 2023-01-19 | End: 2023-01-19

## 2023-01-19 RX ADMIN — SODIUM CHLORIDE 1000 MILLILITER(S): 9 INJECTION INTRAMUSCULAR; INTRAVENOUS; SUBCUTANEOUS at 20:55

## 2023-01-19 RX ADMIN — INSULIN GLARGINE 5 UNIT(S): 100 INJECTION, SOLUTION SUBCUTANEOUS at 22:50

## 2023-01-19 NOTE — ED PROVIDER NOTE - NSICDXPASTMEDICALHX_GEN_ALL_CORE_FT
PAST MEDICAL HISTORY:  Arthritis     Bladder disorder     Bladder Prolapse, Acquired     CVA (cerebrovascular accident) 1/2013    Diabetes mellitus type 2 in nonobese     Dry eyes     Dyslipidemia     History of pancreatitis ' 2008    HTN (hypertension)

## 2023-01-19 NOTE — ED PROVIDER NOTE - PHYSICAL EXAMINATION
CONSTITUTIONAL: thin elderly F, NAD  SKIN: Warm dry  HEAD: NCAT  EYES: NL inspection  ENT: dry oral mucosa  NECK: Supple; non tender.  CARD: RRR  RESP: CTAB  ABD: NTND, no rebound/guarding  EXT: no pedal edema  NEURO: Grossly non-focal  PSYCH: Cooperative, appropriate.

## 2023-01-19 NOTE — ED PROVIDER NOTE - CLINICAL SUMMARY MEDICAL DECISION MAKING FREE TEXT BOX
Reggie PGY2: 87yo F with PMH of HTN, DM2 on insulin, OP, hx of CVA, hyperparathyroidism, TIA, multivessel CAD, memory difficulty AAOx2-3 presents to ED for eval of BS >600 and elevated BP. Reviewed pertinent endo notes from [facility] - A1c 9.7, known to be non-compliant. HTN and hyperglycemic on prior visits. Patient cannot tell for sure if she's been taking her meds and family cannot confirm. Check labs, A1c, UA. Unlikely DKA but would check BHB, VBG. IVF to help dec FS, and would give pts home ACEI. Likely needs more med adjustment.

## 2023-01-19 NOTE — ED PROVIDER NOTE - PATIENT PORTAL LINK FT
You can access the FollowMyHealth Patient Portal offered by [facility] by registering at the following website: [email or web address]. By joining [facility]’s FollowMyHealth portal, you will also be able to view your health information using other applications (apps) compatible with our system.

## 2023-01-19 NOTE — ED ADULT NURSE NOTE - OBJECTIVE STATEMENT
Pt a&ox3, alert but forgetful. c/o high blood glucose at home. FS >600 in triage. As per pts daughter, pt forgets to take medication at times, and did not take her insulin last night, or BP meds this morning. Pmhx HTN, DM. Pt denies cp, SOB, urinary frequency, numbness/tingling, fever/chills, excessive thirst, vision changes. Breathing tai, unlabored; no signs of distress. Pt a&ox3, alert but forgetful. c/o high blood glucose at home. FS >600 in triage. As per pts daughter, pt forgets to take medication at times, and did not take her insulin last night, or BP meds this morning. Pmhx HTN, DM. Pt denies cp, SOB, urinary frequency, numbness/tingling, fever/chills, excessive thirst, vision changes. Breathing tai, unlabored; no signs of distress. Pt hypertensive /96, MD Woodall aware, at bedside.

## 2023-01-19 NOTE — ED PROVIDER NOTE - NSFOLLOWUPINSTRUCTIONS_ED_ALL_ED_FT
You were seen in the Emergency Department for high blood sugar.     Take your medications as prescribed.    If you have fever, chills, nausea, vomiting, new or worsening pain, or if you have any new symptoms return to the Emergency Department.

## 2023-01-19 NOTE — ED PROVIDER NOTE - PROGRESS NOTE DETAILS
Chandler Up, DO PGY-3: pt glucose coming down with insulin, ana maría, had shared decision making w/ family, agrees to dc home, pt had mix up of medications and didn't take meds properly @ home, both daughters will follow pt @ home, saw doctor a few days ago and will return if any new symptoms. Pt states  she is feeling well at the moment. Although patient had evidence of dehydration labs, pt clinically does not appear dehydrated on reassessment after fluids. will dc home w/ pcp followup.

## 2023-01-19 NOTE — ED PROVIDER NOTE - ATTENDING CONTRIBUTION TO CARE
Brief HPI:  89 yo F with PMH of HTN, DM2 on insulin, OP, hx of CVA, hyperparathyroidism, TIA, multivessel CAD, memory difficulty AAOx2-3 presents to ED for eval of BS >600 and elevated BP.  Per patient's daughter, she has not been taking medication as she requires assistance and is not receiving.  Patient denies active sx.      Vitals:   Reviewed    Exam:    GEN:  Non-toxic appearing, non-distressed, speaking full sentences, non-diaphoretic, AAOx3  HEENT:  NCAT, neck supple, EOMI, PERRLA, sclera anicteric, no conjunctival pallor or injection, no stridor, normal voice, no tonsillar exudate, uvula midline  CV:  regular rhythm and rate, s1/s2 audible, no murmurs, rubs or gallops, peripheral pulses 2+ and symmetric  PULM:  non-labored respirations, lungs clear to auscultation bilaterally, no wheezes, crackles or rales  ABD:  non distended, non-tender, no rebound, no guarding, negative De La Rosa's sign, bowel sounds normal, no cvat  MSK:  no gross deformity, non-tender extremities and joints, range of motion grossly normal appearing, no extremity edema, extremities warm and well perfused   NEURO:  AAOx3, CN II-XII intact, motor 5/5 in upper and lower extremities bilaterally, sensation grossly intact in extremities and trunk, finger to nose testing wnl, no nystagmus, negative Romberg, no pronator drift, no gait deficit  SKIN:  warm, dry, no rash or vesicles     A/P:  89 yo F with PMH of HTN, DM2 on insulin, OP, hx of CVA, hyperparathyroidism, TIA, multivessel CAD, memory difficulty AAOx2-3 presents to ED for eval of BS >600 and elevated BP.  Suspect medication non-compliance.  Will send labs, supportive care.  Dispo pending.

## 2023-01-19 NOTE — ED PROVIDER NOTE - NSICDXPASTSURGICALHX_GEN_ALL_CORE_FT
PAST SURGICAL HISTORY:  Bladder Prolapse, Acquired ' 2013;  Insertion Pessary    S/P laparotomy initially to remove pancreatic mass but did not visualize mass and closed: ' 2008

## 2023-01-19 NOTE — ED ADULT NURSE NOTE - NSIMPLEMENTINTERV_GEN_ALL_ED
Implemented All Fall with Harm Risk Interventions:  [place] to call system. Call bell, personal items and telephone within reach. Instruct patient to call for assistance. Room bathroom lighting operational. Non-slip footwear when patient is off stretcher. Physically safe environment: no spills, clutter or unnecessary equipment. Stretcher in lowest position, wheels locked, appropriate side rails in place. Provide visual cue, wrist band, yellow gown, etc. Monitor gait and stability. Monitor for mental status changes and reorient to person, place, and time. Review medications for side effects contributing to fall risk. Reinforce activity limits and safety measures with patient and family. Provide visual clues: red socks.

## 2023-01-19 NOTE — ED PROVIDER NOTE - OBJECTIVE STATEMENT
87yo F with PMH of HTN, DM2 on insulin, OP, hx of CVA, hyperparathyroidism, TIA, multivessel CAD, memory difficulty AAOx2-3 presents to ED for eval of BS >600 and elevated BP. Today BP was persistently >600, family not sure if she took insulin. Pt says she did but daughter endorses some confusion and might have missed some pills in her box and no one can tell if she took the insulin or not. Similar for BP meds. Pt denies HA, CP, SOB, abd pain, NVDC, urinary sxs, polyuria/polydipsia. Thinks she takes repaglinide, and PM insulin lantus 5U. On enalapril 10 BID and metoprolol for BP control.

## 2023-01-19 NOTE — ED ADULT NURSE NOTE - PATIENT ON (OXYGEN DELIVERY METHOD)
General Sunscreen Counseling: I recommended a broad spectrum sunscreen with a SPF of 50 or higher.  I explained that SPF 50 sunscreens block approximately 97 percent of the sun's harmful rays.  Sunscreens should be applied at least 15 minutes prior to expected sun exposure and then every 2 hours after that as long as sun exposure continues. If swimming or exercising sunscreen should be reapplied every 45 minutes to an hour after getting wet or sweating.  One ounce, or the equivalent of a shot glass full of sunscreen, is adequate to protect the skin not covered by a bathing suit. I also recommended a lip balm with a sunscreen as well. Sun protective clothing can be used in lieu of sunscreen but must be worn the entire time you are exposed to the sun's rays.
Detail Level: Generalized
Products Recommended: Hats, sun protective clothing, Sunscreen and reapply sunscreen. [place], Mia Odellm
room air

## 2023-04-28 ENCOUNTER — APPOINTMENT (OUTPATIENT)
Dept: ENDOCRINOLOGY | Facility: CLINIC | Age: 88
End: 2023-04-28
Payer: MEDICARE

## 2023-04-28 VITALS
DIASTOLIC BLOOD PRESSURE: 70 MMHG | HEIGHT: 60 IN | SYSTOLIC BLOOD PRESSURE: 130 MMHG | BODY MASS INDEX: 18.26 KG/M2 | OXYGEN SATURATION: 99 % | WEIGHT: 93 LBS

## 2023-04-28 LAB — HBA1C MFR BLD HPLC: 7.1

## 2023-04-28 PROCEDURE — 99214 OFFICE O/P EST MOD 30 MIN: CPT | Mod: 25

## 2023-04-28 PROCEDURE — 83036 HEMOGLOBIN GLYCOSYLATED A1C: CPT | Mod: QW

## 2023-04-28 NOTE — HISTORY REVIEWED
[History reviewed] : History reviewed. [Medications and Allergies reviewed] : Medications and allergies reviewed. /13 yr old daughter

## 2023-04-28 NOTE — REASON FOR VISIT
[Follow - Up] : a follow-up visit [DM Type 2] : DM Type 2 [Osteoporosis] : osteoporosis [Hyperparathyroidism] : hyperparathyroidism [Family Member] : family member

## 2023-07-12 ENCOUNTER — APPOINTMENT (OUTPATIENT)
Dept: ENDOCRINOLOGY | Facility: CLINIC | Age: 88
End: 2023-07-12

## 2023-08-11 ENCOUNTER — APPOINTMENT (OUTPATIENT)
Dept: ENDOCRINOLOGY | Facility: CLINIC | Age: 88
End: 2023-08-11

## 2023-09-05 NOTE — DATA REVIEWED
[FreeTextEntry1] : Labs\par 2/20/23\par urine alb/cr 343\par chol 134 HDL 52 LDL 67 tri 69\par glucose 171\par BUN/cr 20/.77\par calcium 9.6\par ALT 34\par intact \par TSH 4.24 Free T4 1.1\par phos 3.2\par H/H 13.6/41.7\par 25 vitamin D 16\par \par May 2018\par Hba1c 6.5%

## 2023-09-05 NOTE — REVIEW OF SYSTEMS
[Blurred Vision] : blurred vision [Hearing Loss] : hearing loss [SOB on Exertion] : shortness of breath on exertion [Joint Pain] : joint pain [Stress] : stress [Negative] : Endocrine [Recent Weight Gain (___ Lbs)] : recent weight gain: [unfilled] lbs [Fatigue] : no fatigue [Recent Weight Loss (___ Lbs)] : no recent weight loss [Dysphagia] : no dysphagia [Neck Pain] : no neck pain [Dysphonia] : no dysphonia [Polyuria] : no polyuria [Pain/Numbness of Digits] : no pain/numbness of digits [Swelling] : no swelling

## 2023-09-05 NOTE — ASSESSMENT
[Diabetes Foot Care] : diabetes foot care [Denosumab Therapy] : Risks  and benefits of denosumab therapy were discussed with the patient including eczema, cellulitis, osteonecrosis of the jaw and atypical femur fractures [FreeTextEntry1] : 89 y.o. female with h/o Type 2 DM, HTN, hyperlipidemia, osteoporosis and primary hyperparathyroidism.  1. Type 2 DM- Good control with Hba1c of 7.1% today. No need for tight glycemic control given patient's age. Encouraged a carbohydrate consistent diet. Will continue Lantus 5 units daily and Prandin 2 mg QAC for now given limited data. Encouraged patient and family to keep in touch with blood glucose readings and to vary monitoring times.  UTD with CMP and urine alb/cr ratio in 2/2023.   2. HTN- BP is at goal. Discussed risks of CVA.  Will continue Enalapril 10 mg BID and will continue Metoprolol. Stress compliance.   3. Hyperlipidemia- Lipids are at goal and will continue statin  4. Osteoporosis- DEXA scan performed in October 2021 shows left total hip -2.9 with 2.4% decrease, left femoral neck -2.9 with 4% decrease and 1/3 radius -2.5 with 9.1% increase. Given increased risk of fracture, Prolia given on 1/10/23  from office supply. Will repeat DEXA scan in the fall of 2023. Stable serum calcium and 25 vitamin D levels and will continue vitamin D supplement. No calcium supplements given primary hyperparathyroidism. Fall precaution reviewed.   5. Primary hyperparathyroidism- Has declined surgery in the past. Will monitor for now. Encouraged hydration. Stable serum calcium, intact PTH and 25 vitamin D level.   Follow up in 3 months for Prolia  Follow up with opthalmology

## 2023-09-05 NOTE — HISTORY OF PRESENT ILLNESS
[FreeTextEntry1] : 89 y.o. female with h/o Type 2 DM, HTN, hyperlipidemia and osteoporosis here for follow up visit. Diagnosed with valvular heart disease. Follows with cardiology at [facility]. Does have facial droop with h/o CVA in the past. Feeling fine and ambulating. Checking FS every morning and are 87 to 160s. Taking Lantus 5 units SQ daily and Repaglinide 2 mg QAC. Had hypoglycemia in the past on Humalog 75/25 mix regimen but nothing recently. Past due for opthalmology and does have retinopathy. Saw podiatry recently. Does have proteinuria. \par \par Hand pain has improved. Did have fall with right hip fracture requiring surgery in August 2018 at [facility]. No recent falls and no other fractures. Began treatment with Prolia in June 2016. Had dose of Prolia was on November 3, 2017 and resumed on July 18, 2018. Treated with Alendronate from May 2011 through May 2015 then had 1 year drug holiday. \par \par DEXA scan in May 2016 showed spine -0.3, total hip -2.5 and left femoral neck -2.6, 1/3 radius -2.2. \par DEXA scan in July 2018 shows left total hip -2.9 with 7.8% decrease, left femoral neck -3.4 with 14.4% decrease and 1/3 radius -2.6 with 3.8% decrease. \par DEXA scan in 9/2020 shows left total hip -2.5 with 8% increase, left femoral neck -2.8 with 12.8% increase and 1/3 radius -3.3 with 7.5% decrease.  \par DEXA scan in October 2021 shows left total hip -2.9 with 2.4% decrease, left femoral neck -2.9 with 4% decrease and 1/3 radius -2.5 with 9.1% increase. \par \par No dental issues. Has dentures. No jaw pain.\par \par In regards to primary hyperparathyroidism, not interested in surgical management. No calcium supplements. Takes vitamin D 1,000 Iu daily. Serum calcium and intact PTH levels have been stable.

## 2023-09-05 NOTE — PHYSICAL EXAM
[Alert] : alert [No Acute Distress] : no acute distress [Normal Sclera/Conjunctiva] : normal sclera/conjunctiva [EOMI] : extra ocular movement intact [No LAD] : no lymphadenopathy [Thyroid Not Enlarged] : the thyroid was not enlarged [No Respiratory Distress] : no respiratory distress [Clear to Auscultation] : lungs were clear to auscultation bilaterally [Normal S1, S2] : normal S1 and S2 [Regular Rhythm] : with a regular rhythm [No Edema] : no peripheral edema [Normal Bowel Sounds] : normal bowel sounds [Not Tender] : non-tender [Not Distended] : not distended [Soft] : abdomen soft [Normal Anterior Cervical Nodes] : no anterior cervical lymphadenopathy [No Spinal Tenderness] : no spinal tenderness [Kyphosis] : kyphosis present [No Clubbing, Cyanosis] : no clubbing  or cyanosis of the fingernails [No Rash] : no rash [Right foot was examined, including] : right foot ~C was examined, including visual inspection with sensory and pulse exams [Left foot was examined, including] : left foot ~C was examined, including visual inspection with sensory and pulse exams [1+] : 1+ in the dorsalis pedis [Diminished Throughout Both Feet] : diminished tactile sensation with monofilament testing throughout both feet [Normal Reflexes] : deep tendon reflexes were 2+ and symmetric [Normal Affect] : the affect was normal [Normal Mood] : the mood was normal [de-identified] : 3/6 ELIDIA [FreeTextEntry1] : callus [FreeTextEntry2] : hammer toes [FreeTextEntry5] : callus [FreeTextEntry6] : hammer toes

## 2023-09-19 ENCOUNTER — APPOINTMENT (OUTPATIENT)
Dept: ENDOCRINOLOGY | Facility: CLINIC | Age: 88
End: 2023-09-19
Payer: MEDICARE

## 2023-09-19 VITALS — HEIGHT: 58.3 IN | WEIGHT: 86 LBS | BODY MASS INDEX: 17.81 KG/M2

## 2023-09-19 PROCEDURE — ZZZZZ: CPT

## 2023-09-19 PROCEDURE — 77080 DXA BONE DENSITY AXIAL: CPT

## 2023-09-19 PROCEDURE — 96372 THER/PROPH/DIAG INJ SC/IM: CPT

## 2023-09-19 RX ORDER — DENOSUMAB 60 MG/ML
60 INJECTION SUBCUTANEOUS
Qty: 1 | Refills: 0 | Status: COMPLETED | OUTPATIENT
Start: 2023-09-14

## 2023-09-20 LAB
25(OH)D3 SERPL-MCNC: 35.7 NG/ML
ALBUMIN SERPL ELPH-MCNC: 4 G/DL
ALP BLD-CCNC: 89 U/L
ALT SERPL-CCNC: 19 U/L
ANION GAP SERPL CALC-SCNC: 12 MMOL/L
AST SERPL-CCNC: 24 U/L
BILIRUB SERPL-MCNC: 0.4 MG/DL
BUN SERPL-MCNC: 29 MG/DL
CALCIUM SERPL-MCNC: 10.7 MG/DL
CHLORIDE SERPL-SCNC: 103 MMOL/L
CO2 SERPL-SCNC: 24 MMOL/L
CREAT SERPL-MCNC: 0.8 MG/DL
EGFR: 70 ML/MIN/1.73M2
GLUCOSE SERPL-MCNC: 133 MG/DL
POTASSIUM SERPL-SCNC: 4.5 MMOL/L
PROT SERPL-MCNC: 8.7 G/DL
SODIUM SERPL-SCNC: 139 MMOL/L

## 2023-09-22 ENCOUNTER — APPOINTMENT (OUTPATIENT)
Dept: UROGYNECOLOGY | Facility: CLINIC | Age: 88
End: 2023-09-22

## 2023-10-17 NOTE — DISCHARGE NOTE ADULT - DEVELOP COPING SKILLS. FOR EXAMPLE, STRATEGIES AND LIFESTYLE CHANGES THAT REDUCE NEGATIVE MOODS, STRESS, AND EXPOSURE TO SMOKING CUES
Addended by: DONNY WOODARD on: 10/17/2023 07:58 AM     Modules accepted: Orders     Statement Selected

## 2024-01-23 ENCOUNTER — RX RENEWAL (OUTPATIENT)
Age: 89
End: 2024-01-23

## 2024-01-23 RX ORDER — ENALAPRIL MALEATE 10 MG/1
10 TABLET ORAL TWICE DAILY
Qty: 180 | Refills: 1 | Status: ACTIVE | COMMUNITY
Start: 2021-02-19 | End: 1900-01-01

## 2024-01-24 RX ORDER — REPAGLINIDE 1 MG/1
1 TABLET ORAL
Qty: 540 | Refills: 3 | Status: ACTIVE | COMMUNITY
Start: 2020-01-28 | End: 1900-01-01

## 2024-01-24 RX ORDER — ATORVASTATIN CALCIUM 10 MG/1
10 TABLET, FILM COATED ORAL
Qty: 90 | Refills: 3 | Status: ACTIVE | COMMUNITY
Start: 2018-11-02 | End: 1900-01-01

## 2024-01-24 RX ORDER — METOPROLOL TARTRATE 25 MG/1
25 TABLET, FILM COATED ORAL
Qty: 180 | Refills: 3 | Status: ACTIVE | COMMUNITY
Start: 2018-07-18 | End: 1900-01-01

## 2024-03-20 ENCOUNTER — APPOINTMENT (OUTPATIENT)
Dept: ENDOCRINOLOGY | Facility: CLINIC | Age: 89
End: 2024-03-20

## 2024-04-22 NOTE — PROGRESS NOTE ADULT - PROBLEM SELECTOR PLAN 4
04/21/24 1945   Vital Signs   BP (!) 125/37   Temp 99.4 °F (37.4 °C)   Pulse 72   Respirations 17   SpO2 95 %   Pain Assessment   Pain Assessment None - Denies Pain   Treatment   Treatment Number 1   Time On 2006   Treatment Goal 3Hrs, 2.5L UF   Observations & Evaluations   Level of Consciousness 0   Oriented X ROMEL, respiratory distress   Heart Rhythm Irregular   Respiratory Quality/Effort Dyspnea with exertion   O2 Device PAP (positive airway pressure)   Bilateral Breath Sounds Diminished   Skin Color Other (comment)  (appropriate for ethnicity)   Skin Condition/Temp Dry;Warm   Abdomen Inspection Soft   Edema Generalized   RLE Edema +4;Pitting   LLE Edema +4;Pitting   Technical Checks   Dialysis Machine No. 06   RO Machine Number R06   Dialyzer Lot No. [id number]   Tubing Lot Number [id number]   All Connections Secure Yes   NS Bag Yes   Saline Line Double Clamped Yes   Dialyzer Revaclear 300   Prime Volume (mL) 50 mL   ICEBOAT I;C;E;B;O;A;T   RO Machine Log Sheet Completed Yes   Machine Alarm Self Test Completed;Passed   Air Foam Detector Tested;Proper Function   Extracorporeal Circuit Tested for Integrity Yes   Machine Conductivity 13.3   Manual Ph 7.4   Bleach Test (Neg) Yes   Bath Temperature 98.6 °F (37 °C)   Dialysis Bath   K+ (Potassium) 4   Ca+ (Calcium) 2.5   Na+ (Sodium) 138   HCO3 (Bicarb) 38   Treatment Initiation   Dialyze Hours 3   Treatment  Initiation Universal Precautions maintained;Lines secured to patient;Connections secured;Prime given;Venous Parameters set;Arterial Parameters set;Air foam detector engaged;Dialysate;Saline line double clamped;Revaclear Dialyzer;REV-300   Hemodialysis Central Access Right Neck   Placement Date: 04/21/24   Orientation: Right  Access Location: Neck   Continued need for line? Yes   Site Assessment Clean, dry & intact   Venous Lumen Status Brisk blood return;Flushed   Arterial Lumen Status Brisk blood return;Flushed   Alcohol Cap Used Yes   Line Care Cap  controlled  5/12 controlled

## 2024-06-25 ENCOUNTER — NON-APPOINTMENT (OUTPATIENT)
Age: 89
End: 2024-06-25

## 2024-06-25 ENCOUNTER — APPOINTMENT (OUTPATIENT)
Dept: ENDOCRINOLOGY | Facility: CLINIC | Age: 89
End: 2024-06-25
Payer: MEDICARE

## 2024-06-25 VITALS
HEIGHT: 58.3 IN | OXYGEN SATURATION: 99 % | WEIGHT: 86 LBS | BODY MASS INDEX: 17.81 KG/M2 | SYSTOLIC BLOOD PRESSURE: 150 MMHG | HEART RATE: 71 BPM | DIASTOLIC BLOOD PRESSURE: 70 MMHG

## 2024-06-25 DIAGNOSIS — I10 ESSENTIAL (PRIMARY) HYPERTENSION: ICD-10-CM

## 2024-06-25 DIAGNOSIS — M81.0 AGE-RELATED OSTEOPOROSIS W/OUT CURRENT PATHOLOGICAL FRACTURE: ICD-10-CM

## 2024-06-25 DIAGNOSIS — E21.0 PRIMARY HYPERPARATHYROIDISM: ICD-10-CM

## 2024-06-25 DIAGNOSIS — E78.00 PURE HYPERCHOLESTEROLEMIA, UNSPECIFIED: ICD-10-CM

## 2024-06-25 DIAGNOSIS — E11.9 TYPE 2 DIABETES MELLITUS W/OUT COMPLICATIONS: ICD-10-CM

## 2024-06-25 PROCEDURE — G2211 COMPLEX E/M VISIT ADD ON: CPT

## 2024-06-25 PROCEDURE — 96372 THER/PROPH/DIAG INJ SC/IM: CPT

## 2024-06-25 PROCEDURE — 99214 OFFICE O/P EST MOD 30 MIN: CPT | Mod: 25

## 2024-06-25 RX ORDER — DENOSUMAB 60 MG/ML
60 INJECTION SUBCUTANEOUS
Qty: 1 | Refills: 0 | Status: COMPLETED | OUTPATIENT
Start: 2024-06-25

## 2024-06-25 RX ADMIN — DENOSUMAB 0 MG/ML: 60 INJECTION SUBCUTANEOUS at 00:00

## 2024-06-27 ENCOUNTER — NON-APPOINTMENT (OUTPATIENT)
Age: 89
End: 2024-06-27

## 2024-06-27 LAB
24R-OH-CALCIDIOL SERPL-MCNC: 27.5 PG/ML
25(OH)D3 SERPL-MCNC: 22.9 NG/ML
ALBUMIN SERPL ELPH-MCNC: 3.7 G/DL
ALP BLD-CCNC: 116 U/L
ALT SERPL-CCNC: 53 U/L
ANION GAP SERPL CALC-SCNC: 10 MMOL/L
AST SERPL-CCNC: 48 U/L
BILIRUB SERPL-MCNC: 0.4 MG/DL
BUN SERPL-MCNC: 23 MG/DL
CA-I SERPL-SCNC: 5.6 MG/DL
CALCIUM SERPL-MCNC: 10.3 MG/DL
CALCIUM SERPL-MCNC: 10.3 MG/DL
CHLORIDE SERPL-SCNC: 100 MMOL/L
CHOLEST SERPL-MCNC: 113 MG/DL
CO2 SERPL-SCNC: 27 MMOL/L
CREAT SERPL-MCNC: 0.94 MG/DL
EGFR: 58 ML/MIN/1.73M2
ESTIMATED AVERAGE GLUCOSE: 148 MG/DL
GLUCOSE SERPL-MCNC: 199 MG/DL
HBA1C MFR BLD HPLC: 6.8 %
HDLC SERPL-MCNC: 46 MG/DL
LDLC SERPL CALC-MCNC: 51 MG/DL
MAGNESIUM SERPL-MCNC: 2.1 MG/DL
NONHDLC SERPL-MCNC: 67 MG/DL
PARATHYROID HORMONE INTACT: 63 PG/ML
PHOSPHATE SERPL-MCNC: 3.2 MG/DL
POTASSIUM SERPL-SCNC: 4.2 MMOL/L
PROT SERPL-MCNC: 9.5 G/DL
SODIUM SERPL-SCNC: 137 MMOL/L
T4 FREE SERPL-MCNC: 1.2 NG/DL
TRIGL SERPL-MCNC: 78 MG/DL
TSH SERPL-ACNC: 4.36 UIU/ML
VIT B12 SERPL-MCNC: 554 PG/ML

## 2024-09-04 ENCOUNTER — RX RENEWAL (OUTPATIENT)
Age: 89
End: 2024-09-04

## 2025-01-08 ENCOUNTER — APPOINTMENT (OUTPATIENT)
Dept: ENDOCRINOLOGY | Facility: CLINIC | Age: 89
End: 2025-01-08
Payer: MEDICARE

## 2025-01-08 VITALS
OXYGEN SATURATION: 100 % | HEART RATE: 77 BPM | WEIGHT: 89 LBS | HEIGHT: 58 IN | BODY MASS INDEX: 18.68 KG/M2 | DIASTOLIC BLOOD PRESSURE: 71 MMHG | SYSTOLIC BLOOD PRESSURE: 178 MMHG

## 2025-01-08 VITALS — DIASTOLIC BLOOD PRESSURE: 60 MMHG | SYSTOLIC BLOOD PRESSURE: 140 MMHG

## 2025-01-08 VITALS — HEART RATE: 76 BPM | OXYGEN SATURATION: 98 %

## 2025-01-08 DIAGNOSIS — M81.0 AGE-RELATED OSTEOPOROSIS W/OUT CURRENT PATHOLOGICAL FRACTURE: ICD-10-CM

## 2025-01-08 DIAGNOSIS — E78.00 PURE HYPERCHOLESTEROLEMIA, UNSPECIFIED: ICD-10-CM

## 2025-01-08 DIAGNOSIS — E11.9 TYPE 2 DIABETES MELLITUS W/OUT COMPLICATIONS: ICD-10-CM

## 2025-01-08 DIAGNOSIS — E21.0 PRIMARY HYPERPARATHYROIDISM: ICD-10-CM

## 2025-01-08 DIAGNOSIS — I10 ESSENTIAL (PRIMARY) HYPERTENSION: ICD-10-CM

## 2025-01-08 LAB — HBA1C MFR BLD HPLC: 6.9

## 2025-01-08 PROCEDURE — 99214 OFFICE O/P EST MOD 30 MIN: CPT | Mod: 25

## 2025-01-08 PROCEDURE — 83036 HEMOGLOBIN GLYCOSYLATED A1C: CPT | Mod: QW

## 2025-01-08 PROCEDURE — 96372 THER/PROPH/DIAG INJ SC/IM: CPT

## 2025-01-08 RX ORDER — DENOSUMAB 60 MG/ML
60 INJECTION SUBCUTANEOUS
Qty: 1 | Refills: 0 | Status: COMPLETED | OUTPATIENT
Start: 2025-01-08

## 2025-01-08 RX ADMIN — DENOSUMAB 0 MG/ML: 60 INJECTION SUBCUTANEOUS at 00:00

## 2025-01-10 LAB
25(OH)D3 SERPL-MCNC: 35.9 NG/ML
ALBUMIN SERPL ELPH-MCNC: 3.6 G/DL
ALP BLD-CCNC: 95 U/L
ALT SERPL-CCNC: 27 U/L
ANION GAP SERPL CALC-SCNC: 13 MMOL/L
AST SERPL-CCNC: 29 U/L
BILIRUB SERPL-MCNC: 0.4 MG/DL
BUN SERPL-MCNC: 30 MG/DL
CALCIUM SERPL-MCNC: 9.9 MG/DL
CALCIUM SERPL-MCNC: 9.9 MG/DL
CHLORIDE SERPL-SCNC: 103 MMOL/L
CHOLEST SERPL-MCNC: 102 MG/DL
CO2 SERPL-SCNC: 24 MMOL/L
CREAT SERPL-MCNC: 0.9 MG/DL
CREAT SPEC-SCNC: 87 MG/DL
EGFR: 61 ML/MIN/1.73M2
GLUCOSE SERPL-MCNC: 264 MG/DL
HCT VFR BLD CALC: 36.8 %
HDLC SERPL-MCNC: 39 MG/DL
HGB BLD-MCNC: 11.5 G/DL
LDLC SERPL CALC-MCNC: 46 MG/DL
MCHC RBC-ENTMCNC: 29.4 PG
MCHC RBC-ENTMCNC: 31.3 G/DL
MCV RBC AUTO: 94.1 FL
MICROALBUMIN 24H UR DL<=1MG/L-MCNC: 14.4 MG/DL
MICROALBUMIN/CREAT 24H UR-RTO: 166 MG/G
NONHDLC SERPL-MCNC: 63 MG/DL
PARATHYROID HORMONE INTACT: 82 PG/ML
PHOSPHATE SERPL-MCNC: 2.7 MG/DL
PLATELET # BLD AUTO: 278 K/UL
POTASSIUM SERPL-SCNC: 4.7 MMOL/L
PROT SERPL-MCNC: 8.7 G/DL
RBC # BLD: 3.91 M/UL
RBC # FLD: 13.9 %
SODIUM SERPL-SCNC: 139 MMOL/L
TRIGL SERPL-MCNC: 86 MG/DL
TSH SERPL-ACNC: 2.55 UIU/ML
WBC # FLD AUTO: 8.1 K/UL

## 2025-02-24 NOTE — ED PROVIDER NOTE - PSH
Bladder Prolapse, Acquired  ' 2013;  Insertion Pessary  S/P laparotomy  initially to remove pancreatic mass but did not visualize mass and closed: ' 2008
n/a

## 2025-03-12 ENCOUNTER — RX RENEWAL (OUTPATIENT)
Age: 89
End: 2025-03-12

## 2025-05-08 NOTE — PROGRESS NOTE ADULT - PROBLEM/PLAN-6
DISPLAY PLAN FREE TEXT
Previously Declined (within the last year)

## 2025-05-12 ENCOUNTER — RX RENEWAL (OUTPATIENT)
Age: 89
End: 2025-05-12

## 2025-06-24 NOTE — PROGRESS NOTE ADULT - ASSESSMENT
No in lab complications 83 YO F w/ hx of HTN , DM, TIA without residual deficits, no known CAD hx (stress test 11/2017 normal), presents to the ED with NSTEMI, s/p cath-medical managment for CAD. Course complicated by sepsis and anemia and now a possible PNA.

## 2025-07-02 ENCOUNTER — APPOINTMENT (OUTPATIENT)
Dept: ENDOCRINOLOGY | Facility: CLINIC | Age: 89
End: 2025-07-02

## 2025-07-09 ENCOUNTER — APPOINTMENT (OUTPATIENT)
Dept: ENDOCRINOLOGY | Facility: CLINIC | Age: 89
End: 2025-07-09
Payer: MEDICARE

## 2025-07-09 VITALS
WEIGHT: 84 LBS | BODY MASS INDEX: 17.63 KG/M2 | DIASTOLIC BLOOD PRESSURE: 67 MMHG | SYSTOLIC BLOOD PRESSURE: 170 MMHG | OXYGEN SATURATION: 100 % | HEART RATE: 83 BPM | HEIGHT: 58 IN

## 2025-07-09 VITALS — SYSTOLIC BLOOD PRESSURE: 120 MMHG | DIASTOLIC BLOOD PRESSURE: 60 MMHG

## 2025-07-09 LAB — HBA1C MFR BLD HPLC: 6.5

## 2025-07-09 PROCEDURE — 99214 OFFICE O/P EST MOD 30 MIN: CPT | Mod: 25

## 2025-07-09 PROCEDURE — 83036 HEMOGLOBIN GLYCOSYLATED A1C: CPT | Mod: QW

## 2025-07-09 PROCEDURE — 96372 THER/PROPH/DIAG INJ SC/IM: CPT

## 2025-07-09 RX ORDER — DENOSUMAB 60 MG/ML
60 INJECTION SUBCUTANEOUS
Qty: 1 | Refills: 0 | Status: COMPLETED | OUTPATIENT
Start: 2025-07-09

## 2025-07-09 RX ADMIN — DENOSUMAB 0 MG/ML: 60 INJECTION SUBCUTANEOUS at 00:00

## 2025-07-30 RX ORDER — LANCETS 33 GAUGE
EACH MISCELLANEOUS
Qty: 3 | Refills: 3 | Status: ACTIVE | COMMUNITY
Start: 2025-07-30 | End: 1900-01-01

## 2025-07-30 RX ORDER — BLOOD-GLUCOSE METER
W/DEVICE EACH MISCELLANEOUS
Qty: 1 | Refills: 0 | Status: ACTIVE | COMMUNITY
Start: 2025-07-30 | End: 1900-01-01

## 2025-08-04 ENCOUNTER — RX RENEWAL (OUTPATIENT)
Age: 89
End: 2025-08-04

## 2025-08-18 ENCOUNTER — RX RENEWAL (OUTPATIENT)
Age: 89
End: 2025-08-18